# Patient Record
Sex: MALE | Race: BLACK OR AFRICAN AMERICAN | Employment: OTHER | URBAN - METROPOLITAN AREA
[De-identification: names, ages, dates, MRNs, and addresses within clinical notes are randomized per-mention and may not be internally consistent; named-entity substitution may affect disease eponyms.]

---

## 2011-03-01 LAB — COLONOSCOPY, EXTERNAL: NORMAL

## 2016-12-01 LAB — PSA, EXTERNAL: 1.9

## 2017-02-07 ENCOUNTER — OFFICE VISIT (OUTPATIENT)
Dept: SLEEP MEDICINE | Age: 62
End: 2017-02-07

## 2017-02-07 VITALS
OXYGEN SATURATION: 94 % | DIASTOLIC BLOOD PRESSURE: 99 MMHG | TEMPERATURE: 98 F | HEART RATE: 74 BPM | SYSTOLIC BLOOD PRESSURE: 160 MMHG | HEIGHT: 71 IN | WEIGHT: 315 LBS | BODY MASS INDEX: 44.1 KG/M2

## 2017-02-07 DIAGNOSIS — G47.33 OSA (OBSTRUCTIVE SLEEP APNEA): Primary | ICD-10-CM

## 2017-02-07 DIAGNOSIS — E66.01 OBESITY, MORBID, BMI 50 OR HIGHER (HCC): ICD-10-CM

## 2017-02-07 RX ORDER — ATENOLOL 50 MG/1
TABLET ORAL
Refills: 3 | COMMUNITY
Start: 2016-12-30 | End: 2017-12-22 | Stop reason: SDUPTHER

## 2017-02-07 RX ORDER — DOCUSATE SODIUM 100 MG/1
100 CAPSULE ORAL 2 TIMES DAILY
COMMUNITY
End: 2019-02-20

## 2017-02-07 NOTE — PROGRESS NOTES
217 [facility] [place]. [place], [address]  Tel.  [phone]  Fax. 100 [facility] 60  [place], [address]  Tel.  [phone]  Fax. [phone] [address] [facility] Lydia Barrera [address]  Tel.  [phone]  Fax. [phone]       Subjective:      Cassidy Simmons is a 59 y.o. male who I am asked to see in consultation for evaluation and management of sleep apnea. He was diagnosed with sleep apnea several years ago. He is using his device regularly. He complains of awakening in the middle of the night because of SOB associated with mask air leaking. Symptoms began several months ago, gradually worsening since that time. He usually can fall asleep in 5 minutes. Family or house members note tossing and turning. He denies completely or partially paralyzed while falling asleep or waking up. There are maximal  mask comfort problems. The following problems are noted with PAP:     Drowsiness yes Problems exhaling yes   Snoring no Forget to put on no   Mask Comfortable yes Can't fall asleep no   Dry Mouth yes Mask falls off yes   Air Leaking yes Frequent awakenings yes     Rosendo Kyle DANII Simmons does wake up frequently at night. He is bothered by waking up too early and left unable to get back to sleep. He actually sleeps about 5 hours at night and wakes up about 4 times during the night. He does not work shifts:  .   Rosendo Kyle indicates he does get too little sleep at night. His bedtime is 0100. He awakens at 0600. He does take naps. He takes   naps a week lasting  . He has the following observed behaviors:  ;  .  Other remarks:      [place] Sleepiness Score: 16   which reflect moderate daytime drowsiness. No Known Allergies      Current Outpatient Prescriptions:     atenolol (TENORMIN) 50 mg tablet, TK 1 T PO D, Disp: , Rfl: 3    psyllium (METAMUCIL) powd, Take  by mouth., Disp: , Rfl:     docusate sodium (COLACE) 100 mg capsule, Take 100 mg by mouth two (2) times a day. , Disp: , Rfl:     bumetanide (BUMEX) 1 mg tablet, , Disp: , Rfl:     cholecalciferol (VITAMIN D3) 1,000 unit tablet, Take 2,000 Units by mouth daily. , Disp: , Rfl:     omeprazole (PRILOSEC OTC) 20 mg tablet, Take 20 mg by mouth daily. , Disp: , Rfl:     olmesartan (BENICAR) 40 mg tablet, Take 40 mg by mouth daily. , Disp: , Rfl:     allopurinol (ZYLOPRIM) 300 mg tablet, Take 300 mg by mouth daily. , Disp: , Rfl:     LG-XT-LK-Fe-Min-Lycopen-Lutein (CENTRUM) 0.4-162-18 mg Tab, Take  by mouth.  , Disp: , Rfl:     bumetanide (BUMEX) 0.5 mg tablet, Take 0.5 mg by mouth daily. , Disp: , Rfl:     aspirin 81 mg chewable tablet, Take 81 mg by mouth daily. , Disp: , Rfl:     clobetasol-emollient (TEMOVATE-E) 0.05 % topical cream, , Disp: , Rfl:     clotrimazole-betamethasone (LOTRISONE) topical cream, , Disp: , Rfl:     BOOSTRIX TDAP 2.5-8-5 Lf-mcg-Lf/0.5mL susp susp, , Disp: , Rfl:     VARICELLA-ZOSTER VIRUS INFECTION PROPHYLAXIS 19,400 unit/0.65 mL susr injection, , Disp: , Rfl:     albuterol (PROAIR HFA) 90 mcg/actuation inhaler, Take 2 puffs by inhalation every four (4) hours as needed for Wheezing., Disp: 1 Inhaler, Rfl: 1    chlorpheniramine-HYDROcodone (TUSSIONEX PENNKINETIC ER) 8-10 mg/5 mL suspension, Take 5 mL by mouth every twelve (12) hours as needed for Cough. , Disp: 60 mL, Rfl: 0     He  has a past medical history of Community acquired pneumonia; Environmental allergies; Hypertension; and Sleep apnea. He  has a past surgical history that includes orthopaedic. He family history includes Diabetes in an other family member; Heart Disease in an other family member; Hypertension in an other family member. He  reports that he has never smoked. He does not have any smokeless tobacco history on file. He reports that he does not drink alcohol. Review of Systems:  Constitutional:  No significant weight loss or weight gain. Eyes:  No blurred vision.   CVS:  No significant chest pain  Pulm:  No significant shortness of breath  GI:  No significant nausea or vomiting  :  No significant nocturia  Musculoskeletal:  No significant joint pain at night  Skin:  No significant rashes  Neuro:  No significant dizziness   Psych:  No active mood issues    Sleep Review of Systems: notable for no difficulty falling asleep; frequent awakenings at night;  irregular dreaming noted; no nightmares ; no early morning headaches ; no memory problems; no concentration issues; no history of any automobile or occupational accidents due to daytime drowsiness. Objective:     Visit Vitals    BP (!) 160/99    Pulse 74    Temp 98 °F (36.7 °C)    Ht 5' 11\" (1.803 m)    Wt (!) 377 lb (171 kg)    SpO2 94%    BMI 52.58 kg/m2         General:   Not in acute distress   Eyes:  Anicteric sclerae, no obvious strabismus   Nose:  No obvious nasal septum deviation    Oropharynx:   Class 3 oropharyngeal outlet, thick tongue base, enlarged and boggy uvula, low-lying soft palate, narrow tonsilo-pharyngeal pilars   Tonsils:   tonsils are unappreciated   Neck:   Neck circ. in \"inches\": 18.5; midline trachea   Chest/Lungs:  Equal lung expansion, clear on auscultation    CVS:  Normal rate, regular rhythm; no JVD   Skin:  Warm to touch; no obvious rashes   Neuro:  No focal deficits ; no obvious tremor    Psych:  Normal affect,  normal countenance;          Assessment:       ICD-10-CM ICD-9-CM    1. TERESA (obstructive sleep apnea) G47.33 327.23 AMB SUPPLY ORDER   2. Obesity, morbid, BMI 50 or higher ([facility] E66.01 278.01        Plan:     * He was provided information on sleep apnea including coresponding risk factors and the importance of proper treatment. * He was likewise counseled on weight management and lateral sleeping posture (with the use of bed pillows or wedge) which may reduce sleep apnea events. Caution with hypnotics, alcohol, and sedating pain medications as these can worsen sleep apnea.   Adherence to PAP therapy sub-optimal.  We'll request Sleep Educator assessment to improve adherence to PAP therapy - see tech notes  Follow-up Disposition:  Return in about 1 month (around 3/7/2017) for CPAP Clinic. Counseling was provided regarding the importance of regular PAP use and on proper sleep hygiene and safe driving. I have reviewed/discussed the above normal BMI with the patient. I have recommended the following interventions: dietary management education, guidance, and counseling . The plan is as follows: I have counseled this patient on diet and exercise regimens. .    Thank you for allowing us to participate in your patient's medical care.     Manuel Glaser M.D.  (electronically signed)  Diplomate in Sleep Medicine, [facility]

## 2017-02-07 NOTE — MR AVS SNAPSHOT
Visit Information Date & Time Provider Department Dept. Phone Encounter #  
 2/7/2017 11:00 AM Mirian Palencia, Dionicio [facility] [id number] Follow-up Instructions Return in about 1 month (around 3/7/2017) for CPAP Clinic. Routing History Follow-up and Disposition History Upcoming Health Maintenance Date Due Hepatitis C Screening 1955 DTaP/Tdap/Td series (1 - Tdap) 12/29/1974 FOBT Q 1 YEAR AGE 50-75 12/29/2003 ZOSTER VACCINE AGE 60> 12/29/2013 INFLUENZA AGE 9 TO ADULT 8/1/2016 Allergies as of 2/7/2017  Review Complete On: 2/7/2017 By: Mirian Palencia MD  
 No Known Allergies Current Immunizations  Never Reviewed Name Date Influenza Vaccine 10/1/2012 Pneumococcal Vaccine (Unspecified Type) 10/1/2012 Not reviewed this visit You Were Diagnosed With   
  
 Codes Comments TERESA (obstructive sleep apnea)    -  Primary ICD-10-CM: G47.33 
ICD-9-CM: 327.23 Obesity, morbid, BMI 50 or higher (HCC)     ICD-10-CM: E66.01 
ICD-9-CM: 278.01 Vitals BP Pulse Temp Height(growth percentile) Weight(growth percentile) SpO2  
 (!) 160/99 74 98 °F (36.7 °C) 5' 11\" (1.803 m) (!) 377 lb (171 kg) 94% BMI Smoking Status 52.58 kg/m2 Never Smoker Vitals History BMI and BSA Data Body Mass Index Body Surface Area 52.58 kg/m 2 2.93 m 2 Preferred Pharmacy Pharmacy Name Phone [place] [phone], [address] AT [place] [address] [phone] Your Updated Medication List  
  
   
This list is accurate as of: 2/7/17 11:31 AM.  Always use your most recent med list.  
  
  
  
  
 albuterol 90 mcg/actuation inhaler Commonly known as:  PROAIR HFA Take 2 puffs by inhalation every four (4) hours as needed for Wheezing. allopurinol 300 mg tablet Commonly known as:  Arcadio Phan  
 Take 300 mg by mouth daily. aspirin 81 mg chewable tablet Take 81 mg by mouth daily. atenolol 50 mg tablet Commonly known as:  TENORMIN  
TK 1 T PO D  
  
 BENICAR 40 mg tablet Generic drug:  olmesartan Take 40 mg by mouth daily. BOOSTRIX TDAP 2.5-8-5 Lf-mcg-Lf/0.5mL Susp susp Generic drug:  Diphth, Pertus(Acell), Tetanus * bumetanide 0.5 mg tablet Commonly known as:  Ilan Valdo Take 0.5 mg by mouth daily. * bumetanide 1 mg tablet Commonly known as:  Ilan Valdo CENTRUM 0.4-162-18 mg Tab Generic drug:  AV-ZU-YX-Fe-Min-Lycopen-Lutein Take  by mouth. chlorpheniramine-HYDROcodone 10-8 mg/5 mL suspension Commonly known as:  Tg Grieves ER Take 5 mL by mouth every twelve (12) hours as needed for Cough. clobetasol-emollient 0.05 % topical cream  
Commonly known as:  TEMOVATE-E  
  
 clotrimazole-betamethasone topical cream  
Commonly known as:  LOTRISONE  
  
 COLACE 100 mg capsule Generic drug:  docusate sodium Take 100 mg by mouth two (2) times a day. PriLOSEC OTC 20 mg tablet Generic drug:  omeprazole Take 20 mg by mouth daily. psyllium Powd Commonly known as:  METAMUCIL Take  by mouth.  
  
 varicella-zoster vacine live 19,400 unit/0.65 mL Susr injection Generic drug:  varicella zoster vacine live VITAMIN D3 1,000 unit tablet Generic drug:  cholecalciferol Take 2,000 Units by mouth daily. * Notice: This list has 2 medication(s) that are the same as other medications prescribed for you. Read the directions carefully, and ask your doctor or other care provider to review them with you. We Performed the Following AMB SUPPLY ORDER [[id number] Custom] Comments:  
 Wireless modem enrollment with privileges to change therapy remotely - indefinite. PAP Mask -patient preference, tubing, filters as needed (all sleep supplies) Length of Need = 99 months Jorden Ashraf M.D.  (electronically signed) Diplomate in Sleep Medicine, ABI Follow-up Instructions Return in about 1 month (around 3/7/2017) for CPAP Clinic. Patient Instructions [address] [place]. [address] [facility], [address] Tel.  [phone] Fax. [address] [facility] [address] [place], [address] [facility] Tel.  [phone] Fax. [phone] [address] [facility] Lydia Barrera  Tel.  [phone] Fax. [phone] Learning About CPAP for Sleep Apnea What is CPAP? CPAP is a small machine that you use at home every night while you sleep. It increases air pressure in your throat to keep your airway open. When you have sleep apnea, this can help you sleep better so you feel much better. CPAP stands for \"continuous positive airway pressure. \" The CPAP machine will have one of the following: · A mask that covers your nose and mouth · Prongs that fit into your nose · A mask that covers your nose only, the most common type. This type is called NCPAP. The N stands for \"nasal.\" Why is it done? CPAP is usually the best treatment for obstructive sleep apnea. It is the first treatment choice and the most widely used. Your doctor may suggest CPAP if you have: · Moderate to severe sleep apnea. · Sleep apnea and coronary artery disease (CAD) or heart failure. How does it help? · CPAP can help you have more normal sleep, so you feel less sleepy and more alert during the daytime. · CPAP may help keep heart failure or other heart problems from getting worse. · NCPAP may help lower your blood pressure. · If you use CPAP, your bed partner may also sleep better because you are not snoring or restless. What are the side effects? Some people who use CPAP have: · A dry or stuffy nose and a sore throat. · Irritated skin on the face. · Sore eyes. · Bloating. If you have any of these problems, work with your doctor to fix them.  Here are some things you can try: · Be sure the mask or nasal prongs fit well. · See if your doctor can adjust the pressure of your CPAP. · If your nose is dry, try a humidifier. · If your nose is runny or stuffy, try decongestant medicine or a steroid nasal spray. If these things do not help, you might try a different type of machine. Some machines have air pressure that adjusts on its own. Others have air pressures that are different when you breathe in than when you breathe out. This may reduce discomfort caused by too much pressure in your nose. Where can you learn more? Go to Kno.be Enter J332 in the search box to learn more about \"Learning About CPAP for Sleep Apnea. \"  
© 9053-2703 Healthwise, Incorporated. Care instructions adapted under license by Navdeep Ayers (which disclaims liability or warranty for this information). This care instruction is for use with your licensed healthcare professional. If you have questions about a medical condition or this instruction, always ask your healthcare professional. [place] [address] any warranty or liability for your use of this information. Content Version: 4.7.95901; Last Revised: January 11, 2010 PROPER SLEEP HYGIENE What to avoid · Do not have drinks with caffeine, such as coffee or black tea, for 8 hours before bed. · Do not smoke or use other types of tobacco near bedtime. Nicotine is a stimulant and can keep you awake. · Avoid drinking alcohol late in the evening, because it can cause you to wake in the middle of the night. · Do not eat a big meal close to bedtime. If you are hungry, eat a light snack. · Do not drink a lot of water close to bedtime, because the need to urinate may wake you up during the night. · Do not read or watch TV in bed. Use the bed only for sleeping and sexual activity. What to try · Go to bed at the same time every night, and wake up at the same time every morning. Do not take naps during the day. · Keep your bedroom quiet, dark, and cool. · Get regular exercise, but not within 3 to 4 hours of your bedtime. [place] · Sleep on a comfortable pillow and mattress. · If watching the clock makes you anxious, turn it facing away from you so you cannot see the time. · If you worry when you lie down, start a worry book. Well before bedtime, write down your worries, and then set the book and your concerns aside. · Try meditation or other relaxation techniques before you go to bed. · If you cannot fall asleep, get up and go to another room until you feel sleepy. Do something relaxing. Repeat your bedtime routine before you go to bed again. · Make your house quiet and calm about an hour before bedtime. Turn down the lights, turn off the TV, log off the computer, and turn down the volume on music. This can help you relax after a busy day. Drowsy Driving: The [employer] cites drowsiness as a causing factor in more than 264,576 police reported crashes annually, resulting in 76,000 injuries and 1,500 deaths. Other surveys suggest 55% of people polled have driven while drowsy in the past year, 23% had fallen asleep but not crashed, 3% crashed, and 2% had and accident due to drowsy driving. Who is at risk? Young Drivers: One study of drowsy driving accidents states that 55% of the drivers were under 25 years. Of those, 75% were male. Shift Workers and Travelers: People who work overnight or travel across time zones frequently are at higher risk of experiencing Circadian Rhythm Disorders. They are trying to work and function when their body is programed to sleep. Sleep Deprived: Lack of sleep has a serious impact on your ability to pay attention or focus on a task. Consistently getting less than the average of 8 hours your body needs creates partial or cumulative sleep deprivation. Untreated Sleep Disorders: Sleep Apnea, Narcolepsy, R.L.S., and other sleep disorders (untreated) prevent a person from getting enough restful sleep. This leads to excessive daytime sleepiness and increases the risk for drowsy driving accidents by up to 7 times. Medications / Alcohol: Even over the counter medications can cause drowsiness. Medications that impair a drivers attention should have a warning label. Alcohol naturally makes you sleepy and on its own can cause accidents. Combined with excessive drowsiness its effects are amplified. Signs of Drowsy Driving: * You don't remember driving the last few miles * You may drift out of your quinton * You are unable to focus and your thoughts wander * You may yawn more often than normal 
 * You have difficulty keeping your eyes open / nodding off * Missing traffic signs, speeding, or tailgating Prevention-  
Good sleep hygiene, lifestyle and behavioral choices have the most impact on drowsy driving. There is no substitute for sleep and the average person requires 8 hours nightly. If you find yourself driving drowsy, stop and sleep. Consider the sleep hygiene tips provided during your visit as well. Medication Refill Policy: Refills for all medications require 1 week advance notice. Please have your pharmacy fax a refill request. We are unable to fax, or call in \"controled substance\" medications and you will need to pick these prescriptions up from our office. SeeControl Activation Thank you for requesting access to SeeControl. Please follow the instructions below to securely access and download your online medical record. SeeControl allows you to send messages to your doctor, view your test results, renew your prescriptions, schedule appointments, and more. How Do I Sign Up? 1. In your internet browser, go to [email or web address]. 2. Click on the First Time User? Click Here link in the Sign In box.  You will see the New Member Sign Up page. 3. Enter your [employer] Direct Access Code exactly as it appears below. You will not need to use this code after youve completed the sign-up process. If you do not sign up before the expiration date, you must request a new code. [employer] Direct Access Code: [email or web address] Expires: 2017 11:26 AM (This is the date your opvizorhart access code will ) 4. Enter the last four digits of your Social Security Number (xxxx) and Date of Birth (mm/dd/yyyy) as indicated and click Submit. You will be taken to the next sign-up page. 5. Create a [employer] Direct ID. This will be your [employer] Direct login ID and cannot be changed, so think of one that is secure and easy to remember. 6. Create a [employer] Direct password. You can change your password at any time. 7. Enter your Password Reset Question and Answer. This can be used at a later time if you forget your password. 8. Enter your e-mail address. You will receive e-mail notification when new information is available in [address] 9. Click Sign Up. You can now view and download portions of your medical record. 10. Click the Download Summary menu link to download a portable copy of your medical information. Additional Information If you have questions, please call [phone]. Remember, [employer] Direct is NOT to be used for urgent needs. For medical emergencies, dial 911. Starting a Weight Loss Plan: After Your Visit Your Care Instructions If you are thinking about losing weight, it can be hard to know where to start. Your doctor can help you set up a weight loss plan that best meets your needs. You may want to take a class on nutrition or exercise, or join a weight loss support group. If you have questions about how to make changes to your eating or exercise habits, ask your doctor about seeing a registered dietitian or an exercise specialist. 
It can be a big challenge to lose weight.  But you do not have to make huge changes at once. Make small changes, and stick with them. When those changes become habit, add a few more changes. If you do not think you are ready to make changes right now, try to pick a date in the future. Make an appointment to see your doctor to discuss whether the time is right for you to start a plan. Follow-up care is a key part of your treatment and safety. Be sure to make and go to all appointments, and call your doctor if you are having problems. It's also a good idea to know your test results and keep a list of the medicines you take. How can you care for yourself at home? · Set realistic goals. Many people expect to lose much more weight than is likely. A weight loss of 5% to 10% of your body weight may be enough to improve your health. · Get family and friends involved to provide support. Talk to them about why you are trying to lose weight, and ask them to help. They can help by participating in exercise and having meals with you, even if they may be eating something different. · Find what works best for you. If you do not have time or do not like to cook, a program that offers meal replacement bars or shakes may be better for you. Or if you like to prepare meals, finding a plan that includes daily menus and recipes may be best. 
· Ask your doctor about other health professionals who can help you achieve your weight loss goals. ¨ A dietitian can help you make healthy changes in your diet. ¨ An exercise specialist or  can help you develop a safe and effective exercise program. 
¨ A counselor or psychiatrist can help you cope with issues such as depression, anxiety, or family problems that can make it hard to focus on weight loss. · Consider joining a support group for people who are trying to lose weight. Your doctor can suggest groups in your area. Where can you learn more? Go to DealExplorer.be Enter O100 in the search box to learn more about \"Starting a Weight Loss Plan: After Your Visit. \"  
© 2897-6943 Healthwise, Incorporated. Care instructions adapted under license by [employer] [place] (which disclaims liability or warranty for this information). This care instruction is for use with your licensed healthcare professional. If you have questions about a medical condition or this instruction, always ask your healthcare professional. [place] [address] any warranty or liability for your use of this information. Content Version: 7.3.76507; Last Revised: September 1, 2009 Introducing [facility]! [employer] introduces L[employer]Works patient portal. Now you can access parts of your medical record, email your doctor's office, and request medication refills online. 1. In your internet browser, go to [email or web address] 2. Click on the First Time User? Click Here link in the Sign In box. You will see the New Member Sign Up page. 3. Enter your LootWorks Access Code exactly as it appears below. You will not need to use this code after youve completed the sign-up process. If you do not sign up before the expiration date, you must request a new code. · LootWorks Access Code: [email or web address] Expires: 5/8/2017 11:26 AM 
 
4. Enter the last four digits of your Social Security Number (xxxx) and Date of Birth (mm/dd/yyyy) as indicated and click Submit. You will be taken to the next sign-up page. 5. Create a LootWorks ID. This will be your LootWorks login ID and cannot be changed, so think of one that is secure and easy to remember. 6. Create a LootWorks password. You can change your password at any time. 7. Enter your Password Reset Question and Answer. This can be used at a later time if you forget your password. 8. Enter your e-mail address. You will receive e-mail notification when new information is available in [address] 9. Click Sign Up. You can now view and download portions of your medical record. 10. Click the Download Summary menu link to download a portable copy of your medical information. If you have questions, please visit the Frequently Asked Questions section of the [employer] website. Remember, [employer] is NOT to be used for urgent needs. For medical emergencies, dial 911. Now available from your iPhone and Android! Please provide this summary of care documentation to your next provider. Your primary care clinician is listed as Beth Beck II. If you have any questions after today's visit, please call [phone].

## 2017-02-07 NOTE — PATIENT INSTRUCTIONS
[address] [place]. [place], [address]  Tel.  [phone]  Fax. [address] [facility] 60  [place], [address] [facility]  Tel.  [phone]  Fax. [phone] [address]  Tel.  [phone]  Fax. [phone]     Learning About CPAP for Sleep Apnea  What is CPAP? CPAP is a small machine that you use at home every night while you sleep. It increases air pressure in your throat to keep your airway open. When you have sleep apnea, this can help you sleep better so you feel much better. CPAP stands for \"continuous positive airway pressure. \"  The CPAP machine will have one of the following:  · A mask that covers your nose and mouth  · Prongs that fit into your nose  · A mask that covers your nose only, the most common type. This type is called NCPAP. The N stands for \"nasal.\"  Why is it done? CPAP is usually the best treatment for obstructive sleep apnea. It is the first treatment choice and the most widely used. Your doctor may suggest CPAP if you have:  · Moderate to severe sleep apnea. · Sleep apnea and coronary artery disease (CAD) or heart failure. How does it help? · CPAP can help you have more normal sleep, so you feel less sleepy and more alert during the daytime. · CPAP may help keep heart failure or other heart problems from getting worse. · NCPAP may help lower your blood pressure. · If you use CPAP, your bed partner may also sleep better because you are not snoring or restless. What are the side effects? Some people who use CPAP have:  · A dry or stuffy nose and a sore throat. · Irritated skin on the face. · Sore eyes. · Bloating. If you have any of these problems, work with your doctor to fix them. Here are some things you can try:  · Be sure the mask or nasal prongs fit well. · See if your doctor can adjust the pressure of your CPAP. · If your nose is dry, try a humidifier.   · If your nose is runny or stuffy, try decongestant medicine or a steroid nasal spray. If these things do not help, you might try a different type of machine. Some machines have air pressure that adjusts on its own. Others have air pressures that are different when you breathe in than when you breathe out. This may reduce discomfort caused by too much pressure in your nose. Where can you learn more? Go to ShoutNow.be  Enter Perico Lang in the search box to learn more about \"Learning About CPAP for Sleep Apnea. \"   © 3775-3378 Healthwise, Incorporated. Care instructions adapted under license by [employer] (which disclaims liability or warranty for this information). This care instruction is for use with your licensed healthcare professional. If you have questions about a medical condition or this instruction, always ask your healthcare professional. [address] any warranty or liability for your use of this information. Content Version: 6.1.15432; Last Revised: January 11, 2010  PROPER SLEEP HYGIENE    What to avoid  · Do not have drinks with caffeine, such as coffee or black tea, for 8 hours before bed. · Do not smoke or use other types of tobacco near bedtime. Nicotine is a stimulant and can keep you awake. · Avoid drinking alcohol late in the evening, because it can cause you to wake in the middle of the night. · Do not eat a big meal close to bedtime. If you are hungry, eat a light snack. · Do not drink a lot of water close to bedtime, because the need to urinate may wake you up during the night. · Do not read or watch TV in bed. Use the bed only for sleeping and sexual activity. What to try  · Go to bed at the same time every night, and wake up at the same time every morning. Do not take naps during the day. · Keep your bedroom quiet, dark, and cool. · Get regular exercise, but not within 3 to 4 hours of your bedtime. .  · Sleep on a comfortable pillow and mattress.   · If watching the clock makes you anxious, turn it facing away from you so you cannot see the time. · If you worry when you lie down, start a worry book. Well before bedtime, write down your worries, and then set the book and your concerns aside. · Try meditation or other relaxation techniques before you go to bed. · If you cannot fall asleep, get up and go to another room until you feel sleepy. Do something relaxing. Repeat your bedtime routine before you go to bed again. · Make your house quiet and calm about an hour before bedtime. Turn down the lights, turn off the TV, log off the computer, and turn down the volume on music. This can help you relax after a busy day. Drowsy Driving: The [employer] cites drowsiness as a causing factor in more than 331,649 police reported crashes annually, resulting in 76,000 injuries and 1,500 deaths. Other surveys suggest 55% of people polled have driven while drowsy in the past year, 23% had fallen asleep but not crashed, 3% crashed, and 2% had and accident due to drowsy driving. Who is at risk? Young Drivers: One study of drowsy driving accidents states that 55% of the drivers were under 25 years. Of those, 75% were male. Shift Workers and Travelers: People who work overnight or travel across time zones frequently are at higher risk of experiencing Circadian Rhythm Disorders. They are trying to work and function when their body is programed to sleep. Sleep Deprived: Lack of sleep has a serious impact on your ability to pay attention or focus on a task. Consistently getting less than the average of 8 hours your body needs creates partial or cumulative sleep deprivation. Untreated Sleep Disorders: Sleep Apnea, Narcolepsy, R.L.S., and other sleep disorders (untreated) prevent a person from getting enough restful sleep. This leads to excessive daytime sleepiness and increases the risk for drowsy driving accidents by up to 7 times.   Medications / Alcohol: Even over the counter medications can cause drowsiness. Medications that impair a drivers attention should have a warning label. Alcohol naturally makes you sleepy and on its own can cause accidents. Combined with excessive drowsiness its effects are amplified. Signs of Drowsy Driving:   * You don't remember driving the last few miles   * You may drift out of your quinton   * You are unable to focus and your thoughts wander   * You may yawn more often than normal   * You have difficulty keeping your eyes open / nodding off   * Missing traffic signs, speeding, or tailgating  Prevention-   Good sleep hygiene, lifestyle and behavioral choices have the most impact on drowsy driving. There is no substitute for sleep and the average person requires 8 hours nightly. If you find yourself driving drowsy, stop and sleep. Consider the sleep hygiene tips provided during your visit as well. Medication Refill Policy: Refills for all medications require 1 week advance notice. Please have your pharmacy fax a refill request. We are unable to fax, or call in \"controled substance\" medications and you will need to pick these prescriptions up from our office. [facility] Activation    Thank you for requesting access to [facility]. Please follow the instructions below to securely access and download your online medical record. [facility] allows you to send messages to your doctor, view your test results, renew your prescriptions, schedule appointments, and more. How Do I Sign Up? 1. In your internet browser, go to [email or web address]. 2. Click on the First Time User? Click Here link in the Sign In box. You will see the New Member Sign Up page. 3. Enter your [facility] Access Code exactly as it appears below. You will not need to use this code after youve completed the sign-up process. If you do not sign up before the expiration date, you must request a new code.     [facility] Access Code: [email or web address]  Expires: 2017 11:26 AM (This is the date your [employer] access code will )    4. Enter the last four digits of your Social Security Number (xxxx) and Date of Birth (mm/dd/yyyy) as indicated and click Submit. You will be taken to the next sign-up page. 5. Create a [employer] ID. This will be your [employer] login ID and cannot be changed, so think of one that is secure and easy to remember. 6. Create a [employer] password. You can change your password at any time. 7. Enter your Password Reset Question and Answer. This can be used at a later time if you forget your password. 8. Enter your e-mail address. You will receive e-mail notification when new information is available in [address] 9. Click Sign Up. You can now view and download portions of your medical record. 10. Click the Download Summary menu link to download a portable copy of your medical information. Additional Information    If you have questions, please call [phone]. Remember, [employer] is NOT to be used for urgent needs. For medical emergencies, dial 911. Starting a Weight Loss Plan: After Your Visit  Your Care Instructions  If you are thinking about losing weight, it can be hard to know where to start. Your doctor can help you set up a weight loss plan that best meets your needs. You may want to take a class on nutrition or exercise, or join a weight loss support group. If you have questions about how to make changes to your eating or exercise habits, ask your doctor about seeing a registered dietitian or an exercise specialist.  It can be a big challenge to lose weight. But you do not have to make huge changes at once. Make small changes, and stick with them. When those changes become habit, add a few more changes. If you do not think you are ready to make changes right now, try to pick a date in the future.  Make an appointment to see your doctor to discuss whether the time is right for you to start a plan.  Follow-up care is a key part of your treatment and safety. Be sure to make and go to all appointments, and call your doctor if you are having problems. It's also a good idea to know your test results and keep a list of the medicines you take. How can you care for yourself at home? · Set realistic goals. Many people expect to lose much more weight than is likely. A weight loss of 5% to 10% of your body weight may be enough to improve your health. · Get family and friends involved to provide support. Talk to them about why you are trying to lose weight, and ask them to help. They can help by participating in exercise and having meals with you, even if they may be eating something different. · Find what works best for you. If you do not have time or do not like to cook, a program that offers meal replacement bars or shakes may be better for you. Or if you like to prepare meals, finding a plan that includes daily menus and recipes may be best.  · Ask your doctor about other health professionals who can help you achieve your weight loss goals. ¨ A dietitian can help you make healthy changes in your diet. ¨ An exercise specialist or  can help you develop a safe and effective exercise program.  ¨ A counselor or psychiatrist can help you cope with issues such as depression, anxiety, or family problems that can make it hard to focus on weight loss. · Consider joining a support group for people who are trying to lose weight. Your doctor can suggest groups in your area. Where can you learn more? Go to Diavibe.be  Enter U357 in the search box to learn more about \"Starting a Weight Loss Plan: After Your Visit. \"   © 5724-3706 Healthwise, Incorporated. Care instructions adapted under license by Elia Laura (which disclaims liability or warranty for this information).  This care instruction is for use with your licensed healthcare professional. If you have questions about a medical condition or this instruction, always ask your healthcare professional. [address] any warranty or liability for your use of this information.   Content Version: 5.3.89116; Last Revised: September 1, 2009

## 2017-08-01 ENCOUNTER — OFFICE VISIT (OUTPATIENT)
Dept: INTERNAL MEDICINE CLINIC | Age: 62
End: 2017-08-01

## 2017-08-01 VITALS
BODY MASS INDEX: 44.1 KG/M2 | TEMPERATURE: 98.5 F | WEIGHT: 315 LBS | HEART RATE: 74 BPM | RESPIRATION RATE: 16 BRPM | OXYGEN SATURATION: 97 % | DIASTOLIC BLOOD PRESSURE: 78 MMHG | HEIGHT: 71 IN | SYSTOLIC BLOOD PRESSURE: 122 MMHG

## 2017-08-01 DIAGNOSIS — J06.9 VIRAL URI WITH COUGH: Primary | ICD-10-CM

## 2017-08-01 RX ORDER — HYDROCODONE POLISTIREX AND CHLORPHENIRAMINE POLISTIREX 10; 8 MG/5ML; MG/5ML
5 SUSPENSION, EXTENDED RELEASE ORAL
Qty: 60 ML | Refills: 0 | Status: SHIPPED | OUTPATIENT
Start: 2017-08-01 | End: 2017-09-11

## 2017-08-01 RX ORDER — PROMETHAZINE HYDROCHLORIDE AND CODEINE PHOSPHATE 6.25; 1 MG/5ML; MG/5ML
5 SOLUTION ORAL
Qty: 240 ML | Refills: 0 | Status: SHIPPED | OUTPATIENT
Start: 2017-08-01 | End: 2017-08-01 | Stop reason: ALTCHOICE

## 2017-08-01 NOTE — PROGRESS NOTES
Mr. Martha Hastings comes to the office today complaining of rhinorrhea, dry cough, low grade temp and malaise. He didn't feel like he could work today. He didn't check his temperature. He says he knows he had one. Physical Examination:  GENERAL:  T: 98.5. BP: 122/78. P: 74 and regular. O2 sat on room air: 97%. WT: 381. HEENT:  Ears are clear. Nose - pale, swollen turbinates, clear drainage. Throat is normal.  NECK:  Without adenopathy or stridor. LUNGS:  Clear. Breath sounds diminished bilaterally due to poor inspiratory effort. HEART:  Regular rhythm, no murmurs or gallops. EXTREMITIES:  1+ edema. Impression:  1. Acute viral upper respiratory infection. Plan:  1. Use Tussionex, one teaspoon every 12 hours. 2. I have excused him from work until 08/03. 3. Follow up here as previously scheduled.

## 2017-08-01 NOTE — PROGRESS NOTES
Chief Complaint   Patient presents with    Cold Symptoms     congestion     Pt states has been having cold symptoms and congestion since 7/30/2017. Pt using fam cough drops. 1. Have you been to the ER, urgent care clinic since your last visit? Hospitalized since your last visit? No    2. Have you seen or consulted any other health care providers outside of the [facility] since your last visit? Include any pap smears or colon screening.  No

## 2017-08-01 NOTE — LETTER
NOTIFICATION RETURN TO WORK / SCHOOL 
 
8/1/2017 3:57 PM 
 
Mr. Wang Simmons 
[address] [facility] [address] [id number] To Whom It May Concern: 
 
Wang Simmons is currently under the care of Vandana Sparks. He will return to work/school on: 8/3/2017 If there are questions or concerns please have the patient contact our office. Sincerely, Gray Frye MD

## 2017-08-22 ENCOUNTER — OFFICE VISIT (OUTPATIENT)
Dept: INTERNAL MEDICINE CLINIC | Age: 62
End: 2017-08-22

## 2017-08-22 VITALS
SYSTOLIC BLOOD PRESSURE: 154 MMHG | BODY MASS INDEX: 53 KG/M2 | HEART RATE: 65 BPM | DIASTOLIC BLOOD PRESSURE: 91 MMHG | TEMPERATURE: 97.5 F | WEIGHT: 315 LBS

## 2017-08-22 DIAGNOSIS — R10.9 RIGHT FLANK PAIN: Primary | ICD-10-CM

## 2017-08-22 PROBLEM — M54.50 ACUTE RIGHT-SIDED LOW BACK PAIN WITHOUT SCIATICA: Status: ACTIVE | Noted: 2017-08-22

## 2017-08-22 LAB
BACTERIA UA POCT, BACTPOCT: NORMAL
BILIRUB UR QL STRIP: NEGATIVE
CASTS UA POCT: NORMAL
CLUE CELLS, CLUEPOCT: NEGATIVE
CRYSTALS UA POCT, CRYSPOCT: NEGATIVE
EPITHELIAL CELLS POCT, EPITHPOCT: NORMAL
GLUCOSE UR-MCNC: NEGATIVE MG/DL
KETONES P FAST UR STRIP-MCNC: NEGATIVE MG/DL
MUCUS UA POCT, MUCPOCT: NORMAL
PH UR STRIP: 5 [PH] (ref 5–7)
PROTEIN,URINE POC: NEGATIVE MG/DL
RBC UA POCT, RBCPOCT: NORMAL
SP GR UR STRIP: 1.01 (ref 1.01–1.02)
TRICH UA POCT, TRICHPOC: NEGATIVE
UA UROBILINOGEN AMB POC: NORMAL (ref 0.2–1)
URINALYSIS CLARITY POC: CLEAR
URINALYSIS COLOR POC: NORMAL
URINE BLOOD POC: NEGATIVE
URINE LEUKOCYTES POC: NORMAL
URINE NITRITES POC: NEGATIVE
WBC UA POCT, WBCPOCT: NORMAL
YEAST UA POCT, YEASTPOC: NEGATIVE

## 2017-08-22 RX ORDER — CELECOXIB 200 MG/1
200 CAPSULE ORAL 2 TIMES DAILY
Qty: 20 CAP | Refills: 2 | Status: SHIPPED | OUTPATIENT
Start: 2017-08-22 | End: 2017-08-22 | Stop reason: SDUPTHER

## 2017-08-22 RX ORDER — CYCLOBENZAPRINE HCL 5 MG
5 TABLET ORAL
Qty: 30 TAB | Refills: 1 | Status: SHIPPED | OUTPATIENT
Start: 2017-08-22 | End: 2017-09-11

## 2017-08-22 NOTE — PROGRESS NOTES
Mr. Nenita Frye comes to the office today complaining of right sided low back pain. He just returned from a week bus trip with a group to [place] and [place]. He said yesterday after work he could hardly get out of the car. There are no radicular symptoms. He's not had nausea, vomiting, fever or chills. Physical Examination:  GENERAL:  WT: 380 lbs. BP: initially 154/94, subsequently 130/84. T:  97.5. P: 65 and regular. LUNGS:  Clear. BACK:  There is no CVA tenderness to fist percussion. HEART:  Regular rhythm, no murmurs or gallops. ABDOMEN:  Obese, soft, non tender. MUSCULOSKELETAL:  Examination of lower back reveals tenderness over the right paraspinal lumbar musculature. He is moving quite gingerly. Studies:  UA is dipstick negative for blood. Impression:  1. Musculoskeletal right flank pain. Plan:  1. Flexeril 5 mg t.i.d.  2. Celebrex 200 mg b.i.d.  3. Ice and moist heat. 4. Excused from work until 08/24. 5. Follow up in December for full review or sooner prn.

## 2017-08-22 NOTE — LETTER
NOTIFICATION RETURN TO WORK / SCHOOL 
 
8/22/2017 4:07 PM 
 
Mr. Elba Simmons 
[address]  [address] [address] To Whom It May Concern: 
 
Elba Simmons is currently under the care of Jose Rees. He will return to work/school on: [id number] If there are questions or concerns please have the patient contact our office. Sincerely, Joy Mclain MD

## 2017-08-22 NOTE — PROGRESS NOTES
Reviewed record in preparation for visit and have obtained necessary documentation. Identified pt with two pt identifiers(name and ). Chief Complaint   Patient presents with    Back Pain        Coordination of Care Questionnaire:  :     1) Have you been to an emergency room, urgent care clinic since your last visit? No     Hospitalized since your last visit? No               2) Have you seen or consulted any other health care providers outside of [address] [place] [address] since your last visit?  No        Back pain since back to work after vacation   Right side most painful

## 2017-08-23 RX ORDER — CELECOXIB 200 MG/1
CAPSULE ORAL
Qty: 180 CAP | Refills: 2 | Status: SHIPPED | OUTPATIENT
Start: 2017-08-23 | End: 2017-09-11

## 2017-09-11 ENCOUNTER — OFFICE VISIT (OUTPATIENT)
Dept: INTERNAL MEDICINE CLINIC | Age: 62
End: 2017-09-11

## 2017-09-11 VITALS
HEIGHT: 71 IN | OXYGEN SATURATION: 93 % | DIASTOLIC BLOOD PRESSURE: 80 MMHG | BODY MASS INDEX: 44.1 KG/M2 | HEART RATE: 72 BPM | SYSTOLIC BLOOD PRESSURE: 130 MMHG | WEIGHT: 315 LBS | RESPIRATION RATE: 20 BRPM | TEMPERATURE: 97.9 F

## 2017-09-11 DIAGNOSIS — L29.9 PRURITUS: ICD-10-CM

## 2017-09-11 DIAGNOSIS — L30.9 ECZEMA, UNSPECIFIED TYPE: Primary | ICD-10-CM

## 2017-09-11 PROBLEM — E88.81 METABOLIC SYNDROME: Status: ACTIVE | Noted: 2017-09-11

## 2017-09-11 PROBLEM — R10.9 RIGHT FLANK PAIN: Status: RESOLVED | Noted: 2017-08-22 | Resolved: 2017-09-11

## 2017-09-11 PROBLEM — E78.5 DYSLIPIDEMIA: Status: ACTIVE | Noted: 2017-09-11

## 2017-09-11 PROBLEM — G47.33 OSA (OBSTRUCTIVE SLEEP APNEA): Status: ACTIVE | Noted: 2017-09-11

## 2017-09-11 RX ORDER — CETIRIZINE HCL 10 MG
10 TABLET ORAL 2 TIMES DAILY
COMMUNITY
Start: 2017-09-11 | End: 2019-02-20

## 2017-09-11 RX ORDER — TRIAMCINOLONE ACETONIDE 1 MG/G
CREAM TOPICAL 2 TIMES DAILY
Qty: 80 G | Refills: 0 | Status: SHIPPED | OUTPATIENT
Start: 2017-09-11 | End: 2019-02-20

## 2017-09-11 NOTE — PROGRESS NOTES
Chief Complaint   Patient presents with    Skin Problem     pt c/o red bump all over his upper body for 2 days.  it is itching bad used Benadryl spray and it didn't help

## 2017-09-11 NOTE — PROGRESS NOTES
Subjective:     Mr. Charlie Champion comes to the office today complaining of a rash and itching over the last three to four days time. He denies any unusual food ingestion. He denies any change in soaps, detergents, cologne, etc.  He's had no unusual ingestions. He's been using some Benadryl spray with improvement. He's had no fever or chills. He denies systemic symptoms otherwise. His rash is a vague erythematous rash, mostly over his elbows, left flank and shoulders. It mostly has an eczematoid appearance. He'll try a steroid cream, as well as high dose Zyrtec for the itching and see how he does. He'll call back if he doesn't improve.         Patient Active Problem List    Diagnosis Date Noted    HTN (hypertension) 05/23/2014     Priority: 1 - One    Dyslipidemia 09/11/2017     Priority: 2 - Two    TERESA (obstructive sleep apnea) 09/11/2017     Priority: 2 - Two    Metabolic syndrome 45/85/3834     Priority: 2 - Two    Esophageal reflux 05/23/2014     Priority: 2 - Two    Eczema 09/11/2017     Priority: 3 - Three    Pruritus 09/11/2017     Priority: 3 - Three    Essential hypertension, benign 02/16/2013     Priority: 3 - Three    Partial small bowel obstruction ([address] 05/22/2014     Priority: 4 - Four    Acute right-sided low back pain without sciatica 08/22/2017     Past Surgical History:   Procedure Laterality Date    HX ORTHOPAEDIC      right knee      Social History   Substance Use Topics    Smoking status: Never Smoker    Smokeless tobacco: Not on file    Alcohol use No     No Known Allergies    Review of Systems:  GEN: no weight loss, weight gain, fatigue or night sweats  CV: no PND, orthopnea, or palpitations  Resp: no dyspnea on exertion, no cough  Abd: no nausea, vomiting or diarrhea  Endocrine: no hair loss, excessive thirst or polyuria  Neurological ROS: no TIA or stroke symptoms      Objective:     Visit Vitals    /80 (BP 1 Location: Right arm, BP Patient Position: Sitting)    Pulse 72  Temp 97.9 °F (36.6 °C) (Oral)    Resp 20    Ht 5' 11\" (1.803 m)    Wt (!) 381 lb (172.8 kg)    SpO2 93%    BMI 53.14 kg/m2     General:   alert, cooperative and no distress   Eyes: conjunctivae/sclerae clear. PERRL   Mouth:  No oral lesions, no pharyngeal erythema, no exudates   Skin: Eczematoid rash over elbows, shoulder and left flank   Heart: S1 and S2 normal,no murmurs noted    Lungs: Clear to auscultation bilaterally, no increased work of breathing   Abdomen: Soft, nontender. Normal bowel sounds   Extremities: No edema or cyanosis                                Assessment/Plan:       ICD-10-CM ICD-9-CM    1. Eczema, unspecified type L30.9 692.9    2. Pruritus L29.9 698.9        Orders Placed This Encounter    triamcinolone acetonide (KENALOG) 0.1 % topical cream     Sig: Apply  to affected area two (2) times a day. use thin layer     Dispense:  80 g     Refill:  0    cetirizine (ZYRTEC) 10 mg tablet     Sig: Take 1 Tab by mouth two (2) times a day. Follow-up Disposition:  Return if symptoms worsen or fail to improve, for As scheduled.

## 2017-10-03 RX ORDER — CELECOXIB 200 MG/1
CAPSULE ORAL
Qty: 180 CAP | Refills: 0 | Status: SHIPPED | OUTPATIENT
Start: 2017-10-03 | End: 2019-02-20

## 2017-10-03 RX ORDER — CELECOXIB 200 MG/1
CAPSULE ORAL
Qty: 20 CAP | Refills: 0 | Status: SHIPPED | OUTPATIENT
Start: 2017-10-03 | End: 2017-10-03 | Stop reason: SDUPTHER

## 2017-10-31 RX ORDER — OLMESARTAN MEDOXOMIL 40 MG/1
TABLET ORAL
Qty: 30 TAB | Refills: 0 | Status: SHIPPED | OUTPATIENT
Start: 2017-10-31 | End: 2017-12-04 | Stop reason: SINTOL

## 2017-11-07 ENCOUNTER — OFFICE VISIT (OUTPATIENT)
Dept: INTERNAL MEDICINE CLINIC | Age: 62
End: 2017-11-07

## 2017-11-07 VITALS
HEIGHT: 71 IN | SYSTOLIC BLOOD PRESSURE: 131 MMHG | WEIGHT: 315 LBS | BODY MASS INDEX: 44.1 KG/M2 | TEMPERATURE: 98.6 F | HEART RATE: 73 BPM | RESPIRATION RATE: 18 BRPM | OXYGEN SATURATION: 95 % | DIASTOLIC BLOOD PRESSURE: 88 MMHG

## 2017-11-07 DIAGNOSIS — Z23 ENCOUNTER FOR IMMUNIZATION: ICD-10-CM

## 2017-11-07 DIAGNOSIS — J02.8 ACUTE PHARYNGITIS DUE TO OTHER SPECIFIED ORGANISMS: Primary | ICD-10-CM

## 2017-11-07 RX ORDER — AZITHROMYCIN 250 MG/1
TABLET, FILM COATED ORAL
Qty: 6 TAB | Refills: 0 | Status: SHIPPED | OUTPATIENT
Start: 2017-11-07 | End: 2017-11-12

## 2017-11-07 NOTE — PROGRESS NOTES
Chief Complaint   Patient presents with    Sinus Pain     States this started last week, sinus headaches    Ear Pain     States he has fluid in his ear, hear and feels the fluid    Sore Throat     states he feels the fluid draining down his throat with pressure     1. Have you been to the ER, urgent care clinic since your last visit? Hospitalized since your last visit? No    2. Have you seen or consulted any other health care providers outside of the [address] [place] [address] since your last visit? Include any pap smears or colon screening. No      Patient presents for routine immunizations. Patient denies any symptoms, reactions or allergies that would exclude them from being immunized today. After obtaining written consent, and per verbal orders of Dr. Steven Macedo, injection of influenza given. Risks and adverse reactions were discussed and the VIS was given to them. All questions were addressed. Patient was observed for 20 minutes post injection. There were no reactions observed at this time. Advised patient to call with any concerns or signs or symptoms of adverse reaction.     Abdi Ackerman LPN

## 2017-11-07 NOTE — PROGRESS NOTES
Mr. Sherrie Heath comes to the office today complaining of several days of sore throat, stopped up left ear and postnasal drip. He's not had fevers, chills or cough. He was taking Xyzal, but stopped it. Steroid nose sprays make him have epistaxis. Physical Examination:  GENERAL:  WT: 381. T: 98.6. BP: 131/88. P: 73 and regular. O2 sat on room air: 95%. HEENT:  Ears show structurally normal appearing TMs that move normally with valsalva. Nose - pale, swollen turbinates. No significant obstruction. Throat is erythematous. NECK:  Without adenopathy or stridor. LUNGS:  Clear. HEART:  Regular rhythm, no murmurs or gallops. Impression:  1. Acute pharyngitis. Plan:  1. Z-Yair as directed. 2. Resume Xyzal, one daily. 3. Follow up as previously scheduled for full review in November.

## 2017-11-22 PROBLEM — R07.9 CHEST PAIN SYNDROME: Status: ACTIVE | Noted: 2017-11-22

## 2017-11-22 PROBLEM — E88.89 STEATOSIS (HCC): Status: ACTIVE | Noted: 2017-11-22

## 2017-11-22 PROBLEM — J30.9 ALLERGIC RHINITIS: Status: ACTIVE | Noted: 2017-11-22

## 2017-11-22 PROBLEM — E61.1 IRON DEFICIENCY: Status: ACTIVE | Noted: 2017-11-22

## 2017-11-22 PROBLEM — M10.9 GOUT: Status: ACTIVE | Noted: 2017-11-22

## 2017-11-22 PROBLEM — R19.7 DIARRHEA: Status: ACTIVE | Noted: 2017-11-22

## 2017-11-22 PROBLEM — E66.9 OBESITY: Status: ACTIVE | Noted: 2017-11-22

## 2017-11-22 PROBLEM — H65.22 LEFT CHRONIC SEROUS OTITIS MEDIA: Status: ACTIVE | Noted: 2017-11-22

## 2017-11-22 PROBLEM — G47.00 INSOMNIA: Status: ACTIVE | Noted: 2017-11-22

## 2017-11-22 PROBLEM — R06.00 DYSPNEA: Status: ACTIVE | Noted: 2017-11-22

## 2017-11-27 ENCOUNTER — LAB ONLY (OUTPATIENT)
Dept: INTERNAL MEDICINE CLINIC | Age: 62
End: 2017-11-27

## 2017-11-27 DIAGNOSIS — E88.81 METABOLIC SYNDROME: ICD-10-CM

## 2017-11-27 DIAGNOSIS — E78.5 DYSLIPIDEMIA: ICD-10-CM

## 2017-11-27 DIAGNOSIS — Z20.5 EXPOSURE TO HEPATITIS C: Primary | ICD-10-CM

## 2017-11-27 DIAGNOSIS — Z00.00 ANNUAL PHYSICAL EXAM: ICD-10-CM

## 2017-11-27 DIAGNOSIS — M10.9 ACUTE GOUT, UNSPECIFIED CAUSE, UNSPECIFIED SITE: ICD-10-CM

## 2017-11-27 LAB
ALBUMIN SERPL-MCNC: 4 G/DL (ref 3.9–5.4)
ALKALINE PHOS POC: 108 U/L (ref 38–126)
ALT SERPL-CCNC: 48 U/L (ref 9–52)
AST SERPL-CCNC: 33 U/L (ref 14–36)
BACTERIA UA POCT, BACTPOCT: NORMAL
BILIRUB UR QL STRIP: NEGATIVE
BUN BLD-MCNC: 20 MG/DL (ref 9–20)
CALCIUM BLD-MCNC: 9.4 MG/DL (ref 8.4–10.2)
CASTS UA POCT: 0
CHLORIDE BLD-SCNC: 101 MMOL/L (ref 98–107)
CHOLEST SERPL-MCNC: 204 MG/DL (ref 0–200)
CK (CPK) POC: 334 U/L (ref 30–135)
CLUE CELLS, CLUEPOCT: NEGATIVE
CO2 POC: 31 MMOL/L (ref 22–32)
CREAT BLD-MCNC: 1 MG/DL (ref 0.8–1.5)
CRYSTALS UA POCT, CRYSPOCT: NEGATIVE
EGFR (POC): 79.7
EPITHELIAL CELLS POCT: NEGATIVE
GLUCOSE POC: 100 MG/DL (ref 75–110)
GLUCOSE UR-MCNC: NEGATIVE MG/DL
GRAN# POC: 4.3 K/UL (ref 2–7.8)
GRAN% POC: 59.3 % (ref 37–92)
HBA1C MFR BLD HPLC: 6.1 % (ref 4.5–5.7)
HCT VFR BLD CALC: 40.7 % (ref 37–51)
HDLC SERPL-MCNC: 35 MG/DL (ref 35–130)
HGB BLD-MCNC: 13.3 G/DL (ref 12–18)
IRON POC: 98 UG/DL (ref 49–181)
IRON SATURATION POC: 23 % (ref 15–55)
KETONES P FAST UR STRIP-MCNC: NEGATIVE MG/DL
LDL CHOLESTEROL POC: 98.4 MG/DL (ref 0–130)
LY# POC: 2.4 K/UL (ref 0.6–4.1)
LY% POC: 34 % (ref 10–58.5)
MCH RBC QN: 30.8 PG (ref 26–32)
MCHC RBC-ENTMCNC: 32.7 G/DL (ref 30–36)
MCV RBC: 94 FL (ref 80–97)
MID #, POC: 0.4 K/UL (ref 0–1.8)
MID% POC: 6.7 % (ref 0.1–24)
MUCUS UA POCT, MUCPOCT: NORMAL
PH UR STRIP: 6 [PH] (ref 5–7)
PLATELET # BLD: 189 K/UL (ref 140–440)
POTASSIUM SERPL-SCNC: 4.1 MMOL/L (ref 3.6–5)
PROT SERPL-MCNC: 7.5 G/DL (ref 6.3–8.2)
PROT UR QL STRIP: NEGATIVE
PSA SERPL-MCNC: 1.8 NG/ML (ref 0–4)
RBC # BLD: 4.32 M/UL (ref 4.2–6.3)
RBC UA POCT, RBCPOCT: NORMAL
SODIUM SERPL-SCNC: 142 MMOL/L (ref 137–145)
SP GR UR STRIP: 1.02 (ref 1.01–1.02)
TCHOL/HDL RATIO (POC): 5.8 (ref 0–4)
TIBC POC: 433 UG/DL (ref 260–462)
TOTAL BILIRUBIN POC: 0.6 MG/DL (ref 0.2–1.3)
TRICH UA POCT, TRICHPOC: NEGATIVE
TRIGL SERPL-MCNC: 353 MG/DL (ref 0–200)
TSH BLD-ACNC: 1.68 UIU/ML (ref 0.4–4.2)
UA UROBILINOGEN AMB POC: NORMAL (ref 0.2–1)
URIC ACID, POC: 5.4 MG/DL (ref 3.5–8.5)
URINALYSIS CLARITY POC: CLEAR
URINALYSIS COLOR POC: NORMAL
URINE BLOOD POC: NEGATIVE
URINE CULT COMMENT, POCT: NORMAL
URINE LEUKOCYTES POC: NEGATIVE
URINE NITRITES POC: NEGATIVE
VITAMIN D POC: 27.6 NG/ML (ref 30–96)
VLDLC SERPL CALC-MCNC: 70.6 MG/DL
WBC # BLD: 7.1 K/UL (ref 4.1–10.9)
WBC UA POCT, WBCPOCT: 0
YEAST UA POCT, YEASTPOC: NEGATIVE

## 2017-11-28 LAB — HCV AB S/CO SERPL IA: <0.1 S/CO RATIO (ref 0–0.9)

## 2017-12-04 ENCOUNTER — OFFICE VISIT (OUTPATIENT)
Dept: INTERNAL MEDICINE CLINIC | Age: 62
End: 2017-12-04

## 2017-12-04 VITALS
HEIGHT: 71 IN | TEMPERATURE: 98 F | OXYGEN SATURATION: 96 % | WEIGHT: 315 LBS | BODY MASS INDEX: 44.1 KG/M2 | HEART RATE: 80 BPM | SYSTOLIC BLOOD PRESSURE: 160 MMHG | DIASTOLIC BLOOD PRESSURE: 88 MMHG

## 2017-12-04 DIAGNOSIS — I10 ESSENTIAL HYPERTENSION: ICD-10-CM

## 2017-12-04 DIAGNOSIS — T46.4X5A COUGH DUE TO ANGIOTENSIN-CONVERTING ENZYME INHIBITOR: Primary | ICD-10-CM

## 2017-12-04 DIAGNOSIS — R05.8 COUGH DUE TO ANGIOTENSIN-CONVERTING ENZYME INHIBITOR: Primary | ICD-10-CM

## 2017-12-04 DIAGNOSIS — Z00.00 ANNUAL PHYSICAL EXAM: ICD-10-CM

## 2017-12-04 PROBLEM — E66.01 OBESITY, MORBID (HCC): Status: ACTIVE | Noted: 2017-12-04

## 2017-12-04 RX ORDER — AMLODIPINE BESYLATE 5 MG/1
5 TABLET ORAL DAILY
Qty: 30 TAB | Refills: 11 | Status: SHIPPED | OUTPATIENT
Start: 2017-12-04 | End: 2017-12-04 | Stop reason: SDUPTHER

## 2017-12-04 RX ORDER — AMLODIPINE BESYLATE 5 MG/1
TABLET ORAL
Qty: 90 TAB | Refills: 11 | Status: SHIPPED | OUTPATIENT
Start: 2017-12-04 | End: 2018-08-16 | Stop reason: ALTCHOICE

## 2017-12-04 NOTE — LETTER
12/4/2017 1:16 PM 
 
Mr. Farhat Raya Day 
[address]  [address] [address] Dear Mr. Simmons: 
 
I am writing this letter as a follow-up to your recent physical examination. I have enclosed copies of your lab work for your review. After going over this information, if you have any questions please give me a call. Your current active and past medical problems include: 1. Hypertension. 2. Hyperlipidemia. 3. Obesity. 4. Sleep apnea, for which you are supposed to be using CPAP nightly. 5. Swelling in your lower extremities, called lymphedema. 6. You had a cardiac catheterization in 2006 following an abnormal stress test.  The catheterization was normal. 
7. Gastroesophageal reflux disease. 8. Chronic cough secondary to Benicar. 9. Vitamin D deficiency. 10. You were hospitalized at [facility] in 2014 with a small bowel obstruction that resolved with conservative treatment. Your current medications are: 1. Allopurinol 300 mg daily. 2. Bumex 2 mg daily. 3. Amlodipine 5 mg daily. 4. Atenolol 50 mg daily. 5. Centrum Silver, one daily. 6. Metamucil daily. 7. Aspirin 81 mg daily. On physical examination, your height was 5'11\". Your weight was 390 pounds. BMI 54.6. Your blood pressure was 144/94. Temperature 98. Your pulse was 80 and regular. Your physical examination was generally normal.  You do have swelling in both legs and changes in the skin. It is essential that you use the moisturizing lotion at least once a day on those legs, or if the skin breaks down we will have a terrible time trying to get them to heal.  Your resting electrocardiogram was within normal limits. Pulmonary function testing was abnormal, likely due to your weight and nothing intrinsically wrong with your lungs. Hearing testing showed a symmetrical hearing loss across all frequencies tested, worse in the higher frequencies.  
 
Your laboratory studies revealed your hemoglobin and iron stores to be normal.  Your blood sugar was 100 and hemoglobin A1c, which tells me what your average blood sugar has been for the last six weeks, is slightly elevated at 6.1. If your A1c is less than 6.5, but greater than 5.7, we label you as prediabetes. You have a family history of diabetes. The treatment at this point is weight loss. PSA, which is a test for prostate cancer, was normal and stable at 1.8. You were slightly vitamin D deficient, but you have just started a new vitamin that should correct that. Thyroid function is normal.  Urinalysis is normal.  We checked you for the hepatitis C virus and that is negative. Uric acid is 5.4. As long as that is less than 6 your risk of gout is low. Total cholesterol was 204, triglycerides were 353. In order to lower your triglycerides you must restrict carbohydrates. You had a colonoscopy in 2016. That should be repeated in 2021. You are up to date on influenza, shingles, tetanus and whooping cough vaccines. I have changed Benicar to Amlodipine for your blood pressure and I think that will resolve your chronic cough. At this point in your life the major risk to your long term health is your weight. We have talked about diet and weight loss for many years without any significant results. The chances of you losing a substantial amount of weight at this point in your life is remote. Again, I ask you to consider having bariatric surgery. You would lose 100 pounds and a lot of your health problems would disappear. Please discuss this with your wife. I look forward to seeing you again in January to check your weight and blood pressure. Sincerely, Alejandro Jung MD

## 2017-12-04 NOTE — PROGRESS NOTES
Mr. Jaleel Franco comes to the office today for a comprehensive medical evaluation. Past Medical History:  1. Hypertension. 2. Hyperlipidemia. 3. Obesity. 4. Sleep apnea, on CPAP, but not using it regularly. 5. Gout. 6. Status post arthroscopic menisectomy right knee. 7. Lymphedema of lower extremities. 8. Chest pain syndrome, followed by abnormal stress Cardiolite, . Following the Cardiolite he had a cardiac catheterization that showed normal left ventricular function and no significant coronary artery disease. 9. GERD. 10. Hospitalized in  with community acquired pneumonia. 11. Status post colonoscopic polypectomy. 12. Metabolic syndrome. 13. Allergic rhinitis. 14. Vitamin D deficiency. 15. Pruritus ani.  16. Hospitalized at [address]  to 14 with small bowel obstruction and that resolved with conservative treatment. 17. Restrictive lung disease due to body habitus. Current Medications:  1. Allopurinol 300 mg daily. 2. Aspirin 81 mg daily. 3. Atenolol 50 mg daily. 4. Bumex 2 mg daily. 5. Zyrtec 10 mg daily. 6. Metamucil once daily. 7. Omeprazole 20 mg daily. Drug Allergies:  None. ACE inhibitors caused a chronic cough. 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 He is a lifelong nonsmoker and occasional social drinker. He is . Family History:  His mother  at age 72 from complications of CHF. She was also a type 2 diabetic. His father is alive at age 80. He is a type 2 diabetic. Mr. Jaleel Franco has seven siblings. He has a brother who  in his 92W from complications of diabetes and end stage renal disease. He also had a heart attack and bilateral amputations. He has an uncle with prostate cancer, another uncle with colon cancer. Review of Systems:  Mr. Simmons complains of a chronic cough. Cough is nonproductive. He does not have chest pain, chest pressure, chest tightness, PND or orthopnea.   He does have ankle edema. He has nocturia x 3-4. He has noticed that when he wears his CPAP mask he doesn't get up to urinate as frequently. Physical Examination:  GENERAL:  HT: 5'11\". WT: 390. BMI: 54.6. BP: 160/88 initially, I got 144/94. P: 80 and regular. O2 saturation on room air: 96%. HEENT:  Head normocephalic, atraumatic. Eyes - pupils midrange, equal directly and consensually. Extraocular movements are normal.  Ears, nose and throat are normal.  NECK:  Without JVD, adenopathy, thyromegaly or carotid bruits. LUNGS:  Clear. HEART:   Quiet precordium. Regular rate and rhythm. No audible murmurs or gallops. ABDOMEN:  Massively obese, soft, non tender. No detectable hepatosplenomegaly, masses or bruits. :  Genitalia, normal male, testes descended bilaterally without masses, tenderness or inguinal hernia. RECTAL:  Palpably normal prostate gland, stool brown and heme negative. EXTREMITIES:  1-2+ pedal, ankle and pretibial edema bilaterally. There are brawny changes in the skin on the anterior calves. Pulses are normal.  SKIN:  No suspicious lesions. NEUROLOGICAL:  Cranial nerves II-XII are intact. Strength, gait and mental status are normal for age. Studies:  Pulmonary function testing shows restriction as before. EKG is WNL. Hearing testing shows symmetrical hearing loss across all frequencies tested, worse in the higher frequencies. Laboratory:  HGB and iron stores are normal. Blood sugar is 100 and Hgb A1c is 6.1. Potassium, kidney function, calcium level and LFTs are normal.  Uric acid 5.4.  UA is normal.  Vitamin D 27.6. PSA 1.8. TSH 1.7. Total cholesterol 204, triglycerides 353, LDL 98, HDL 35. Impression:  1. Hypertension, poor control. 2. Obesity. 3. Metabolic syndrome. 4. Prediabetes. 5. Sleep apnea. 6. Lymphedema. 7. Hyperlipidemia. 8. Chronic cough due to Benicar. Plan:  1. Discontinue Benicar. Start Amlodipine 5 mg daily.   2. Continue other medications as outlined. 3. We talked about diet and salt restriction. 4. Flu vaccine, shingles, tetanus and whooping cough are up to date. 5. Next colonoscopy will be due in 2021.  6. Follow up here in six weeks for recheck.

## 2017-12-04 NOTE — PROGRESS NOTES
Reviewed record in preparation for visit and have obtained necessary documentation. Identified pt with two pt identifiers(name and ). Chief Complaint   Patient presents with    Complete Physical        Coordination of Care Questionnaire:  :     1) Have you been to an emergency room, urgent care clinic since your last visit? No    Hospitalized since your last visit? No    :          2) Have you seen or consulted any other health care providers outside of [address] [place] [address] since your last visit?  No

## 2017-12-04 NOTE — LETTER
NOTIFICATION RETURN TO WORK / SCHOOL 
 
12/4/2017 9:51 AM 
 
Mr. Wanda Simmons 
[address] [facility] [address] [address] To Whom It May Concern: 
 
Wanda Simmons is currently under the care of Vandana Sparks. He will return to work/school on: 12/05/2017 If there are questions or concerns please have the patient contact our office. Sincerely, Fariha Wagoner MD

## 2017-12-04 NOTE — Clinical Note
Mr. Teddy Thomas comes to the office today for a comprehensive medical evaluation. Past Medical History: 1. Hypertension. 2. Hyperlipidemia. 3. Obesity. 4. Sleep apnea, on CPAP, but not using it regularly. 5. Gout. 6. Status post arthroscopic menisectomy right knee. 7. Lymphedema of lower extremities. 8. Chest pain syndrome, followed by abnormal stress Cardiolite, . Following the Cardiolite he had a cardiac catheterization that showed normal left ventricular function and no significant coronary artery disease. 9. GERD. 10. Hospitalized in  with community acquired pneumonia. 11. Status post colonoscopic polypectomy. 12. Metabolic syndrome. 13. Allergic rhinitis. 14. Vitamin D deficiency. 15. Pruritus ani. 16. Hospitalized at [address]  to 14 with small bowel obstruction and that resolved with conservative treatment. 17. Restrictive lung disease due to body habitus. Current Medications: 1. Allopurinol 300 mg daily. 2. Aspirin 81 mg daily. 3. Atenolol 50 mg daily. 4. Bumex 2 mg daily. 5. Zyrtec 10 mg daily. 6. Metamucil once daily. 7. Omeprazole 20 mg daily. Drug Allergies:  None. ACE inhibitors caused a chronic cough. 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 He is a lifelong nonsmoker and occasional social drinker. He is . Family History:  His mother  at age 72 from complications of CHF. She was also a type 2 diabetic. His father is alive at age 80. He is a type 2 diabetic. Mr. Teddy Thomas has seven siblings. He has a brother who  in his 07T from complications of diabetes and end stage renal disease. He also had a heart attack and bilateral amputations. He has an uncle with prostate cancer, another uncle with colon cancer. Review of Systems:  Mr. Simmons complains of a chronic cough. Cough is nonproductive.   He does not have chest pain, chest pressure, chest tightness, PND or orthopnea. He does have ankle edema. He has nocturia x 3-4. He has noticed that when he wears his CPAP mask he doesn't get up to urinate as frequently. Physical Examination: 
GENERAL:  HT: 5'11\". WT: 390. BMI: 54.6. BP: 160/88 initially, I got 144/94. P: 80 and regular. O2 saturation on room air: 96%. HEENT:  Head normocephalic, atraumatic. Eyes - pupils midrange, equal directly and consensually. Extraocular movements are normal.  Ears, nose and throat are normal. 
NECK:  Without JVD, adenopathy, thyromegaly or carotid bruits. LUNGS:  Clear. HEART:   Quiet precordium. Regular rate and rhythm. No audible murmurs or gallops. ABDOMEN:  Massively obese, soft, non tender. No detectable hepatosplenomegaly, masses or bruits. :  Genitalia, normal male, testes descended bilaterally without masses, tenderness or inguinal hernia. RECTAL:  Palpably normal prostate gland, stool brown and heme negative. EXTREMITIES:  1-2+ pedal, ankle and pretibial edema bilaterally. There are brawny changes in the skin on the anterior calves. Pulses are normal. 
SKIN:  No suspicious lesions. NEUROLOGICAL:  Cranial nerves II-XII are intact. Strength, gait and mental status are normal for age. Studies:  Pulmonary function testing shows restriction as before. EKG is WNL. Hearing testing shows symmetrical hearing loss across all frequencies tested, worse in the higher frequencies. Laboratory:  HGB and iron stores are normal. Blood sugar is 100 and Hgb A1c is 6.1. Potassium, kidney function, calcium level and LFTs are normal.  Uric acid 5.4.  UA is normal.  Vitamin D 27.6. PSA 1.8. TSH 1.7. Total cholesterol 204, triglycerides 353, LDL 98, HDL 35. Impression: 1. Hypertension, poor control. 2. Obesity. 3. Metabolic syndrome. 4. Prediabetes. 5. Sleep apnea. 6. Lymphedema. 7. Hyperlipidemia. 8. Chronic cough due to Benicar. Plan: 1. Discontinue Benicar. Start Amlodipine 5 mg daily. 2. Continue other medications as outlined. 3. We talked about diet and salt restriction. 4. Flu vaccine, shingles, tetanus and whooping cough are up to date. 5. Next colonoscopy will be due in 2021. 
6. Follow up here in six weeks for recheck.

## 2017-12-22 RX ORDER — ATENOLOL 50 MG/1
TABLET ORAL
Qty: 90 TAB | Refills: 0 | Status: SHIPPED | OUTPATIENT
Start: 2017-12-22 | End: 2018-03-23 | Stop reason: SDUPTHER

## 2018-01-22 ENCOUNTER — OFFICE VISIT (OUTPATIENT)
Dept: INTERNAL MEDICINE CLINIC | Age: 63
End: 2018-01-22

## 2018-01-22 VITALS
DIASTOLIC BLOOD PRESSURE: 91 MMHG | SYSTOLIC BLOOD PRESSURE: 140 MMHG | HEART RATE: 80 BPM | WEIGHT: 315 LBS | BODY MASS INDEX: 44.1 KG/M2 | OXYGEN SATURATION: 90 % | HEIGHT: 71 IN | RESPIRATION RATE: 20 BRPM | TEMPERATURE: 98.3 F

## 2018-01-22 DIAGNOSIS — T46.4X5A COUGH DUE TO ANGIOTENSIN-CONVERTING ENZYME INHIBITOR: ICD-10-CM

## 2018-01-22 DIAGNOSIS — I10 ESSENTIAL HYPERTENSION, BENIGN: Primary | ICD-10-CM

## 2018-01-22 DIAGNOSIS — E11.9 DIABETES MELLITUS TYPE 2, DIET-CONTROLLED (HCC): ICD-10-CM

## 2018-01-22 DIAGNOSIS — R05.8 COUGH DUE TO ANGIOTENSIN-CONVERTING ENZYME INHIBITOR: ICD-10-CM

## 2018-01-22 DIAGNOSIS — E78.5 DYSLIPIDEMIA: ICD-10-CM

## 2018-01-22 LAB
ALBUMIN SERPL-MCNC: 3.9 G/DL (ref 3.9–5.4)
ALKALINE PHOS POC: 92 U/L (ref 38–126)
ALT SERPL-CCNC: 48 U/L (ref 9–52)
AST SERPL-CCNC: 35 U/L (ref 14–36)
BUN BLD-MCNC: 20 MG/DL (ref 9–20)
CALCIUM BLD-MCNC: 9.1 MG/DL (ref 8.4–10.2)
CHLORIDE BLD-SCNC: 100 MMOL/L (ref 98–107)
CHOLEST SERPL-MCNC: 172 MG/DL (ref 0–200)
CO2 POC: 31 MMOL/L (ref 22–32)
CREAT BLD-MCNC: 1 MG/DL (ref 0.8–1.5)
EGFR (POC): 79.2
GLUCOSE POC: 104 MG/DL (ref 75–110)
HBA1C MFR BLD HPLC: 5.6 % (ref 4.5–5.7)
HDLC SERPL-MCNC: 29 MG/DL (ref 35–130)
LDL CHOLESTEROL POC: 108.2 MG/DL (ref 0–130)
POTASSIUM SERPL-SCNC: 4 MMOL/L (ref 3.6–5)
PROT SERPL-MCNC: 7.2 G/DL (ref 6.3–8.2)
SODIUM SERPL-SCNC: 142 MMOL/L (ref 137–145)
TCHOL/HDL RATIO (POC): 5.9 (ref 0–4)
TOTAL BILIRUBIN POC: 0.8 MG/DL (ref 0.2–1.3)
TRIGL SERPL-MCNC: 174 MG/DL (ref 0–200)
VLDLC SERPL CALC-MCNC: 34.8 MG/DL

## 2018-01-22 NOTE — PROGRESS NOTES
Mr. Ramya Arriola comes to the office today in follow up for:  1. Hypertension. 2. Chronic cough due to ARB. 3. Prediabetes. 4. Obesity. 5. Hyperlipidemia. Medications:  As outlined in office visit of 12/04 except he's no longer on Benicar and is on Amlodipine 5 mg daily. He says his cough has improved since we stopped the Benicar. Physical Examination:  VITALS: WT: 362. BP: 140/91 initially, subsequently 130/84. T: 98.3.  P:  80 and regular. CHEST:  Lungs are clear. HEART:  Regular rhythm, no murmurs or gallops. EXTREMITIES:  Chronic brawny edema bilaterally. Plan:  1. Check CMP, glycohemoglobin, lipid panel. 2. We talked at length about calorie and carbohydrate and sodium restriction. He seems to understand. 3. Follow up for recheck in four months. I'll call with the results of the lab work.

## 2018-01-22 NOTE — PROGRESS NOTES
1. Have you been to the ER, urgent care clinic since your last visit? Hospitalized since your last visit? No    2. Have you seen or consulted any other health care providers outside of the [facility] since your last visit? Include any pap smears or colon screening.  No   Chief Complaint   Patient presents with    Follow-up     fasting

## 2018-01-22 NOTE — LETTER
NOTIFICATION RETURN TO WORK / SCHOOL 
 
1/22/2018 8:49 AM 
 
Mr. Ghazal Simmons 
[address]  [address] [address] To Whom It May Concern: 
 
Ghazal Simmons is currently under the care of Jose Rees. He will return to work/school on:01/22/2018 If there are questions or concerns please have the patient contact our office. Sincerely, Jim Osborn MD

## 2018-01-23 NOTE — PROGRESS NOTES
VT=952;A1C=5.6-both of these are nl & much improved compared to lab results in December.     KJ=823 down from 253    Cholesterol-172 down from 204-EXCELLENT-keep up the good work

## 2018-03-07 RX ORDER — ALLOPURINOL 300 MG/1
TABLET ORAL
Qty: 90 TAB | Refills: 0 | Status: SHIPPED | OUTPATIENT
Start: 2018-03-07 | End: 2018-06-03 | Stop reason: SDUPTHER

## 2018-03-15 DIAGNOSIS — G47.30 SLEEP APNEA IN ADULT: Primary | ICD-10-CM

## 2018-03-26 RX ORDER — ATENOLOL 50 MG/1
TABLET ORAL
Qty: 90 TAB | Refills: 0 | Status: SHIPPED | OUTPATIENT
Start: 2018-03-26 | End: 2018-06-24 | Stop reason: SDUPTHER

## 2018-05-22 ENCOUNTER — OFFICE VISIT (OUTPATIENT)
Dept: SLEEP MEDICINE | Age: 63
End: 2018-05-22

## 2018-05-22 VITALS
HEART RATE: 88 BPM | DIASTOLIC BLOOD PRESSURE: 66 MMHG | WEIGHT: 315 LBS | SYSTOLIC BLOOD PRESSURE: 129 MMHG | OXYGEN SATURATION: 91 % | BODY MASS INDEX: 44.1 KG/M2 | HEIGHT: 71 IN

## 2018-05-22 DIAGNOSIS — G47.33 OSA (OBSTRUCTIVE SLEEP APNEA): Primary | ICD-10-CM

## 2018-05-28 NOTE — PATIENT INSTRUCTIONS
Sleep Apnea: Care Instructions  Your Care Instructions    Sleep apnea means that you frequently stop breathing for 10 seconds or longer during sleep. It can be mild to severe, based on the number of times an hour that you stop breathing or have slowed breathing. Blocked or narrowed airways in your nose, mouth, or throat can cause sleep apnea. Your airway can become blocked when your throat muscles and tongue relax during sleep. You can treat sleep apnea at home by making lifestyle changes. You also can use a CPAP breathing machine that keeps tissues in the throat from blocking your airway. Or your doctor may suggest that you use a breathing device while you sleep. It helps keep your airway open. This could be a device that you put in your mouth. Other examples include strips or disks that you use on your nose. In some cases, surgery may be needed to remove enlarged tissues in the throat. Follow-up care is a key part of your treatment and safety. Be sure to make and go to all appointments, and call your doctor if you are having problems. It's also a good idea to know your test results and keep a list of the medicines you take. How can you care for yourself at home? · Lose weight, if needed. It may reduce the number of times you stop breathing or have slowed breathing. · Sleep on your side. It may stop mild apnea. If you tend to roll onto your back, sew a pocket in the back of your pajama top. Put a tennis ball into the pocket, and stitch the pocket shut. This will help keep you from sleeping on your back. · Avoid alcohol and medicines such as sleeping pills and sedatives before bed. · Do not smoke. Smoking can make sleep apnea worse. If you need help quitting, talk to your doctor about stop-smoking programs and medicines. These can increase your chances of quitting for good. · Prop up the head of your bed 4 to 6 inches by putting bricks under the legs of the bed.   · Treat breathing problems, such as a stuffy nose, caused by a cold or allergies. · Try a continuous positive airway pressure (CPAP) breathing machine if your doctor recommends it. The machine keeps your airway open when you sleep. · If CPAP does not work for you, ask your doctor if you can try other breathing machines. A bilevel positive airway pressure machine uses one type of air pressure for breathing in and another type for breathing out. Another device raises or lowers air pressure as needed while you breathe. · Talk to your doctor if:  ¨ Your nose feels dry or bleeds when you use one of these machines. You may need to increase moisture in the air. A humidifier may help. ¨ Your nose is runny or stuffy from using a breathing machine. Decongestants or a corticosteroid nasal spray may help. ¨ You are sleepy during the day and it gets in the way of the normal things you do. Do not drive when you are drowsy. When should you call for help? Watch closely for changes in your health, and be sure to contact your doctor if:  ? · You still have sleep apnea even though you have made lifestyle changes. ? · You are thinking of trying a device such as CPAP. ? · You are having problems using a CPAP or similar machine. Where can you learn more? Go to http://mike-rivas.info/. Enter I409 in the search box to learn more about \"Sleep Apnea: Care Instructions. \"  Current as of: May 12, 2017  Content Version: 11.4  © 3876-0458 [employer]. Care instructions adapted under license by [employer] (which disclaims liability or warranty for this information). If you have questions about a medical condition or this instruction, always ask your healthcare professional. [address] any warranty or liability for your use of this information.

## 2018-05-28 NOTE — PROGRESS NOTES
[address] [facility] [place]. [place], [address]  Tel.  [phone]  Fax. [address] [facility] [address]  [place], [address] [facility]  Tel.  [phone]  Fax. [phone] [address]   Tel.  [phone]  Fax. [phone]         Chief Complaint       Chief Complaint   Patient presents with    Sleep Problem     1 year PAP follow up, not sleeping well, last seen by Dr. Aletha Manzano in 2017         HPI        Shelton Jarvis is a right handed 57y.o. year old male seen for follow-up. He was accompanied by his wife who assisted with the history . He evaluated with a sleep study at [facility]. A copy of that evaluation is not noted in the chart. He was seen at [facility] in Feb 2017 by Dr. Kenton Brian. Compliance data not noted on chart. He is currently renting CPAP unit from [address] [place] [address]. He continues to experience nocturnal awakening, daytime fatigue, non-restorative sleep. Allergies   Allergen Reactions    Ace Inhibitors Cough       Current Outpatient Prescriptions   Medication Sig Dispense Refill    atenolol (TENORMIN) 50 mg tablet TAKE 1 TABLET BY MOUTH EVERY DAY 90 Tab 0    allopurinol (ZYLOPRIM) 300 mg tablet TAKE 1 TABLET BY MOUTH DAILY 90 Tab 0    amLODIPine (NORVASC) 5 mg tablet TAKE 1 TABLET BY MOUTH EVERY DAY 90 Tab 11    celecoxib (CELEBREX) 200 mg capsule TAKE 1 CAPSULE BY MOUTH TWICE DAILY 180 Cap 0    triamcinolone acetonide (KENALOG) 0.1 % topical cream Apply  to affected area two (2) times a day. use thin layer 80 g 0    cetirizine (ZYRTEC) 10 mg tablet Take 1 Tab by mouth two (2) times a day.  psyllium (METAMUCIL) powd Take  by mouth.  docusate sodium (COLACE) 100 mg capsule Take 100 mg by mouth two (2) times a day.  bumetanide (BUMEX) 1 mg tablet 2 mg.       clobetasol-emollient (TEMOVATE-E) 0.05 % topical cream       clotrimazole-betamethasone (LOTRISONE) topical cream       albuterol (PROAIR HFA) 90 mcg/actuation inhaler Take 2 puffs by inhalation every four (4) hours as needed for Wheezing. 1 Inhaler 1    cholecalciferol (VITAMIN D3) 1,000 unit tablet Take 2,000 Units by mouth daily.  omeprazole (PRILOSEC OTC) 20 mg tablet Take 20 mg by mouth daily.  ZS-KD-BC-Fe-Min-Lycopen-Lutein (CENTRUM) 0.4-162-18 mg Tab Take  by mouth.  aspirin 81 mg chewable tablet Take 81 mg by mouth daily. He  has a past medical history of Allergic rhinitis (11/22/2017); Chest pain syndrome (11/22/2017); Community acquired pneumonia; Diarrhea (11/22/2017); Dyspnea (11/22/2017); Environmental allergies; Gout (11/22/2017); Hypertension; Insomnia (11/22/2017); Iron deficiency (11/22/2017); Left chronic serous otitis media (11/22/2017); Obesity (11/22/2017); Sleep apnea; and Steatosis (11/22/2017). He  has a past surgical history that includes hx orthopaedic. He family history includes Diabetes in an other family member; Heart Disease in an other family member; Hypertension in an other family member. He  reports that he has never smoked. He has never used smokeless tobacco. He reports that he does not drink alcohol. Review of Systems:  Unchanged per patient      Objective:     Visit Vitals    /66    Pulse 88    Ht 5' 11\" (1.803 m)    Wt (!) 389 lb (176.4 kg)    SpO2 91%    BMI 54.25 kg/m2     Body mass index is 54.25 kg/(m^2). Assessment:       ICD-10-CM ICD-9-CM    1. TERESA (obstructive sleep apnea) G47.33 327.23 AMB SUPPLY ORDER   History of sleep apnea. His DME will be contacted for documentation of initial study and current compliance. HST if a sleep study is not documented. Plan:     Orders Placed This Encounter    AMB SUPPLY ORDER     CPAP mask and supplies       * Patient has a history  consistent with the diagnosis of sleep apnea. * Sleep testing was ordered for initial evaluation.     * He was provided information on sleep apnea including coresponding risk factors and the importance of proper treatment. * Treatment options if indicated were reviewed today. DME will be contacted regarding copy of original sleep study.       Dell Gonzalez MD, Vida Hannon  05/28/18

## 2018-06-04 ENCOUNTER — DOCUMENTATION ONLY (OUTPATIENT)
Dept: SLEEP MEDICINE | Age: 63
End: 2018-06-04

## 2018-06-04 RX ORDER — ALLOPURINOL 300 MG/1
TABLET ORAL
Qty: 90 TAB | Refills: 0 | Status: SHIPPED | OUTPATIENT
Start: 2018-06-04 | End: 2018-08-31 | Stop reason: SDUPTHER

## 2018-06-25 RX ORDER — ATENOLOL 50 MG/1
TABLET ORAL
Qty: 90 TAB | Refills: 0 | Status: SHIPPED | OUTPATIENT
Start: 2018-06-25 | End: 2018-09-20 | Stop reason: SDUPTHER

## 2018-07-17 ENCOUNTER — HOSPITAL ENCOUNTER (OUTPATIENT)
Dept: SLEEP MEDICINE | Age: 63
Discharge: HOME | End: 2018-07-17
Payer: COMMERCIAL

## 2018-07-17 ENCOUNTER — TELEPHONE (OUTPATIENT)
Dept: SLEEP MEDICINE | Age: 63
End: 2018-07-17

## 2018-07-17 DIAGNOSIS — G47.33 OBSTRUCTIVE SLEEP APNEA (ADULT) (PEDIATRIC): ICD-10-CM

## 2018-07-17 DIAGNOSIS — G47.33 OBSTRUCTIVE SLEEP APNEA (ADULT) (PEDIATRIC): Primary | ICD-10-CM

## 2018-07-17 PROCEDURE — 95811 POLYSOM 6/>YRS CPAP 4/> PARM: CPT | Performed by: SPECIALIST

## 2018-07-22 RX ORDER — BUMETANIDE 1 MG/1
TABLET ORAL
Qty: 60 TAB | Refills: 0 | Status: SHIPPED | OUTPATIENT
Start: 2018-07-22 | End: 2018-08-19 | Stop reason: SDUPTHER

## 2018-07-23 ENCOUNTER — OFFICE VISIT (OUTPATIENT)
Dept: INTERNAL MEDICINE CLINIC | Age: 63
End: 2018-07-23

## 2018-07-23 VITALS
SYSTOLIC BLOOD PRESSURE: 142 MMHG | DIASTOLIC BLOOD PRESSURE: 82 MMHG | OXYGEN SATURATION: 95 % | HEIGHT: 71 IN | HEART RATE: 74 BPM

## 2018-07-23 DIAGNOSIS — B35.1 ONYCHOMYCOSIS: Primary | ICD-10-CM

## 2018-07-23 NOTE — PROGRESS NOTES
Subjective:  Mr. Aston Pal comes to the office today because his left thumbnail dropped off over the weekend. It is painless. Physical Examination:  GENERAL:  BP: 142/82. P: 74 and regular. EXTREMITIES: He has a few onychomycotic toenails and fingernails. Plan:  I reassured him that this process was completely benign and treatment was likely to be ineffective and I didn't recommend it. Follow up as scheduled.

## 2018-07-24 ENCOUNTER — DOCUMENTATION ONLY (OUTPATIENT)
Dept: SLEEP MEDICINE | Age: 63
End: 2018-07-24

## 2018-07-24 ENCOUNTER — OFFICE VISIT (OUTPATIENT)
Dept: SLEEP MEDICINE | Age: 63
End: 2018-07-24

## 2018-07-24 VITALS
BODY MASS INDEX: 44.1 KG/M2 | HEART RATE: 74 BPM | WEIGHT: 315 LBS | DIASTOLIC BLOOD PRESSURE: 69 MMHG | SYSTOLIC BLOOD PRESSURE: 111 MMHG | OXYGEN SATURATION: 94 % | HEIGHT: 71 IN

## 2018-07-24 DIAGNOSIS — G47.33 OSA (OBSTRUCTIVE SLEEP APNEA): Primary | ICD-10-CM

## 2018-07-24 NOTE — PROGRESS NOTES
Pap order faxed stat to [facility] for Bi level pap machine on 07/24/2018.

## 2018-07-24 NOTE — PROGRESS NOTES
[facility] [place]. [place], [address]  Tel.  [phone]  Fax. 100 [facility] 60  [place], [address] [facility]  Tel.  [phone]  Fax. [phone] [address] [facility]  Tel.  [phone]  Fax. [phone]         Chief Complaint       Chief Complaint   Patient presents with    Sleep Problem     follow up         HPI        Neri Rodriguez is a  57y.o. year old male seen for follow-up. He is accompanied by his wife. He evaluated with a sleep study at [facility]. A copy of that evaluation is not noted in the chart. He was seen at [facility] in Feb 2017 by Dr. Ronny Donald. Compliance data not noted on chart. He is currently renting CPAP unit from [address] [place] [address].      He continues to experience nocturnal awakening, daytime fatigue, non-restorative sleep. He  was evaluated with a sleep study which demonstrated obstructive sleep apnea characterized by an AHI of 87.9  per hour associated with marked arterial desaturations to 52% . CPAP and BIPAP were employed . Desaturations persisted with CPAP. At BIPAP setting of 18/13  Cm baseline SaO2 greater than 90%. The study was reviewed in detail with the patient and his wife today. Allergies   Allergen Reactions    Ace Inhibitors Cough       Current Outpatient Prescriptions   Medication Sig Dispense Refill    bumetanide (BUMEX) 1 mg tablet TAKE 2 TABLETS BY MOUTH EVERY DAY 60 Tab 0    atenolol (TENORMIN) 50 mg tablet TAKE 1 TABLET BY MOUTH EVERY DAY 90 Tab 0    allopurinol (ZYLOPRIM) 300 mg tablet TAKE 1 TABLET BY MOUTH DAILY 90 Tab 0    amLODIPine (NORVASC) 5 mg tablet TAKE 1 TABLET BY MOUTH EVERY DAY 90 Tab 11    celecoxib (CELEBREX) 200 mg capsule TAKE 1 CAPSULE BY MOUTH TWICE DAILY 180 Cap 0    triamcinolone acetonide (KENALOG) 0.1 % topical cream Apply  to affected area two (2) times a day.  use thin layer 80 g 0    cetirizine (ZYRTEC) 10 mg tablet Take 1 Tab by mouth two (2) times a day.  psyllium (METAMUCIL) powd Take  by mouth.  docusate sodium (COLACE) 100 mg capsule Take 100 mg by mouth two (2) times a day.  clobetasol-emollient (TEMOVATE-E) 0.05 % topical cream       clotrimazole-betamethasone (LOTRISONE) topical cream       albuterol (PROAIR HFA) 90 mcg/actuation inhaler Take 2 puffs by inhalation every four (4) hours as needed for Wheezing. 1 Inhaler 1    omeprazole (PRILOSEC OTC) 20 mg tablet Take 20 mg by mouth daily.  SV-YR-TK-Fe-Min-Lycopen-Lutein (CENTRUM) 0.4-162-18 mg Tab Take  by mouth.  aspirin 81 mg chewable tablet Take 81 mg by mouth daily.  cholecalciferol (VITAMIN D3) 1,000 unit tablet Take 2,000 Units by mouth daily. He  has a past medical history of Allergic rhinitis (11/22/2017); Chest pain syndrome (11/22/2017); Community acquired pneumonia; Diarrhea (11/22/2017); Dyspnea (11/22/2017); Environmental allergies; Gout (11/22/2017); Hypertension; Insomnia (11/22/2017); Iron deficiency (11/22/2017); Left chronic serous otitis media (11/22/2017); Obesity (11/22/2017); Sleep apnea; and Steatosis (11/22/2017). He  has a past surgical history that includes hx orthopaedic. He family history includes Diabetes in an other family member; Heart Disease in an other family member; Hypertension in an other family member. He  reports that he has never smoked. He has never used smokeless tobacco. He reports that he does not drink alcohol. Review of Systems:  Unchanged per patient      Objective:     Visit Vitals    /69    Pulse 74    Ht 5' 11\" (1.803 m)    Wt (!) 388 lb (176 kg)    SpO2 94%    BMI 54.12 kg/m2     Body mass index is 54.12 kg/(m^2). Assessment:       ICD-10-CM ICD-9-CM    1. TERESA (obstructive sleep apnea) G47.33 327.23 AMB SUPPLY ORDER     Severe sleep disordered breathing associated with marked/sustained arterial desaturations.   Desaturations improve with BiPAP 18/13 cm. He will be seen in follow-up for first adherence appointment. He is to contact the office for specific problems. Plan:     Orders Placed This Encounter    AMB SUPPLY ORDER     Diagnosis: Obstructive Sleep Apnea ICD-10 Code (G47.33)     Positive Airway Pressure Therapy: Duration of need: 99 months. ResMed BiPAP Device: EPAP  13 cmH2O, IPAP: 18 cmH2O. CPAP mask -  Patient preference, headgear, tubing, and filter;  heated humidifier; wireless modem. Remote monitoring enrollment. Rafaela Mendoza MD, FAASM  Diplomate, American Board of Sleep Medicine       * Patient has a history and examination consistent with the diagnosis of sleep apnea. * He was provided information on sleep apnea including coresponding risk factors and the importance of proper treatment. * Treatment options if indicated were reviewed today. Potential benefit of weight reduction was discussed. Weight reduction techniques reviewed.       Rafaela Mendoza MD, FAASM  Electronically signed 07/24/18

## 2018-07-24 NOTE — MR AVS SNAPSHOT
[address] [address] [phone] Patient: Katharine Simmons MRN:  [id number] Visit Information Date & Time Provider Department Dept. Phone Encounter #  
 2018  2:30 PM Callie Banks MD [address] [facility] [phone] [id number] Follow-up Instructions Return in about 7 weeks (around 2018) for Compliance review. Follow-up and Disposition History Upcoming Health Maintenance Date Due  
 FOOT EXAM Q1 1963 MICROALBUMIN Q1 1963 EYE EXAM RETINAL OR DILATED Q1 1963 FOBT Q 1 YEAR AGE 50-75 2003 DTaP/Tdap/Td series (1 - Tdap) 2014 HEMOGLOBIN A1C Q6M 2018 Influenza Age 5 to Adult 2018 LIPID PANEL Q1 2019 Allergies as of 2018  Review Complete On: 2018 By: Callie Banks MD  
  
 Severity Noted Reaction Type Reactions Ace Inhibitors  2017    Cough Current Immunizations  Never Reviewed Name Date Influenza Vaccine 10/1/2012 Influenza Vaccine (Quad) PF 2017 Pneumococcal Vaccine (Unspecified Type) 10/1/2012 TB Skin Test (PPD) 2005 Td 2014 Zoster Vaccine, Live 2014 Not reviewed this visit You Were Diagnosed With   
  
 Codes Comments TERESA (obstructive sleep apnea)    -  Primary ICD-10-CM: G47.33 
ICD-9-CM: 327.23 Vitals BP Pulse Height(growth percentile) Weight(growth percentile) SpO2 BMI  
 111/69 74 5' 11\" (1.803 m) (!) 388 lb (176 kg) 94% 54.12 kg/m2 Smoking Status Never Smoker Vitals History BMI and BSA Data Body Mass Index Body Surface Area  
 54.12 kg/m 2 2.97 m 2 Preferred Pharmacy Pharmacy Name Phone [phone], [address] AT [place] [address] [phone] Your Updated Medication List  
  
   
This list is accurate as of 7/24/18  3:22 PM.  Always use your most recent med list.  
  
  
  
  
 albuterol 90 mcg/actuation inhaler Commonly known as:  PROAIR HFA Take 2 puffs by inhalation every four (4) hours as needed for Wheezing. allopurinol 300 mg tablet Commonly known as:  ZYLOPRIM  
TAKE 1 TABLET BY MOUTH DAILY  
  
 amLODIPine 5 mg tablet Commonly known as:  [place] TAKE 1 TABLET BY MOUTH EVERY DAY  
  
 aspirin 81 mg chewable tablet Take 81 mg by mouth daily. atenolol 50 mg tablet Commonly known as:  TENORMIN  
TAKE 1 TABLET BY MOUTH EVERY DAY  
  
 bumetanide 1 mg tablet Commonly known as:  [place] TAKE 2 TABLETS BY MOUTH EVERY DAY  
  
 celecoxib 200 mg capsule Commonly known as:  CELEBREX  
TAKE 1 CAPSULE BY MOUTH TWICE DAILY CENTRUM 0.4-162-18 mg Tab Generic drug:  CE-DP-PC-Fe-Min-Lycopen-Lutein Take  by mouth. cetirizine 10 mg tablet Commonly known as:  ZYRTEC Take 1 Tab by mouth two (2) times a day. clobetasol-emollient 0.05 % topical cream  
Commonly known as:  TEMOVATE-E  
  
 clotrimazole-betamethasone topical cream  
Commonly known as:  LOTRISONE  
  
 COLACE 100 mg capsule Generic drug:  docusate sodium Take 100 mg by mouth two (2) times a day. PriLOSEC OTC 20 mg tablet Generic drug:  omeprazole Take 20 mg by mouth daily. psyllium Powd Commonly known as:  METAMUCIL Take  by mouth.  
  
 triamcinolone acetonide 0.1 % topical cream  
Commonly known as:  KENALOG Apply  to affected area two (2) times a day. use thin layer VITAMIN D3 1,000 unit tablet Generic drug:  cholecalciferol Take 2,000 Units by mouth daily. We Performed the Following AMB SUPPLY ORDER [[id number] Custom] Comments:  
 Diagnosis: Obstructive Sleep Apnea ICD-10 Code (G47.33) 
  Positive Airway Pressure Therapy: Duration of need: 99 months. ResMed BiPAP Device: EPAP  13 cmH2O, IPAP: 18 cmH2O. CPAP mask -  Patient preference, headgear, tubing, and filter;  heated humidifier; wireless modem. Remote monitoring enrollment. Marianela Hardwick MD, Zhanna Rogers Diplomate, American Board of Sleep Medicine Follow-up Instructions Return in about 7 weeks (around 9/11/2018) for Compliance review. Patient Instructions Sleep Apnea: Care Instructions Your Care Instructions Sleep apnea means that you frequently stop breathing for 10 seconds or longer during sleep. It can be mild to severe, based on the number of times an hour that you stop breathing or have slowed breathing. Blocked or narrowed airways in your nose, mouth, or throat can cause sleep apnea. Your airway can become blocked when your throat muscles and tongue relax during sleep. You can treat sleep apnea at home by making lifestyle changes. You also can use a CPAP breathing machine that keeps tissues in the throat from blocking your airway. Or your doctor may suggest that you use a breathing device while you sleep. It helps keep your airway open. This could be a device that you put in your mouth. Other examples include strips or disks that you use on your nose. In some cases, surgery may be needed to remove enlarged tissues in the throat. Follow-up care is a key part of your treatment and safety. Be sure to make and go to all appointments, and call your doctor if you are having problems. It's also a good idea to know your test results and keep a list of the medicines you take. How can you care for yourself at home? · Lose weight, if needed. It may reduce the number of times you stop breathing or have slowed breathing. · Sleep on your side. It may stop mild apnea. If you tend to roll onto your back, sew a pocket in the back of your FanBread top. Put a tennis ball into the pocket, and stitch the pocket shut. This will help keep you from sleeping on your back. · Avoid alcohol and medicines such as sleeping pills and sedatives before bed. · Do not smoke. Smoking can make sleep apnea worse. If you need help quitting, talk to your doctor about stop-smoking programs and medicines. These can increase your chances of quitting for good. · Prop up the head of your bed 4 to 6 inches by putting bricks under the legs of the bed. · Treat breathing problems, such as a stuffy nose, caused by a cold or allergies. · Try a continuous positive airway pressure (CPAP) breathing machine if your doctor recommends it. The machine keeps your airway open when you sleep. · If CPAP does not work for you, ask your doctor if you can try other breathing machines. A bilevel positive airway pressure machine uses one type of air pressure for breathing in and another type for breathing out. Another device raises or lowers air pressure as needed while you breathe. · Talk to your doctor if: 
¨ Your nose feels dry or bleeds when you use one of these machines. You may need to increase moisture in the air. A humidifier may help. ¨ Your nose is runny or stuffy from using a breathing machine. Decongestants or a corticosteroid nasal spray may help. ¨ You are sleepy during the day and it gets in the way of the normal things you do. Do not drive when you are drowsy. When should you call for help? Watch closely for changes in your health, and be sure to contact your doctor if: 
  · You still have sleep apnea even though you have made lifestyle changes.  
  · You are thinking of trying a device such as CPAP.  
  · You are having problems using a CPAP or similar machine. Where can you learn more? Go to http://mike-rivas.info/. Enter Q978 in the search box to learn more about \"Sleep Apnea: Care Instructions. \" Current as of: December 6, 2017 Content Version: 11.7 © 9939-8726 [employer].  Care instructions adapted under license by [address] (which disclaims liability or warranty for this information). If you have questions about a medical condition or this instruction, always ask your healthcare professional. [address]  any warranty or liability for your use of this information. Patient Instructions History Introducing [facility]! [employer] introduces [employer] patient portal. Now you can access parts of your medical record, email your doctor's office, and request medication refills online. 1. In your internet browser, go to [email or web address] 2. Click on the First Time User? Click Here link in the Sign In box. You will see the New Member Sign Up page. 3. Enter your [employer] Access Code exactly as it appears below. You will not need to use this code after youve completed the sign-up process. If you do not sign up before the expiration date, you must request a new code. · [employer] Access Code: [email or web address] Expires: 10/7/2018  4:09 PM 
 
4. Enter the last four digits of your Social Security Number (xxxx) and Date of Birth (mm/dd/yyyy) as indicated and click Submit. You will be taken to the next sign-up page. 5. Create a [employer] ID. This will be your [employer] login ID and cannot be changed, so think of one that is secure and easy to remember. 6. Create a [employer] password. You can change your password at any time. 7. Enter your Password Reset Question and Answer. This can be used at a later time if you forget your password. 8. Enter your e-mail address. You will receive e-mail notification when new information is available in [address] 9. Click Sign Up. You can now view and download portions of your medical record. 10. Click the Download Summary menu link to download a portable copy of your medical information.  
 
If you have questions, please visit the Frequently Asked Questions section of the [address]. Remember, [employer] is NOT to be used for urgent needs. For medical emergencies, dial 911. Now available from your iPhone and Android! Please provide this summary of care documentation to your next provider. Your primary care clinician is listed as Fatuma Youssef II. If you have any questions after today's visit, please call [phone].

## 2018-07-24 NOTE — PATIENT INSTRUCTIONS
Sleep Apnea: Care Instructions  Your Care Instructions    Sleep apnea means that you frequently stop breathing for 10 seconds or longer during sleep. It can be mild to severe, based on the number of times an hour that you stop breathing or have slowed breathing. Blocked or narrowed airways in your nose, mouth, or throat can cause sleep apnea. Your airway can become blocked when your throat muscles and tongue relax during sleep. You can treat sleep apnea at home by making lifestyle changes. You also can use a CPAP breathing machine that keeps tissues in the throat from blocking your airway. Or your doctor may suggest that you use a breathing device while you sleep. It helps keep your airway open. This could be a device that you put in your mouth. Other examples include strips or disks that you use on your nose. In some cases, surgery may be needed to remove enlarged tissues in the throat. Follow-up care is a key part of your treatment and safety. Be sure to make and go to all appointments, and call your doctor if you are having problems. It's also a good idea to know your test results and keep a list of the medicines you take. How can you care for yourself at home? · Lose weight, if needed. It may reduce the number of times you stop breathing or have slowed breathing. · Sleep on your side. It may stop mild apnea. If you tend to roll onto your back, sew a pocket in the back of your pajama top. Put a tennis ball into the pocket, and stitch the pocket shut. This will help keep you from sleeping on your back. · Avoid alcohol and medicines such as sleeping pills and sedatives before bed. · Do not smoke. Smoking can make sleep apnea worse. If you need help quitting, talk to your doctor about stop-smoking programs and medicines. These can increase your chances of quitting for good. · Prop up the head of your bed 4 to 6 inches by putting bricks under the legs of the bed.   · Treat breathing problems, such as a stuffy nose, caused by a cold or allergies. · Try a continuous positive airway pressure (CPAP) breathing machine if your doctor recommends it. The machine keeps your airway open when you sleep. · If CPAP does not work for you, ask your doctor if you can try other breathing machines. A bilevel positive airway pressure machine uses one type of air pressure for breathing in and another type for breathing out. Another device raises or lowers air pressure as needed while you breathe. · Talk to your doctor if:  ¨ Your nose feels dry or bleeds when you use one of these machines. You may need to increase moisture in the air. A humidifier may help. ¨ Your nose is runny or stuffy from using a breathing machine. Decongestants or a corticosteroid nasal spray may help. ¨ You are sleepy during the day and it gets in the way of the normal things you do. Do not drive when you are drowsy. When should you call for help? Watch closely for changes in your health, and be sure to contact your doctor if:    · You still have sleep apnea even though you have made lifestyle changes.     · You are thinking of trying a device such as CPAP.     · You are having problems using a CPAP or similar machine. Where can you learn more? Go to http://mike-rivas.info/. Enter U482 in the search box to learn more about \"Sleep Apnea: Care Instructions. \"  Current as of: December 6, 2017  Content Version: 11.7  © 0341-9595 [employer]. Care instructions adapted under license by [employer] (which disclaims liability or warranty for this information). If you have questions about a medical condition or this instruction, always ask your healthcare professional. [place] [address] any warranty or liability for your use of this information.

## 2018-08-07 ENCOUNTER — OFFICE VISIT (OUTPATIENT)
Dept: INTERNAL MEDICINE CLINIC | Age: 63
End: 2018-08-07

## 2018-08-07 VITALS
DIASTOLIC BLOOD PRESSURE: 88 MMHG | HEART RATE: 72 BPM | OXYGEN SATURATION: 94 % | SYSTOLIC BLOOD PRESSURE: 141 MMHG | BODY MASS INDEX: 53.14 KG/M2 | WEIGHT: 315 LBS | TEMPERATURE: 98.5 F

## 2018-08-07 DIAGNOSIS — I10 ESSENTIAL HYPERTENSION, BENIGN: Primary | ICD-10-CM

## 2018-08-07 DIAGNOSIS — E11.9 DIABETES MELLITUS TYPE 2, DIET-CONTROLLED (HCC): ICD-10-CM

## 2018-08-07 DIAGNOSIS — E61.1 IRON DEFICIENCY: ICD-10-CM

## 2018-08-07 DIAGNOSIS — G47.33 OSA (OBSTRUCTIVE SLEEP APNEA): ICD-10-CM

## 2018-08-07 LAB
GRAN# POC: 4.2 K/UL (ref 2–7.8)
GRAN% POC: 59.3 % (ref 37–92)
HCT VFR BLD CALC: 42.2 % (ref 37–51)
HGB BLD-MCNC: 13.3 G/DL (ref 12–18)
LY# POC: 2.3 K/UL (ref 0.6–4.1)
LY% POC: 33.4 % (ref 10–58.5)
MCH RBC QN: 29.7 PG (ref 26–32)
MCHC RBC-ENTMCNC: 31.5 G/DL (ref 30–36)
MCV RBC: 94 FL (ref 80–97)
MID #, POC: 0.5 K/UL (ref 0–1.8)
MID% POC: 7.3 % (ref 0.1–24)
PLATELET # BLD: 215 K/UL (ref 140–440)
RBC # BLD: 4.48 M/UL (ref 4.2–6.3)
WBC # BLD: 7 K/UL (ref 4.1–10.9)

## 2018-08-07 NOTE — PROGRESS NOTES
Subjective:  Mr. Daniel Bee comes to the office today complaining of feeling lightheaded. He's recently had sleep studies done and he's now been placed on BIPAP. He had 88 apneas an hour. He has not had true vertigo and he has not had syncope or near syncope. Physical Examination:  GENERAL:  WT: 381. BP: 141/88 by the nurse, I get 108/80 in each arm with the patient sitting. T: 98.5. P: 72 and regular. O2 sat on room air: 94%. HEENT:  Unremarkable. NECK:  Without jugular venous distention or bruits. CHEST:  Lungs clear. CARDIAC:  Heart regular rhythm without murmurs or gallops. EXTREMITIES:  2+ brawny edema bilaterally. Impression:  1. I think his lightheadedness is probably related to relative hypotension. Plan:  1. Check CBC, CMP, iron profile and glycohemoglobin. 2. Discontinue Amlodipine. Continue other medications as outlined. 3. I've asked him to return to clinic next week to recheck the blood pressure. 4. I had another discussion with him about my recommendation for bariatric surgery.

## 2018-08-07 NOTE — LETTER
NOTIFICATION RETURN TO WORK / SCHOOL 
 
8/7/2018 4:09 PM 
 
Mr. Cassidy Simmons 
[address] [id number] To Whom It May Concern: 
 
Cassidy Simmons is currently under the care of Vandana Sparks. He will return to work/school on: 08/08/18 If there are questions or concerns please have the patient contact our office. Sincerely, Janay Patricio MD

## 2018-08-07 NOTE — PROGRESS NOTES
Reviewed record in preparation for visit and have obtained necessary documentation. Identified pt with two pt identifiers(name and ). Chief Complaint   Patient presents with    Dizziness     light headed        Coordination of Care Questionnaire:  :     1) Have you been to an emergency room, urgent care clinic since your last visit? No     Hospitalized since your last visit? No               2) Have you seen or consulted any other health care providers outside of [facility] since your last visit?  Sleep doctor

## 2018-08-08 LAB
ALBUMIN SERPL-MCNC: 4.3 G/DL (ref 3.6–4.8)
ALBUMIN/GLOB SERPL: 1.4 {RATIO} (ref 1.2–2.2)
ALP SERPL-CCNC: 99 IU/L (ref 39–117)
ALT SERPL-CCNC: 35 IU/L (ref 0–44)
AST SERPL-CCNC: 33 IU/L (ref 0–40)
BILIRUB SERPL-MCNC: 0.2 MG/DL (ref 0–1.2)
BUN SERPL-MCNC: 22 MG/DL (ref 8–27)
BUN/CREAT SERPL: 20 (ref 10–24)
CALCIUM SERPL-MCNC: 9.1 MG/DL (ref 8.6–10.2)
CHLORIDE SERPL-SCNC: 101 MMOL/L (ref 96–106)
CO2 SERPL-SCNC: 26 MMOL/L (ref 20–29)
CREAT SERPL-MCNC: 1.08 MG/DL (ref 0.76–1.27)
EST. AVERAGE GLUCOSE BLD GHB EST-MCNC: 117 MG/DL
GLOBULIN SER CALC-MCNC: 3.1 G/DL (ref 1.5–4.5)
GLUCOSE SERPL-MCNC: 98 MG/DL (ref 65–99)
HBA1C MFR BLD: 5.7 % (ref 4.8–5.6)
IRON SATN MFR SERPL: 15 % (ref 15–55)
IRON SERPL-MCNC: 47 UG/DL (ref 38–169)
POTASSIUM SERPL-SCNC: 4 MMOL/L (ref 3.5–5.2)
PROT SERPL-MCNC: 7.4 G/DL (ref 6–8.5)
SODIUM SERPL-SCNC: 143 MMOL/L (ref 134–144)
TIBC SERPL-MCNC: 321 UG/DL (ref 250–450)
UIBC SERPL-MCNC: 274 UG/DL (ref 111–343)

## 2018-08-16 ENCOUNTER — OFFICE VISIT (OUTPATIENT)
Dept: INTERNAL MEDICINE CLINIC | Age: 63
End: 2018-08-16

## 2018-08-16 VITALS
SYSTOLIC BLOOD PRESSURE: 150 MMHG | WEIGHT: 315 LBS | BODY MASS INDEX: 44.1 KG/M2 | HEIGHT: 71 IN | HEART RATE: 74 BPM | DIASTOLIC BLOOD PRESSURE: 82 MMHG | OXYGEN SATURATION: 94 %

## 2018-08-16 DIAGNOSIS — G47.33 OSA (OBSTRUCTIVE SLEEP APNEA): ICD-10-CM

## 2018-08-16 DIAGNOSIS — I10 ESSENTIAL HYPERTENSION, BENIGN: Primary | ICD-10-CM

## 2018-08-16 RX ORDER — MODAFINIL 200 MG/1
200 TABLET ORAL DAILY
Qty: 30 TAB | Refills: 11 | Status: SHIPPED | OUTPATIENT
Start: 2018-08-16 | End: 2018-10-22 | Stop reason: SDUPTHER

## 2018-08-16 NOTE — PROGRESS NOTES
Subjective:  Mr. Tapan Cintron comes to the office today in follow up for:  1. Hypertension. 2. Dizziness. He says the dizziness is better since he stopped Amlodipine, but he has a congestive cough, which is nonproductive. He's been taking Mucinex without relief. Current Medications:  1. Centrum Silver, one daily. 2. Bumex 2 mg q.a.m.  3. Allopurinol 300 mg daily. 4. Atenolol 50 mg daily. 5. Aspirin 81 mg daily. 6. Mucinex 600 mg b.i.d.  7. Prilosec 20 mg daily. He complains of fatigue and somnolence during the day despite wearing his CPAP mask. He has severe sleep apnea. Physical Examination:  GENERAL:  WT: 382. BP: 150/82, 130/86 on two determinations. P: 74 and regular. O2 sat on room air: 94%. HEENT:  Unremarkable. CHEST:  Lungs clear. CARDIAC:  Heart regular rhythm without murmurs or gallops. Plan:  1. Discontinue Mucinex. 2. Try Zyrtec 10 mg daily as I think this cough is likely allergic. 3. Stay off Amlodipine. 4. Try Provigil 200 mg daily for somnolence despite CPAP treatment. 5. Follow up to see me in two months.

## 2018-08-16 NOTE — PROGRESS NOTES
Reviewed record in preparation for visit and have obtained necessary documentation. Identified pt with two pt identifiers(name and ).     Chief Complaint   Patient presents with    Hypertension     follow up        Coordination of Care Questionnaire:  :     1) Have you been to an emergency room, urgent care clinic since your last visit? no    Hospitalized since your last visit? no    :          2) Have you seen or consulted any other health care providers outside of [facility] since your last visit? no

## 2018-08-20 RX ORDER — BUMETANIDE 1 MG/1
TABLET ORAL
Qty: 180 TAB | Refills: 0 | Status: SHIPPED | OUTPATIENT
Start: 2018-08-20 | End: 2018-11-18 | Stop reason: SDUPTHER

## 2018-08-20 RX ORDER — BUMETANIDE 1 MG/1
TABLET ORAL
Qty: 60 TAB | Refills: 0 | Status: SHIPPED | OUTPATIENT
Start: 2018-08-20 | End: 2018-08-20 | Stop reason: SDUPTHER

## 2018-08-31 RX ORDER — ALLOPURINOL 300 MG/1
TABLET ORAL
Qty: 90 TAB | Refills: 0 | Status: SHIPPED | OUTPATIENT
Start: 2018-08-31 | End: 2018-11-28 | Stop reason: SDUPTHER

## 2018-09-20 RX ORDER — ATENOLOL 50 MG/1
TABLET ORAL
Qty: 90 TAB | Refills: 0 | Status: SHIPPED | OUTPATIENT
Start: 2018-09-20 | End: 2018-12-20 | Stop reason: SDUPTHER

## 2018-10-22 ENCOUNTER — OFFICE VISIT (OUTPATIENT)
Dept: INTERNAL MEDICINE CLINIC | Age: 63
End: 2018-10-22

## 2018-10-22 VITALS
OXYGEN SATURATION: 95 % | HEART RATE: 66 BPM | WEIGHT: 315 LBS | SYSTOLIC BLOOD PRESSURE: 138 MMHG | BODY MASS INDEX: 44.1 KG/M2 | HEIGHT: 71 IN | DIASTOLIC BLOOD PRESSURE: 84 MMHG

## 2018-10-22 DIAGNOSIS — I10 ESSENTIAL HYPERTENSION, BENIGN: ICD-10-CM

## 2018-10-22 DIAGNOSIS — G47.33 OSA (OBSTRUCTIVE SLEEP APNEA): ICD-10-CM

## 2018-10-22 DIAGNOSIS — Z23 ENCOUNTER FOR IMMUNIZATION: Primary | ICD-10-CM

## 2018-10-22 RX ORDER — ECONAZOLE NITRATE 10 MG/G
CREAM TOPICAL
Refills: 0 | COMMUNITY
Start: 2018-10-11 | End: 2019-02-20

## 2018-10-22 RX ORDER — TERBINAFINE HYDROCHLORIDE 250 MG/1
TABLET ORAL
Refills: 1 | COMMUNITY
Start: 2018-10-12 | End: 2019-02-20

## 2018-10-22 RX ORDER — MODAFINIL 200 MG/1
200 TABLET ORAL DAILY
Qty: 30 TAB | Refills: 11 | Status: SHIPPED | OUTPATIENT
Start: 2018-10-22 | End: 2019-05-22

## 2018-10-22 NOTE — PATIENT INSTRUCTIONS
Vaccine Information Statement    Influenza (Flu) Vaccine (Inactivated or Recombinant): What you need to know    Many Vaccine Information Statements are available in Romanian and other languages. See www.immunize.org/vis  Hojas de Información Sobre Vacunas están disponibles en Español y en muchos otros idiomas. Visite www.immunize.org/vis    1. Why get vaccinated? Influenza (flu) is a contagious disease that spreads around the [place] every year, usually between October and May. Flu is caused by influenza viruses, and is spread mainly by coughing, sneezing, and close contact. Anyone can get flu. Flu strikes suddenly and can last several days. Symptoms vary by age, but can include:   fever/chills   sore throat   muscle aches   fatigue   cough   headache    runny or stuffy nose    Flu can also lead to pneumonia and blood infections, and cause diarrhea and seizures in children. If you have a medical condition, such as heart or lung disease, flu can make it worse. Flu is more dangerous for some people. Infants and young children, people 72years of age and older, pregnant women, and people with certain health conditions or a weakened immune system are at greatest risk. Each year thousands of people in the [facility] die from flu, and many more are hospitalized. Flu vaccine can:   keep you from getting flu,   make flu less severe if you do get it, and   keep you from spreading flu to your family and other people. 2. Inactivated and recombinant flu vaccines    A dose of flu vaccine is recommended every flu season. Children 6 months through 6years of age may need two doses during the same flu season. Everyone else needs only one dose each flu season.        Some inactivated flu vaccines contain a very small amount of a mercury-based preservative called thimerosal. Studies have not shown thimerosal in vaccines to be harmful, but flu vaccines that do not contain thimerosal are available. There is no live flu virus in flu shots. They cannot cause the flu. There are many flu viruses, and they are always changing. Each year a new flu vaccine is made to protect against three or four viruses that are likely to cause disease in the upcoming flu season. But even when the vaccine doesnt exactly match these viruses, it may still provide some protection    Flu vaccine cannot prevent:   flu that is caused by a virus not covered by the vaccine, or   illnesses that look like flu but are not. It takes about 2 weeks for protection to develop after vaccination, and protection lasts through the flu season. 3. Some people should not get this vaccine    Tell the person who is giving you the vaccine:     If you have any severe, life-threatening allergies. If you ever had a life-threatening allergic reaction after a dose of flu vaccine, or have a severe allergy to any part of this vaccine, you may be advised not to get vaccinated. Most, but not all, types of flu vaccine contain a small amount of egg protein.  If you ever had Guillain-Barré Syndrome (also called GBS). Some people with a history of GBS should not get this vaccine. This should be discussed with your doctor.  If you are not feeling well. It is usually okay to get flu vaccine when you have a mild illness, but you might be asked to come back when you feel better. 4. Risks of a vaccine reaction    With any medicine, including vaccines, there is a chance of reactions. These are usually mild and go away on their own, but serious reactions are also possible. Most people who get a flu shot do not have any problems with it.      Minor problems following a flu shot include:    soreness, redness, or swelling where the shot was given     hoarseness   sore, red or itchy eyes   cough   fever   aches   headache   itching   fatigue  If these problems occur, they usually begin soon after the shot and last 1 or 2 days. More serious problems following a flu shot can include the following:     There may be a small increased risk of Guillain-Barré Syndrome (GBS) after inactivated flu vaccine. This risk has been estimated at 1 or 2 additional cases per million people vaccinated. This is much lower than the risk of severe complications from flu, which can be prevented by flu vaccine.  Young children who get the flu shot along with pneumococcal vaccine (PCV13) and/or DTaP vaccine at the same time might be slightly more likely to have a seizure caused by fever. Ask your doctor for more information. Tell your doctor if a child who is getting flu vaccine has ever had a seizure. Problems that could happen after any injected vaccine:      People sometimes faint after a medical procedure, including vaccination. Sitting or lying down for about 15 minutes can help prevent fainting, and injuries caused by a fall. Tell your doctor if you feel dizzy, or have vision changes or ringing in the ears.  Some people get severe pain in the shoulder and have difficulty moving the arm where a shot was given. This happens very rarely.  Any medication can cause a severe allergic reaction. Such reactions from a vaccine are very rare, estimated at about 1 in a million doses, and would happen within a few minutes to a few hours after the vaccination. As with any medicine, there is a very remote chance of a vaccine causing a serious injury or death. The safety of vaccines is always being monitored. For more information, visit: www.cdc.gov/vaccinesafety/    5. What if there is a serious reaction? What should I look for?  Look for anything that concerns you, such as signs of a severe allergic reaction, very high fever, or unusual behavior.     Signs of a severe allergic reaction can include hives, swelling of the face and throat, difficulty breathing, a fast heartbeat, dizziness, and weakness - usually within a few minutes to a few hours after the vaccination. What should I do?  If you think it is a severe allergic reaction or other emergency that cant wait, call 9-1-1 and get the person to the nearest hospital. Otherwise, call your doctor.  Reactions should be reported to the Vaccine Adverse Event Reporting System (VAERS). Your doctor should file this report, or you can do it yourself through  the VAERS web site at www.vaers. [facility].gov, or by calling 4-218.420.4764. VAERS does not give medical advice. 6. The National Vaccine Injury Compensation Program    The [facility] Vaccine Injury Compensation Program (VICP) is a federal program that was created to compensate people who may have been injured by certain vaccines. Persons who believe they may have been injured by a vaccine can learn about the program and about filing a claim by calling 5-995.342.5759 or visiting the [employer] website at www.[facility].gov/vaccinecompensation. There is a time limit to file a claim for compensation. 7. How can I learn more?  Ask your healthcare provider. He or she can give you the vaccine package insert or suggest other sources of information.  Call your local or state health department.  Contact the Centers for Disease Control and Prevention (CDC):  - Call 4-108.135.4642 (1-800-CDC-INFO) or  - Visit CDCs website at www.cdc.gov/flu    Vaccine Information Statement   Inactivated Influenza Vaccine   8/7/2015  42 MISTY Smith [id number]    Department of Health and Human Services  Centers for Disease Control and Prevention    Office Use Only

## 2018-10-22 NOTE — PROGRESS NOTES
Subjective:  Mr. Michael Briceno comes to the office today in follow up for:  1. Hypertension. 2. Severe sleep apnea, on BiPAP. 3. Morbid obesity. Current Medications:  1. Allopurinol 300 mg daily. 2. Aspirin 81 mg daily. 3. Atenolol 50 mg daily. 4. Bumex 2 mg daily. 5. Zyrtec 10 mg prn.  6. Amlodipine 5 mg daily. 7. Metamucil once daily. 8. Omeprazole 20 mg daily. He has seen a dermatologist and he's on a pill, which I assume is Lamisil. He's not wearing his mask at night because of discomfort. He's not taking his Provigil for unknown reasons to he and I.    Physical Examination:  GENERAL:  WT: 385. BP: 138/84. P: 66 and regular. O2 sat on room air: 95%. CHEST:  Lungs clear. CARDIAC:  Heart regular rhythm without murmurs or gallops. EXTREMITIES:  2+ brawny edema bilaterally. Plan:  1. I've implored him to try the Provigil. 2. Flu vaccine given today. 3. He's seeing a sleep doctor. I also implored him to use the BiPAP. 4. Follow up in six months with Dr. Danna Danielle.

## 2018-10-22 NOTE — PROGRESS NOTES
Reviewed record in preparation for visit and have obtained necessary documentation. Identified pt with two pt identifiers(name and ). Chief Complaint   Patient presents with    Diabetes     follow up    Hypertension        Coordination of Care Questionnaire:  :     1) Have you been to an emergency room, urgent care clinic since your last visit? no    Hospitalized since your last visit? no              2) Have you seen or consulted any other health care providers outside of [address] [place] [address] since your last visit? Saw a dermatologist about 2 weeks ago for nail fungus.

## 2018-10-23 ENCOUNTER — OFFICE VISIT (OUTPATIENT)
Dept: SLEEP MEDICINE | Age: 63
End: 2018-10-23

## 2018-10-23 VITALS
WEIGHT: 315 LBS | HEART RATE: 74 BPM | HEIGHT: 71 IN | DIASTOLIC BLOOD PRESSURE: 70 MMHG | OXYGEN SATURATION: 94 % | SYSTOLIC BLOOD PRESSURE: 127 MMHG | BODY MASS INDEX: 44.1 KG/M2

## 2018-10-23 DIAGNOSIS — G47.33 OSA (OBSTRUCTIVE SLEEP APNEA): Primary | ICD-10-CM

## 2018-11-02 NOTE — PROGRESS NOTES
[address] [facility]. [place], [address]  Tel.  [phone]  Fax. 100 [facility] 60  [place], [address] [facility]  Tel.  [phone]  Fax. [phone] [address] [facility] Lydia Barrera   Tel.  [phone]  Fax. [phone]         Chief Complaint       Chief Complaint   Patient presents with    Sleep Problem     1st adherence visit         HPI        Olya Perez is a 57 y.o.  male seen for follow-up. He was evaluated with a sleep study at [facility]. A copy of that evaluation is not noted in the chart. He was seen at [facility] in Feb 2017 by Dr. Luz Peck. He  was evaluated with a sleep study which demonstrated obstructive sleep apnea characterized by an AHI of 87.9  per hour associated with marked arterial desaturations to 52% . CPAP and BIPAP were employed . Desaturations persisted with CPAP. At BIPAP setting of 18/13  cm baseline SaO2 greater than 90%. Compliance data downloaded and reviewed in detail with the patient today. During the past 82 days, BIPAP used during 50 days with the average daily use of 3.65 hours. CMS compliance criteria 18 %. AHI 3 per hour. Mask leak was elevated. Allergies   Allergen Reactions    Ace Inhibitors Cough       Current Outpatient Medications   Medication Sig Dispense Refill    econazole nitrate (SPECTAZOLE) 1 % topical cream USE ON RASH DAILY AFTER BATHING  0    terbinafine HCl (LAMISIL) 250 mg tablet TK 1 T PO QD  1    modafinil (PROVIGIL) 200 mg tablet Take 1 Tab by mouth daily.  Max Daily Amount: 200 mg. 30 Tab 11    atenolol (TENORMIN) 50 mg tablet TAKE 1 TABLET BY MOUTH EVERY DAY 90 Tab 0    allopurinol (ZYLOPRIM) 300 mg tablet TAKE 1 TABLET BY MOUTH DAILY 90 Tab 0    bumetanide (BUMEX) 1 mg tablet TAKE 2 TABLETS BY MOUTH EVERY  Tab 0    celecoxib (CELEBREX) 200 mg capsule TAKE 1 CAPSULE BY MOUTH TWICE DAILY 180 Cap 0    triamcinolone acetonide (KENALOG) 0.1 % topical cream Apply  to affected area two (2) times a day. use thin layer 80 g 0    cetirizine (ZYRTEC) 10 mg tablet Take 1 Tab by mouth two (2) times a day.  psyllium (METAMUCIL) powd Take  by mouth.  docusate sodium (COLACE) 100 mg capsule Take 100 mg by mouth two (2) times a day.  clobetasol-emollient (TEMOVATE-E) 0.05 % topical cream       clotrimazole-betamethasone (LOTRISONE) topical cream       albuterol (PROAIR HFA) 90 mcg/actuation inhaler Take 2 puffs by inhalation every four (4) hours as needed for Wheezing. 1 Inhaler 1    omeprazole (PRILOSEC OTC) 20 mg tablet Take 20 mg by mouth daily.  NV-RG-IE-Fe-Min-Lycopen-Lutein (CENTRUM) 0.4-162-18 mg Tab Take  by mouth.  aspirin 81 mg chewable tablet Take 81 mg by mouth daily.  cholecalciferol (VITAMIN D3) 1,000 unit tablet Take 2,000 Units by mouth daily. He  has a past medical history of Allergic rhinitis, Chest pain syndrome, Community acquired pneumonia, Diarrhea, Dyspnea, Environmental allergies, Gout, Hypertension, Insomnia, Iron deficiency, Left chronic serous otitis media, Obesity, Sleep apnea, and Steatosis. He  has a past surgical history that includes hx orthopaedic. He family history includes Diabetes in an other family member; Heart Disease in an other family member; Hypertension in an other family member. He  reports that  has never smoked. he has never used smokeless tobacco. He reports that he does not drink alcohol. Review of Systems:  Unchanged per patient      Objective:     Visit Vitals  /70   Pulse 74   Ht 5' 11\" (1.803 m)   Wt (!) 385 lb (174.6 kg)   SpO2 94%   BMI 53.70 kg/m²     Body mass index is 53.7 kg/m². Assessment:       ICD-10-CM ICD-9-CM    1. TERESA (obstructive sleep apnea) G47.33 327.23      Severe sleep disordered breathing responding to BiPAP 18/13 centimeters. Mask leak is elevated. Patient does need to be consistent with BiPAP usage. Compliance review will be obtained in 3 months. Plan:   No orders of the defined types were placed in this encounter. * Patient has a history and examination consistent with the diagnosis of sleep apnea. * He was provided information on sleep apnea including coresponding risk factors and the importance of proper treatment. * Treatment options if indicated were reviewed today. Potential benefit of weight reduction was discussed. Weight reduction techniques reviewed. Viri Boyd MD, [facility]  Electronically signed 11/02/18        This note was created using voice recognition software. Despite editing, there may be syntax errors. This note will not be viewable in [address]

## 2018-11-02 NOTE — PATIENT INSTRUCTIONS

## 2018-11-18 RX ORDER — BUMETANIDE 1 MG/1
TABLET ORAL
Qty: 180 TAB | Refills: 0 | Status: SHIPPED | OUTPATIENT
Start: 2018-11-18 | End: 2019-02-20 | Stop reason: SDUPTHER

## 2018-11-27 ENCOUNTER — OFFICE VISIT (OUTPATIENT)
Dept: SLEEP MEDICINE | Age: 63
End: 2018-11-27

## 2018-11-27 VITALS
OXYGEN SATURATION: 95 % | SYSTOLIC BLOOD PRESSURE: 137 MMHG | HEIGHT: 71 IN | HEART RATE: 83 BPM | WEIGHT: 315 LBS | DIASTOLIC BLOOD PRESSURE: 77 MMHG | BODY MASS INDEX: 44.1 KG/M2

## 2018-11-27 DIAGNOSIS — G47.33 OSA (OBSTRUCTIVE SLEEP APNEA): Primary | ICD-10-CM

## 2018-11-27 NOTE — PROGRESS NOTES
[address] [facility]. [place], [address]  Tel.  [phone]  Fax. 100 [facility] 60  [place], [address] [facility]  Tel.  [phone]  Fax. [phone] [address] [facility]  Tel.  [phone]  Fax. [phone]         Chief Complaint       Chief Complaint   Patient presents with    Sleep Problem     adherence visit- non compliant on last visit         HPI        Sierra Zhang is a  57 y.o.  male seen for follow-up. He was evaluated with a sleep study at [facility]. A copy of that evaluation is not noted in the chart. He was seen at [facility] in Feb 2017 by Dr. Joe Castillo.     He  was evaluated with a sleep study which demonstrated obstructive sleep apnea characterized by an AHI of 87.9  per hour associated with marked arterial desaturations to 52% . CPAP and BIPAP were employed . Desaturations persisted with CPAP. At BIPAP setting of 18/13  cm baseline SaO2 greater than 90%.  Compliance data download demonstrated CMS compliance criteria 18 %. AHI 3 per hour. Importance of consistent usage reviewed in detail.      Compliance data downloaded and reviewed in detail with the patient today. During the past 30 days, BIPAP used during 19 days with the average daily use of 3.95 hours. CMS compliance criteria 33%. AHI 3 per hour. Mask  leak elevated. Allergies   Allergen Reactions    Ace Inhibitors Cough       Current Outpatient Medications   Medication Sig Dispense Refill    bumetanide (BUMEX) 1 mg tablet TAKE 2 TABLETS BY MOUTH EVERY  Tab 0    econazole nitrate (SPECTAZOLE) 1 % topical cream USE ON RASH DAILY AFTER BATHING  0    terbinafine HCl (LAMISIL) 250 mg tablet TK 1 T PO QD  1    modafinil (PROVIGIL) 200 mg tablet Take 1 Tab by mouth daily.  Max Daily Amount: 200 mg. 30 Tab 11    atenolol (TENORMIN) 50 mg tablet TAKE 1 TABLET BY MOUTH EVERY DAY 90 Tab 0    allopurinol (ZYLOPRIM) 300 mg tablet TAKE 1 TABLET BY MOUTH DAILY 90 Tab 0    celecoxib (CELEBREX) 200 mg capsule TAKE 1 CAPSULE BY MOUTH TWICE DAILY 180 Cap 0    triamcinolone acetonide (KENALOG) 0.1 % topical cream Apply  to affected area two (2) times a day. use thin layer 80 g 0    cetirizine (ZYRTEC) 10 mg tablet Take 1 Tab by mouth two (2) times a day.  psyllium (METAMUCIL) powd Take  by mouth.  docusate sodium (COLACE) 100 mg capsule Take 100 mg by mouth two (2) times a day.  clobetasol-emollient (TEMOVATE-E) 0.05 % topical cream       clotrimazole-betamethasone (LOTRISONE) topical cream       albuterol (PROAIR HFA) 90 mcg/actuation inhaler Take 2 puffs by inhalation every four (4) hours as needed for Wheezing. 1 Inhaler 1    cholecalciferol (VITAMIN D3) 1,000 unit tablet Take 2,000 Units by mouth daily.  omeprazole (PRILOSEC OTC) 20 mg tablet Take 20 mg by mouth daily.  AW-FD-AL-Fe-Min-Lycopen-Lutein (CENTRUM) 0.4-162-18 mg Tab Take  by mouth.  aspirin 81 mg chewable tablet Take 81 mg by mouth daily. He  has a past medical history of Allergic rhinitis, Chest pain syndrome, Community acquired pneumonia, Diarrhea, Dyspnea, Environmental allergies, Gout, Hypertension, Insomnia, Iron deficiency, Left chronic serous otitis media, Obesity, Sleep apnea, and Steatosis. He  has a past surgical history that includes hx orthopaedic. He family history includes Diabetes in an other family member; Heart Disease in an other family member; Hypertension in an other family member. He  reports that  has never smoked. he has never used smokeless tobacco. He reports that he does not drink alcohol. Review of Systems:  Unchanged per patient      Objective:     Visit Vitals  /77 (BP 1 Location: Left arm, BP Patient Position: Sitting)   Pulse 83   Ht 5' 11\" (1.803 m)   Wt (!) 388 lb (176 kg)   SpO2 95%   BMI 54.12 kg/m²     Body mass index is 54.12 kg/m².        Assessment:       ICD-10-CM ICD-9-CM    1. TERESA (obstructive sleep apnea) G47.33 327.23      Severe sleep disordered breathing responding well to BiPAP 18/13 cm. The importance of using BiPAP nightly was again reviewed with him in detail. Remote compliance review will be obtained in 30 days. If compliant, he should be able to update his supplies at that time. Plan:   No orders of the defined types were placed in this encounter. * Patient has a history and examination consistent with the diagnosis of sleep apnea. * He was provided information on sleep apnea including coresponding risk factors and the importance of proper treatment. * Treatment options if indicated were reviewed today. Potential benefit of weight reduction was discussed. Weight reduction techniques reviewed. Saleem Langston MD, FAASM  Electronically signed 11/27/18        This note was created using voice recognition software. Despite editing, there may be syntax errors. This note will not be viewable in [address]

## 2018-11-27 NOTE — PATIENT INSTRUCTIONS

## 2018-11-28 RX ORDER — ALLOPURINOL 300 MG/1
TABLET ORAL
Qty: 90 TAB | Refills: 1 | Status: SHIPPED | OUTPATIENT
Start: 2018-11-28 | End: 2019-05-28 | Stop reason: SDUPTHER

## 2018-11-28 NOTE — TELEPHONE ENCOUNTER
Patient Last Seen:  10- without labs    Last labs done: 08-    Next appointment:  04-      Requested Prescriptions     Pending Prescriptions Disp Refills    allopurinol (ZYLOPRIM) 300 mg tablet 90 Tab 1     Sig: TAKE 1 TABLET BY MOUTH DAILY

## 2018-12-20 RX ORDER — ATENOLOL 50 MG/1
TABLET ORAL
Qty: 90 TAB | Refills: 3 | Status: SHIPPED | OUTPATIENT
Start: 2018-12-20 | End: 2019-12-16 | Stop reason: SDUPTHER

## 2018-12-20 NOTE — TELEPHONE ENCOUNTER
RX refill request from the patient/pharmacy. Patient last seen 10/22/2018 without labs, and next appt. scheduled for 4/25/2019.   Requested Prescriptions     Pending Prescriptions Disp Refills    atenolol (TENORMIN) 50 mg tablet 90 Tab 0

## 2018-12-20 NOTE — TELEPHONE ENCOUNTER
Requested Prescriptions     Pending Prescriptions Disp Refills    atenolol (TENORMIN) 50 mg tablet 90 Tab 0

## 2019-02-20 ENCOUNTER — OFFICE VISIT (OUTPATIENT)
Dept: INTERNAL MEDICINE CLINIC | Age: 64
End: 2019-02-20

## 2019-02-20 VITALS
HEART RATE: 73 BPM | DIASTOLIC BLOOD PRESSURE: 75 MMHG | SYSTOLIC BLOOD PRESSURE: 119 MMHG | BODY MASS INDEX: 44.1 KG/M2 | TEMPERATURE: 97.6 F | HEIGHT: 71 IN | OXYGEN SATURATION: 94 % | WEIGHT: 315 LBS

## 2019-02-20 DIAGNOSIS — L84 PRE-ULCERATIVE CORN OR CALLOUS: ICD-10-CM

## 2019-02-20 DIAGNOSIS — L97.519 ULCER OF TOE OF RIGHT FOOT, UNSPECIFIED ULCER STAGE (HCC): Primary | ICD-10-CM

## 2019-02-20 RX ORDER — BUMETANIDE 1 MG/1
TABLET ORAL
Qty: 180 TAB | Refills: 0 | Status: SHIPPED | OUTPATIENT
Start: 2019-02-20 | End: 2019-05-20 | Stop reason: SDUPTHER

## 2019-02-20 NOTE — PROGRESS NOTES
Subjective:     Mr. Walter Beckham comes to the office today complaining of irritation at the tip of his right great toe. He has previously had both of his great toenails ablated, though it was not fully ablated on the right side and he does have some minimal nail growing out of each side of the toenail bed. This is not really causing him a problem, but does have curvature of his toe upward and this seems to be rubbing his shoe wear and causing a callus and/or early ulceration there. I think he needs to see the podiatrist to deal with this, as well as any residual issues related to the nail. We will make that referral.  In the meantime he needs to protect it with bandaids and I showed him how to bandage it in the meantime.         Patient Active Problem List    Diagnosis Date Noted    HTN (hypertension) 05/23/2014     Priority: 1 - One    Dyslipidemia 09/11/2017     Priority: 2 - Two    TERESA (obstructive sleep apnea) 09/11/2017     Priority: 2 - Two    Metabolic syndrome 43/67/8233     Priority: 2 - Two    Esophageal reflux 05/23/2014     Priority: 2 - Two    Eczema 09/11/2017     Priority: 3 - Three    Pruritus 09/11/2017     Priority: 3 - Three    Essential hypertension, benign 02/16/2013     Priority: 3 - Three    Partial small bowel obstruction ([address] 05/22/2014     Priority: 4 - Four    Onychomycosis 07/23/2018    Diabetes mellitus type 2, diet-controlled ([address] 01/22/2018    Obesity, morbid ([address] 12/04/2017    Cough due to angiotensin-converting enzyme inhibitor 12/04/2017    Annual physical exam 12/04/2017    Diarrhea 11/22/2017    Obesity 11/22/2017    Chest pain syndrome 11/22/2017    Steatosis 11/22/2017    Allergic rhinitis 11/22/2017    Left chronic serous otitis media 11/22/2017    Gout 11/22/2017    Dyspnea 11/22/2017    Insomnia 11/22/2017    Iron deficiency 11/22/2017    Acute pharyngitis due to other specified organisms 11/07/2017    Acute right-sided low back pain without sciatica 08/22/2017     Past Surgical History:   Procedure Laterality Date    HX ORTHOPAEDIC      right knee      Social History     Tobacco Use    Smoking status: Never Smoker    Smokeless tobacco: Never Used   Substance Use Topics    Alcohol use: No     Allergies   Allergen Reactions    Ace Inhibitors Cough     Outpatient Medications Marked as Taking for the 2/20/19 encounter (Office Visit) with Arti Hart MD   Medication Sig Dispense Refill    atenolol (TENORMIN) 50 mg tablet TAKE 1 TABLET BY MOUTH EVERY DAY 90 Tab 3    allopurinol (ZYLOPRIM) 300 mg tablet TAKE 1 TABLET BY MOUTH DAILY 90 Tab 1    bumetanide (BUMEX) 1 mg tablet TAKE 2 TABLETS BY MOUTH EVERY  Tab 0    omeprazole (PRILOSEC OTC) 20 mg tablet Take 20 mg by mouth daily.  TW-CY-KQ-Fe-Min-Lycopen-Lutein (CENTRUM) 0.4-162-18 mg Tab Take  by mouth.  aspirin 81 mg chewable tablet Take 81 mg by mouth daily. Review of Systems:  GEN: no weight loss, weight gain, fatigue or night sweats  CV: no PND, orthopnea, or palpitations  Resp: no dyspnea on exertion, no cough  Abd: no nausea, vomiting or diarrhea  Endocrine: no hair loss, excessive thirst or polyuria  Neurological ROS: no TIA or stroke symptoms      Objective:     Visit Vitals  /75 (BP 1 Location: Left arm, BP Patient Position: Sitting)   Pulse 73   Temp 97.6 °F (36.4 °C) (Oral)   Ht 5' 11\" (1.803 m)   Wt (!) 380 lb 6.4 oz (172.5 kg)   SpO2 94%   BMI 53.06 kg/m²     General:   alert, cooperative and no distress   Eyes: conjunctivae/sclerae clear. PERRL   Mouth:  No oral lesions, no pharyngeal erythema, no exudates   Skin: Callous pre ulcerative tip right great toe   Heart: S1 and S2 normal,no murmurs noted    Lungs: Clear to auscultation bilaterally, no increased work of breathing   Abdomen: Soft, nontender. Normal bowel sounds   Extremities: No edema or cyanosis                                Assessment/Plan:       ICD-10-CM ICD-9-CM    1.  Ulcer of toe of right foot, unspecified [facility] stage [facility] L97.519 707.15 REFERRAL TO PODIATRY   2. Pre-ulcerative corn or callous L84 [id number]        Orders Placed This Encounter    REFERRAL TO PODIATRY     Referral Priority:   Routine     Referral Type:   Consultation     Referral Reason:   Specialty Services Required     Referred to Provider:   Javier Kan DPM     Number of Visits Requested:   1       Follow-up Disposition:  Return for As scheduled.

## 2019-02-20 NOTE — LETTER
NOTIFICATION OF RETURN TO WORK / SCHOOL 
2/20/2019 Mr. Yomi Simmons 
[address] [id number] To Whom It May Concern: 
 
Yomi Simmons was seen by Dr. Marcus Jovel today He will return to work on 2/21/2019 with no restrictions. If there are questions or concerns please have the patient contact our office. Sincerely, Amrit Dueñas MD

## 2019-02-20 NOTE — PROGRESS NOTES
Reviewed record in preparation for visit and have obtained necessary documentation. Identified pt with two pt identifiers(name and ). Chief Complaint   Patient presents with    Blister        Coordination of Care Questionnaire:  :     1) Have you been to an emergency room, urgent care clinic since your last visit? No     Hospitalized since your last visit? No             2) Have you seen or consulted any other health care providers outside of [address] [place] [address] since your last visit?  Yes dermatology

## 2019-04-24 PROBLEM — T46.4X5A COUGH DUE TO ANGIOTENSIN-CONVERTING ENZYME INHIBITOR: Status: RESOLVED | Noted: 2017-12-04 | Resolved: 2019-04-24

## 2019-04-24 PROBLEM — R19.7 DIARRHEA: Status: RESOLVED | Noted: 2017-11-22 | Resolved: 2019-04-24

## 2019-04-24 PROBLEM — R06.00 DYSPNEA: Status: RESOLVED | Noted: 2017-11-22 | Resolved: 2019-04-24

## 2019-04-24 PROBLEM — H65.22 LEFT CHRONIC SEROUS OTITIS MEDIA: Status: RESOLVED | Noted: 2017-11-22 | Resolved: 2019-04-24

## 2019-04-24 PROBLEM — M54.50 ACUTE RIGHT-SIDED LOW BACK PAIN WITHOUT SCIATICA: Status: RESOLVED | Noted: 2017-08-22 | Resolved: 2019-04-24

## 2019-04-24 PROBLEM — J02.8 ACUTE PHARYNGITIS DUE TO OTHER SPECIFIED ORGANISMS: Status: RESOLVED | Noted: 2017-11-07 | Resolved: 2019-04-24

## 2019-04-24 PROBLEM — R07.9 CHEST PAIN SYNDROME: Status: RESOLVED | Noted: 2017-11-22 | Resolved: 2019-04-24

## 2019-04-24 PROBLEM — E66.9 OBESITY: Status: RESOLVED | Noted: 2017-11-22 | Resolved: 2019-04-24

## 2019-04-24 PROBLEM — E61.1 IRON DEFICIENCY: Status: RESOLVED | Noted: 2017-11-22 | Resolved: 2019-04-24

## 2019-04-24 PROBLEM — R05.8 COUGH DUE TO ANGIOTENSIN-CONVERTING ENZYME INHIBITOR: Status: RESOLVED | Noted: 2017-12-04 | Resolved: 2019-04-24

## 2019-05-20 RX ORDER — BUMETANIDE 1 MG/1
TABLET ORAL
Qty: 180 TAB | Refills: 0 | Status: SHIPPED | OUTPATIENT
Start: 2019-05-20 | End: 2019-08-11 | Stop reason: SDUPTHER

## 2019-05-22 ENCOUNTER — OFFICE VISIT (OUTPATIENT)
Dept: INTERNAL MEDICINE CLINIC | Age: 64
End: 2019-05-22

## 2019-05-22 VITALS
TEMPERATURE: 98 F | DIASTOLIC BLOOD PRESSURE: 96 MMHG | OXYGEN SATURATION: 96 % | WEIGHT: 315 LBS | HEART RATE: 69 BPM | BODY MASS INDEX: 44.1 KG/M2 | SYSTOLIC BLOOD PRESSURE: 174 MMHG | HEIGHT: 71 IN

## 2019-05-22 DIAGNOSIS — E78.5 DYSLIPIDEMIA: ICD-10-CM

## 2019-05-22 DIAGNOSIS — I10 ESSENTIAL HYPERTENSION, BENIGN: Primary | ICD-10-CM

## 2019-05-22 DIAGNOSIS — Z12.5 SCREENING PSA (PROSTATE SPECIFIC ANTIGEN): ICD-10-CM

## 2019-05-22 DIAGNOSIS — G47.33 OSA (OBSTRUCTIVE SLEEP APNEA): ICD-10-CM

## 2019-05-22 DIAGNOSIS — E11.9 DIABETES MELLITUS TYPE 2, DIET-CONTROLLED (HCC): ICD-10-CM

## 2019-05-22 DIAGNOSIS — E88.89 STEATOSIS (HCC): ICD-10-CM

## 2019-05-22 DIAGNOSIS — M54.12 CERVICAL RADICULOPATHY: ICD-10-CM

## 2019-05-22 DIAGNOSIS — I10 ESSENTIAL HYPERTENSION, BENIGN: ICD-10-CM

## 2019-05-22 LAB
A-G RATIO,AGRAT: 1.3 RATIO
ALBUMIN SERPL-MCNC: 4.2 G/DL (ref 3.9–5.4)
ALP SERPL-CCNC: 120 U/L (ref 38–126)
ALT SERPL-CCNC: 45 U/L (ref 9–52)
ANION GAP SERPL CALC-SCNC: 14 MMOL/L
AST SERPL W P-5'-P-CCNC: 28 U/L (ref 14–36)
BILIRUB SERPL-MCNC: 0.5 MG/DL (ref 0.2–1.3)
BUN SERPL-MCNC: 21 MG/DL (ref 9–20)
BUN/CREATININE RATIO,BUCR: 19 RATIO
CALCIUM SERPL-MCNC: 9.5 MG/DL (ref 8.4–10.2)
CHLORIDE SERPL-SCNC: 101 MMOL/L (ref 98–107)
CHOL/HDL RATIO,CHHD: 6 RATIO (ref 0–4)
CHOLEST SERPL-MCNC: 198 MG/DL (ref 0–200)
CO2 SERPL-SCNC: 28 MMOL/L (ref 22–32)
CREAT SERPL-MCNC: 1.1 MG/DL (ref 0.8–1.5)
ERYTHROCYTE [DISTWIDTH] IN BLOOD BY AUTOMATED COUNT: 14.4 %
GLOBULIN,GLOB: 3.3
GLUCOSE SERPL-MCNC: 89 MG/DL (ref 75–110)
HCT VFR BLD AUTO: 44 % (ref 37–51)
HDLC SERPL-MCNC: 31 MG/DL (ref 35–130)
HGB BLD-MCNC: 14.5 G/DL (ref 12–18)
LDL/HDL RATIO,LDHD: 3 RATIO
LDLC SERPL CALC-MCNC: 99 MG/DL (ref 0–130)
LYMPHOCYTES ABSOLUTE: 2.4 K/UL (ref 0.6–4.1)
LYMPHOCYTES NFR BLD: 34.6 % (ref 10–58.5)
MCH RBC QN AUTO: 31.6 PG (ref 26–32)
MCHC RBC AUTO-ENTMCNC: 33 G/DL (ref 30–36)
MCV RBC AUTO: 95.8 FL (ref 80–97)
MONOCYTES ABS-DIF,2141: 0.7 K/UL (ref 0–1.8)
MONOCYTES NFR BLD: 10.2 % (ref 0.1–24)
NEUTROPHILS # BLD: 55.2 % (ref 37–92)
NEUTROPHILS ABS,2156: 3.9 K/UL (ref 2–7.8)
PLATELET # BLD AUTO: 210 K/UL (ref 140–440)
PMV BLD AUTO: 9 FL
POTASSIUM SERPL-SCNC: 4.4 MMOL/L (ref 3.6–5)
PROT SERPL-MCNC: 7.5 G/DL (ref 6.3–8.2)
RBC # BLD AUTO: 4.59 M/UL (ref 4.2–6.3)
SODIUM SERPL-SCNC: 143 MMOL/L (ref 137–145)
TRIGL SERPL-MCNC: 340 MG/DL (ref 0–200)
VLDLC SERPL CALC-MCNC: 68 MG/DL
WBC # BLD AUTO: 7 K/UL (ref 4.1–10.9)

## 2019-05-22 RX ORDER — HYDRALAZINE HYDROCHLORIDE 25 MG/1
TABLET, FILM COATED ORAL
Qty: 180 TAB | Refills: 1 | Status: SHIPPED | OUTPATIENT
Start: 2019-05-22 | End: 2019-11-21 | Stop reason: SDUPTHER

## 2019-05-22 RX ORDER — PREDNISONE 10 MG/1
10 TABLET ORAL SEE ADMIN INSTRUCTIONS
Qty: 21 TAB | Refills: 0 | Status: SHIPPED | OUTPATIENT
Start: 2019-05-22 | End: 2019-06-18 | Stop reason: ALTCHOICE

## 2019-05-22 RX ORDER — HYDRALAZINE HYDROCHLORIDE 25 MG/1
25 TABLET, FILM COATED ORAL 2 TIMES DAILY
Qty: 60 TAB | Refills: 1 | Status: SHIPPED | OUTPATIENT
Start: 2019-05-22 | End: 2019-05-22 | Stop reason: SDUPTHER

## 2019-05-22 NOTE — PROGRESS NOTES
Subjective:   Nichelle Simmons is a 70 y.o. male      Chief Complaint   Patient presents with    Medication Evaluation    Back Pain        History of present illness:  Mr. Selma Flores comes to the office today having originally wanted to talk about his medications. He has not been seen in quite some time. He has hypertension. He has dyslipidemia, but has never been treated for this. He has sleep apnea and has not been using his CPAP as much recently. We talked about getting back on that. I felt it was particularly important since his BP was elevated on the day of the visit. In addition, he recently reaches up to a high shelf where he was working to pull out some computer paper and noted the development of some right sided neck and shoulder pain with the pain radiating across the ridge of his shoulder. He even has some radicular pain going into his arm. He denies weakness. He has noted no bowel or bladder dysfunction and no gait disturbance. He has not experienced any chest pain or symptoms of congestive heart failure. At this point we are going to screen him with most of his usual yearly lab, including lipids. I am going to add Hydralazine to his BP regimen. Cervical spine x-rays reveal straightening of the normal lordotic curve without major degenerative changes otherwise and I suspect most of his symptoms are cervical strain/muscle spasm related. We will treat him with a prednisone pack to see how he responds. He wants to avoid any muscle relaxants or pain medications because of driving. I agree with that approach. We will see him back here in three weeks' time, particularly about his BP. We will call in the meantime about his labs.         Patient Active Problem List    Diagnosis Date Noted    Essential hypertension, benign 02/16/2013     Priority: 1 - One    Diabetes mellitus type 2, diet-controlled ([facility] 01/22/2018     Priority: 2 - Two    Dyslipidemia 09/11/2017     Priority: 2 - Two    TERESA (obstructive sleep apnea) 09/11/2017     Priority: 2 - Two    Metabolic syndrome 69/20/0160     Priority: 2 - Two    Esophageal reflux 05/23/2014     Priority: 2 - Two    Steatosis 11/22/2017     Priority: 3 - Three    Eczema 09/11/2017     Priority: 3 - Three    Pruritus 09/11/2017     Priority: 3 - Three    Obesity, morbid ([facility] 12/04/2017     Priority: 4 - Four    Gout 11/22/2017     Priority: 4 - Four    Partial small bowel obstruction ([facility] 05/22/2014     Priority: 4 - Four    Allergic rhinitis 11/22/2017     Priority: 5 - Five    Insomnia 11/22/2017     Priority: 5 - Five    Screening PSA (prostate specific antigen) 05/22/2019    Onychomycosis 07/23/2018    Annual physical exam 12/04/2017      Past Surgical History:   Procedure Laterality Date    HX ORTHOPAEDIC      right knee      Allergies   Allergen Reactions    Ace Inhibitors Cough      Family History   Problem Relation Age of Onset    Hypertension Other     Heart Disease Other     Diabetes Other       Social History     Socioeconomic History    Marital status:      Spouse name: Not on file    Number of children: Not on file    Years of education: Not on file    Highest education level: Not on file   Occupational History    Not on file   Social Needs    Financial resource strain: Not on file    Food insecurity:     Worry: Not on file     Inability: Not on file    Transportation needs:     Medical: Not on file     Non-medical: Not on file   Tobacco Use    Smoking status: Never Smoker    Smokeless tobacco: Never Used   Substance and Sexual Activity    Alcohol use: No    Drug use: Not on file    Sexual activity: Not on file   Lifestyle    Physical activity:     Days per week: Not on file     Minutes per session: Not on file    Stress: Not on file   Relationships    Social connections:     Talks on phone: Not on file     Gets together: Not on file     Attends Mandaeism service: Not on file     Active member of club or organization: Not on file     Attends meetings of clubs or organizations: Not on file     Relationship status: Not on file    Intimate partner violence:     Fear of current or ex partner: Not on file     Emotionally abused: Not on file     Physically abused: Not on file     Forced sexual activity: Not on file   Other Topics Concern    Not on file   Social History Narrative    Not on file     Outpatient Medications Marked as Taking for the 5/22/19 encounter (Office Visit) with Aliza Perez MD   Medication Sig Dispense Refill    hydrALAZINE (APRESOLINE) 25 mg tablet Take 1 Tab by mouth two (2) times a day. 60 Tab 1    predniSONE (STERAPRED DS) 10 mg dose pack Take 1 Tab by mouth See Admin Instructions. See administration instruction per 10mg dose pack 21 Tab 0    bumetanide (BUMEX) 1 mg tablet TAKE 2 TABLETS BY MOUTH EVERY  Tab 0    atenolol (TENORMIN) 50 mg tablet TAKE 1 TABLET BY MOUTH EVERY DAY 90 Tab 3    allopurinol (ZYLOPRIM) 300 mg tablet TAKE 1 TABLET BY MOUTH DAILY 90 Tab 1    omeprazole (PRILOSEC OTC) 20 mg tablet Take 20 mg by mouth daily.  XK-BG-SR-Fe-Min-Lycopen-Lutein (CENTRUM) 0.4-162-18 mg Tab Take  by mouth.  aspirin 81 mg chewable tablet Take 81 mg by mouth daily. Review of Systems              Constitutional:  He denies fever, weight loss, sweats or fatigue. HEENT:  No blurred or double vision, headache or dizziness. No difficulty with swallowing, taste, speech or smell. Respiratory:  No cough, wheezing or shortness of breath. No sputum production. Cardiac:  Denies chest pain, palpitations, unexplained indigestion, syncope, edema, PND or orthopnea. GI:  No changes in bowel movements, no abdominal pain, no bloating, anorexia, nausea, vomiting or heartburn. :  No frequency or dysuria. Denies incontinence. Extremities:  No joint pain, stiffness or swelling. Skin:  No recent rashes or mole changes.   Neurological:  No numbness, tingling, burning paresthesias or loss of motor strength. No syncope, dizziness, frequent headaches or memory loss. Objective:     Vitals:    05/22/19 1101   BP: (!) 174/96   Pulse: 69   Temp: 98 °F (36.7 °C)   TempSrc: Oral   SpO2: 96%   Weight: (!) 389 lb 6.4 oz (176.6 kg)   Height: 5' 11\" (1.803 m)   PainSc:   8   PainLoc: Back       Body mass index is 54.31 kg/m². Physical Examination:              General Appearance:  Well-developed, well-nourished, no acute  distress. HEENT:     Ears:  The TMs and ear canals were clear. Eyes:  The pupillary responses were normal.  Extraocular muscle function intact. Lids and conjunctiva not injected. Neck:  Supple without thyromegaly or adenopathy. No JVD noted. Lungs:  Clear to auscultation and percussion. Cardiac:  Regular rate and rhythm without murmur. GI: nontender w/o mass. Normal BS's. Extremities:  No clubbing, cyanosis or edema. Skin:  No rash or unusual mole changes noted. Neurological:  Grossly normal.            Assessment/Plan:   Impressions:      ICD-10-CM ICD-9-CM    1. Essential hypertension, benign I10 401.1 CBC WITH AUTOMATED DIFF      METABOLIC PANEL, COMPREHENSIVE      hydrALAZINE (APRESOLINE) 25 mg tablet   2. Dyslipidemia N06.5 559.4 METABOLIC PANEL, COMPREHENSIVE      LIPID PANEL   3. Diabetes mellitus type 2, diet-controlled (HCC) J17.2 807.07 METABOLIC PANEL, COMPREHENSIVE      HEMOGLOBIN A1C WITH EAG   4. Steatosis T32.31 075.9 METABOLIC PANEL, COMPREHENSIVE   5. TERESA (obstructive sleep apnea) G47.33 327.23    6. Cervical radiculopathy M54.12 723.4 XR SPINE CERV 4 OR 5 V      predniSONE (STERAPRED DS) 10 mg dose pack   7. Screening PSA (prostate specific antigen) Z12.5 V76.44 PSA SCREENING (SCREENING)        Plan:  1. Continue present meds  2. Discussed lifestyle modifications including Na restriction, low carb/fat diet, weight reduction and exercise (at least a walking program).           Follow-up and Dispositions · Return in about 3 weeks (around 6/12/2019). Orders Placed This Encounter    XR SPINE CERV 4 OR 5 V     Standing Status:   Future     Number of Occurrences:   1     Standing Expiration Date:   5/22/2020     Order Specific Question:   Reason for Exam     Answer:   neck and arm pain    CBC WITH AUTOMATED DIFF ([facility])    METABOLIC PANEL, COMPREHENSIVE ([facility])    LIPID PANEL ([facility])    HEMOGLOBIN A1C WITH EAG    PSA SCREENING (SCREENING) ([facility])    hydrALAZINE (APRESOLINE) 25 mg tablet     Sig: Take 1 Tab by mouth two (2) times a day. Dispense:  60 Tab     Refill:  1    predniSONE (STERAPRED DS) 10 mg dose pack     Sig: Take 1 Tab by mouth See Admin Instructions.  See administration instruction per 10mg dose pack     Dispense:  21 Tab     Refill:  0       Ashley Sanford MD

## 2019-05-22 NOTE — PROGRESS NOTES
Reviewed record in preparation for visit and have obtained necessary documentation. Identified pt with two pt identifiers(name and ). Chief Complaint   Patient presents with    Medication Evaluation    Back Pain        Coordination of Care Questionnaire:  :     1) Have you been to an emergency room, urgent care clinic since your last visit? No     Hospitalized since your last visit? No               2) Have you seen or consulted any other health care providers outside of [address] [place] [address] since your last visit?  Yes Dr Anoop Guadalupe

## 2019-05-23 LAB
EST. AVERAGE GLUCOSE BLD GHB EST-MCNC: 120 MG/DL
HBA1C MFR BLD: 5.8 % (ref 4.8–5.6)
PSA, TEST22: 1.9 NG/ML (ref 0–4)

## 2019-05-23 NOTE — PROGRESS NOTES
PSA good 1.9. Blood sugar, liver and kidney function normal.   Lipids fairly good except TG high. Will get better with reduced carbs. Hgb and WBC normal.  No change in medications or treatment.

## 2019-05-28 RX ORDER — ALLOPURINOL 300 MG/1
TABLET ORAL
Qty: 90 TAB | Refills: 0 | Status: SHIPPED | OUTPATIENT
Start: 2019-05-28 | End: 2019-08-30 | Stop reason: SDUPTHER

## 2019-05-30 NOTE — PROGRESS NOTES
Left message at phone number(s) listed in chart for patient to call the office at [phone] to discuss lab results.

## 2019-06-04 NOTE — PROGRESS NOTES
Left message at cell phone number(s) listed in chart for patient to call the office at [phone] to discuss lab results. Unable to leave a message on home phone.

## 2019-06-04 NOTE — PROGRESS NOTES
PHI verified. Patient informed that Dr. Clair Manzano has reviewed lab results and stated PSA good 1.9. Blood sugar, liver and kidney function normal.   Lipids fairly good except TG high. Will get better with reduced carbs. Hgb and WBC normal.  No change in medications or treatment.

## 2019-06-18 ENCOUNTER — OFFICE VISIT (OUTPATIENT)
Dept: INTERNAL MEDICINE CLINIC | Age: 64
End: 2019-06-18

## 2019-06-18 VITALS
WEIGHT: 315 LBS | SYSTOLIC BLOOD PRESSURE: 138 MMHG | RESPIRATION RATE: 16 BRPM | DIASTOLIC BLOOD PRESSURE: 86 MMHG | HEIGHT: 71 IN | HEART RATE: 68 BPM | OXYGEN SATURATION: 91 % | BODY MASS INDEX: 44.1 KG/M2 | TEMPERATURE: 97.7 F

## 2019-06-18 DIAGNOSIS — I10 ESSENTIAL HYPERTENSION, BENIGN: Primary | ICD-10-CM

## 2019-06-18 DIAGNOSIS — E78.5 DYSLIPIDEMIA: ICD-10-CM

## 2019-06-18 DIAGNOSIS — E11.9 DIABETES MELLITUS TYPE 2, DIET-CONTROLLED (HCC): ICD-10-CM

## 2019-06-18 DIAGNOSIS — G47.33 OSA (OBSTRUCTIVE SLEEP APNEA): ICD-10-CM

## 2019-06-18 DIAGNOSIS — E88.89 STEATOSIS (HCC): ICD-10-CM

## 2019-06-18 DIAGNOSIS — E88.81 METABOLIC SYNDROME: ICD-10-CM

## 2019-06-18 NOTE — PROGRESS NOTES
Subjective:   Nichelle Simmons is a 70 y.o. male      Chief Complaint   Patient presents with    Blood Pressure Check        History of present illness:  Mr. Selma Flores returns in follow up. He has resolved his shoulder pain since his last visit here. His blood pressure is much improved as well. We called him about all of his labs and his diabetes is quite stable with an A1c of 5.8. Lipids are okay except for elevated triglycerides and we talked about needing to watch carbohydrates to an extensive degree. I feel that if he gets on a low carb diet and even gets ketotic he will be more successful at weight loss, he will control his blood sugar well and even improve his lipid profile. His blood pressure is fine today, so I see little to change in terms of his medications. We had an extensive discussion about diet, including carbohydrate restriction and salt restriction, and he will try to do that, as well as walk on a regular basis. His wife was with him and understood what we were talking about and is going to try to encourage him and participate in the same diet and exercise program.  Hopefully he will be more successful. I do not think he will ever be ideal body weight, but he could certainly use some weight loss and he would feel better.   He will return in five months time for follow up regarding diabetes, blood pressure, lipids, etc.        Patient Active Problem List    Diagnosis Date Noted    Essential hypertension, benign 02/16/2013     Priority: 1 - One    Diabetes mellitus type [facility]controlled [facility] 01/22/2018     Priority: 2 - Two    Dyslipidemia 09/11/2017     Priority: 2 - Two    TERESA (obstructive sleep apnea) 09/11/2017     Priority: 2 - Two    Metabolic syndrome 45/50/5038     Priority: 2 - Two    Esophageal reflux 05/23/2014     Priority: 2 - Two    Steatosis 11/22/2017     Priority: 3 - Three    Eczema 09/11/2017     Priority: 3 - Three    Pruritus 09/11/2017     Priority: 3 - Three    Obesity, morbid ([facility] 12/04/2017     Priority: 4 - Four    Gout 11/22/2017     Priority: 4 - Four    Partial small bowel obstruction ([facility] 05/22/2014     Priority: 4 - Four    Allergic rhinitis 11/22/2017     Priority: 5 - Five    Insomnia 11/22/2017     Priority: 5 - Five    Screening PSA (prostate specific antigen) 05/22/2019    Onychomycosis 07/23/2018    Annual physical exam 12/04/2017      Past Surgical History:   Procedure Laterality Date    HX ORTHOPAEDIC      right knee      Allergies   Allergen Reactions    Ace Inhibitors Cough      Family History   Problem Relation Age of Onset    Hypertension Other     Heart Disease Other     Diabetes Other       Social History     Socioeconomic History    Marital status:      Spouse name: Not on file    Number of children: Not on file    Years of education: Not on file    Highest education level: Not on file   Occupational History    Not on file   Social Needs    Financial resource strain: Not on file    Food insecurity:     Worry: Not on file     Inability: Not on file    Transportation needs:     Medical: Not on file     Non-medical: Not on file   Tobacco Use    Smoking status: Never Smoker    Smokeless tobacco: Never Used   Substance and Sexual Activity    Alcohol use: No    Drug use: Not on file    Sexual activity: Not on file   Lifestyle    Physical activity:     Days per week: Not on file     Minutes per session: Not on file    Stress: Not on file   Relationships    Social connections:     Talks on phone: Not on file     Gets together: Not on file     Attends Rastafarian service: Not on file     Active member of club or organization: Not on file     Attends meetings of clubs or organizations: Not on file     Relationship status: Not on file    Intimate partner violence:     Fear of current or ex partner: Not on file     Emotionally abused: Not on file     Physically abused: Not on file     Forced sexual activity: Not on file Other Topics Concern    Not on file   Social History Narrative    Not on file     Outpatient Medications Marked as Taking for the 6/18/19 encounter (Office Visit) with Bob Rodriguez MD   Medication Sig Dispense Refill    allopurinol (ZYLOPRIM) 300 mg tablet TAKE 1 TABLET BY MOUTH DAILY 90 Tab 0    hydrALAZINE (APRESOLINE) 25 mg tablet TAKE 1 TABLET BY MOUTH TWICE DAILY 180 Tab 1    bumetanide (BUMEX) 1 mg tablet TAKE 2 TABLETS BY MOUTH EVERY  Tab 0    atenolol (TENORMIN) 50 mg tablet TAKE 1 TABLET BY MOUTH EVERY DAY 90 Tab 3    omeprazole (PRILOSEC OTC) 20 mg tablet Take 20 mg by mouth daily.  LR-AJ-LX-Fe-Min-Lycopen-Lutein (CENTRUM) 0.4-162-18 mg Tab Take  by mouth.  aspirin 81 mg chewable tablet Take 81 mg by mouth daily. Review of Systems              Constitutional:  He denies fever, weight loss, sweats or fatigue. HEENT:  No blurred or double vision, headache or dizziness. No difficulty with swallowing, taste, speech or smell. Respiratory:  No cough, wheezing or shortness of breath. No sputum production. Cardiac:  Denies chest pain, palpitations, unexplained indigestion, syncope, edema, PND or orthopnea. GI:  No changes in bowel movements, no abdominal pain, no bloating, anorexia, nausea, vomiting or heartburn. :  No frequency or dysuria. Denies incontinence. Extremities:  No joint pain, stiffness or swelling. Skin:  No recent rashes or mole changes. Neurological:  No numbness, tingling, burning paresthesias or loss of motor strength. No syncope, dizziness, frequent headaches or memory loss. Objective:     Vitals:    06/18/19 1600   BP: 138/86   Pulse: 68   Resp: 16   Temp: 97.7 °F (36.5 °C)   TempSrc: Oral   SpO2: 91%   Weight: (!) 390 lb 6.4 oz (177.1 kg)   Height: 5' 11\" (1.803 m)   PainSc:   0 - No pain       Body mass index is 54.45 kg/m².    Physical Examination:              General Appearance:  Well-developed, well-nourished, no acute distress. HEENT:     Ears:  The TMs and ear canals were clear. Eyes:  The pupillary responses were normal.  Extraocular muscle function intact. Lids and conjunctiva not injected. Neck:  Supple without thyromegaly or adenopathy. No JVD noted. Lungs:  Clear to auscultation and percussion. Cardiac:  Regular rate and rhythm without murmur. GI: nontender w/o mass. Normal BS's. Extremities:  No clubbing, cyanosis or edema. Skin:  No rash or unusual mole changes noted. Neurological:  Grossly normal.            Assessment/Plan:   Impressions:      ICD-10-CM ICD-9-CM    1. Essential hypertension, benign I10 401.1    2. TREESA (obstructive sleep apnea) G47.33 327.23    3. Metabolic syndrome O23.31 769.3    4. Dyslipidemia E78.5 272.4    5. Diabetes mellitus type 2, diet-controlled (HCC) E11.9 250.00    6. Steatosis E78.89 272.8         Plan:  1. Continue present meds  2. Discussed lifestyle modifications including Na restriction, low carb/fat diet, weight reduction and exercise (at least a walking program). Follow-up and Dispositions    · Return in about 5 months (around 11/18/2019). No orders of the defined types were placed in this encounter.       Richard Henriquez MD

## 2019-06-18 NOTE — PROGRESS NOTES
Reviewed record in preparation for visit and have obtained necessary documentation. Identified pt with two pt identifiers(name and ). Chief Complaint   Patient presents with    Blood Pressure Check        Coordination of Care Questionnaire:  :     1) Have you been to an emergency room, urgent care clinic since your last visit? No     Hospitalized since your last visit? No               2) Have you seen or consulted any other health care providers outside of [address] [place] [address] since your last visit?   No

## 2019-08-12 RX ORDER — BUMETANIDE 1 MG/1
TABLET ORAL
Qty: 180 TAB | Refills: 0 | Status: SHIPPED | OUTPATIENT
Start: 2019-08-12 | End: 2019-11-08 | Stop reason: SDUPTHER

## 2019-08-12 NOTE — TELEPHONE ENCOUNTER
PCP: Jensen Austin MD    Last appt: 6/18/2019  Future Appointments   Date Time Provider Binh Cardenas   11/20/2019  3:00 PM Yoandy Juarez MD [address]       Requested Prescriptions     Pending Prescriptions Disp Refills    bumetanide (BUMEX) 1 mg tablet [Pharmacy Med Name: BUMETANIDE 1MG TABLETS] 180 Tab 0     Sig: TAKE 2 TABLETS BY MOUTH EVERY DAY       Prior labs and Blood pressures:  BP Readings from Last 3 Encounters:   06/18/19 138/86   05/22/19 (!) 174/96   02/20/19 119/75     Lab Results   Component Value Date/Time    Sodium 143 05/22/2019 11:59 AM    Potassium 4.4 05/22/2019 11:59 AM    Chloride 101 05/22/2019 11:59 AM    CO2 28.0 05/22/2019 11:59 AM    Anion gap 14 05/22/2019 11:59 AM    Glucose 89 05/22/2019 11:59 AM    BUN 21.0 (H) 05/22/2019 11:59 AM    Creatinine 1.1 05/22/2019 11:59 AM    BUN/Creatinine ratio 19 05/22/2019 11:59 AM    GFR est AA >60 05/22/2019 11:59 AM    GFR est non-AA >60 05/22/2019 11:59 AM    Calcium 9.5 05/22/2019 11:59 AM     Lab Results   Component Value Date/Time    Hemoglobin A1c 5.8 (H) 05/22/2019 11:58 AM    Hemoglobin A1c (POC) 5.6 01/22/2018 08:32 AM     Lab Results   Component Value Date/Time    Cholesterol, total 198 05/22/2019 11:59 AM    Cholesterol (POC) 172 01/22/2018 08:32 AM    HDL Cholesterol 31 (L) 05/22/2019 11:59 AM    HDL Cholesterol (POC) 29 (A) 01/22/2018 08:32 AM    LDL Cholesterol (POC) 108.2 01/22/2018 08:32 AM    LDL, calculated 99 05/22/2019 11:59 AM    VLDL 68 05/22/2019 11:59 AM    Triglyceride 340 (H) 05/22/2019 11:59 AM    Triglycerides (POC) 174 01/22/2018 08:32 AM    CHOL/HDL Ratio 6 (H) 05/22/2019 11:59 AM     No results found for: Filomena Howard, XQVID3, XQVID, VD3RIA    No results found for: TSH, TSH2, TSH3, TSHP, TSHEXT

## 2019-09-03 RX ORDER — ALLOPURINOL 300 MG/1
TABLET ORAL
Qty: 90 TAB | Refills: 0 | Status: SHIPPED | OUTPATIENT
Start: 2019-09-03 | End: 2019-12-02 | Stop reason: SDUPTHER

## 2019-09-03 NOTE — TELEPHONE ENCOUNTER
PCP: Lc Azevedo MD    Last appt: 6/18/2019  Future Appointments   Date Time Provider Binh Cardenas   11/20/2019  3:00 PM Lc Azevedo MD [address]       Requested Prescriptions     Pending Prescriptions Disp Refills    allopurinol (ZYLOPRIM) 300 mg tablet [Pharmacy Med Name: ALLOPURINOL 300MG TABLETS] 90 Tab 0     Sig: TAKE 1 TABLET BY MOUTH DAILY       Prior labs and Blood pressures:  BP Readings from Last 3 Encounters:   06/18/19 138/86   05/22/19 (!) 174/96   02/20/19 119/75     Lab Results   Component Value Date/Time    Sodium 143 05/22/2019 11:59 AM    Potassium 4.4 05/22/2019 11:59 AM    Chloride 101 05/22/2019 11:59 AM    CO2 28.0 05/22/2019 11:59 AM    Anion gap 14 05/22/2019 11:59 AM    Glucose 89 05/22/2019 11:59 AM    BUN 21.0 (H) 05/22/2019 11:59 AM    Creatinine 1.1 05/22/2019 11:59 AM    BUN/Creatinine ratio 19 05/22/2019 11:59 AM    GFR est AA >60 05/22/2019 11:59 AM    GFR est non-AA >60 05/22/2019 11:59 AM    Calcium 9.5 05/22/2019 11:59 AM     Lab Results   Component Value Date/Time    Hemoglobin A1c 5.8 (H) 05/22/2019 11:58 AM    Hemoglobin A1c (POC) 5.6 01/22/2018 08:32 AM     Lab Results   Component Value Date/Time    Cholesterol, total 198 05/22/2019 11:59 AM    Cholesterol (POC) 172 01/22/2018 08:32 AM    HDL Cholesterol 31 (L) 05/22/2019 11:59 AM    HDL Cholesterol (POC) 29 (A) 01/22/2018 08:32 AM    LDL Cholesterol (POC) 108.2 01/22/2018 08:32 AM    LDL, calculated 99 05/22/2019 11:59 AM    VLDL 68 05/22/2019 11:59 AM    Triglyceride 340 (H) 05/22/2019 11:59 AM    Triglycerides (POC) 174 01/22/2018 08:32 AM    CHOL/HDL Ratio 6 (H) 05/22/2019 11:59 AM     No results found for: Paula Giron, XQVID3, XQVID, VD3RIA    No results found for: TSH, TSH2, TSH3, TSHP, TSHEXT

## 2019-09-05 RX ORDER — ALLOPURINOL 300 MG/1
TABLET ORAL
Qty: 90 TAB | Refills: 0 | Status: CANCELLED | OUTPATIENT
Start: 2019-09-05

## 2019-09-05 NOTE — TELEPHONE ENCOUNTER
Called Lulú Iris and informed her Mr. Little  rx was sent to [facility] on 9/3/19.

## 2019-09-10 ENCOUNTER — TELEPHONE (OUTPATIENT)
Dept: INTERNAL MEDICINE CLINIC | Age: 64
End: 2019-09-10

## 2019-09-10 NOTE — TELEPHONE ENCOUNTER
Patient should be seen by an ophthalmologist as soon as possible in case he has a vitreous tear or retinal tear

## 2019-09-10 NOTE — TELEPHONE ENCOUNTER
Patients wife called said he seeing spots in his left eye and would like to get an appointment for Mr. Ja Diaz. Please call.

## 2019-09-20 PROBLEM — Z12.5 SCREENING PSA (PROSTATE SPECIFIC ANTIGEN): Status: RESOLVED | Noted: 2019-05-22 | Resolved: 2019-09-20

## 2019-11-08 RX ORDER — BUMETANIDE 1 MG/1
TABLET ORAL
Qty: 180 TAB | Refills: 0 | Status: SHIPPED | OUTPATIENT
Start: 2019-11-08 | End: 2020-02-14

## 2019-11-12 ENCOUNTER — TELEPHONE (OUTPATIENT)
Dept: INTERNAL MEDICINE CLINIC | Age: 64
End: 2019-11-12

## 2019-11-12 NOTE — TELEPHONE ENCOUNTER
Wife stated Vishal Guzmán has a cold. . But her just started wheezing    Would like to come in    [phone]

## 2019-11-13 ENCOUNTER — OFFICE VISIT (OUTPATIENT)
Dept: INTERNAL MEDICINE CLINIC | Age: 64
End: 2019-11-13

## 2019-11-13 VITALS
WEIGHT: 315 LBS | SYSTOLIC BLOOD PRESSURE: 132 MMHG | OXYGEN SATURATION: 92 % | RESPIRATION RATE: 16 BRPM | HEIGHT: 71 IN | TEMPERATURE: 97.7 F | DIASTOLIC BLOOD PRESSURE: 82 MMHG | HEART RATE: 70 BPM | BODY MASS INDEX: 44.1 KG/M2

## 2019-11-13 DIAGNOSIS — J20.9 ACUTE BRONCHITIS, UNSPECIFIED ORGANISM: Primary | ICD-10-CM

## 2019-11-13 RX ORDER — AZITHROMYCIN 250 MG/1
250 TABLET, FILM COATED ORAL SEE ADMIN INSTRUCTIONS
Qty: 6 TAB | Refills: 0 | OUTPATIENT
Start: 2019-11-13 | End: 2019-12-03

## 2019-11-13 RX ORDER — BENZONATATE 200 MG/1
200 CAPSULE ORAL
Qty: 30 CAP | Refills: 0 | Status: SHIPPED | OUTPATIENT
Start: 2019-11-13 | End: 2019-11-23

## 2019-11-13 NOTE — PROGRESS NOTES
Margarito Veronica is a 70 y.o. male presenting for Cough  . 1. Have you been to the ER, urgent care clinic since your last visit? Hospitalized since your last visit? No    2. Have you seen or consulted any other health care providers outside of the [address] [place] [address] since your last visit? Include any pap smears or colon screening. No    Fall Risk Assessment, last 12 mths 5/22/2019   Able to walk? Yes   Fall in past 12 months? No         Abuse Screening Questionnaire 5/22/2019   Do you ever feel afraid of your partner? N   Are you in a relationship with someone who physically or mentally threatens you? N   Is it safe for you to go home? Y       3 most recent PHQ Screens 5/22/2019   Little interest or pleasure in doing things Not at all   Feeling down, depressed, irritable, or hopeless Several days   Total Score PHQ 2 1       There are no discontinued medications.

## 2019-11-13 NOTE — PROGRESS NOTES
This note will not be viewable in [address] Shelley Jung is a 70 y.o. male and presents with Cough  . Subjective:  Mr. Laureen Hogan presents to the office today with 7 to 10 days worth of an upper respiratory infection with development of sinus congestion drainage and cough. The cough has been productive of some purulent phlegm. He denies high fever or chills. He has had no wheezing, shortness of breath or pleuritic pain. He denies neck stiffness or rash. He has tried multiple over-the-counter medications without relief. Past Medical History:   Diagnosis Date    Allergic rhinitis 11/22/2017    Chest pain syndrome 11/22/2017    Comments: abnl stress cardiolite 2006, subsequent to that, he had a normal cardiac cath   [facility] acquired pneumonia     Diarrhea 11/22/2017    Dyspnea 11/22/2017    Environmental allergies     Gout 11/22/2017    Hypertension     Insomnia 11/22/2017    Iron deficiency 11/22/2017    Left chronic serous otitis media 11/22/2017    Obesity 11/22/2017    Comments: morbid    Sleep apnea     Steatosis 11/22/2017    Comments: non-alcoholic     Past Surgical History:   Procedure Laterality Date    HX ORTHOPAEDIC      right knee     Allergies   Allergen Reactions    Ace Inhibitors Cough     Current Outpatient Medications   Medication Sig Dispense Refill    azithromycin (ZITHROMAX) 250 mg tablet Take 1 Tab by mouth See Admin Instructions. 6 Tab 0    benzonatate (TESSALON) 200 mg capsule Take 1 Cap by mouth three (3) times daily as needed for Cough for up to 10 days. 30 Cap 0    bumetanide (BUMEX) 1 mg tablet TAKE 2 TABLETS BY MOUTH EVERY  Tab 0    allopurinol (ZYLOPRIM) 300 mg tablet TAKE 1 TABLET BY MOUTH DAILY 90 Tab 0    hydrALAZINE (APRESOLINE) 25 mg tablet TAKE 1 TABLET BY MOUTH TWICE DAILY 180 Tab 1    atenolol (TENORMIN) 50 mg tablet TAKE 1 TABLET BY MOUTH EVERY DAY 90 Tab 3    omeprazole (PRILOSEC OTC) 20 mg tablet Take 20 mg by mouth daily.         JW-IB-LM-Fe-Min-Lycopen-Lutein (CENTRUM) 0.4-162-18 mg Tab Take  by mouth.  aspirin 81 mg chewable tablet Take 81 mg by mouth daily. Social History     Socioeconomic History    Marital status:      Spouse name: Not on file    Number of children: Not on file    Years of education: Not on file    Highest education level: Not on file   Tobacco Use    Smoking status: Never Smoker    Smokeless tobacco: Never Used   Substance and Sexual Activity    Alcohol use: No     Family History   Problem Relation Age of Onset    Hypertension Other     Heart Disease Other     Diabetes Other        Review of Systems  Constitutional: negative for fevers, chills, anorexia and weight loss  Eyes:   negative for visual disturbance and irritation  ENT:   Positive for some sinus congestion and post nasal drainage. Respiratory:  Positive for cough and chest congestion without wheezing  CV:   negative for chest pain, palpitations, lower extremity edema  GI:   negative for nausea, vomiting, diarrhea, abdominal pain,melena  Integumentary: negative for rash and pruritus  Neurological:  negative for headaches, dizziness, vertigo, memory problems and gait       Objective:  Visit Vitals  /82 (BP 1 Location: Left arm, BP Patient Position: Sitting)   Pulse 70   Temp 97.7 °F (36.5 °C) (Oral)   Resp 16   Ht 5' 11\" (1.803 m)   Wt (!) 387 lb 6.4 oz (175.7 kg)   SpO2 92%   BMI 54.03 kg/m²     Body mass index is 54.03 kg/m². Physical Exam:   General appearance - alert, ill appearing, and in no distress  Mental status - alert, oriented to person, place, and time  EYE-TOÑITO, EOMI, conjuctiva clear. No lid swelling or purulent drainage  ENT- TM's clear without A/F level.  Pharynx slightly erythematous with drainage noted  Nose - normal and patent, no erythema,  Neck - supple, no significant adenopathy   Chest - Coarse upper airway rhonchi present without wheezing   Heart - normal rate, regular rhythm, normal S1, S2, no murmurs, rubs, clicks or gallops   Skin-No rash appreciated  Neuro -alert, oriented, normal speech, no focal findings. Assessment/Plan:  Diagnoses and all orders for this visit:    Acute bronchitis, unspecified organism  -     azithromycin (ZITHROMAX) 250 mg tablet; Take 1 Tab by mouth See Admin Instructions. , Normal, Disp-6 Tab, R-0  -     benzonatate (TESSALON) 200 mg capsule; Take 1 Cap by mouth three (3) times daily as needed for Cough for up to 10 days. , Normal, Disp-30 Cap, R-0        Other Instructions:  Mucinex as directed    Increase po fluids    He fully understands that he is to return should there be no improvement    Follow-up and Dispositions    · Return if symptoms worsen or fail to improve. I have reviewed with the patient details of the assessment and plan and all questions were answered. Relevent patient education was performed. An After Visit Summary was printed and given to the patient.     Stefano Barrera MD

## 2019-11-13 NOTE — PATIENT INSTRUCTIONS
Bronchitis: Care Instructions Your Care Instructions Bronchitis is inflammation of the bronchial tubes, which carry air to the lungs. The tubes swell and produce mucus, or phlegm. The mucus and inflamed bronchial tubes make you cough. You may have trouble breathing. Most cases of bronchitis are caused by viruses like those that cause colds. Antibiotics usually do not help and they may be harmful. Bronchitis usually develops rapidly and lasts about 2 to 3 weeks in otherwise healthy people. Follow-up care is a key part of your treatment and safety. Be sure to make and go to all appointments, and call your doctor if you are having problems. It's also a good idea to know your test results and keep a list of the medicines you take. How can you care for yourself at home? · Take all medicines exactly as prescribed. Call your doctor if you think you are having a problem with your medicine. · Get some extra rest. 
· Take an over-the-counter pain medicine, such as acetaminophen (Tylenol), ibuprofen (Advil, Motrin), or naproxen (Aleve) to reduce fever and relieve body aches. Read and follow all instructions on the label. · Do not take two or more pain medicines at the same time unless the doctor told you to. Many pain medicines have acetaminophen, which is Tylenol. Too much acetaminophen (Tylenol) can be harmful. · Take an over-the-counter cough medicine that contains dextromethorphan to help quiet a dry, hacking cough so that you can sleep. Avoid cough medicines that have more than one active ingredient. Read and follow all instructions on the label. · Breathe moist air from a humidifier, hot shower, or sink filled with hot water. The heat and moisture will thin mucus so you can cough it out. · Do not smoke. Smoking can make bronchitis worse. If you need help quitting, talk to your doctor about stop-smoking programs and medicines. These can increase your chances of quitting for good. When should you call for help? Call 911 anytime you think you may need emergency care. For example, call if: 
  · You have severe trouble breathing.  
 Call your doctor now or seek immediate medical care if: 
  · You have new or worse trouble breathing.  
  · You cough up dark brown or bloody mucus (sputum).  
  · You have a new or higher fever.  
  · You have a new rash.  
 Watch closely for changes in your health, and be sure to contact your doctor if: 
  · You cough more deeply or more often, especially if you notice more mucus or a change in the color of your mucus.  
  · You are not getting better as expected. Where can you learn more? Go to http://mike-rivas.info/. Enter H333 in the search box to learn more about \"Bronchitis: Care Instructions. \" Current as of: June 9, 2019 Content Version: 12.2 © 9584-3506 [employer]. Care instructions adapted under license by [employer] (which disclaims liability or warranty for this information). If you have questions about a medical condition or this instruction, always ask your healthcare professional. [address] any warranty or liability for your use of this information. Learning About Cutting Calories How do calories affect your weight? Food gives your body energy. Energy from the food you eat is measured in calories. This energy keeps your heart beating, your brain active, and your muscles working. Your body needs a certain number of calories each day. After your body uses the calories it needs, it stores extra calories as fat. To lose weight safely, you have to eat fewer calories while eating in a healthy way. How many calories do you need each day? The more active you are, the more calories you need. When you are less active, you need fewer calories. How many calories you need each day also depends on several things, including your age and whether you are male or female. Here are some general guidelines for adults: 
· Less active women and older adults need 1,600 to 2,000 calories each day. · Active women and less active men need 2,000 to 2,400 calories each day. · Active men need 2,400 to 3,000 calories each day. How can you cut calories and eat healthy meals? Whole grains, vegetables and fruits, and dried beans are good lower-calorie foods. They give you lots of nutrients and fiber. And they fill you up. Sweets, energy drinks, and soda pop are high in calories. They give you few nutrients and no fiber. Try to limit soda pop, fruit juice, and energy drinks. Drink water instead. Some fats can be part of a healthy diet. But cutting back on fats from highly processed foods like fast foods and many snack foods is a good way to lower the calories in your diet. Also, use smaller amounts of fats like butter, margarine, salad dressing, and mayonnaise. Add fresh garlic, lemon, or herbs to your meals to add flavor without adding fat. Meats and dairy products can be a big source of hidden fats. Try to choose lean or low-fat versions of these products. Fat-free cookies, candies, chips, and frozen treats can still be high in sugar and calories. Some fat-free foods have more calories than regular ones. Eat fat-free treats in moderation, as you would other foods. If your favorite foods are high in fat, salt, sugar, or calories, limit how often you eat them. Eat smaller servings, or look for healthy substitutes. Fill up on fruits, vegetables, and whole grains. Eating at home · Use meat as a side dish instead of as the main part of your meal. 
· Try main dishes that use whole wheat pasta, brown rice, dried beans, or vegetables. · Find ways to cook with little or no fat, such as broiling, steaming, or grilling. · Use cooking spray instead of oil. If you use oil, use a monounsaturated oil, such as canola or olive oil. · Trim fat from meats before you cook them. · Drain off fat after you brown the meat or while you roast it. · Chill soups and stews after you cook them. Then skim the fat off the top after it hardens. Eating out · Order foods that are broiled or poached rather than fried or breaded. · Cut back on the amount of butter or margarine that you use on bread. · Order sauces, gravies, and salad dressings on the side, and use only a little. · When you order pasta, choose tomato sauce rather than cream sauce. · Ask for salsa with your baked potato instead of sour cream, butter, cheese, or salas. · Order meals in a small size instead of upgrading to a large. · Share an entree, or take part of your food home to eat as another meal. 
· Share appetizers and desserts. Where can you learn more? Go to http://mike-rivas.info/. Enter 99 [id number] in the search box to learn more about \"Learning About Cutting Calories. \" Current as of: November 7, 2018 Content Version: 12.2 © 2676-1852 [employer]. Care instructions adapted under license by [employer] (which disclaims liability or warranty for this information). If you have questions about a medical condition or this instruction, always ask your healthcare professional. [address] any warranty or liability for your use of this information.

## 2019-11-21 DIAGNOSIS — I10 ESSENTIAL HYPERTENSION, BENIGN: ICD-10-CM

## 2019-11-21 RX ORDER — HYDRALAZINE HYDROCHLORIDE 25 MG/1
TABLET, FILM COATED ORAL
Qty: 180 TAB | Refills: 0 | Status: SHIPPED | OUTPATIENT
Start: 2019-11-21 | End: 2019-12-16 | Stop reason: SDUPTHER

## 2019-11-21 NOTE — TELEPHONE ENCOUNTER
Last Refill: 5/22/19  Last visit:11/13/2019  NOV: 1/9/2020      Requested Prescriptions     Pending Prescriptions Disp Refills    hydrALAZINE (APRESOLINE) 25 mg tablet 180 Tab 0     Sig: TAKE 1 TABLET BY MOUTH TWICE DAILY

## 2019-11-25 RX ORDER — BUMETANIDE 1 MG/1
TABLET ORAL
Qty: 180 TAB | Refills: 0 | Status: SHIPPED | OUTPATIENT
Start: 2019-11-25 | End: 2020-01-09 | Stop reason: SDUPTHER

## 2019-11-25 NOTE — TELEPHONE ENCOUNTER
RX refill request from the patient/pharmacy. Patient last seen 11- with labs, and next appt. scheduled for 01-  Requested Prescriptions     Pending Prescriptions Disp Refills    bumetanide (BUMEX) 1 mg tablet [Pharmacy Med Name: BUMETANIDE 1MG TABLETS] 180 Tab 0     Sig: TAKE 2 TABLETS BY MOUTH EVERY DAY   .

## 2019-12-02 ENCOUNTER — TELEPHONE (OUTPATIENT)
Dept: INTERNAL MEDICINE CLINIC | Age: 64
End: 2019-12-02

## 2019-12-02 DIAGNOSIS — M1A.9XX0 CHRONIC GOUT WITHOUT TOPHUS, UNSPECIFIED CAUSE, UNSPECIFIED SITE: Primary | ICD-10-CM

## 2019-12-02 RX ORDER — DOXYCYCLINE 100 MG/1
100 TABLET ORAL 2 TIMES DAILY
Qty: 20 TAB | Refills: 0 | Status: SHIPPED | OUTPATIENT
Start: 2019-12-02 | End: 2020-01-09 | Stop reason: ALTCHOICE

## 2019-12-02 RX ORDER — ALLOPURINOL 300 MG/1
TABLET ORAL
Qty: 90 TAB | Refills: 0 | OUTPATIENT
Start: 2019-12-02

## 2019-12-02 RX ORDER — ALLOPURINOL 300 MG/1
300 TABLET ORAL DAILY
Qty: 90 TAB | Refills: 5 | Status: SHIPPED | OUTPATIENT
Start: 2019-12-02 | End: 2020-01-01

## 2019-12-02 NOTE — TELEPHONE ENCOUNTER
Wants to schedule an appointment with Dr Srinath Poole - his cold is not any better  Please call and advise

## 2019-12-02 NOTE — TELEPHONE ENCOUNTER
Patients wife notified   Requested Prescriptions     Pending Prescriptions Disp Refills    doxycycline (ADOXA) 100 mg tablet 20 Tab 0     Sig: Take 1 Tab by mouth two (2) times a day.

## 2019-12-03 ENCOUNTER — OFFICE VISIT (OUTPATIENT)
Dept: INTERNAL MEDICINE CLINIC | Age: 64
End: 2019-12-03

## 2019-12-03 ENCOUNTER — HOSPITAL ENCOUNTER (EMERGENCY)
Age: 64
Discharge: HOME | End: 2019-12-03
Attending: EMERGENCY MEDICINE
Payer: MEDICARE

## 2019-12-03 VITALS
HEIGHT: 71 IN | WEIGHT: 315 LBS | HEART RATE: 80 BPM | RESPIRATION RATE: 18 BRPM | BODY MASS INDEX: 44.1 KG/M2 | DIASTOLIC BLOOD PRESSURE: 76 MMHG | SYSTOLIC BLOOD PRESSURE: 132 MMHG | OXYGEN SATURATION: 92 % | TEMPERATURE: 99 F

## 2019-12-03 VITALS
WEIGHT: 315 LBS | DIASTOLIC BLOOD PRESSURE: 57 MMHG | HEART RATE: 78 BPM | SYSTOLIC BLOOD PRESSURE: 112 MMHG | TEMPERATURE: 98.1 F | OXYGEN SATURATION: 94 % | HEIGHT: 71 IN | BODY MASS INDEX: 44.1 KG/M2 | RESPIRATION RATE: 22 BRPM

## 2019-12-03 DIAGNOSIS — R05.9 COUGH: Primary | ICD-10-CM

## 2019-12-03 DIAGNOSIS — J18.9 PNEUMONIA OF LEFT LOWER LOBE DUE TO INFECTIOUS ORGANISM: ICD-10-CM

## 2019-12-03 DIAGNOSIS — J18.9 LINGULAR PNEUMONIA: Primary | ICD-10-CM

## 2019-12-03 LAB
ALBUMIN SERPL-MCNC: 3.2 G/DL (ref 3.5–5)
ALBUMIN/GLOB SERPL: 0.8 {RATIO} (ref 1.1–2.2)
ALP SERPL-CCNC: 88 U/L (ref 45–117)
ALT SERPL-CCNC: 31 U/L (ref 12–78)
ANION GAP SERPL CALC-SCNC: 5 MMOL/L (ref 5–15)
AST SERPL-CCNC: 18 U/L (ref 15–37)
BASOPHILS # BLD: 0 K/UL (ref 0–0.1)
BASOPHILS NFR BLD: 0 % (ref 0–1)
BILIRUB SERPL-MCNC: 0.6 MG/DL (ref 0.2–1)
BUN SERPL-MCNC: 16 MG/DL (ref 6–20)
BUN/CREAT SERPL: 13 (ref 12–20)
CALCIUM SERPL-MCNC: 8.1 MG/DL (ref 8.5–10.1)
CHLORIDE SERPL-SCNC: 102 MMOL/L (ref 97–108)
CO2 SERPL-SCNC: 31 MMOL/L (ref 21–32)
CREAT SERPL-MCNC: 1.23 MG/DL (ref 0.7–1.3)
DIFFERENTIAL METHOD BLD: ABNORMAL
EOSINOPHIL # BLD: 0 K/UL (ref 0–0.4)
EOSINOPHIL NFR BLD: 0 % (ref 0–7)
ERYTHROCYTE [DISTWIDTH] IN BLOOD BY AUTOMATED COUNT: 16.5 % (ref 11.5–14.5)
GLOBULIN SER CALC-MCNC: 4.1 G/DL (ref 2–4)
GLUCOSE SERPL-MCNC: 100 MG/DL (ref 65–100)
HCT VFR BLD AUTO: 41.5 % (ref 36.6–50.3)
HGB BLD-MCNC: 13.3 G/DL (ref 12.1–17)
IMM GRANULOCYTES # BLD AUTO: 0 K/UL (ref 0–0.04)
IMM GRANULOCYTES NFR BLD AUTO: 0 % (ref 0–0.5)
LACTATE BLD-SCNC: 1.12 MMOL/L (ref 0.4–2)
LYMPHOCYTES # BLD: 2 K/UL (ref 0.8–3.5)
LYMPHOCYTES NFR BLD: 20 % (ref 12–49)
MCH RBC QN AUTO: 29.6 PG (ref 26–34)
MCHC RBC AUTO-ENTMCNC: 32 G/DL (ref 30–36.5)
MCV RBC AUTO: 92.2 FL (ref 80–99)
MONOCYTES # BLD: 1.5 K/UL (ref 0–1)
MONOCYTES NFR BLD: 16 % (ref 5–13)
NEUTS SEG # BLD: 6.2 K/UL (ref 1.8–8)
NEUTS SEG NFR BLD: 64 % (ref 32–75)
NRBC # BLD: 0 K/UL (ref 0–0.01)
NRBC BLD-RTO: 0 PER 100 WBC
PLATELET # BLD AUTO: 175 K/UL (ref 150–400)
PMV BLD AUTO: 11 FL (ref 8.9–12.9)
POTASSIUM SERPL-SCNC: 3.8 MMOL/L (ref 3.5–5.1)
PROT SERPL-MCNC: 7.3 G/DL (ref 6.4–8.2)
RBC # BLD AUTO: 4.5 M/UL (ref 4.1–5.7)
SODIUM SERPL-SCNC: 138 MMOL/L (ref 136–145)
WBC # BLD AUTO: 9.9 K/UL (ref 4.1–11.1)

## 2019-12-03 PROCEDURE — 36415 COLL VENOUS BLD VENIPUNCTURE: CPT

## 2019-12-03 PROCEDURE — 87040 BLOOD CULTURE FOR BACTERIA: CPT

## 2019-12-03 PROCEDURE — 85025 COMPLETE CBC W/AUTO DIFF WBC: CPT

## 2019-12-03 PROCEDURE — 80053 COMPREHEN METABOLIC PANEL: CPT

## 2019-12-03 PROCEDURE — 83605 ASSAY OF LACTIC ACID: CPT

## 2019-12-03 PROCEDURE — 99284 EMERGENCY DEPT VISIT MOD MDM: CPT

## 2019-12-03 RX ORDER — PREDNISONE 10 MG/1
TABLET ORAL
Qty: 21 TAB | Refills: 0 | Status: SHIPPED | OUTPATIENT
Start: 2019-12-03 | End: 2020-01-09 | Stop reason: ALTCHOICE

## 2019-12-03 NOTE — ED NOTES
Pt discharged at this time. VSS at time of discharge. PIV x1 removed with no complications. Discharge instructions reviewed and follow up care discussed at this time. Pt verbalized understanding and denies any questions at this time. Prescriptions x2 given. Pt dressed and ambulated out of ED at this time.  All care provided within pt best interest.

## 2019-12-03 NOTE — LETTER
[facility] EMERGENCY DEPT 
[address] [place] [address] 
[phone] Work/School Note Date: 12/3/2019 To Whom It May concern: 
 
Aman Simmons was seen and treated today in the emergency room by the following provider(s): 
Attending Provider: Mary Almanza MD.   
 
Aman Simmons may return to work on 12/9/2019.  
 
Sincerely, 
 
 
 
 
Tonya Costa MD

## 2019-12-03 NOTE — ED TRIAGE NOTES
Pt presents today from PCP for pneumonia. Pt states he had a cold on nov 13, and was treated with steriods. Pt states he has not recovered since then. Pt feels a rattling in his chest and per his PCP had \"low oxygen\". Pt is placed on monitor x3 at this time. Family at bedside.

## 2019-12-03 NOTE — PROGRESS NOTES
Subjective:  Mr. Carrol Mchugh is a pleasant 70year old gentleman, who comes in today with his wife in attendance. Mr. Simmons developed a URI, including a cough, about three weeks ago. He was seen by Dr. Luciano Ariza on 11/13/19 and treated with a Z-Yair and some Tessalon Perles. Despite the fact that he has taken his medication faithfully, he has continued to have a productive cough of yellowish to greenish sputum. In the past 24-48 hours he has had increasing congestion. He denies any shortness of breath, but has had wheezing. He has had chills and fever. Denies any nausea or vomiting. They did call Dr. Luciano Ariza yesterday and were given a prescription for Doxycycline 100 mg twice daily, however they have not yet started the prescription. This morning they were concerned because of his increasing congestion. Past Medical History:   Diagnosis Date    Allergic rhinitis 11/22/2017    Chest pain syndrome 11/22/2017    Comments: abnl stress cardiolite 2006, subsequent to that, he had a normal cardiac cath   [facility] acquired pneumonia     Diarrhea 11/22/2017    Dyspnea 11/22/2017    Environmental allergies     Gout 11/22/2017    Hypertension     Insomnia 11/22/2017    Iron deficiency 11/22/2017    Left chronic serous otitis media 11/22/2017    Obesity 11/22/2017    Comments: morbid    Sleep apnea     Steatosis 11/22/2017    Comments: non-alcoholic     Past Surgical History:   Procedure Laterality Date    HX ORTHOPAEDIC      right knee       Current Outpatient Medications on File Prior to Visit   Medication Sig Dispense Refill    allopurinol (ZYLOPRIM) 300 mg tablet Take 1 Tab by mouth daily for 30 days.  90 Tab 5    bumetanide (BUMEX) 1 mg tablet TAKE 2 TABLETS BY MOUTH EVERY  Tab 0    bumetanide (BUMEX) 1 mg tablet TAKE 2 TABLETS BY MOUTH EVERY  Tab 0    atenolol (TENORMIN) 50 mg tablet TAKE 1 TABLET BY MOUTH EVERY DAY 90 Tab 3    omeprazole (PRILOSEC OTC) 20 mg tablet Take 20 mg by mouth daily.  ZM-AJ-BQ-Fe-Min-Lycopen-Lutein (CENTRUM) 0.4-162-18 mg Tab Take  by mouth.  aspirin 81 mg chewable tablet Take 81 mg by mouth daily.  doxycycline (ADOXA) 100 mg tablet Take 1 Tab by mouth two (2) times a day. 20 Tab 0    hydrALAZINE (APRESOLINE) 25 mg tablet TAKE 1 TABLET BY MOUTH TWICE DAILY 180 Tab 0    azithromycin (ZITHROMAX) 250 mg tablet Take 1 Tab by mouth See Admin Instructions. 6 Tab 0     No current facility-administered medications on file prior to visit. Allergies   Allergen Reactions    Ace Inhibitors Cough   Physical Examination:  GENERAL:  Pleasant gentleman in no acute distress. He is alert and oriented. He answers my questions appropriately. VITALS:  Blood pressure:  132/76. Pulse:  80.  Respirations:  18.  Temperature:  99.  O2 sat: Initially 92, repeated by myself is 91. Weight:  386 pounds. HEENT:  Normocephalic, atraumatic. TMs normal.  Mouth mucosa pink. Tongue midline. Pharynx minimally injected without presence of exudate. No sinus tenderness. NECK:  Supple without adenopathy. CHEST:  Lungs - decreased breath sounds left lower lobe posteriorly with some expiratory wheezing. CV:  Heart regular rhythm. EXTREMITIES:  No edema or calf tenderness. Studies:  Two views of the chest revealed an infiltrate in his left mid lung. Impression:  1. Left lower lobe pneumonia. Plan:  1. Since he has failed outpatient treatment and sat is low, it was felt that he would be better served by being admitted in the hospital for IV antibiotic. I therefore am referring him to the emergency room for admission. I have contacted the emergency room to alert them that they were on their way.

## 2019-12-03 NOTE — PATIENT INSTRUCTIONS
Pneumonia: Care Instructions Your Care Instructions Pneumonia is an infection of the lungs. Most cases are caused by infections from bacteria or viruses. Pneumonia may be mild or very severe. If it is caused by bacteria, you will be treated with antibiotics. It may take a few weeks to a few months to recover fully from pneumonia, depending on how sick you were and whether your overall health is good. Follow-up care is a key part of your treatment and safety. Be sure to make and go to all appointments, and call your doctor if you are having problems. It's also a good idea to know your test results and keep a list of the medicines you take. How can you care for yourself at home? · Take your antibiotics exactly as directed. Do not stop taking the medicine just because you are feeling better. You need to take the full course of antibiotics. · Take your medicines exactly as prescribed. Call your doctor if you think you are having a problem with your medicine. · Get plenty of rest and sleep. You may feel weak and tired for a while, but your energy level will improve with time. · To prevent dehydration, drink plenty of fluids, enough so that your urine is light yellow or clear like water. Choose water and other caffeine-free clear liquids until you feel better. If you have kidney, heart, or liver disease and have to limit fluids, talk with your doctor before you increase the amount of fluids you drink. · Take care of your cough so you can rest. A cough that brings up mucus from your lungs is common with pneumonia. It is one way your body gets rid of the infection. But if coughing keeps you from resting or causes severe fatigue and chest-wall pain, talk to your doctor. He or she may suggest that you take a medicine to reduce the cough. · Use a vaporizer or humidifier to add moisture to your bedroom. Follow the directions for cleaning the machine. · Do not smoke or allow others to smoke around you. Smoke will make your cough last longer. If you need help quitting, talk to your doctor about stop-smoking programs and medicines. These can increase your chances of quitting for good. · Take an over-the-counter pain medicine, such as acetaminophen (Tylenol), ibuprofen (Advil, Motrin), or naproxen (Aleve). Read and follow all instructions on the label. · Do not take two or more pain medicines at the same time unless the doctor told you to. Many pain medicines have acetaminophen, which is Tylenol. Too much acetaminophen (Tylenol) can be harmful. · If you were given a spirometer to measure how well your lungs are working, use it as instructed. This can help your doctor tell how your recovery is going. · To prevent pneumonia in the future, talk to your doctor about getting a flu vaccine (once a year) and a pneumococcal vaccine (one time only for most people). When should you call for help? Call 911 anytime you think you may need emergency care. For example, call if: 
  · You have severe trouble breathing.  
 Call your doctor now or seek immediate medical care if: 
  · You cough up dark brown or bloody mucus (sputum).  
  · You have new or worse trouble breathing.  
  · You are dizzy or lightheaded, or you feel like you may faint.  
 Watch closely for changes in your health, and be sure to contact your doctor if: 
  · You have a new or higher fever.  
  · You are coughing more deeply or more often.  
  · You are not getting better after 2 days (48 hours).  
  · You do not get better as expected. Where can you learn more? Go to http://mike-rivas.info/. Enter [id number] in the search box to learn more about \"Pneumonia: Care Instructions. \" Current as of: June 9, 2019 Content Version: 12.2 © 0672-9026 [employer].  Care instructions adapted under license by [employer] (which disclaims liability or warranty for this information). If you have questions about a medical condition or this instruction, always ask your healthcare professional. [place] [address] any warranty or liability for your use of this information.

## 2019-12-03 NOTE — ED PROVIDER NOTES
EMERGENCY DEPARTMENT HISTORY AND PHYSICAL EXAM      Date: 12/3/2019  Patient Name: Aman Simmons    History of Presenting Illness     Chief Complaint   Patient presents with    Cough     sent by PCP for PNA on xray; c/o dry cough, malaise, fever x one week       History Provided By: Patient    HPI: Aman Simmons, 70 y.o. male  presents to the ED with cc of cough malaise and fever for 1 week. Patient states he began feeling bad actually on November 13. He went to his primary care doctor with upper respiratory type symptoms and was placed on a Z-Yair, steroids, and something for cough. He states he may have briefly felt better but never back to his baseline. He went to his doctor today for fever this morning. He is also had congestion in his chest but is not able to cough up any mucus. He has been fatigued and malaised. No chest pain. No nausea vomiting or diarrhea. Patient was prescribed doxycycline yesterday but he has not started this medicine yet. He had a chest x-ray today at his PCPs office which showed a lingular pneumonia. There are no other complaints, changes, or physical findings at this time. PCP: Chaz Posey NP    No current facility-administered medications on file prior to encounter. Current Outpatient Medications on File Prior to Encounter   Medication Sig Dispense Refill    allopurinol (ZYLOPRIM) 300 mg tablet Take 1 Tab by mouth daily for 30 days. 90 Tab 5    hydrALAZINE (APRESOLINE) 25 mg tablet TAKE 1 TABLET BY MOUTH TWICE DAILY 180 Tab 0    bumetanide (BUMEX) 1 mg tablet TAKE 2 TABLETS BY MOUTH EVERY  Tab 0    atenolol (TENORMIN) 50 mg tablet TAKE 1 TABLET BY MOUTH EVERY DAY 90 Tab 3    omeprazole (PRILOSEC OTC) 20 mg tablet Take 20 mg by mouth daily.  KE-WR-TB-Fe-Min-Lycopen-Lutein (CENTRUM) 0.4-162-18 mg Tab Take  by mouth.  aspirin 81 mg chewable tablet Take 81 mg by mouth daily.         doxycycline (ADOXA) 100 mg tablet Take 1 Tab by mouth two (2) times a day. 20 Tab 0    bumetanide (BUMEX) 1 mg tablet TAKE 2 TABLETS BY MOUTH EVERY  Tab 0    [DISCONTINUED] azithromycin (ZITHROMAX) 250 mg tablet Take 1 Tab by mouth See Admin Instructions. 6 Tab 0       Past History     Past Medical History:  Past Medical History:   Diagnosis Date    Allergic rhinitis 11/22/2017    Chest pain syndrome 11/22/2017    Comments: abnl stress cardiolite 2006, subsequent to that, he had a normal cardiac cath   [facility] acquired pneumonia     Diarrhea 11/22/2017    Dyspnea 11/22/2017    Environmental allergies     Gout 11/22/2017    Hypertension     Insomnia 11/22/2017    Iron deficiency 11/22/2017    Left chronic serous otitis media 11/22/2017    Obesity 11/22/2017    Comments: morbid    Sleep apnea     Steatosis 11/22/2017    Comments: non-alcoholic       Past Surgical History:  Past Surgical History:   Procedure Laterality Date    HX ORTHOPAEDIC      right knee       Family History:  Family History   Problem Relation Age of Onset    Hypertension Other     Heart Disease Other     Diabetes Other        Social History:  Social History     Tobacco Use    Smoking status: Never Smoker    Smokeless tobacco: Never Used   Substance Use Topics    Alcohol use: Yes     Comment: rarely    Drug use: Not on file       Allergies: Allergies   Allergen Reactions    Ace Inhibitors Cough         Review of Systems   Review of Systems   Constitutional: Positive for chills, fatigue and fever. HENT: Negative for congestion, ear pain, rhinorrhea, sore throat and trouble swallowing. Eyes: Negative for visual disturbance. Respiratory: Positive for cough. Negative for chest tightness and shortness of breath. Cardiovascular: Negative for chest pain and palpitations. Gastrointestinal: Negative for abdominal pain, blood in stool, constipation, diarrhea, nausea and vomiting.    Genitourinary: Negative for decreased urine volume, difficulty urinating, dysuria and frequency. Musculoskeletal: Negative for back pain and neck pain. Skin: Negative for color change and rash. Neurological: Negative for dizziness, weakness, light-headedness and headaches. Physical Exam   Physical Exam  Vitals signs and nursing note reviewed. Constitutional:       General: He is not in acute distress. Appearance: He is well-developed. He is obese. He is not ill-appearing. Eyes:      Conjunctiva/sclera: Conjunctivae normal.   Neck:      Musculoskeletal: Neck supple. Cardiovascular:      Rate and Rhythm: Normal rate and regular rhythm. Pulmonary:      Effort: Pulmonary effort is normal. No accessory muscle usage or respiratory distress. Breath sounds: Rhonchi present. Abdominal:      General: There is no distension. Palpations: Abdomen is soft. Tenderness: There is no tenderness. Lymphadenopathy:      Cervical: No cervical adenopathy. Skin:     General: Skin is warm and dry. Neurological:      Mental Status: He is alert and oriented to person, place, and time. Cranial Nerves: No cranial nerve deficit. Sensory: No sensory deficit. Diagnostic Study Results     Labs -     Recent Results (from the past 24 hour(s))   CBC WITH AUTOMATED DIFF    Collection Time: 12/03/19  1:18 PM   Result Value Ref Range    WBC 9.9 4.1 - 11.1 K/uL    RBC 4.50 4. 10 - 5.70 M/uL    HGB 13.3 12.1 - 17.0 g/dL    HCT 41.5 36.6 - 50.3 %    MCV 92.2 80.0 - 99.0 FL    MCH 29.6 26.0 - 34.0 PG    MCHC 32.0 30.0 - 36.5 g/dL    RDW 16.5 (H) 11.5 - 14.5 %    PLATELET 668 830 - 944 K/uL    MPV 11.0 8.9 - 12.9 FL    NRBC 0.0 0  WBC    ABSOLUTE NRBC 0.00 0.00 - 0.01 K/uL    NEUTROPHILS 64 32 - 75 %    LYMPHOCYTES 20 12 - 49 %    MONOCYTES 16 (H) 5 - 13 %    EOSINOPHILS 0 0 - 7 %    BASOPHILS 0 0 - 1 %    IMMATURE GRANULOCYTES 0 0.0 - 0.5 %    ABS. NEUTROPHILS 6.2 1.8 - 8.0 K/UL    ABS. LYMPHOCYTES 2.0 0.8 - 3.5 K/UL    ABS.  MONOCYTES 1.5 (H) 0.0 - 1.0 K/UL ABS. EOSINOPHILS 0.0 0.0 - 0.4 K/UL    ABS. BASOPHILS 0.0 0.0 - 0.1 K/UL    ABS. IMM. GRANS. 0.0 0.00 - 0.04 K/UL    DF AUTOMATED     POC LACTIC ACID    Collection Time: 12/03/19  1:43 PM   Result Value Ref Range    Lactic Acid (POC) 1.12 0.40 - 0.85 mmol/L   METABOLIC PANEL, COMPREHENSIVE    Collection Time: 12/03/19  1:58 PM   Result Value Ref Range    Sodium 138 136 - 145 mmol/L    Potassium 3.8 3.5 - 5.1 mmol/L    Chloride 102 97 - 108 mmol/L    CO2 31 21 - 32 mmol/L    Anion gap 5 5 - 15 mmol/L    Glucose 100 65 - 100 mg/dL    BUN 16 6 - 20 MG/DL    Creatinine 1.23 0.70 - 1.30 MG/DL    BUN/Creatinine ratio 13 12 - 20      GFR est AA >60 >60 ml/min/1.73m2    GFR est non-AA 59 (L) >60 ml/min/1.73m2    Calcium 8.1 (L) 8.5 - 10.1 MG/DL    Bilirubin, total 0.6 0.2 - 1.0 MG/DL    ALT (SGPT) 31 12 - 78 U/L    AST (SGOT) 18 15 - 37 U/L    Alk. phosphatase 88 45 - 117 U/L    Protein, total 7.3 6.4 - 8.2 g/dL    Albumin 3.2 (L) 3.5 - 5.0 g/dL    Globulin 4.1 (H) 2.0 - 4.0 g/dL    A-G Ratio 0.8 (L) 1.1 - 2.2         Radiologic Studies -   No orders to display     CT Results  (Last 48 hours)    None        CXR Results  (Last 48 hours)               12/03/19 1152  XR CHEST PA LAT Final result    Impression:  IMPRESSION: Lingular pneumonia. Narrative:  EXAM: XR CHEST PA LAT       INDICATION: cough       COMPARISON: 12/4/2017. FINDINGS: PA and lateral radiographs of the chest demonstrate an ill-defined   infiltrate in the left midlung, possibly in the lingula. The cardiac and   mediastinal contours and pulmonary vascularity are normal. The bones and soft   tissues are within normal limits. Medical Decision Making   I am the first provider for this patient. I reviewed the vital signs, available nursing notes, past medical history, past surgical history, family history and social history. Vital Signs-Reviewed the patient's vital signs.   Patient Vitals for the past 24 hrs:   Temp Pulse Resp BP SpO2   12/03/19 1434  76 26 100/65 96 %   12/03/19 1341     93 %   12/03/19 1257 98.1 °F (36.7 °C) 82 20 130/72 93 %         Records Reviewed: Nursing Notes, Old Medical Records and Previous Radiology Studies    Provider Notes (Medical Decision Making):   Patient referred from primary care for pneumonia. He is not febrile on arrival.  He does not meet any SIRS criteria for potential sepsis. His oxygen saturations are in the low 90s but they are acceptable not hypoxic. He has a normal white blood cell count. Remainder of labs are all within normal ranges. Would recommend that he continue the doxycycline that he was prescribed yesterday. We will add additional course of prednisone and guaifenesin. He does not meet any admission criteria and I do not think that it would be beneficial to admit him. Certainly the doxycycline is better than the azithromycin for this pneumonia and I think this will cleared up. ED Course:   Initial assessment performed. The patients presenting problems have been discussed, and they are in agreement with the care plan formulated and outlined with them. I have encouraged them to ask questions as they arise throughout their visit. Orders Placed This Encounter    SEPSIS ORDERS INITIATED IN TRIAGE (DO NOT DESELECT)    CULTURE, BLOOD, PAIRED    CBC WITH AUTOMATED DIFF    METABOLIC PANEL, COMPREHENSIVE    DIET NPO    WEIGH PATIENT    POC  LACTIC ACID    POC LACTIC ACID    SALINE LOCK IV ONE TIME STAT    DISCONTD: doxycycline (VIBRAMYCIN) 100 mg in 0.9% sodium chloride (MBP/ADV) 100 mL    predniSONE (STERAPRED DS) 10 mg dose pack    guaiFENesin (MUCINEX) 1,200 mg Ta12 ER tablet       Procedures      Critical Care Time:   0    Disposition:  Discharge  3:10 PM    The patient's emergency department evaluation is now complete. I have reviewed all labs, imaging, and pertinent information. I have discussed all results with the patient and/or family.   Based on our evaluation today I do believe that the patient is safe to be discharged home. The patient has been provided with at home instructions that are pertinent to their complaint today, although these may not be specific to the exact diagnosis. I have reviewed the patient's home medications and attempted to reconcile if not already done so by pharmacy or nursing staff. I have discussed all new prescriptions with the patient. The patient has been encouraged to follow-up with primary care doctor and/or specialist, and these have been discussed with the patient. The patient has been advised that they may return to the emergency department if they have any worsening symptoms and or new symptoms that are of concern to them. Verbal discharge instructions may have also been provided to the patient that may not be specifically contained in the written discharge instructions. The patient has been given opportunity to ask questions prior to discharge. PLAN:  1. Current Discharge Medication List      START taking these medications    Details   predniSONE (STERAPRED DS) 10 mg dose pack 6-day Dosepak use as directed  Qty: 21 Tab, Refills: 0      guaiFENesin (MUCINEX) 1,200 mg Ta12 ER tablet Take 1 Tab by mouth two (2) times a day. Qty: 10 Tab, Refills: 0         STOP taking these medications       azithromycin (ZITHROMAX) 250 mg tablet Comments:   Reason for Stoppin.   Follow-up Information     Follow up With Specialties Details Why Contact Andres Pennington NP Nurse Practitioner Schedule an appointment as soon as possible for a visit in 1 week  Felipe Luis A Woodszs[address]radha [facility] [address].  [phone]          Return to ED if worse     Diagnosis     Clinical Impression:   1. Lingular pneumonia            This note will not be viewable in [address]

## 2019-12-03 NOTE — PROGRESS NOTES
Stiven Simmons is a 70 y.o. male     Chief Complaint   Patient presents with    Cold Symptoms     fever chills and cought for the past three weeks       Visit Vitals  /76 (BP 1 Location: Left arm, BP Patient Position: Sitting)   Pulse 80   Temp 99 °F (37.2 °C) (Oral)   Resp 18   Ht 5' 11\" (1.803 m)   Wt (!) 386 lb 9.6 oz (175.4 kg)   SpO2 92%   BMI 53.92 kg/m²       Health Maintenance Due   Topic Date Due    FOOT EXAM Q1  12/29/1963    MICROALBUMIN Q1  12/29/1963    EYE EXAM RETINAL OR DILATED  12/29/1963    DTaP/Tdap/Td series (1 - Tdap) 12/29/1964    Shingrix Vaccine Age 50> (1 of 2) 12/29/2003    FOBT Q 1 YEAR AGE 50-75  12/29/2003    GLAUCOMA SCREENING Q2Y  12/29/2018    Pneumococcal 65+ years (1 of 1 - PPSV23) 12/29/2018    MEDICARE YEARLY EXAM  05/23/2019    Influenza Age 9 to Adult  08/01/2019    HEMOGLOBIN A1C Q6M  11/22/2019       1. Have you been to the ER, urgent care clinic since your last visit? Hospitalized since your last visit? No    2. Have you seen or consulted any other health care providers outside of the [address] [place] [address] since your last visit? Include any pap smears or colon screening.  No 18

## 2019-12-08 LAB
BACTERIA SPEC CULT: NORMAL
SERVICE CMNT-IMP: NORMAL

## 2019-12-10 ENCOUNTER — OFFICE VISIT (OUTPATIENT)
Dept: INTERNAL MEDICINE CLINIC | Age: 64
End: 2019-12-10

## 2019-12-10 VITALS
OXYGEN SATURATION: 95 % | BODY MASS INDEX: 44.1 KG/M2 | RESPIRATION RATE: 16 BRPM | WEIGHT: 315 LBS | DIASTOLIC BLOOD PRESSURE: 78 MMHG | HEIGHT: 71 IN | SYSTOLIC BLOOD PRESSURE: 122 MMHG | HEART RATE: 69 BPM | TEMPERATURE: 97.9 F

## 2019-12-10 DIAGNOSIS — J18.9 PNEUMONIA OF LEFT LOWER LOBE DUE TO INFECTIOUS ORGANISM: Primary | ICD-10-CM

## 2019-12-10 NOTE — PROGRESS NOTES
Subjective:  Mr. Nuzhat Brady is a pleasant 70year old gentleman who comes in with his wife in attendance. He is now one week status post left lower lobe lingular pneumonia. He is completing a course of Doxycycline 100 mg twice daily. He is feeling much better. He has continued to have a cough, but denies any shortness of breath or wheezing. He is tolerating his medication well. Denies chills or fever. Denies nausea or vomiting. Past Medical History:   Diagnosis Date    Allergic rhinitis 11/22/2017    Chest pain syndrome 11/22/2017    Comments: abnl stress cardiolite 2006, subsequent to that, he had a normal cardiac cath   [facility] acquired pneumonia     Diarrhea 11/22/2017    Dyspnea 11/22/2017    Environmental allergies     Gout 11/22/2017    Hypertension     Insomnia 11/22/2017    Iron deficiency 11/22/2017    Left chronic serous otitis media 11/22/2017    Obesity 11/22/2017    Comments: morbid    Sleep apnea     Steatosis 11/22/2017    Comments: non-alcoholic     Past Surgical History:   Procedure Laterality Date    HX ORTHOPAEDIC      right knee       Current Outpatient Medications on File Prior to Visit   Medication Sig Dispense Refill    predniSONE (STERAPRED DS) 10 mg dose pack 6-day Dosepak use as directed 21 Tab 0    guaiFENesin (MUCINEX) 1,200 mg Ta12 ER tablet Take 1 Tab by mouth two (2) times a day. 10 Tab 0    allopurinol (ZYLOPRIM) 300 mg tablet Take 1 Tab by mouth daily for 30 days. 90 Tab 5    doxycycline (ADOXA) 100 mg tablet Take 1 Tab by mouth two (2) times a day. 20 Tab 0    bumetanide (BUMEX) 1 mg tablet TAKE 2 TABLETS BY MOUTH EVERY  Tab 0    hydrALAZINE (APRESOLINE) 25 mg tablet TAKE 1 TABLET BY MOUTH TWICE DAILY 180 Tab 0    bumetanide (BUMEX) 1 mg tablet TAKE 2 TABLETS BY MOUTH EVERY  Tab 0    atenolol (TENORMIN) 50 mg tablet TAKE 1 TABLET BY MOUTH EVERY DAY 90 Tab 3    omeprazole (PRILOSEC OTC) 20 mg tablet Take 20 mg by mouth daily.         YL-OA-XV-Fe-Min-Lycopen-Lutein (CENTRUM) 0.4-162-18 mg Tab Take  by mouth.  aspirin 81 mg chewable tablet Take 81 mg by mouth daily. No current facility-administered medications on file prior to visit. Allergies   Allergen Reactions    Ace Inhibitors Cough     Physical Examination:  GENERAL:  Pleasant, markedly obese gentleman in no acute distress. He is alert and oriented. VITALS:  Blood pressure:  122/78. Pulse:  69.  Respirations:  16.  Temperature:  97.9. O2 sat:  95%. HEENT:  Normocephalic, atraumatic. NECK:  Supple without adenopathy. CHEST:  Lungs clear to auscultation, no rales or wheezes. CV:  Heart regular rhythm without murmur or gallop. EXTREMITIES:  No edema or calf tenderness. Distal pulses were present. Studies:  Two views of the chest revealed resolution of his pneumonia. Impression:  1. Left lower lobe pneumonia, resolved. Plan:  1. He was asked to finish the course of antibiotic. 2. He will certainly contact us should he have any concerns or recurrent problems.

## 2019-12-10 NOTE — PROGRESS NOTES
De Simmons is a 70 y.o. male     Chief Complaint   Patient presents with   [facility] Follow Up       Visit Vitals  /78 (BP 1 Location: Left arm, BP Patient Position: Sitting)   Pulse 69   Temp 97.9 °F (36.6 °C) (Oral)   Resp 16   Ht 5' 11\" (1.803 m)   Wt (!) 386 lb (175.1 kg)   SpO2 95%   BMI 53.84 kg/m²       Health Maintenance Due   Topic Date Due    FOOT EXAM Q1  12/29/1963    MICROALBUMIN Q1  12/29/1963    EYE EXAM RETINAL OR DILATED  12/29/1963    DTaP/Tdap/Td series (1 - Tdap) 12/29/1964    Shingrix Vaccine Age 50> (1 of 2) 12/29/2003    FOBT Q 1 YEAR AGE 50-75  12/29/2003    GLAUCOMA SCREENING Q2Y  12/29/2018    Pneumococcal 65+ years (1 of 1 - PPSV23) 12/29/2018    MEDICARE YEARLY EXAM  05/23/2019    Influenza Age 9 to Adult  08/01/2019    HEMOGLOBIN A1C Q6M  11/22/2019       1. Have you been to the ER, urgent care clinic since your last visit? Hospitalized since your last visit? No    2. Have you seen or consulted any other health care providers outside of the [address] [place] [address] since your last visit? Include any pap smears or colon screening.  No

## 2019-12-10 NOTE — PATIENT INSTRUCTIONS
Pneumonia: Care Instructions  Your Care Instructions    Pneumonia is an infection of the lungs. Most cases are caused by infections from bacteria or viruses. Pneumonia may be mild or very severe. If it is caused by bacteria, you will be treated with antibiotics. It may take a few weeks to a few months to recover fully from pneumonia, depending on how sick you were and whether your overall health is good. Follow-up care is a key part of your treatment and safety. Be sure to make and go to all appointments, and call your doctor if you are having problems. It's also a good idea to know your test results and keep a list of the medicines you take. How can you care for yourself at home? · Take your antibiotics exactly as directed. Do not stop taking the medicine just because you are feeling better. You need to take the full course of antibiotics. · Take your medicines exactly as prescribed. Call your doctor if you think you are having a problem with your medicine. · Get plenty of rest and sleep. You may feel weak and tired for a while, but your energy level will improve with time. · To prevent dehydration, drink plenty of fluids, enough so that your urine is light yellow or clear like water. Choose water and other caffeine-free clear liquids until you feel better. If you have kidney, heart, or liver disease and have to limit fluids, talk with your doctor before you increase the amount of fluids you drink. · Take care of your cough so you can rest. A cough that brings up mucus from your lungs is common with pneumonia. It is one way your body gets rid of the infection. But if coughing keeps you from resting or causes severe fatigue and chest-wall pain, talk to your doctor. He or she may suggest that you take a medicine to reduce the cough. · Use a vaporizer or humidifier to add moisture to your bedroom. Follow the directions for cleaning the machine. · Do not smoke or allow others to smoke around you.  Smoke will make your cough last longer. If you need help quitting, talk to your doctor about stop-smoking programs and medicines. These can increase your chances of quitting for good. · Take an over-the-counter pain medicine, such as acetaminophen (Tylenol), ibuprofen (Advil, Motrin), or naproxen (Aleve). Read and follow all instructions on the label. · Do not take two or more pain medicines at the same time unless the doctor told you to. Many pain medicines have acetaminophen, which is Tylenol. Too much acetaminophen (Tylenol) can be harmful. · If you were given a spirometer to measure how well your lungs are working, use it as instructed. This can help your doctor tell how your recovery is going. · To prevent pneumonia in the future, talk to your doctor about getting a flu vaccine (once a year) and a pneumococcal vaccine (one time only for most people). When should you call for help? Call 911 anytime you think you may need emergency care. For example, call if:    · You have severe trouble breathing.    Call your doctor now or seek immediate medical care if:    · You cough up dark brown or bloody mucus (sputum).     · You have new or worse trouble breathing.     · You are dizzy or lightheaded, or you feel like you may faint.    Watch closely for changes in your health, and be sure to contact your doctor if:    · You have a new or higher fever.     · You are coughing more deeply or more often.     · You are not getting better after 2 days (48 hours).     · You do not get better as expected. Where can you learn more? Go to http://mike-rivas.info/. Enter [id number] in the search box to learn more about \"Pneumonia: Care Instructions. \"  Current as of: June 9, 2019  Content Version: 12.2  © 3740-3193 [employer]. Care instructions adapted under license by [employer] (which disclaims liability or warranty for this information).  If you have questions about a medical condition or this instruction, always ask your healthcare professional. [place] [address] any warranty or liability for your use of this information.

## 2019-12-13 ENCOUNTER — TELEPHONE (OUTPATIENT)
Dept: INTERNAL MEDICINE CLINIC | Age: 64
End: 2019-12-13

## 2019-12-13 NOTE — TELEPHONE ENCOUNTER
Verified two patient identifiers, name and date of birth. Lemaye Hutchinsw is out of the office, xray follow up will be called by [facility] Monday.

## 2019-12-13 NOTE — TELEPHONE ENCOUNTER
Patients wife calling about husbands chest xray he had done on Tuesday. She was told that someone would call her back about the xray. Please advise.

## 2019-12-16 DIAGNOSIS — I10 ESSENTIAL HYPERTENSION, BENIGN: ICD-10-CM

## 2019-12-16 RX ORDER — ATENOLOL 50 MG/1
TABLET ORAL
Qty: 90 TAB | Refills: 3 | Status: SHIPPED | OUTPATIENT
Start: 2019-12-16 | End: 2020-06-11 | Stop reason: SDUPTHER

## 2019-12-16 RX ORDER — HYDRALAZINE HYDROCHLORIDE 25 MG/1
TABLET, FILM COATED ORAL
Qty: 180 TAB | Refills: 0 | Status: SHIPPED | OUTPATIENT
Start: 2019-12-16 | End: 2020-03-18 | Stop reason: SDUPTHER

## 2020-01-08 PROBLEM — B35.1 ONYCHOMYCOSIS: Status: RESOLVED | Noted: 2018-07-23 | Resolved: 2020-01-08

## 2020-01-08 PROBLEM — L29.9 PRURITUS: Status: RESOLVED | Noted: 2017-09-11 | Resolved: 2020-01-08

## 2020-01-08 PROBLEM — E88.89 STEATOSIS (HCC): Status: RESOLVED | Noted: 2017-11-22 | Resolved: 2020-01-08

## 2020-01-08 PROBLEM — L30.9 ECZEMA: Status: RESOLVED | Noted: 2017-09-11 | Resolved: 2020-01-08

## 2020-01-08 PROBLEM — J30.9 ALLERGIC RHINITIS: Status: RESOLVED | Noted: 2017-11-22 | Resolved: 2020-01-08

## 2020-01-09 ENCOUNTER — OFFICE VISIT (OUTPATIENT)
Dept: INTERNAL MEDICINE CLINIC | Age: 65
End: 2020-01-09

## 2020-01-09 VITALS
HEART RATE: 64 BPM | DIASTOLIC BLOOD PRESSURE: 82 MMHG | OXYGEN SATURATION: 95 % | HEIGHT: 71 IN | RESPIRATION RATE: 16 BRPM | TEMPERATURE: 97.9 F | WEIGHT: 315 LBS | BODY MASS INDEX: 44.1 KG/M2 | SYSTOLIC BLOOD PRESSURE: 126 MMHG

## 2020-01-09 DIAGNOSIS — E88.81 METABOLIC SYNDROME: ICD-10-CM

## 2020-01-09 DIAGNOSIS — M1A.9XX0 CHRONIC GOUT WITHOUT TOPHUS, UNSPECIFIED CAUSE, UNSPECIFIED SITE: ICD-10-CM

## 2020-01-09 DIAGNOSIS — I10 ESSENTIAL HYPERTENSION, BENIGN: ICD-10-CM

## 2020-01-09 DIAGNOSIS — E78.5 DYSLIPIDEMIA: ICD-10-CM

## 2020-01-09 DIAGNOSIS — E11.9 DIABETES MELLITUS TYPE 2, DIET-CONTROLLED (HCC): ICD-10-CM

## 2020-01-09 DIAGNOSIS — E66.01 OBESITY, MORBID (HCC): ICD-10-CM

## 2020-01-09 DIAGNOSIS — Z00.00 ANNUAL PHYSICAL EXAM: Primary | ICD-10-CM

## 2020-01-09 DIAGNOSIS — E55.9 VITAMIN D DEFICIENCY: ICD-10-CM

## 2020-01-09 DIAGNOSIS — Z12.5 SCREENING PSA (PROSTATE SPECIFIC ANTIGEN): ICD-10-CM

## 2020-01-09 DIAGNOSIS — Z23 ENCOUNTER FOR IMMUNIZATION: ICD-10-CM

## 2020-01-09 DIAGNOSIS — G47.33 OSA (OBSTRUCTIVE SLEEP APNEA): ICD-10-CM

## 2020-01-09 LAB
25(OH)D3 SERPL-MCNC: 28 NG/ML (ref 30–96)
A-G RATIO,AGRAT: 1.3 RATIO
ALBUMIN SERPL-MCNC: 4.1 G/DL (ref 3.9–5.4)
ALP SERPL-CCNC: 100 U/L (ref 38–126)
ALT SERPL-CCNC: 41 U/L (ref 0–50)
ANION GAP SERPL CALC-SCNC: 11 MMOL/L
AST SERPL W P-5'-P-CCNC: 34 U/L (ref 14–36)
BILIRUB SERPL-MCNC: 0.7 MG/DL (ref 0.2–1.3)
BILIRUB UR QL: NEGATIVE
BUN SERPL-MCNC: 23 MG/DL (ref 9–20)
BUN/CREATININE RATIO,BUCR: 21 RATIO
CALCIUM SERPL-MCNC: 9.6 MG/DL (ref 8.4–10.2)
CHLORIDE SERPL-SCNC: 98 MMOL/L (ref 98–107)
CHOL/HDL RATIO,CHHD: 6 RATIO (ref 0–4)
CHOLEST SERPL-MCNC: 197 MG/DL (ref 0–200)
CLARITY: CLEAR
CO2 SERPL-SCNC: 33 MMOL/L (ref 22–32)
COLOR UR: NORMAL
CREAT SERPL-MCNC: 1.1 MG/DL (ref 0.8–1.5)
GLOBULIN,GLOB: 3.2
GLUCOSE 24H UR-MRATE: NEGATIVE G/(24.H)
GLUCOSE SERPL-MCNC: 94 MG/DL (ref 75–110)
HDLC SERPL-MCNC: 34 MG/DL (ref 35–130)
HGB UR QL STRIP: NEGATIVE
KETONES UR QL STRIP.AUTO: NEGATIVE
LDL/HDL RATIO,LDHD: 3 RATIO
LDLC SERPL CALC-MCNC: 112 MG/DL (ref 0–130)
LEUKOCYTE ESTERASE: NEGATIVE
MICROALBUMIN, URINE: 20 MG/L (ref 0–20)
NITRITE UR QL STRIP.AUTO: NEGATIVE
PH UR STRIP: 6 [PH] (ref 5–7)
POTASSIUM SERPL-SCNC: 4.6 MMOL/L (ref 3.6–5)
PROT SERPL-MCNC: 7.3 G/DL (ref 6.3–8.2)
PROT UR STRIP-MCNC: NEGATIVE MG/DL
PSA, TEST22: 2.2 NG/ML (ref 0–4)
RBC #/AREA URNS HPF: 0 #/HPF
SODIUM SERPL-SCNC: 142 MMOL/L (ref 137–145)
SP GR UR REFRACTOMETRY: 1.01 (ref 1–1.03)
TRIGL SERPL-MCNC: 257 MG/DL (ref 0–200)
TSH SERPL DL<=0.05 MIU/L-ACNC: 1.14 UIU/ML (ref 0.34–5.6)
URATE SERPL-MCNC: 5.3 MG/DL (ref 3.5–8.5)
UROBILINOGEN UR QL STRIP.AUTO: NEGATIVE
VLDLC SERPL CALC-MCNC: 51 MG/DL
WBC URNS QL MICRO: 0 #/HPF

## 2020-01-09 RX ORDER — ALLOPURINOL 300 MG/1
TABLET ORAL DAILY
COMMUNITY
End: 2021-02-22 | Stop reason: SDUPTHER

## 2020-01-09 NOTE — PROGRESS NOTES
Harshad Rodriguez presents today at the clinic for    Chief Complaint   Patient presents with    Annual Wellness Visit    Diabetes     follow up    Hypertension     follow up    Cholesterol Problem     follow up        Wt Readings from Last 3 Encounters:   01/09/20 (!) 395 lb (179.2 kg)   12/10/19 (!) 386 lb (175.1 kg)   12/03/19 (!) 386 lb 0.4 oz (175.1 kg)     Temp Readings from Last 3 Encounters:   01/09/20 97.9 °F (36.6 °C) (Oral)   12/10/19 97.9 °F (36.6 °C) (Oral)   12/03/19 98.1 °F (36.7 °C)     BP Readings from Last 3 Encounters:   01/09/20 126/82   12/10/19 122/78   12/03/19 112/57     Pulse Readings from Last 3 Encounters:   01/09/20 64   12/10/19 69   12/03/19 78       Health Maintenance Due   Topic    FOOT EXAM Q1     MICROALBUMIN Q1     EYE EXAM RETINAL OR DILATED     DTaP/Tdap/Td series (1 - Tdap)    Shingrix Vaccine Age 50> (1 of 2)    FOBT Q 1 YEAR AGE 54-65     GLAUCOMA SCREENING Q2Y     Pneumococcal 65+ years (1 of 1 - PPSV23)    MEDICARE YEARLY EXAM     Influenza Age 5 to Adult     HEMOGLOBIN A1C Q6M          Learning Assessment:  :     Learning Assessment 2/7/2017   PRIMARY LEARNER Patient   BARRIERS PRIMARY LEARNER NONE   CO-LEARNER CAREGIVER No   PRIMARY LANGUAGE ENGLISH    NEED No   ANSWERED BY Patient   RELATIONSHIP SELF       Depression Screening:  :     3 most recent PHQ Screens 1/9/2020   Little interest or pleasure in doing things Not at all   Feeling down, depressed, irritable, or hopeless Not at all   Total Score PHQ 2 0       Fall Risk Assessment:  :     Fall Risk Assessment, last 12 mths 1/9/2020   Able to walk? Yes   Fall in past 12 months? No       Abuse Screening:  :     Abuse Screening Questionnaire 5/22/2019 11/7/2017   Do you ever feel afraid of your partner? N N   Are you in a relationship with someone who physically or mentally threatens you? N N   Is it safe for you to go home?  Y Y       Coordination of Care Questionnaire:  :     1. Have you been to the ER, urgent care clinic since your last visit? Hospitalized since your last visit? no    2. Have you seen or consulted any other health care providers outside of the [address] [place] [address] since your last visit? Include any pap smears or colon screening.  no

## 2020-01-09 NOTE — PROGRESS NOTES
This note will not be viewable in [address] Jaguar Hernandez is a 75 y.o. male and presents with Annual Wellness Visit; Diabetes (follow up); Hypertension (follow up); and Cholesterol Problem (follow up)      Subjective:  Patient comes in today for his annual Medicare wellness visit and follow-up of his prediabetes, hypertension, dyslipidemia. Patient is morbidly obese unfortunately is up 10 pounds since his last visit in December. He does admit to being noncompliant with his diet. He also has obstructive sleep apnea but has not followed up with his sleep specialist.  He is noncompliant with his CPAP mask. Patient has a history of prediabetes his last A1c was 5.8. He does admit over the holidays his diet has been full of carbs and soda. He denies numbness or tingling sensation, polydipsia or polyuria. He has dyslipidemia in particular hypertriglyceridemia but is not on any medication. Blood pressure is well controlled on current meds. He was on Benicar in the past but that was switched secondary to persistent cough. Currently remains on atenolol and hydralazine. He reports he is chronic lower back pain which inhibits him to work out.       Past Medical History:   Diagnosis Date    Allergic rhinitis 11/22/2017    Chest pain syndrome 11/22/2017    Comments: abnl stress cardiolite 2006, subsequent to that, he had a normal cardiac cath   [facility] acquired pneumonia     Diarrhea 11/22/2017    Dyspnea 11/22/2017    Environmental allergies     Gout 11/22/2017    Hypertension     Insomnia 11/22/2017    Iron deficiency 11/22/2017    Left chronic serous otitis media 11/22/2017    Obesity 11/22/2017    Comments: morbid    Sleep apnea     Steatosis 11/22/2017    Comments: non-alcoholic     Past Surgical History:   Procedure Laterality Date    HX ORTHOPAEDIC      right knee     Allergies   Allergen Reactions    Ace Inhibitors Cough     Current Outpatient Medications   Medication Sig Dispense Refill    allopurinol (ZYLOPRIM) 300 mg tablet Take  by mouth daily.  atenolol (TENORMIN) 50 mg tablet TAKE 1 TABLET BY MOUTH EVERY DAY 90 Tab 3    hydrALAZINE (APRESOLINE) 25 mg tablet TAKE 1 TABLET BY MOUTH TWICE DAILY 180 Tab 0    guaiFENesin (MUCINEX) 1,200 mg Ta12 ER tablet Take 1 Tab by mouth two (2) times a day. (Patient taking differently: Take 1,200 mg by mouth two (2) times daily as needed.) 10 Tab 0    bumetanide (BUMEX) 1 mg tablet TAKE 2 TABLETS BY MOUTH EVERY  Tab 0    omeprazole (PRILOSEC OTC) 20 mg tablet Take 20 mg by mouth daily.  VO-GK-PL-Fe-Min-Lycopen-Lutein (CENTRUM) 0.4-162-18 mg Tab Take  by mouth.  aspirin 81 mg chewable tablet Take 81 mg by mouth daily. Social History     Socioeconomic History    Marital status:      Spouse name: Not on file    Number of children: Not on file    Years of education: Not on file    Highest education level: Not on file   Tobacco Use    Smoking status: Never Smoker    Smokeless tobacco: Never Used   Substance and Sexual Activity    Alcohol use: Yes     Comment: rarely     Family History   Problem Relation Age of Onset    Hypertension Other     Heart Disease Other     Diabetes Other        Review of Systems  ROS is unremarkable except for ones highlighted in bold.    Constitutional: negative for fevers, chills, anorexia and weight loss  Eyes:   negative for visual disturbance and irritation  ENT:   negative for tinnitus,sore throat,nasal congestion,ear pain,hoarseness  Respiratory:  negative for cough, hemoptysis, dyspnea,wheezing  CV:   negative for chest pain, palpitations, lower extremity edema  GI:   negative for nausea, vomiting, diarrhea, abdominal pain,melena  Endo:               negative for polyuria,polydipsia,polyphagia,heat intolerance  Genitourinary: negative for frequency, dysuria and hematuria  Integumentary: negative for rash and pruritus  Hematologic:  negative for easy bruising and gum/nose bleeding  Musculoskel: negative for myalgias, arthralgias, back pain, muscle weakness, joint pain  Neurological:  negative for headaches, dizziness, vertigo, memory problems and gait   Behavl/Psych: negative for feelings of anxiety, depression, mood changes  ROS otherwise negative     Objective:  Visit Vitals  /82 (BP 1 Location: Left arm, BP Patient Position: Sitting)   Pulse 64   Temp 97.9 °F (36.6 °C) (Oral)   Resp 16   Ht 5' 11\" (1.803 m)   Wt (!) 395 lb (179.2 kg)   SpO2 95%   BMI 55.09 kg/m²     Physical Exam:   General appearance - alert, well appearing, and in no distress  Mental status - alert, oriented to person, place, and time  EYE-TOÑITO, EOMI, fundi normal, corneas normal, no foreign bodies  ENT-ENT exam normal, no neck nodes or sinus tenderness  Nose - normal and patent, no erythema, discharge or polyps  Mouth - mucous membranes moist, pharynx normal without lesions  Neck - supple, no significant adenopathy   Chest - clear to auscultation, no wheezes, rales or rhonchi, symmetric air entry   Heart - normal rate, regular rhythm, normal S1, S2, no murmurs, rubs, clicks or gallops   Abdomen - soft, nontender, nondistended, no masses or organomegaly  Lymph- no adenopathy palpable  Ext-peripheral pulses normal, no pedal edema, no clubbing or cyanosis  Skin-Warm and dry. no hyperpigmentation, vitiligo, or suspicious lesions  Neuro -alert, oriented, normal speech, no focal findings or movement disorder noted  Musculoskeletal- FROM, no bony abnormalities, no point tenderness    Left Foot: Inspection: normal.  Pulse DP: 2+ (normal). Filament test: normal sensation with micro filament. Vibratory sensation: normal.  Right Foot: Inspection: normal.  Pulse DP: 2+ (normal). Filament test: normal sensation with micro filament.   Vibratory sensation: normal.      Lab Review:  Results for orders placed or performed during the hospital encounter of 12/03/19   CULTURE, BLOOD, PAIRED   Result Value Ref Range Special Requests: NO SPECIAL REQUESTS      Culture result: NO GROWTH 5 DAYS     CBC WITH AUTOMATED DIFF   Result Value Ref Range    WBC 9.9 4.1 - 11.1 K/uL    RBC 4.50 4. 10 - 5.70 M/uL    HGB 13.3 12.1 - 17.0 g/dL    HCT 41.5 36.6 - 50.3 %    MCV 92.2 80.0 - 99.0 FL    MCH 29.6 26.0 - 34.0 PG    MCHC 32.0 30.0 - 36.5 g/dL    RDW 16.5 (H) 11.5 - 14.5 %    PLATELET 595 518 - 095 K/uL    MPV 11.0 8.9 - 12.9 FL    NRBC 0.0 0  WBC    ABSOLUTE NRBC 0.00 0.00 - 0.01 K/uL    NEUTROPHILS 64 32 - 75 %    LYMPHOCYTES 20 12 - 49 %    MONOCYTES 16 (H) 5 - 13 %    EOSINOPHILS 0 0 - 7 %    BASOPHILS 0 0 - 1 %    IMMATURE GRANULOCYTES 0 0.0 - 0.5 %    ABS. NEUTROPHILS 6.2 1.8 - 8.0 K/UL    ABS. LYMPHOCYTES 2.0 0.8 - 3.5 K/UL    ABS. MONOCYTES 1.5 (H) 0.0 - 1.0 K/UL    ABS. EOSINOPHILS 0.0 0.0 - 0.4 K/UL    ABS. BASOPHILS 0.0 0.0 - 0.1 K/UL    ABS. IMM. GRANS. 0.0 0.00 - 0.04 K/UL    DF AUTOMATED     METABOLIC PANEL, COMPREHENSIVE   Result Value Ref Range    Sodium 138 136 - 145 mmol/L    Potassium 3.8 3.5 - 5.1 mmol/L    Chloride 102 97 - 108 mmol/L    CO2 31 21 - 32 mmol/L    Anion gap 5 5 - 15 mmol/L    Glucose 100 65 - 100 mg/dL    BUN 16 6 - 20 MG/DL    Creatinine 1.23 0.70 - 1.30 MG/DL    BUN/Creatinine ratio 13 12 - 20      GFR est AA >60 >60 ml/min/1.73m2    GFR est non-AA 59 (L) >60 ml/min/1.73m2    Calcium 8.1 (L) 8.5 - 10.1 MG/DL    Bilirubin, total 0.6 0.2 - 1.0 MG/DL    ALT (SGPT) 31 12 - 78 U/L    AST (SGOT) 18 15 - 37 U/L    Alk. phosphatase 88 45 - 117 U/L    Protein, total 7.3 6.4 - 8.2 g/dL    Albumin 3.2 (L) 3.5 - 5.0 g/dL    Globulin 4.1 (H) 2.0 - 4.0 g/dL    A-G Ratio 0.8 (L) 1.1 - 2.2     POC LACTIC ACID   Result Value Ref Range    Lactic Acid (POC) 1.12 0.40 - 2.00 mmol/L        Documenation Review:  Hypertension. Hyperlipidemia. Obesity. Sleep apnea, on CPAP, but not using it regularly. Gout. Metabolic syndrome. Prediabetes    Chest pain syndrome, followed by abnormal stress Cardiolite, 2006. Following the Cardiolite he had a cardiac catheterization that showed normal left ventricular function and no significant coronary artery disease. Assessment/Plan:    Diagnoses and all orders for this visit:    1. Annual physical exam    2. Diabetes mellitus type 2, diet-controlled (HCC)  -     URINE, MICROALBUMIN, SEMIQUANTITATIVE  -     HEMOGLOBIN A1C W/O EAG  -      DIABETES FOOT EXAM    3. Dyslipidemia  -     LIPID PANEL    4. Essential hypertension, benign  -     CBC W/O DIFF  -     URINALYSIS W/MICROSCOPIC  -     TSH 3RD GENERATION  -     METABOLIC PANEL, COMPREHENSIVE    5. Chronic gout without tophus, unspecified cause, unspecified site    6. Metabolic syndrome  -     URIC ACID    7. Obesity, morbid ([address]    8. TERESA (obstructive sleep apnea)    9. Screening PSA (prostate specific antigen)  -     PSA SCREENING (SCREENING)    10. Vitamin D deficiency  -     VITAMIN D, 25 HYDROXY    11. Encounter for immunization  -     INFLUENZA VACCINE INACTIVATED (IIV), SUBUNIT, ADJUVANTED, IM  -     ADMIN INFLUENZA VIRUS VAC        Patient will get his flu shot today. He is due for his second booster dose of Pneumovax 23. He will first get his Prevnar 13 during the next visit. He is due for his Tdap and Shingrix vaccine. Next colonoscopy will be due in 2021   continue current meds. I will call with lab results and make further recommendations or adjustments if necessary. Discussed lifestyle modifications including Na restriction, low carb/fat diet, weight reduction and exercise (at least a walking program). ICD-10-CM ICD-9-CM    1. Annual physical exam Z00.00 V70.0    2. Diabetes mellitus type 2, diet-controlled (HCC) E11.9 250.00 URINE, MICROALBUMIN, SEMIQUANTITATIVE      HEMOGLOBIN A1C W/O EAG       DIABETES FOOT EXAM   3.  Dyslipidemia E78.5 272.4 LIPID PANEL   4. Essential hypertension, benign I10 401.1 CBC W/O DIFF      URINALYSIS W/MICROSCOPIC      TSH 3RD GENERATION      METABOLIC PANEL, COMPREHENSIVE   5. Chronic gout without tophus, unspecified cause, unspecified site M1A. 9XX0 274.02    6. Metabolic syndrome Q30.33 370.4 URIC ACID   7. Obesity, morbid ([address] E66.01 278.01    8. TERESA (obstructive sleep apnea) G47.33 327.23    9. Screening PSA (prostate specific antigen) Z12.5 V76.44 PSA SCREENING (SCREENING)   10. Vitamin D deficiency E55.9 268.9 VITAMIN D, 25 HYDROXY   11. Encounter for immunization Z23 V03.89 INFLUENZA VACCINE INACTIVATED (IIV), SUBUNIT, ADJUVANTED, IM      ADMIN INFLUENZA VIRUS VAC         Follow-up and Dispositions    · Return in about 3 months (around 4/9/2020) for follow up. I have reviewed with the patient details of the assessment and plan and all questions were answered. Relevent patient education was performed. Verbal and/or written instructions (see AVS) provided. The most recent lab findings were reviewed with the patient. Plan was discussed with patient who verbally expressed understanding. An After Visit Summary was printed and given to the patient.     Stephani Patel MD

## 2020-01-09 NOTE — PATIENT INSTRUCTIONS
Vaccine Information Statement    Influenza (Flu) Vaccine (Inactivated or Recombinant): What You Need to Know    Many Vaccine Information Statements are available in Serbian and other languages. See www.immunize.org/vis  Hojas de información sobre vacunas están disponibles en español y en muchos otros idiomas. Visite www.immunize.org/vis    1. Why get vaccinated? Influenza vaccine can prevent influenza (flu). Flu is a contagious disease that spreads around the [facility] every year, usually between October and May. Anyone can get the flu, but it is more dangerous for some people. Infants and young children, people 72years of age and older, pregnant women, and people with certain health conditions or a weakened immune system are at greatest risk of flu complications. Pneumonia, bronchitis, sinus infections and ear infections are examples of flu-related complications. If you have a medical condition, such as heart disease, cancer or diabetes, flu can make it worse. Flu can cause fever and chills, sore throat, muscle aches, fatigue, cough, headache, and runny or stuffy nose. Some people may have vomiting and diarrhea, though this is more common in children than adults. Each year thousands of people in the [facility] die from flu, and many more are hospitalized. Flu vaccine prevents millions of illnesses and flu-related visits to the doctor each year. 2. Influenza vaccines     CDC recommends everyone 10months of age and older get vaccinated every flu season. Children 6 months through 6years of age may need 2 doses during a single flu season. Everyone else needs only 1 dose each flu season. It takes about 2 weeks for protection to develop after vaccination. There are many flu viruses, and they are always changing. Each year a new flu vaccine is made to protect against three or four viruses that are likely to cause disease in the upcoming flu season.  Even when the vaccine doesnt exactly match these viruses, it may still provide some protection. Influenza vaccine does not cause flu. Influenza vaccine may be given at the same time as other vaccines. 3. Talk with your health care provider    Tell your vaccine provider if the person getting the vaccine:   Has had an allergic reaction after a previous dose of influenza vaccine, or has any severe, life-threatening allergies.  Has ever had Guillain-Barré Syndrome (also called GBS). In some cases, your health care provider may decide to postpone influenza vaccination to a future visit. People with minor illnesses, such as a cold, may be vaccinated. People who are moderately or severely ill should usually wait until they recover before getting influenza vaccine. Your health care provider can give you more information. 4. Risks of a reaction     Soreness, redness, and swelling where shot is given, fever, muscle aches, and headache can happen after influenza vaccine.  There may be a very small increased risk of Guillain-Barré Syndrome (GBS) after inactivated influenza vaccine (the flu shot). Genie Castellani children who get the flu shot along with pneumococcal vaccine (PCV13), and/or DTaP vaccine at the same time might be slightly more likely to have a seizure caused by fever. Tell your health care provider if a child who is getting flu vaccine has ever had a seizure. People sometimes faint after medical procedures, including vaccination. Tell your provider if you feel dizzy or have vision changes or ringing in the ears. As with any medicine, there is a very remote chance of a vaccine causing a severe allergic reaction, other serious injury, or death. 5. What if there is a serious problem? An allergic reaction could occur after the vaccinated person leaves the clinic.  If you see signs of a severe allergic reaction (hives, swelling of the face and throat, difficulty breathing, a fast heartbeat, dizziness, or weakness), call 9-1-1 and get the person to the nearest hospital.    For other signs that concern you, call your health care provider. Adverse reactions should be reported to the Vaccine Adverse Event Reporting System (VAERS). Your health care provider will usually file this report, or you can do it yourself. Visit the VAERS website at www.vaers. [facility].gov or call 7-671.461.8266. VAERS is only for reporting reactions, and VAERS staff do not give medical advice. 6. The National Vaccine Injury Compensation Program    The [facility] Vaccine Injury Compensation Program (VICP) is a federal program that was created to compensate people who may have been injured by certain vaccines. Visit the VICP website at www.[facility].gov/vaccinecompensation or call 9-773.255.4745 to learn about the program and about filing a claim. There is a time limit to file a claim for compensation. 7. How can I learn more?  Ask your health care provider.  Call your local or state health department.  Contact the Centers for Disease Control and Prevention (CDC):  - Call 9-480.432.3071 (4-459-XQU-INFO) or  - Visit CDCs influenza website at www.cdc.gov/flu    Vaccine Information Statement (Interim)  Inactivated Influenza Vaccine   8/15/2019  42 MISTY Mix 505ZD-48   Department of Health and Human Services  Centers for Disease Control and Prevention    Office Use Only         Starting a Weight Loss Plan: Care Instructions  Your Care Instructions    If you are thinking about losing weight, it can be hard to know where to start. Your doctor can help you set up a weight loss plan that best meets your needs. You may want to take a class on nutrition or exercise, or join a weight loss support group. If you have questions about how to make changes to your eating or exercise habits, ask your doctor about seeing a registered dietitian or an exercise specialist.  It can be a big challenge to lose weight. But you do not have to make huge changes at once.  Make small changes, and stick with them. When those changes become habit, add a few more changes. If you do not think you are ready to make changes right now, try to pick a date in the future. Make an appointment to see your doctor to discuss whether the time is right for you to start a plan. Follow-up care is a key part of your treatment and safety. Be sure to make and go to all appointments, and call your doctor if you are having problems. It's also a good idea to know your test results and keep a list of the medicines you take. How can you care for yourself at home? · Set realistic goals. Many people expect to lose much more weight than is likely. A weight loss of 5% to 10% of your body weight may be enough to improve your health. · Get family and friends involved to provide support. Talk to them about why you are trying to lose weight, and ask them to help. They can help by participating in exercise and having meals with you, even if they may be eating something different. · Find what works best for you. If you do not have time or do not like to cook, a program that offers meal replacement bars or shakes may be better for you. Or if you like to prepare meals, finding a plan that includes daily menus and recipes may be best.  · Ask your doctor about other health professionals who can help you achieve your weight loss goals. ? A dietitian can help you make healthy changes in your diet. ? An exercise specialist or  can help you develop a safe and effective exercise program.  ? A counselor or psychiatrist can help you cope with issues such as depression, anxiety, or family problems that can make it hard to focus on weight loss. · Consider joining a support group for people who are trying to lose weight. Your doctor can suggest groups in your area. Where can you learn more? Go to http://mike-rivas.info/.   Enter E565 in the search box to learn more about \"Starting a Weight Loss Plan: Care Instructions. \"  Current as of: March 28, 2019  Content Version: 12.2  © 7598-4051 [employer]. Care instructions adapted under license by [employer] (which disclaims liability or warranty for this information). If you have questions about a medical condition or this instruction, always ask your healthcare professional. [place] [address] any warranty or liability for your use of this information.

## 2020-01-10 LAB
ERYTHROCYTE [DISTWIDTH] IN BLOOD BY AUTOMATED COUNT: 15.2 % (ref 11.6–15.4)
HBA1C MFR BLD HPLC: 5.5 % (ref 4–5.7)
HCT VFR BLD AUTO: 40.5 % (ref 37.5–51)
HGB BLD-MCNC: 13.7 G/DL (ref 13–17.7)
MCH RBC QN AUTO: 30 PG (ref 26.6–33)
MCHC RBC AUTO-ENTMCNC: 33.8 G/DL (ref 31.5–35.7)
MCV RBC AUTO: 89 FL (ref 79–97)
PLATELET # BLD AUTO: 220 X10E3/UL (ref 150–450)
RBC # BLD AUTO: 4.56 X10E6/UL (ref 4.14–5.8)
WBC # BLD AUTO: 6.7 X10E3/UL (ref 3.4–10.8)

## 2020-02-14 RX ORDER — BUMETANIDE 1 MG/1
TABLET ORAL
Qty: 180 TAB | Refills: 0 | Status: SHIPPED | OUTPATIENT
Start: 2020-02-14 | End: 2020-05-13 | Stop reason: SDUPTHER

## 2020-02-14 NOTE — TELEPHONE ENCOUNTER
RX refill request from the patient/pharmacy. Patient last seen 01- with labs, and does not have a f/up appointment.   Requested Prescriptions     Pending Prescriptions Disp Refills    bumetanide (BUMEX) 1 mg tablet [Pharmacy Med Name: BUMETANIDE 1MG TABLETS] 180 Tab 0     Sig: TAKE 2 TABLETS BY MOUTH EVERY DAY

## 2020-03-18 DIAGNOSIS — I10 ESSENTIAL HYPERTENSION, BENIGN: ICD-10-CM

## 2020-03-18 RX ORDER — HYDRALAZINE HYDROCHLORIDE 25 MG/1
TABLET, FILM COATED ORAL
Qty: 180 TAB | Refills: 3 | Status: SHIPPED | OUTPATIENT
Start: 2020-03-18 | End: 2020-10-12 | Stop reason: SDUPTHER

## 2020-03-18 NOTE — TELEPHONE ENCOUNTER
PCP: Sujatha Bah MD    Last appt: 1/9/2020  No future appointments.     Requested Prescriptions     Pending Prescriptions Disp Refills    hydrALAZINE (APRESOLINE) 25 mg tablet 180 Tab 3     Sig: TAKE 1 TABLET BY MOUTH TWICE DAILY       Prior labs and Blood pressures:  BP Readings from Last 3 Encounters:   01/09/20 126/82   12/10/19 122/78   12/03/19 112/57     Lab Results   Component Value Date/Time    Sodium 142 01/09/2020 09:40 AM    Potassium 4.6 01/09/2020 09:40 AM    Chloride 98 01/09/2020 09:40 AM    CO2 33.0 (H) 01/09/2020 09:40 AM    Anion gap 11 01/09/2020 09:40 AM    Glucose 94 01/09/2020 09:40 AM    BUN 23.0 (H) 01/09/2020 09:40 AM    Creatinine 1.1 01/09/2020 09:40 AM    BUN/Creatinine ratio 21 01/09/2020 09:40 AM    GFR est AA >60 01/09/2020 09:40 AM    GFR est non-AA >60 01/09/2020 09:40 AM    Calcium 9.6 01/09/2020 09:40 AM     Lab Results   Component Value Date/Time    Hemoglobin A1c 5.5 01/09/2020 09:40 AM    Hemoglobin A1c (POC) 5.6 01/22/2018 08:32 AM     Lab Results   Component Value Date/Time    Cholesterol, total 197 01/09/2020 09:40 AM    Cholesterol (POC) 172 01/22/2018 08:32 AM    HDL Cholesterol 34 (L) 01/09/2020 09:40 AM    HDL Cholesterol (POC) 29 (A) 01/22/2018 08:32 AM    LDL Cholesterol (POC) 108.2 01/22/2018 08:32 AM    LDL, calculated 112 01/09/2020 09:40 AM    VLDL 51 01/09/2020 09:40 AM    Triglyceride 257 (H) 01/09/2020 09:40 AM    Triglycerides (POC) 174 01/22/2018 08:32 AM    CHOL/HDL Ratio 6 (H) 01/09/2020 09:40 AM     Lab Results   Component Value Date/Time    VITAMIN D, 25-HYDROXY 28 (L) 01/09/2020 09:40 AM       Lab Results   Component Value Date/Time    TSH, 3rd generation 1.14 01/09/2020 09:40 AM

## 2020-03-18 NOTE — TELEPHONE ENCOUNTER
Requested Prescriptions     Pending Prescriptions Disp Refills    hydrALAZINE (APRESOLINE) 25 mg tablet 180 Tab 0     Sig: TAKE 1 TABLET BY MOUTH TWICE DAILY

## 2020-05-13 RX ORDER — BUMETANIDE 1 MG/1
TABLET ORAL
Qty: 180 TAB | Refills: 0 | Status: SHIPPED | OUTPATIENT
Start: 2020-05-13 | End: 2020-08-11

## 2020-06-11 ENCOUNTER — OFFICE VISIT (OUTPATIENT)
Dept: INTERNAL MEDICINE CLINIC | Age: 65
End: 2020-06-11

## 2020-06-11 VITALS
OXYGEN SATURATION: 94 % | BODY MASS INDEX: 44.1 KG/M2 | WEIGHT: 315 LBS | HEIGHT: 71 IN | RESPIRATION RATE: 18 BRPM | HEART RATE: 72 BPM | DIASTOLIC BLOOD PRESSURE: 80 MMHG | TEMPERATURE: 98.1 F | SYSTOLIC BLOOD PRESSURE: 124 MMHG

## 2020-06-11 DIAGNOSIS — G47.33 OSA (OBSTRUCTIVE SLEEP APNEA): ICD-10-CM

## 2020-06-11 DIAGNOSIS — E66.2 OBESITY HYPOVENTILATION SYNDROME (HCC): ICD-10-CM

## 2020-06-11 DIAGNOSIS — M54.42 ACUTE LEFT-SIDED LOW BACK PAIN WITH LEFT-SIDED SCIATICA: ICD-10-CM

## 2020-06-11 DIAGNOSIS — E11.9 DIABETES MELLITUS TYPE 2, DIET-CONTROLLED (HCC): ICD-10-CM

## 2020-06-11 DIAGNOSIS — E88.81 METABOLIC SYNDROME: ICD-10-CM

## 2020-06-11 DIAGNOSIS — E78.1 HYPERTRIGLYCERIDEMIA: Primary | ICD-10-CM

## 2020-06-11 DIAGNOSIS — E78.5 DYSLIPIDEMIA: Primary | ICD-10-CM

## 2020-06-11 DIAGNOSIS — E66.01 OBESITY, MORBID (HCC): ICD-10-CM

## 2020-06-11 LAB
A-G RATIO,AGRAT: 1.2 RATIO
ALBUMIN SERPL-MCNC: 4.1 G/DL (ref 3.9–5.4)
ALP SERPL-CCNC: 113 U/L (ref 38–126)
ALT SERPL-CCNC: 26 U/L (ref 0–50)
ANION GAP SERPL CALC-SCNC: 9 MMOL/L
AST SERPL W P-5'-P-CCNC: 24 U/L (ref 14–36)
BILIRUB SERPL-MCNC: 0.4 MG/DL (ref 0.2–1.3)
BUN SERPL-MCNC: 21 MG/DL (ref 9–20)
BUN/CREATININE RATIO,BUCR: 18 RATIO
CALCIUM SERPL-MCNC: 9.7 MG/DL (ref 8.4–10.2)
CHLORIDE SERPL-SCNC: 102 MMOL/L (ref 98–107)
CHOL/HDL RATIO,CHHD: 6 RATIO (ref 0–4)
CHOLEST SERPL-MCNC: 183 MG/DL (ref 0–200)
CO2 SERPL-SCNC: 33 MMOL/L (ref 22–32)
CREAT SERPL-MCNC: 1.2 MG/DL (ref 0.8–1.5)
ERYTHROCYTE [DISTWIDTH] IN BLOOD BY AUTOMATED COUNT: 15.6 %
GLOBULIN,GLOB: 3.4
GLUCOSE SERPL-MCNC: 96 MG/DL (ref 75–110)
HCT VFR BLD AUTO: 42.5 % (ref 37–51)
HDLC SERPL-MCNC: 33 MG/DL (ref 35–130)
HGB BLD-MCNC: 13.3 G/DL (ref 12–18)
LDL/HDL RATIO,LDHD: 2 RATIO
LDLC SERPL CALC-MCNC: 80 MG/DL (ref 0–130)
MCH RBC QN AUTO: 29.8 PG (ref 26–32)
MCHC RBC AUTO-ENTMCNC: 31.3 G/DL (ref 30–36)
MCV RBC AUTO: 95.2 FL (ref 80–97)
PLATELET # BLD AUTO: 201 K/UL (ref 140–440)
POTASSIUM SERPL-SCNC: 4.6 MMOL/L (ref 3.6–5)
PROT SERPL-MCNC: 7.5 G/DL (ref 6.3–8.2)
RBC # BLD AUTO: 4.46 M/UL (ref 4.2–6.3)
SODIUM SERPL-SCNC: 144 MMOL/L (ref 137–145)
TRIGL SERPL-MCNC: 350 MG/DL (ref 0–200)
VLDLC SERPL CALC-MCNC: 70 MG/DL
WBC # BLD AUTO: 7 K/UL (ref 4.1–10.9)

## 2020-06-11 RX ORDER — FENOFIBRATE 145 MG/1
145 TABLET, COATED ORAL DAILY
Qty: 90 TAB | Refills: 0 | Status: SHIPPED | OUTPATIENT
Start: 2020-06-11 | End: 2020-09-07

## 2020-06-11 RX ORDER — GABAPENTIN 300 MG/1
300 CAPSULE ORAL
Qty: 14 CAP | Refills: 0 | Status: SHIPPED | OUTPATIENT
Start: 2020-06-11 | End: 2020-06-25

## 2020-06-11 RX ORDER — MELOXICAM 7.5 MG/1
7.5 TABLET ORAL DAILY
Qty: 10 TAB | Refills: 0 | Status: SHIPPED | OUTPATIENT
Start: 2020-06-11 | End: 2020-06-19

## 2020-06-11 RX ORDER — METHYLPREDNISOLONE 4 MG/1
TABLET ORAL
Qty: 1 DOSE PACK | Refills: 0 | Status: SHIPPED | OUTPATIENT
Start: 2020-06-11 | End: 2020-06-25

## 2020-06-11 RX ORDER — ATENOLOL 50 MG/1
TABLET ORAL
Qty: 90 TAB | Refills: 3 | Status: SHIPPED | OUTPATIENT
Start: 2020-06-11 | End: 2020-12-07 | Stop reason: SDUPTHER

## 2020-06-11 NOTE — PROGRESS NOTES
This note will not be viewable in [address] Chen Longo is a 75 y.o. male and presents with Hypertension (3 mo fu) and Back Pain      Subjective:  Ms. today comes in today for follow-up of her medical problems. He reports acute lower back pain especially on the left side radiating all the way to his left leg. Associated with numbness and tingling sensation in the left leg. He reports he is tried Tylenol and over-the-counter Advil without any relief. This has been affecting his sleep. He rates the pain as 5/10, continuous, radiating all the way down to his left. He denies bowel or bladder incontinence, no saddle anesthesia. No fever, chills. Morbid obesity. He is trying to lose his weight and following a low-carb diet. He is lost 6 pounds from his last visit. Because of his weight and back pain he is not been able to do a lot of physical activity. Not interested in gastric bypass and never followed up with obesity clinic. Never had any nutrition counseling. Recap- follow-up of his prediabetes, hypertension, dyslipidemia. Patient is morbidly obese unfortunately is up 10 pounds since his last visit in December. He does admit to being noncompliant with his diet. He also has obstructive sleep apnea but has not followed up with his sleep specialist.  He is noncompliant with his CPAP mask. Patient has a history of prediabetes his last A1c was 5.8. He does admit over the holidays his diet has been full of carbs and soda. He denies numbness or tingling sensation, polydipsia or polyuria. He has dyslipidemia in particular hypertriglyceridemia but is not on any medication. Blood pressure is well controlled on current meds. He was on Benicar in the past but that was switched secondary to persistent cough. Currently remains on atenolol and hydralazine. He reports he is chronic lower back pain which inhibits him to work out.       Past Medical History:   Diagnosis Date    Allergic rhinitis 11/22/2017    Chest pain syndrome 11/22/2017    Comments: abnl stress cardiolite 2006, subsequent to that, he had a normal cardiac cath   [facility] acquired pneumonia     Diarrhea 11/22/2017    Dyspnea 11/22/2017    Environmental allergies     Gout 11/22/2017    Hypertension     Insomnia 11/22/2017    Iron deficiency 11/22/2017    Left chronic serous otitis media 11/22/2017    Obesity 11/22/2017    Comments: morbid    Sleep apnea     Steatosis 11/22/2017    Comments: non-alcoholic     Past Surgical History:   Procedure Laterality Date    HX ORTHOPAEDIC      right knee     Allergies   Allergen Reactions    Ace Inhibitors Cough     Current Outpatient Medications   Medication Sig Dispense Refill    atenoloL (TENORMIN) 50 mg tablet TAKE 1 TABLET BY MOUTH EVERY DAY 90 Tab 3    meloxicam (MOBIC) 7.5 mg tablet Take 1 Tab by mouth daily for 10 days. 10 Tab 0    methylPREDNISolone (MEDROL DOSEPACK) 4 mg tablet As directed 1 Dose Pack 0    gabapentin (NEURONTIN) 300 mg capsule Take 1 Cap by mouth nightly as needed for Pain for up to 14 days. Max Daily Amount: 300 mg. Indications: neuropathic pain 14 Cap 0    bumetanide (BUMEX) 1 mg tablet TAKE 2 TABLETS BY MOUTH EVERY  Tab 0    hydrALAZINE (APRESOLINE) 25 mg tablet TAKE 1 TABLET BY MOUTH TWICE DAILY 180 Tab 3    allopurinol (ZYLOPRIM) 300 mg tablet Take  by mouth daily.  guaiFENesin (MUCINEX) 1,200 mg Ta12 ER tablet Take 1 Tab by mouth two (2) times a day. (Patient taking differently: Take 1,200 mg by mouth two (2) times daily as needed.) 10 Tab 0    omeprazole (PRILOSEC OTC) 20 mg tablet Take 20 mg by mouth daily.  EL-GM-YO-Fe-Min-Lycopen-Lutein (CENTRUM) 0.4-162-18 mg Tab Take  by mouth.  aspirin 81 mg chewable tablet Take 81 mg by mouth daily.          Social History     Socioeconomic History    Marital status:      Spouse name: Not on file    Number of children: Not on file    Years of education: Not on file    Highest education level: Not on file   Tobacco Use    Smoking status: Never Smoker    Smokeless tobacco: Never Used   Substance and Sexual Activity    Alcohol use: Yes     Comment: rarely     Family History   Problem Relation Age of Onset    Hypertension Other     Heart Disease Other     Diabetes Other        Review of Systems  ROS is unremarkable except for ones highlighted in bold.    Constitutional: negative for fevers, chills, anorexia and weight loss  Eyes:   negative for visual disturbance and irritation  ENT:   negative for tinnitus,sore throat,nasal congestion,ear pain,hoarseness  Respiratory:  negative for cough, hemoptysis, dyspnea,wheezing  CV:   negative for chest pain, palpitations, lower extremity edema  GI:   negative for nausea, vomiting, diarrhea, abdominal pain,melena  Endo:               negative for polyuria,polydipsia,polyphagia,heat intolerance  Genitourinary: negative for frequency, dysuria and hematuria  Integumentary: negative for rash and pruritus  Hematologic:  negative for easy bruising and gum/nose bleeding  Musculoskel: negative for myalgias, arthralgias, back pain, muscle weakness, joint pain  Neurological:  negative for headaches, dizziness, vertigo, memory problems and gait   Behavl/Psych: negative for feelings of anxiety, depression, mood changes  ROS otherwise negative     Objective:  Visit Vitals  /80 (BP 1 Location: Left arm, BP Patient Position: Sitting)   Pulse 72   Temp 98.1 °F (36.7 °C) (Oral)   Resp 18   Ht 5' 11\" (1.803 m)   Wt (!) 389 lb (176.4 kg)   SpO2 94%   BMI 54.25 kg/m²     Physical Exam:   General appearance - alert, well appearing, and in no distress  Mental status - alert, oriented to person, place, and time  EYE-TOÑITO, EOMI, fundi normal, corneas normal, no foreign bodies  ENT-ENT exam normal, no neck nodes or sinus tenderness  Nose - normal and patent, no erythema, discharge or polyps  Mouth - mucous membranes moist, pharynx normal without lesions  Neck - supple, no significant adenopathy   Chest - clear to auscultation, no wheezes, rales or rhonchi, symmetric air entry   Heart - normal rate, regular rhythm, normal S1, S2, no murmurs, rubs, clicks or gallops   Abdomen - soft, nontender, nondistended, no masses or organomegaly  Lymph- no adenopathy palpable  Ext-peripheral pulses normal, no pedal edema, no clubbing or cyanosis  Skin-Warm and dry.  no hyperpigmentation, vitiligo, or suspicious lesions  Neuro -alert, oriented, normal speech, no focal findings or movement disorder noted  Musculoskeletal- FROM, no bony abnormalities, left hip tenderness        Lab Review:  Results for orders placed or performed in visit on 01/09/20   CBC W/O DIFF   Result Value Ref Range    WBC 6.7 3.4 - 10.8 x10E3/uL    RBC 4.56 4.14 - 5.80 x10E6/uL    HGB 13.7 13.0 - 17.7 g/dL    HCT 40.5 37.5 - 51.0 %    MCV 89 79 - 97 fL    MCH 30.0 26.6 - 33.0 pg    MCHC 33.8 31.5 - 35.7 g/dL    RDW 15.2 11.6 - 15.4 %    PLATELET 608 817 - 940 x10E3/uL   PSA SCREENING (SCREENING)   Result Value Ref Range    PSA 2.2 0.0 - 4.0 ng/mL   URINALYSIS W/MICROSCOPIC   Result Value Ref Range    Color Pale Yellow Pale Yellow - Yellow    CLARITY clear Clear    Glucose urine, 24 hr Negative Negative    Ketone Negative Negative    Bilirubin Negative Negative    Specific gravity 1.015 1.000 - 1.030    pH (UA) 6 5 - 7    Blood Negative Negative    Protein Negative Negative    Urobilinogen Negative Negative    Nitrites Negative Negative    Leukocyte Esterase Negative Negative    WBC 0 0 #/HPF    RBC 0 0 #/HPF   URINE, MICROALBUMIN, SEMIQUANTITATIVE   Result Value Ref Range    Microalbumin, Urine 20 0 - 20 mg/L   TSH 3RD GENERATION   Result Value Ref Range    TSH, 3rd generation 1.14 0.34 - 5.60 uIU/mL   VITAMIN D, 25 HYDROXY   Result Value Ref Range    VITAMIN D, 25-HYDROXY 28 (L) 30 - 96 ng/mL   LIPID PANEL   Result Value Ref Range    Cholesterol, total 197 0 - 200 mg/dL    Triglyceride 257 (H) 0 - 200 mg/dL    HDL Cholesterol 34 (L) 35 - 130 mg/dL    VLDL 51 mg/dL    LDL, calculated 112 0 - 130 mg/dL    CHOL/HDL Ratio 6 (H) 0 - 4 Ratio    LDL/HDL Ratio 3 Ratio   METABOLIC PANEL, COMPREHENSIVE   Result Value Ref Range    Glucose 94 75 - 110 mg/dL    BUN 23.0 (H) 9.0 - 20.0 mg/dL    Creatinine 1.1 0.8 - 1.5 mg/dL    Sodium 142 137 - 145 mmol/L    Potassium 4.6 3.6 - 5.0 mmol/L    Chloride 98 98 - 107 mmol/L    CO2 33.0 (H) 22.0 - 32.0 mmol/L    Calcium 9.6 8.4 - 10.2 mg/dl    Protein, total 7.3 6.3 - 8.2 g/dL    Albumin 4.1 3.9 - 5.4 g/dL    ALT (SGPT) 41 0 - 50 U/L    AST (SGOT) 34.0 14.0 - 36.0 U/L    Alk. phosphatase 100 38 - 126 U/L    Bilirubin, total 0.7 0.2 - 1.3 mg/dL    BUN/Creatinine ratio 21 Ratio    GFR est AA >60 mL/min/1.73m2    GFR est non-AA >60 mL/min/1.73m2    Globulin 3.20     A-G Ratio 1.3 Ratio    Anion gap 11 mmol/L   HEMOGLOBIN A1C W/O EAG   Result Value Ref Range    Hemoglobin A1c 5.5 4.0 - 5.7 %   URIC ACID   Result Value Ref Range    Uric acid 5.3 3.5 - 8.5 mg/dL        Documenation Review:  Hypertension. Hyperlipidemia. Obesity. Sleep apnea, on CPAP, but not using it regularly. Gout. Metabolic syndrome. Prediabetes    Chest pain syndrome, followed by abnormal stress Cardiolite, 2006. Following the Cardiolite he had a cardiac catheterization that showed normal left ventricular function and no significant coronary artery disease. Assessment/Plan:    Diagnoses and all orders for this visit:    1. Dyslipidemia  -     LIPID PANEL    2. Obesity, morbid ([address]  -     REFERRAL TO DIETITIAN    3. Metabolic syndrome    4. Diabetes mellitus type 2, diet-controlled (HCC)  -     METABOLIC PANEL, COMPREHENSIVE  -     CBC W/O DIFF    5. Acute left-sided low back pain with left-sided sciatica  -     meloxicam (MOBIC) 7.5 mg tablet; Take 1 Tab by mouth daily for 10 days. -     methylPREDNISolone (MEDROL DOSEPACK) 4 mg tablet; As directed  -     gabapentin (NEURONTIN) 300 mg capsule;  Take 1 Cap by mouth nightly as needed for Pain for up to 14 days. Max Daily Amount: 300 mg. Indications: neuropathic pain    6. TERESA (obstructive sleep apnea)  -     SLEEP MEDICINE REFERRAL    7. Obesity hypoventilation syndrome (HCC)  -     SLEEP MEDICINE REFERRAL    Other orders  -     atenoloL (TENORMIN) 50 mg tablet; TAKE 1 TABLET BY MOUTH EVERY DAY        Referral for speech specialist seen. He is noncompliant with his CPAP. He needs an updated sleep study. Mobic and morning, gabapentin as needed at night and Medrol Dosepak given for sciatica symptoms. Follow-up in 2 weeks. If his symptoms do not improve we will refer him to physical therapy and orthopedic if required. Patient not interested in gastric bypass or weight loss obesity consultation at the moment. Referral for nutrition placed     - due for his second booster dose of Pneumovax 23. He will first get his Prevnar 13 during the next visit. He is due for his Tdap and Shingrix vaccine. Next colonoscopy will be due in 2021   continue current meds. I will call with lab results and make further recommendations or adjustments if necessary. Discussed lifestyle modifications including Na restriction, low carb/fat diet, weight reduction and exercise (at least a walking program). ICD-10-CM ICD-9-CM    1. Dyslipidemia E78.5 272.4 LIPID PANEL   2. Obesity, morbid ([facility] [address] E66.01 278.01 REFERRAL TO DIETITIAN   3. Metabolic syndrome T18.14 843.0    4. Diabetes mellitus type 2, diet-controlled (HCC) M54.4 317.68 METABOLIC PANEL, COMPREHENSIVE      CBC W/O DIFF   5. Acute left-sided low back pain with left-sided sciatica M54.42 724.2 meloxicam (MOBIC) 7.5 mg tablet     724.3 methylPREDNISolone (MEDROL DOSEPACK) 4 mg tablet      gabapentin (NEURONTIN) 300 mg capsule   6. TERESA (obstructive sleep apnea) G47.33 327.23 SLEEP MEDICINE REFERRAL   7.  Obesity hypoventilation syndrome (HCC) E66.2 278.03 SLEEP MEDICINE REFERRAL         Follow-up and Dispositions · Return in about 2 weeks (around 6/25/2020) for follow up. I have reviewed with the patient details of the assessment and plan and all questions were answered. Relevent patient education was performed. Verbal and/or written instructions (see AVS) provided. The most recent lab findings were reviewed with the patient. Plan was discussed with patient who verbally expressed understanding. An After Visit Summary was printed and given to the patient.     Kellee Hagan MD

## 2020-06-11 NOTE — PROGRESS NOTES
Please let patient know I have sent a prescription for fenofibrate since his triglycerides are still up and he is failed to do lifestyle modification. Rest labs look okay.

## 2020-06-11 NOTE — PATIENT INSTRUCTIONS
Starting a Weight Loss Plan: Care Instructions Your Care Instructions If you are thinking about losing weight, it can be hard to know where to start. Your doctor can help you set up a weight loss plan that best meets your needs. You may want to take a class on nutrition or exercise, or join a weight loss support group. If you have questions about how to make changes to your eating or exercise habits, ask your doctor about seeing a registered dietitian or an exercise specialist. 
It can be a big challenge to lose weight. But you do not have to make huge changes at once. Make small changes, and stick with them. When those changes become habit, add a few more changes. If you do not think you are ready to make changes right now, try to pick a date in the future. Make an appointment to see your doctor to discuss whether the time is right for you to start a plan. Follow-up care is a key part of your treatment and safety. Be sure to make and go to all appointments, and call your doctor if you are having problems. It's also a good idea to know your test results and keep a list of the medicines you take. How can you care for yourself at home? · Set realistic goals. Many people expect to lose much more weight than is likely. A weight loss of 5% to 10% of your body weight may be enough to improve your health. · Get family and friends involved to provide support. Talk to them about why you are trying to lose weight, and ask them to help. They can help by participating in exercise and having meals with you, even if they may be eating something different. · Find what works best for you. If you do not have time or do not like to cook, a program that offers meal replacement bars or shakes may be better for you. Or if you like to prepare meals, finding a plan that includes daily menus and recipes may be best. 
· Ask your doctor about other health professionals who can help you achieve your weight loss goals. ? A dietitian can help you make healthy changes in your diet. ? An exercise specialist or  can help you develop a safe and effective exercise program. 
? A counselor or psychiatrist can help you cope with issues such as depression, anxiety, or family problems that can make it hard to focus on weight loss. · Consider joining a support group for people who are trying to lose weight. Your doctor can suggest groups in your area. Where can you learn more? Go to http://mike-rivas.info/ Enter E519 in the search box to learn more about \"Starting a Weight Loss Plan: Care Instructions. \" Current as of: December 11, 2019               Content Version: 12.5 © 6333-1709 [employer]. Care instructions adapted under license by [employer] (which disclaims liability or warranty for this information). If you have questions about a medical condition or this instruction, always ask your healthcare professional. [address] any warranty or liability for your use of this information.

## 2020-06-11 NOTE — PROGRESS NOTES
Gaudencio Ann is a 75 y.o. male presenting for Hypertension (3 mo fu) and Back Pain  . 1. Have you been to the ER, urgent care clinic since your last visit? Hospitalized since your last visit? No    2. Have you seen or consulted any other health care providers outside of the [address] [place] [address] since your last visit? Include any pap smears or colon screening. No    Fall Risk Assessment, last 12 mths 1/9/2020   Able to walk? Yes   Fall in past 12 months? No         Abuse Screening Questionnaire 5/22/2019   Do you ever feel afraid of your partner? N   Are you in a relationship with someone who physically or mentally threatens you? N   Is it safe for you to go home? Y       3 most recent PHQ Screens 1/9/2020   Little interest or pleasure in doing things Not at all   Feeling down, depressed, irritable, or hopeless Not at all   Total Score PHQ 2 0       There are no discontinued medications.

## 2020-06-19 DIAGNOSIS — M54.42 ACUTE LEFT-SIDED LOW BACK PAIN WITH LEFT-SIDED SCIATICA: ICD-10-CM

## 2020-06-19 RX ORDER — MELOXICAM 7.5 MG/1
TABLET ORAL
Qty: 10 TAB | Refills: 0 | Status: SHIPPED | OUTPATIENT
Start: 2020-06-19 | End: 2020-06-25

## 2020-06-24 NOTE — PROGRESS NOTES
This note will not be viewable in [address] Shay Orozco is a 75 y.o. male and presents with Hypertension (2 week fu)      Subjective:  Ms. today comes in today for follow-up of low back pain, hypertriglyceridemia and constipation. Low back pain acute left lower back pain with sciatica symptoms. Patient was seen 2 weeks back back pain and was prescribed Mobic in the morning followed by gabapentin at the night and a Medrol Dosepak. He reports considerable improvement with the above-stated treatment however it aggravates more with movement. constipation he reports having constipation for more than 3 days. He is tried MiraLAX without any success. He wonders if there is any medication attributing to it. He does try to increase his fiber however that is not helping him much. Lipid panel was significant for elevated triglycerides and TriCor was initiated. He reports to be compliant with it. Recap-6/20-He reports acute lower back pain especially on the left side radiating all the way to his left leg. Associated with numbness and tingling sensation in the left leg. He reports he is tried Tylenol and over-the-counter Advil without any relief. This has been affecting his sleep. He rates the pain as 5/10, continuous, radiating all the way down to his left. He denies bowel or bladder incontinence, no saddle anesthesia. No fever, chills. Morbid obesity. He is trying to lose his weight and following a low-carb diet. He is lost 6 pounds from his last visit. Because of his weight and back pain he is not been able to do a lot of physical activity. Not interested in gastric bypass and never followed up with obesity clinic. Never had any nutrition counseling. Recap- follow-up of his prediabetes, hypertension, dyslipidemia. Patient is morbidly obese unfortunately is up 10 pounds since his last visit in December. He does admit to being noncompliant with his diet.   He also has obstructive sleep apnea but has not followed up with his sleep specialist.  He is noncompliant with his CPAP mask. Patient has a history of prediabetes his last A1c was 5.8. He does admit over the holidays his diet has been full of carbs and soda. He denies numbness or tingling sensation, polydipsia or polyuria. He has dyslipidemia in particular hypertriglyceridemia but is not on any medication. Blood pressure is well controlled on current meds. He was on Benicar in the past but that was switched secondary to persistent cough. Currently remains on atenolol and hydralazine. He reports he is chronic lower back pain which inhibits him to work out. Past Medical History:   Diagnosis Date    Allergic rhinitis 11/22/2017    Chest pain syndrome 11/22/2017    Comments: [facility] stress cardiolite 2006, subsequent to that, he had a normal cardiac cath   [facility] acquired pneumonia     Diarrhea 11/22/2017    Dyspnea 11/22/2017    Environmental allergies     Gout 11/22/2017    Hypertension     Insomnia 11/22/2017    Iron deficiency 11/22/2017    Left chronic serous otitis media 11/22/2017    Obesity 11/22/2017    Comments: morbid    Sleep apnea     Steatosis 11/22/2017    Comments: non-alcoholic     Past Surgical History:   Procedure Laterality Date    HX ORTHOPAEDIC      right knee     Allergies   Allergen Reactions    Ace Inhibitors Cough     Current Outpatient Medications   Medication Sig Dispense Refill    senna-docusate (PERICOLACE) 8.6-50 mg per tablet Take 1 Tab by mouth daily as needed for Constipation for up to 10 days. 10 Tab 0    atenoloL (TENORMIN) 50 mg tablet TAKE 1 TABLET BY MOUTH EVERY DAY 90 Tab 3    gabapentin (NEURONTIN) 300 mg capsule Take 1 Cap by mouth nightly as needed for Pain for up to 14 days. Max Daily Amount: 300 mg. Indications: neuropathic pain 14 Cap 0    fenofibrate nanocrystallized (TRICOR) 145 mg tablet Take 1 Tab by mouth daily for 90 days.  Indications: high amount of triglyceride in the blood 90 Tab 0    bumetanide (BUMEX) 1 mg tablet TAKE 2 TABLETS BY MOUTH EVERY  Tab 0    hydrALAZINE (APRESOLINE) 25 mg tablet TAKE 1 TABLET BY MOUTH TWICE DAILY 180 Tab 3    allopurinol (ZYLOPRIM) 300 mg tablet Take  by mouth daily.  guaiFENesin (MUCINEX) 1,200 mg Ta12 ER tablet Take 1 Tab by mouth two (2) times a day. (Patient taking differently: Take 1,200 mg by mouth two (2) times daily as needed.) 10 Tab 0    omeprazole (PRILOSEC OTC) 20 mg tablet Take 20 mg by mouth daily.  TT-CA-XE-Fe-Min-Lycopen-Lutein (CENTRUM) 0.4-162-18 mg Tab Take  by mouth.  aspirin 81 mg chewable tablet Take 81 mg by mouth daily. Social History     Socioeconomic History    Marital status:      Spouse name: Not on file    Number of children: Not on file    Years of education: Not on file    Highest education level: Not on file   Tobacco Use    Smoking status: Never Smoker    Smokeless tobacco: Never Used   Substance and Sexual Activity    Alcohol use: Yes     Comment: rarely    Drug use: Never     Family History   Problem Relation Age of Onset    Hypertension Other     Heart Disease Other     Diabetes Other     Diabetes Mother     Diabetes Father        Review of Systems  ROS is unremarkable except for ones highlighted in bold.    Constitutional: negative for fevers, chills, anorexia and weight loss  Eyes:   negative for visual disturbance and irritation  ENT:   negative for tinnitus,sore throat,nasal congestion,ear pain,hoarseness  Respiratory:  negative for cough, hemoptysis, dyspnea,wheezing  CV:   negative for chest pain, palpitations, lower extremity edema  GI:   negative for nausea, vomiting, diarrhea, abdominal pain,melena  Endo:               negative for polyuria,polydipsia,polyphagia,heat intolerance  Genitourinary: negative for frequency, dysuria and hematuria  Integumentary: negative for rash and pruritus  Hematologic:  negative for easy bruising and gum/nose bleeding  Musculoskel: negative for myalgias, arthralgias, back pain, muscle weakness, joint pain  Neurological:  negative for headaches, dizziness, vertigo, memory problems and gait   Behavl/Psych: negative for feelings of anxiety, depression, mood changes  ROS otherwise negative     Objective:  Visit Vitals  /80 (BP 1 Location: Left arm, BP Patient Position: Sitting)   Pulse 72   Temp 98.4 °F (36.9 °C)   Resp 16   Ht 5' 11\" (1.803 m)   Wt (!) 391 lb (177.4 kg)   SpO2 (!) 72%   BMI 54.53 kg/m²     Physical Exam:   General appearance - alert, well appearing, and in no distress  Mental status - alert, oriented to person, place, and time  EYE-TOÑITO, EOMI, fundi normal, corneas normal, no foreign bodies  ENT-ENT exam normal, no neck nodes or sinus tenderness  Nose - normal and patent, no erythema, discharge or polyps  Mouth - mucous membranes moist, pharynx normal without lesions  Neck - supple, no significant adenopathy   Chest - clear to auscultation, no wheezes, rales or rhonchi, symmetric air entry   Heart - normal rate, regular rhythm, normal S1, S2, no murmurs, rubs, clicks or gallops   Abdomen - soft, nontender, nondistended, no masses or organomegaly  Lymph- no adenopathy palpable  Ext-peripheral pulses normal, no pedal edema, no clubbing or cyanosis  Skin-Warm and dry.  no hyperpigmentation, vitiligo, or suspicious lesions  Neuro -alert, oriented, normal speech, no focal findings or movement disorder noted  Musculoskeletal- FROM, no bony abnormalities, left hip tenderness        Lab Review:  Results for orders placed or performed in visit on 77/42/67   METABOLIC PANEL, COMPREHENSIVE   Result Value Ref Range    Glucose 96 75 - 110 mg/dL    BUN 21.0 (H) 9.0 - 20.0 mg/dL    Creatinine 1.2 0.8 - 1.5 mg/dL    Sodium 144 137 - 145 mmol/L    Potassium 4.6 3.6 - 5.0 mmol/L    Chloride 102 98 - 107 mmol/L    CO2 33.0 (H) 22.0 - 32.0 mmol/L    Calcium 9.7 8.4 - 10.2 mg/dl    Protein, total 7.5 6.3 - 8.2 g/dL Albumin 4.1 3.9 - 5.4 g/dL    ALT (SGPT) 26 0 - 50 U/L    AST (SGOT) 24.0 14.0 - 36.0 U/L    Alk. phosphatase 113 38 - 126 U/L    Bilirubin, total 0.4 0.2 - 1.3 mg/dL    BUN/Creatinine ratio 18 Ratio    GFR est AA >60 mL/min/1.73m2    GFR est non-AA >60 mL/min/1.73m2    Globulin 3.40     A-G Ratio 1.2 Ratio    Anion gap 9 mmol/L   LIPID PANEL   Result Value Ref Range    Cholesterol, total 183 0 - 200 mg/dL    Triglyceride 350 (H) 0 - 200 mg/dL    HDL Cholesterol 33 (L) 35 - 130 mg/dL    VLDL 70 mg/dL    LDL, calculated 80 0 - 130 mg/dL    CHOL/HDL Ratio 6 (H) 0 - 4 Ratio    LDL/HDL Ratio 2 Ratio   CBC W/O DIFF   Result Value Ref Range    WBC 7.0 4.1 - 10.9 K/uL    RBC 4.46 4.20 - 6.30 M/uL    HGB 13.3 12.0 - 18.0 g/dL    HCT 42.5 37.0 - 51.0 %    MCH 29.8 26.0 - 32.0 pg    MCHC 31.3 30.0 - 36.0 g/dL    MCV 95.2 80.0 - 97.0 fL    RDW 15.6 %    PLATELET 001.9 529.9 - 440.0 K/uL        Documenation Review:  Hypertension. Hyperlipidemia. Obesity. Sleep apnea, on CPAP, but not using it regularly. Gout. Metabolic syndrome. Prediabetes  Chest pain syndrome, followed by abnormal stress Cardiolite, 2006. Following the Cardiolite he had a cardiac catheterization that showed normal left ventricular function and no significant coronary artery disease. Assessment/Plan:    Diagnoses and all orders for this visit:    1. Constipation, unspecified constipation type  -     senna-docusate (PERICOLACE) 8.6-50 mg per tablet; Take 1 Tab by mouth daily as needed for Constipation for up to 10 days. 2. Acute left-sided low back pain with left-sided sciatica  -     REFERRAL TO ORTHOPEDICS    3. Diabetes mellitus type 2, diet-controlled ([address]    4. Metabolic syndrome      Referral for orthopedic spine surgeon placed. Continue Mobic and gabapentin as needed. He is already finished his Medrol Dosepak. Might benefit from physical therapy exercises and weight loss which we discussed last visit as well.   Reports he is trying his best to lose weight however I have not seen a change in his scale. ICD-10-CM ICD-9-CM    1. Constipation, unspecified constipation type K59.00 564.00 senna-docusate (PERICOLACE) 8.6-50 mg per tablet   2. Acute left-sided low back pain with left-sided sciatica M54.42 724.2 REFERRAL TO ORTHOPEDICS     724.3    3. Diabetes mellitus type 2, diet-controlled (HCC) E11.9 250.00    4. Metabolic syndrome Y92.14 479.7          Follow-up and Dispositions    · Return in about 3 months (around 9/25/2020) for follow up. I have reviewed with the patient details of the assessment and plan and all questions were answered. Relevent patient education was performed. Verbal and/or written instructions (see AVS) provided. The most recent lab findings were reviewed with the patient. Plan was discussed with patient who verbally expressed understanding. An After Visit Summary was printed and given to the patient.     Macho Moore MD

## 2020-06-25 ENCOUNTER — OFFICE VISIT (OUTPATIENT)
Dept: INTERNAL MEDICINE CLINIC | Age: 65
End: 2020-06-25

## 2020-06-25 VITALS
WEIGHT: 315 LBS | RESPIRATION RATE: 16 BRPM | TEMPERATURE: 98.4 F | SYSTOLIC BLOOD PRESSURE: 122 MMHG | HEIGHT: 71 IN | OXYGEN SATURATION: 72 % | BODY MASS INDEX: 44.1 KG/M2 | DIASTOLIC BLOOD PRESSURE: 80 MMHG | HEART RATE: 72 BPM

## 2020-06-25 DIAGNOSIS — E11.9 DIABETES MELLITUS TYPE 2, DIET-CONTROLLED (HCC): ICD-10-CM

## 2020-06-25 DIAGNOSIS — E88.81 METABOLIC SYNDROME: ICD-10-CM

## 2020-06-25 DIAGNOSIS — M54.42 ACUTE LEFT-SIDED LOW BACK PAIN WITH LEFT-SIDED SCIATICA: ICD-10-CM

## 2020-06-25 DIAGNOSIS — K59.00 CONSTIPATION, UNSPECIFIED CONSTIPATION TYPE: Primary | ICD-10-CM

## 2020-06-25 RX ORDER — AMOXICILLIN 250 MG
1 CAPSULE ORAL
Qty: 10 TAB | Refills: 0 | Status: SHIPPED | OUTPATIENT
Start: 2020-06-25 | End: 2020-07-05

## 2020-06-25 NOTE — PROGRESS NOTES
Meagan Candelario is a 75 y.o. male presenting for Hypertension (2 week fu)  . 1. Have you been to the ER, urgent care clinic since your last visit? Hospitalized since your last visit? No    2. Have you seen or consulted any other health care providers outside of the [address] [place] [address] since your last visit? Include any pap smears or colon screening. No    Fall Risk Assessment, last 12 mths 1/9/2020   Able to walk? Yes   Fall in past 12 months? No         Abuse Screening Questionnaire 5/22/2019   Do you ever feel afraid of your partner? N   Are you in a relationship with someone who physically or mentally threatens you? N   Is it safe for you to go home? Y       3 most recent PHQ Screens 1/9/2020   Little interest or pleasure in doing things Not at all   Feeling down, depressed, irritable, or hopeless Not at all   Total Score PHQ 2 0       There are no discontinued medications.

## 2020-06-25 NOTE — PATIENT INSTRUCTIONS
Starting a Weight Loss Plan: Care Instructions Your Care Instructions If you are thinking about losing weight, it can be hard to know where to start. Your doctor can help you set up a weight loss plan that best meets your needs. You may want to take a class on nutrition or exercise, or join a weight loss support group. If you have questions about how to make changes to your eating or exercise habits, ask your doctor about seeing a registered dietitian or an exercise specialist. 
It can be a big challenge to lose weight. But you do not have to make huge changes at once. Make small changes, and stick with them. When those changes become habit, add a few more changes. If you do not think you are ready to make changes right now, try to pick a date in the future. Make an appointment to see your doctor to discuss whether the time is right for you to start a plan. Follow-up care is a key part of your treatment and safety. Be sure to make and go to all appointments, and call your doctor if you are having problems. It's also a good idea to know your test results and keep a list of the medicines you take. How can you care for yourself at home? · Set realistic goals. Many people expect to lose much more weight than is likely. A weight loss of 5% to 10% of your body weight may be enough to improve your health. · Get family and friends involved to provide support. Talk to them about why you are trying to lose weight, and ask them to help. They can help by participating in exercise and having meals with you, even if they may be eating something different. · Find what works best for you. If you do not have time or do not like to cook, a program that offers meal replacement bars or shakes may be better for you. Or if you like to prepare meals, finding a plan that includes daily menus and recipes may be best. 
· Ask your doctor about other health professionals who can help you achieve your weight loss goals. ? A dietitian can help you make healthy changes in your diet. ? An exercise specialist or  can help you develop a safe and effective exercise program. 
? A counselor or psychiatrist can help you cope with issues such as depression, anxiety, or family problems that can make it hard to focus on weight loss. · Consider joining a support group for people who are trying to lose weight. Your doctor can suggest groups in your area. Where can you learn more? Go to http://mike-rivas.info/ Enter H126 in the search box to learn more about \"Starting a Weight Loss Plan: Care Instructions. \" Current as of: December 11, 2019               Content Version: 12.5 © 2553-8702 [employer]. Care instructions adapted under license by [employer] (which disclaims liability or warranty for this information). If you have questions about a medical condition or this instruction, always ask your healthcare professional. [address] any warranty or liability for your use of this information.

## 2020-06-30 ENCOUNTER — DOCUMENTATION ONLY (OUTPATIENT)
Dept: SLEEP MEDICINE | Age: 65
End: 2020-06-30

## 2020-06-30 ENCOUNTER — VIRTUAL VISIT (OUTPATIENT)
Dept: SLEEP MEDICINE | Age: 65
End: 2020-06-30

## 2020-06-30 DIAGNOSIS — I10 ESSENTIAL HYPERTENSION, BENIGN: ICD-10-CM

## 2020-06-30 DIAGNOSIS — G47.33 OSA (OBSTRUCTIVE SLEEP APNEA): Primary | ICD-10-CM

## 2020-06-30 NOTE — PROGRESS NOTES
[address] [facility] [place]. [place], [address]   Tel.  [phone]   Fax. [address] [facility] [address]   [place], [address] [facility]   Tel.  [phone]   Fax. [phone] [address]    Tel.  [phone]   Fax. [phone]       Subjective:      Ida Simmons, who was evaluated through a patient-initiated, synchronous (real-time) audio only encounter, and/or her healthcare decision maker, is aware that it is a billable service, with coverage as determined by his insurance carrier. He provided verbal consent to proceed: Yes. He has not had a related appointment within my department in the past 7 days or scheduled within the next 24 hours. Total Time: minutes: 11-20 minutes    I was at home while conducting this encounter. Patient verified with demographics. Jody Bran is a 75 y.o. male, evaluated via audio-only technology on 6/30/2020 for positive airway pressure follow-up, last seen by Dr. Brenda Hamilton on 11/27/2018, prior notes reviewed in detail. In lab split sleep test 7/2018 showed AHI of 87.9/hr with a lowest SaO2 of 52%, duration of SaO2 < 88% 92.7 min. BiLevel device set up 8/1/2018. He reports problems using the device. The following concerns reviewed:    Drowsiness no Problems exhaling no   Snoring no Forget to put on no   Mask Comfortable No, pressure to high Can't fall asleep no   Dry Mouth no Mask falls off no   Air Leaking yes Frequent awakenings yes     He admits that his sleep has improved on PAP therapy using full face mask and heated tubing. Review of device download indicated:  BiLevel pressure: IPAP 18, EPAP 13 cmH2O;   95th Percentile Leak: 89.6 L/Min     % Used Days >= 4 hours: 9. Avg hours used:  3:14. Therapy Apnea Index averaged over PAP use: 2.2 /hr which reflects improved sleep breathing condition.     [place] Sleepiness Score: 13 and Modified F.O.S.Q. Score Total / 2: 17     Allergies   Allergen Reactions  Ace Inhibitors Cough       He has a current medication list which includes the following prescription(s): atenolol, fenofibrate nanocrystallized, bumetanide, hydralazine, allopurinol, omeprazole, fl-yk-uz-fe-min-lycopen-lutein, aspirin, senna-docusate, and guaifenesin. Bernardo Abarca He  has a past medical history of Allergic rhinitis (11/22/2017), Chest pain syndrome (11/22/2017), Community acquired pneumonia, Diarrhea (11/22/2017), Dyspnea (11/22/2017), Environmental allergies, Gout (11/22/2017), Hypertension, Insomnia (11/22/2017), Iron deficiency (11/22/2017), Left chronic serous otitis media (11/22/2017), Obesity (11/22/2017), Sleep apnea, and Steatosis (11/22/2017). Sleep Review of Systems: notable for Negative difficulty falling asleep; Positive awakenings at night; Negative early morning headaches; Negative memory problems; Negative concentration issues; Negative chest pain; Negative shortness of breath; Negative significant joint pain at night; Negative significant muscle pain at night; Negative rashes or itching; Negative heartburn; Negative significant mood issues; occasional afternoon naps per week    Objective:   Vitals reported by patient   Patient-Reported Vitals 6/30/2020   Patient-Reported Weight 390 lb   Patient-Reported Height 5' 11\"      Calculated BMI 54    Physical Exam not completed due to audio only visit. Assessment:       ICD-10-CM ICD-9-CM    1. TERESA (obstructive sleep apnea) G47.33 327.23 AMB SUPPLY ORDER   2. Essential hypertension, benign I10 401.1    3. Adult BMI 50.0-59.9 kg/sq m ([facility]) Z68.43 V85.43        AHI = 87.9(7/2018). On Bi - Level :  IPAP 18, EPAP 13 cmH2O. He is not compliant with PAP therapy although when used PAP shows benefit to the patient and remains necessary for control of his sleep apnea. There is continued clinical benefit from the hours of use demonstrated by AHI reduced from 87.9/hr to 2.2/hr.         Plan:     Follow-up and Dispositions    · Return in about 6 months (around 12/30/2020). * Change pressure setting to easy breath - changes made by provider in [facility] system and [place] [facility]    * Supplies ordered - full face mask and heated tubing    Orders Placed This Encounter    AMB SUPPLY ORDER     Diagnosis: (G47.33) TERESA (obstructive sleep apnea)  (primary encounter diagnosis)     Replacement Supplies for Positive Airway Pressure Therapy Device:   Duration of need: 99 months.  Full Face Mask 1 every 3 months.  Full Face Mask Cushion 1 per month.  Headgear 1 every 6 months.  Tubing with heating element 1 every 3 months.  Filter(s) Disposable 2 per month.  Filter(s) Non-Disposable 1 every 6 months. .   [facility] for Humidifier (Replace) 1 every 6 months. MARY JANE Marcelo; NPI: [id number]    Electronically signed. Date:- 06/30/20       * Counseling was provided regarding the importance of regular PAP use with emphasis on ensuring sufficient total sleep time, proper sleep hygiene, and safe driving. * Re-enforced proper and regular cleaning for the device. * He was asked to contact our office for any problems regarding PAP therapy. 2. Hypertension -  continue on his current regimen, he will continue to monitor his BP and follow up with his PMD for reevaluation/adjustment of medications if warranted. I have reviewed the relationship between hypertension as it relates to sleep-disordered breathing. 3. Recommended a dedicated weight loss program through appropriate diet and exercise regimen as significant weight reduction has been shown to reduce severity of obstructive sleep apnea. Patient's phone number [phone] (home)  was reviewed and confirmed for accuracy. He gives permission for messages regarding results and appointments to be left at that number.     Pursuant to the emergency declaration under the [facility] [place], [address] waiver authority and the [employer] Act, this telephone encounter was conducted, with patient's consent, to reduce the patient's risk of exposure to COVID-19 and provide continuity of care for an established patient. Services were provided through a telephone call to substitute for in-person clinic visit. LEIA ReneeBC, [address] [place] [address]  Electronically signed.  06/30/20

## 2020-06-30 NOTE — PATIENT INSTRUCTIONS
217 [facility]., [place]. 1668 [facility], [address] Tel.  [phone] Fax. 100 [facility] [address], [address] Tel.  [phone] Fax. [phone] [address] Lydia Barrera [address] Tel.  [phone] Fax. [phone] Learning About CPAP for Sleep Apnea What is CPAP? CPAP is a small machine that you use at home every night while you sleep. It increases air pressure in your throat to keep your airway open. When you have sleep apnea, this can help you sleep better so you feel much better. CPAP stands for \"continuous positive airway pressure. \" The CPAP machine will have one of the following: · A mask that covers your nose and mouth · Prongs that fit into your nose · A mask that covers your nose only, the most common type. This type is called NCPAP. The N stands for \"nasal.\" Why is it done? CPAP is usually the best treatment for obstructive sleep apnea. It is the first treatment choice and the most widely used. Your doctor may suggest CPAP if you have: · Moderate to severe sleep apnea. · Sleep apnea and coronary artery disease (CAD) or heart failure. How does it help? · CPAP can help you have more normal sleep, so you feel less sleepy and more alert during the daytime. · CPAP may help keep heart failure or other heart problems from getting worse. · NCPAP may help lower your blood pressure. · If you use CPAP, your bed partner may also sleep better because you are not snoring or restless. What are the side effects? Some people who use CPAP have: · A dry or stuffy nose and a sore throat. · Irritated skin on the face. · Sore eyes. · Bloating. If you have any of these problems, work with your doctor to fix them. Here are some things you can try: · Be sure the mask or nasal prongs fit well. · See if your doctor can adjust the pressure of your CPAP. · If your nose is dry, try a humidifier. · If your nose is runny or stuffy, try decongestant medicine or a steroid nasal spray. If these things do not help, you might try a different type of machine. Some machines have air pressure that adjusts on its own. Others have air pressures that are different when you breathe in than when you breathe out. This may reduce discomfort caused by too much pressure in your nose. Where can you learn more? Go to [employer] Enter O576 in the search box to learn more about \"Learning About CPAP for Sleep Apnea. \"  
© 7077-5218 Healthwise, [employer]orporated. Care instructions adapted under license by [employer] (which disclaims liability or warranty for this information). This care instruction is for use with your licensed healthcare professional. If you have questions about a medical condition or this instruction, always ask your healthcare professional. [address] any warranty or liability for your use of this information. Content Version: 0.6.77867; Last Revised: January 11, 2010 PROPER SLEEP HYGIENE What to avoid · Do not have drinks with caffeine, such as coffee or black tea, for 8 hours before bed. · Do not smoke or use other types of tobacco near bedtime. Nicotine is a stimulant and can keep you awake. · Avoid drinking alcohol late in the evening, because it can cause you to wake in the middle of the night. · Do not eat a big meal close to bedtime. If you are hungry, eat a light snack. · Do not drink a lot of water close to bedtime, because the need to urinate may wake you up during the night. · Do not read or watch TV in bed. Use the bed only for sleeping and sexual activity. What to try · Go to bed at the same time every night, and wake up at the same time every morning. Do not take naps during the day. · Keep your bedroom quiet, dark, and cool. · Get regular exercise, but not within 3 to 4 hours of your bedtime. Manny Victoria · Sleep on a comfortable pillow and mattress. · If watching the clock makes you anxious, turn it facing away from you so you cannot see the time. · If you worry when you lie down, start a worry book. Well before bedtime, write down your worries, and then set the book and your concerns aside. · Try meditation or other relaxation techniques before you go to bed. · If you cannot fall asleep, get up and go to another room until you feel sleepy. Do something relaxing. Repeat your bedtime routine before you go to bed again. · Make your house quiet and calm about an hour before bedtime. Turn down the lights, turn off the TV, log off the computer, and turn down the volume on music. This can help you relax after a busy day. Drowsy Driving: The [facility] [place] [facility] cites drowsiness as a causing factor in more than 086,555 police reported crashes annually, resulting in 76,000 injuries and 1,500 deaths. Other surveys suggest 55% of people polled have driven while drowsy in the past year, 23% had fallen asleep but not crashed, 3% crashed, and 2% had and accident due to drowsy driving. Who is at risk? Young Drivers: One study of drowsy driving accidents states that 55% of the drivers were under 25 years. Of those, 75% were male. Shift Workers and Travelers: People who work overnight or travel across time zones frequently are at higher risk of experiencing Circadian Rhythm Disorders. They are trying to work and function when their body is programed to sleep. Sleep Deprived: Lack of sleep has a serious impact on your ability to pay attention or focus on a task. Consistently getting less than the average of 8 hours your body needs creates partial or cumulative sleep deprivation.   
Untreated Sleep Disorders: Sleep Apnea, Narcolepsy, R.L.S., and other sleep disorders (untreated) prevent a person from getting enough restful sleep. This leads to excessive daytime sleepiness and increases the risk for drowsy driving accidents by up to 7 times. Medications / Alcohol: Even over the counter medications can cause drowsiness. Medications that impair a drivers attention should have a warning label. Alcohol naturally makes you sleepy and on its own can cause accidents. Combined with excessive drowsiness its effects are amplified. Signs of Drowsy Driving: * You don't remember driving the last few miles * You may drift out of your quinton * You are unable to focus and your thoughts wander * You may yawn more often than normal 
 * You have difficulty keeping your eyes open / nodding off * Missing traffic signs, speeding, or tailgating Prevention-  
Good sleep hygiene, lifestyle and behavioral choices have the most impact on drowsy driving. There is no substitute for sleep and the average person requires 8 hours nightly. If you find yourself driving drowsy, stop and sleep. Consider the sleep hygiene tips provided during your visit as well. Medication Refill Policy: Refills for all medications require 1 week advance notice. Please have your pharmacy fax a refill request. We are unable to fax, or call in \"controled substance\" medications and you will need to pick these prescriptions up from our office. [employer] Activation Thank you for requesting access to [employer]. Please follow the instructions below to securely access and download your online medical record. [employer] allows you to send messages to your doctor, view your test results, renew your prescriptions, schedule appointments, and more. How Do I Sign Up? 1. In your internet browser, go to [email or web address]. 2. Click on the First Time User? Click Here link in the Sign In box. You will see the New Member Sign Up page. 3. Enter your [employer] Access Code exactly as it appears below.  You will not need to use this code after youve completed the sign-up process. If you do not sign up before the expiration date, you must request a new code. Eventp[employer] Access Code: [email or web address] Expires: 2020  8:38 AM (This is the date your Eventpig access code will ) 4. Enter the last four digits of your Social Security Number (xxxx) and Date of Birth (mm/dd/yyyy) as indicated and click Submit. You will be taken to the next sign-up page. 5. Create a Eventpig ID. This will be your Eventpig login ID and cannot be changed, so think of one that is secure and easy to remember. 6. Create a Event[employer] password. You can change your password at any time. 7. Enter your Password Reset Question and Answer. This can be used at a later time if you forget your password. 8. Enter your e-mail address. You will receive e-mail notification when new information is available in [address]. 9. Click Sign Up. You can now view and download portions of your medical record. 10. Click the Download Summary menu link to download a portable copy of your medical information. Additional Information If you have questions, please call [phone]. Remember, Event[employer] is NOT to be used for urgent needs. For medical emergencies, dial 911.

## 2020-07-02 DIAGNOSIS — M54.42 ACUTE LEFT-SIDED LOW BACK PAIN WITH LEFT-SIDED SCIATICA: ICD-10-CM

## 2020-07-02 RX ORDER — MELOXICAM 7.5 MG/1
TABLET ORAL
Qty: 10 TAB | Refills: 0 | Status: SHIPPED | OUTPATIENT
Start: 2020-07-02 | End: 2020-07-10

## 2020-07-10 DIAGNOSIS — M54.42 ACUTE LEFT-SIDED LOW BACK PAIN WITH LEFT-SIDED SCIATICA: ICD-10-CM

## 2020-07-10 RX ORDER — MELOXICAM 7.5 MG/1
TABLET ORAL
Qty: 10 TAB | Refills: 0 | Status: SHIPPED | OUTPATIENT
Start: 2020-07-10 | End: 2020-07-19

## 2020-07-19 DIAGNOSIS — M54.42 ACUTE LEFT-SIDED LOW BACK PAIN WITH LEFT-SIDED SCIATICA: ICD-10-CM

## 2020-07-19 RX ORDER — MELOXICAM 7.5 MG/1
TABLET ORAL
Qty: 10 TAB | Refills: 0 | Status: SHIPPED | OUTPATIENT
Start: 2020-07-19 | End: 2020-07-27

## 2020-07-27 DIAGNOSIS — M54.42 ACUTE LEFT-SIDED LOW BACK PAIN WITH LEFT-SIDED SCIATICA: ICD-10-CM

## 2020-07-27 RX ORDER — MELOXICAM 7.5 MG/1
TABLET ORAL
Qty: 10 TAB | Refills: 0 | Status: SHIPPED | OUTPATIENT
Start: 2020-07-27 | End: 2020-08-03

## 2020-08-03 DIAGNOSIS — M54.42 ACUTE LEFT-SIDED LOW BACK PAIN WITH LEFT-SIDED SCIATICA: ICD-10-CM

## 2020-08-03 RX ORDER — MELOXICAM 7.5 MG/1
TABLET ORAL
Qty: 10 TAB | Refills: 0 | Status: SHIPPED | OUTPATIENT
Start: 2020-08-03 | End: 2020-10-06

## 2020-08-11 RX ORDER — BUMETANIDE 1 MG/1
TABLET ORAL
Qty: 180 TAB | Refills: 0 | Status: SHIPPED | OUTPATIENT
Start: 2020-08-11 | End: 2020-11-09

## 2020-09-07 DIAGNOSIS — E78.1 HYPERTRIGLYCERIDEMIA: ICD-10-CM

## 2020-09-07 RX ORDER — FENOFIBRATE 145 MG/1
TABLET, COATED ORAL
Qty: 90 TAB | Refills: 0 | Status: SHIPPED | OUTPATIENT
Start: 2020-09-07 | End: 2020-12-06

## 2020-10-05 PROBLEM — E78.1 HYPERTRIGLYCERIDEMIA: Status: ACTIVE | Noted: 2020-10-05

## 2020-10-06 ENCOUNTER — OFFICE VISIT (OUTPATIENT)
Dept: INTERNAL MEDICINE CLINIC | Age: 65
End: 2020-10-06
Payer: MEDICARE

## 2020-10-06 VITALS
OXYGEN SATURATION: 94 % | HEART RATE: 76 BPM | RESPIRATION RATE: 14 BRPM | TEMPERATURE: 97.1 F | WEIGHT: 315 LBS | DIASTOLIC BLOOD PRESSURE: 94 MMHG | HEIGHT: 71 IN | SYSTOLIC BLOOD PRESSURE: 154 MMHG | BODY MASS INDEX: 44.1 KG/M2

## 2020-10-06 DIAGNOSIS — G47.33 OSA (OBSTRUCTIVE SLEEP APNEA): Primary | ICD-10-CM

## 2020-10-06 DIAGNOSIS — E88.81 METABOLIC SYNDROME: ICD-10-CM

## 2020-10-06 DIAGNOSIS — E78.1 HYPERTRIGLYCERIDEMIA: ICD-10-CM

## 2020-10-06 DIAGNOSIS — I10 ESSENTIAL HYPERTENSION, BENIGN: ICD-10-CM

## 2020-10-06 DIAGNOSIS — Z23 NEEDS FLU SHOT: ICD-10-CM

## 2020-10-06 DIAGNOSIS — E11.9 DIABETES MELLITUS TYPE 2, DIET-CONTROLLED (HCC): ICD-10-CM

## 2020-10-06 LAB
A-G RATIO,AGRAT: 1.3 RATIO
ALBUMIN SERPL-MCNC: 4.4 G/DL (ref 3.9–5.4)
ALP SERPL-CCNC: 76 U/L (ref 38–126)
ALT SERPL-CCNC: 24 U/L (ref 0–50)
ANION GAP SERPL CALC-SCNC: 11 MMOL/L
AST SERPL W P-5'-P-CCNC: 26 U/L (ref 14–36)
BILIRUB SERPL-MCNC: 0.5 MG/DL (ref 0.2–1.3)
BUN SERPL-MCNC: 26 MG/DL (ref 9–20)
BUN/CREATININE RATIO,BUCR: 20 RATIO
CALCIUM SERPL-MCNC: 9.6 MG/DL (ref 8.4–10.2)
CHLORIDE SERPL-SCNC: 104 MMOL/L (ref 98–107)
CHOL/HDL RATIO,CHHD: 4 RATIO (ref 0–4)
CHOLEST SERPL-MCNC: 189 MG/DL (ref 0–200)
CO2 SERPL-SCNC: 28 MMOL/L (ref 22–32)
CREAT SERPL-MCNC: 1.3 MG/DL (ref 0.8–1.5)
ERYTHROCYTE [DISTWIDTH] IN BLOOD BY AUTOMATED COUNT: 15.1 %
GLOBULIN,GLOB: 3.3
GLUCOSE SERPL-MCNC: 90 MG/DL (ref 75–110)
HBA1C MFR BLD HPLC: 5.5 % (ref 4–5.7)
HCT VFR BLD AUTO: 42.3 % (ref 37–51)
HDLC SERPL-MCNC: 43 MG/DL (ref 35–130)
HGB BLD-MCNC: 13.5 G/DL (ref 12–18)
LDL/HDL RATIO,LDHD: 3 RATIO
LDLC SERPL CALC-MCNC: 109 MG/DL (ref 0–130)
MCH RBC QN AUTO: 30.2 PG (ref 26–32)
MCHC RBC AUTO-ENTMCNC: 31.9 G/DL (ref 30–36)
MCV RBC AUTO: 94.6 FL (ref 80–97)
PLATELET # BLD AUTO: 243 K/UL (ref 140–440)
POTASSIUM SERPL-SCNC: 4.3 MMOL/L (ref 3.6–5)
PROT SERPL-MCNC: 7.7 G/DL (ref 6.3–8.2)
RBC # BLD AUTO: 4.47 M/UL (ref 4.2–6.3)
SODIUM SERPL-SCNC: 143 MMOL/L (ref 137–145)
TRIGL SERPL-MCNC: 185 MG/DL (ref 0–200)
VLDLC SERPL CALC-MCNC: 37 MG/DL
WBC # BLD AUTO: 7 K/UL (ref 4.1–10.9)

## 2020-10-06 PROCEDURE — 85027 COMPLETE CBC AUTOMATED: CPT | Performed by: INTERNAL MEDICINE

## 2020-10-06 PROCEDURE — G8427 DOCREV CUR MEDS BY ELIG CLIN: HCPCS | Performed by: INTERNAL MEDICINE

## 2020-10-06 PROCEDURE — 99214 OFFICE O/P EST MOD 30 MIN: CPT | Performed by: INTERNAL MEDICINE

## 2020-10-06 PROCEDURE — 1101F PT FALLS ASSESS-DOCD LE1/YR: CPT | Performed by: INTERNAL MEDICINE

## 2020-10-06 PROCEDURE — G8755 DIAS BP > OR = 90: HCPCS | Performed by: INTERNAL MEDICINE

## 2020-10-06 PROCEDURE — G0008 ADMIN INFLUENZA VIRUS VAC: HCPCS

## 2020-10-06 PROCEDURE — G8536 NO DOC ELDER MAL SCRN: HCPCS | Performed by: INTERNAL MEDICINE

## 2020-10-06 PROCEDURE — 3044F HG A1C LEVEL LT 7.0%: CPT | Performed by: INTERNAL MEDICINE

## 2020-10-06 PROCEDURE — 2022F DILAT RTA XM EVC RTNOPTHY: CPT | Performed by: INTERNAL MEDICINE

## 2020-10-06 PROCEDURE — 83036 HEMOGLOBIN GLYCOSYLATED A1C: CPT | Performed by: INTERNAL MEDICINE

## 2020-10-06 PROCEDURE — 80053 COMPREHEN METABOLIC PANEL: CPT | Performed by: INTERNAL MEDICINE

## 2020-10-06 PROCEDURE — G8417 CALC BMI ABV UP PARAM F/U: HCPCS | Performed by: INTERNAL MEDICINE

## 2020-10-06 PROCEDURE — 80061 LIPID PANEL: CPT | Performed by: INTERNAL MEDICINE

## 2020-10-06 PROCEDURE — G8432 DEP SCR NOT DOC, RNG: HCPCS | Performed by: INTERNAL MEDICINE

## 2020-10-06 PROCEDURE — G8753 SYS BP > OR = 140: HCPCS | Performed by: INTERNAL MEDICINE

## 2020-10-06 PROCEDURE — 3017F COLORECTAL CA SCREEN DOC REV: CPT | Performed by: INTERNAL MEDICINE

## 2020-10-06 PROCEDURE — 90694 VACC AIIV4 NO PRSRV 0.5ML IM: CPT

## 2020-10-06 NOTE — PROGRESS NOTES
Mony Simmons is a 75 y.o. male presenting for Cholesterol Problem (follow up); Hypertension; and Immunization/Injection (flu shot)  . 1. Have you been to the ER, urgent care clinic since your last visit? Hospitalized since your last visit? No    2. Have you seen or consulted any other health care providers outside of the [address] [place] [address] since your last visit? Include any pap smears or colon screening. No    Fall Risk Assessment, last 12 mths 1/9/2020   Able to walk? Yes   Fall in past 12 months? No         Abuse Screening Questionnaire 5/22/2019   Do you ever feel afraid of your partner? N   Are you in a relationship with someone who physically or mentally threatens you? N   Is it safe for you to go home? Y       3 most recent PHQ Screens 1/9/2020   Little interest or pleasure in doing things Not at all   Feeling down, depressed, irritable, or hopeless Not at all   Total Score PHQ 2 0       There are no discontinued medications. After obtaining verbal consent and per orders of Dr. Madison Gilmore, injection of Fluad  given by Maryjane Bah LPN. Order and injection/medication verified by second nurse/ma review by Mere Anguiano. Patient tolerated procedure well.  No reactions noted

## 2020-10-06 NOTE — PROGRESS NOTES
Kwesi Simmons is a 75 y.o. male and presents with Cholesterol Problem (follow up); Hypertension; and Immunization/Injection (flu shot)      Subjective:  Ms. today comes in today for follow-up of chronic medical problems. Patient had an updated sleep study done an appointment with a sleep specialist.  He reports he has been using his CPAP. He does report compliance with his CPAP machine. Blood pressure, slightly elevated today. He denies headache or blurred vision. He is on atenolol and hydralazine. He reports he just took his blood pressure medication. Reports he was just anxious coming to the clinic today. He has tried to modify his diet and has lost 11 pounds since last office visit. Chronic low back pain for which patient followed up with Dr. Isamar Delgado. No surgical intervention required. He was referred to physical therapy which he has been doing religiously. He tried to take Flexeril as needed however that upset his stomach. He has not yet followed up with Dr. Melissa Whittaker.      Recap-6/20-Morbid obesity. He is trying to lose his weight and following a low-carb diet. He is lost 6 pounds from his last visit. Because of his weight and back pain he is not been able to do a lot of physical activity. Not interested in gastric bypass and never followed up with obesity clinic. Never had any nutrition counseling. Recap- follow-up of his prediabetes, hypertension, dyslipidemia. Patient is morbidly obese unfortunately is up 10 pounds since his last visit in December. He does admit to being noncompliant with his diet. He also has obstructive sleep apnea but has not followed up with his sleep specialist.  He is noncompliant with his CPAP mask. Patient has a history of prediabetes his last A1c was 5.8. He does admit over the holidays his diet has been full of carbs and soda. He denies numbness or tingling sensation, polydipsia or polyuria.   He has dyslipidemia in particular hypertriglyceridemia but is not on any medication. Blood pressure is well controlled on current meds. He was on Benicar in the past but that was switched secondary to persistent cough. Currently remains on atenolol and hydralazine. He reports he is chronic lower back pain which inhibits him to work out. Past Medical History:   Diagnosis Date    Allergic rhinitis 11/22/2017    Chest pain syndrome 11/22/2017    Comments: abnl stress cardiolite 2006, subsequent to that, he had a normal cardiac cath   [facility] acquired pneumonia     Diarrhea 11/22/2017    Dyspnea 11/22/2017    Environmental allergies     Gout 11/22/2017    Hypertension     Insomnia 11/22/2017    Iron deficiency 11/22/2017    Left chronic serous otitis media 11/22/2017    Obesity 11/22/2017    Comments: morbid    Sleep apnea     Steatosis 11/22/2017    Comments: non-alcoholic     Past Surgical History:   Procedure Laterality Date    HX ORTHOPAEDIC      right knee     Allergies   Allergen Reactions    Ace Inhibitors Cough     Current Outpatient Medications   Medication Sig Dispense Refill    fenofibrate nanocrystallized (TRICOR) 145 mg tablet TAKE 1 TABLET BY MOUTH EVERY DAY 90 Tab 0    bumetanide (BUMEX) 1 mg tablet TAKE 2 TABLETS BY MOUTH EVERY  Tab 0    atenoloL (TENORMIN) 50 mg tablet TAKE 1 TABLET BY MOUTH EVERY DAY 90 Tab 3    hydrALAZINE (APRESOLINE) 25 mg tablet TAKE 1 TABLET BY MOUTH TWICE DAILY 180 Tab 3    allopurinol (ZYLOPRIM) 300 mg tablet Take  by mouth daily.  omeprazole (PRILOSEC OTC) 20 mg tablet Take 20 mg by mouth daily.  XG-UT-JA-Fe-Min-Lycopen-Lutein (CENTRUM) 0.4-162-18 mg Tab Take  by mouth.  aspirin 81 mg chewable tablet Take 81 mg by mouth daily.          Social History     Socioeconomic History    Marital status:      Spouse name: Not on file    Number of children: Not on file    Years of education: Not on file    Highest education level: Not on file   Tobacco Use    Smoking status: Never Smoker    Smokeless tobacco: Never Used   Substance and Sexual Activity    Alcohol use: Yes     Comment: rarely    Drug use: Never     Family History   Problem Relation Age of Onset    Hypertension Other     Heart Disease Other     Diabetes Other     Diabetes Mother     Diabetes Father        Review of Systems  ROS is unremarkable except for ones highlighted in bold.    Constitutional: negative for fevers, chills, anorexia and weight loss  Eyes:   negative for visual disturbance and irritation  ENT:   negative for tinnitus,sore throat,nasal congestion,ear pain,hoarseness  Respiratory:  negative for cough, hemoptysis, dyspnea,wheezing  CV:   negative for chest pain, palpitations, lower extremity edema  GI:   negative for nausea, vomiting, diarrhea, abdominal pain,melena  Endo:               negative for polyuria,polydipsia,polyphagia,heat intolerance  Genitourinary: negative for frequency, dysuria and hematuria  Integumentary: negative for rash and pruritus  Hematologic:  negative for easy bruising and gum/nose bleeding  Musculoskel: negative for myalgias, arthralgias, back pain, muscle weakness, joint pain  Neurological:  negative for headaches, dizziness, vertigo, memory problems and gait   Behavl/Psych: negative for feelings of anxiety, depression, mood changes  ROS otherwise negative     Objective:  Visit Vitals  BP (!) 154/94 (BP 1 Location: Left arm, BP Patient Position: Sitting)   Pulse 76   Temp 97.1 °F (36.2 °C)   Resp 14   Ht 5' 11\" (1.803 m)   Wt (!) 380 lb 6.4 oz (172.5 kg)   SpO2 94%   BMI 53.06 kg/m²     Physical Exam:   General appearance - alert, well appearing, and in no distress  Mental status - alert, oriented to person, place, and time  EYE-TOÑITO, EOMI, fundi normal, corneas normal, no foreign bodies  ENT-ENT exam normal, no neck nodes or sinus tenderness  Nose - normal and patent, no erythema, discharge or polyps  Mouth - mucous membranes moist, pharynx normal without lesions  Neck - supple, no significant adenopathy   Chest - clear to auscultation, no wheezes, rales or rhonchi, symmetric air entry   Heart - normal rate, regular rhythm, normal S1, S2, no murmurs, rubs, clicks or gallops   Abdomen - soft, nontender, nondistended, no masses or organomegaly  Lymph- no adenopathy palpable  Ext-peripheral pulses normal, no pedal edema, no clubbing or cyanosis  Skin-Warm and dry. no hyperpigmentation, vitiligo, or suspicious lesions  Neuro -alert, oriented, normal speech, no focal findings or movement disorder noted  Musculoskeletal- FROM, no bony abnormalities, left hip tenderness        Lab Review:  Results for orders placed or performed in visit on 93/50/40   METABOLIC PANEL, COMPREHENSIVE   Result Value Ref Range    Glucose 96 75 - 110 mg/dL    BUN 21.0 (H) 9.0 - 20.0 mg/dL    Creatinine 1.2 0.8 - 1.5 mg/dL    Sodium 144 137 - 145 mmol/L    Potassium 4.6 3.6 - 5.0 mmol/L    Chloride 102 98 - 107 mmol/L    CO2 33.0 (H) 22.0 - 32.0 mmol/L    Calcium 9.7 8.4 - 10.2 mg/dl    Protein, total 7.5 6.3 - 8.2 g/dL    Albumin 4.1 3.9 - 5.4 g/dL    ALT (SGPT) 26 0 - 50 U/L    AST (SGOT) 24.0 14.0 - 36.0 U/L    Alk.  phosphatase 113 38 - 126 U/L    Bilirubin, total 0.4 0.2 - 1.3 mg/dL    BUN/Creatinine ratio 18 Ratio    GFR est AA >60 mL/min/1.73m2    GFR est non-AA >60 mL/min/1.73m2    Globulin 3.40     A-G Ratio 1.2 Ratio    Anion gap 9 mmol/L   LIPID PANEL   Result Value Ref Range    Cholesterol, total 183 0 - 200 mg/dL    Triglyceride 350 (H) 0 - 200 mg/dL    HDL Cholesterol 33 (L) 35 - 130 mg/dL    VLDL 70 mg/dL    LDL, calculated 80 0 - 130 mg/dL    CHOL/HDL Ratio 6 (H) 0 - 4 Ratio    LDL/HDL Ratio 2 Ratio   CBC W/O DIFF   Result Value Ref Range    WBC 7.0 4.1 - 10.9 K/uL    RBC 4.46 4.20 - 6.30 M/uL    HGB 13.3 12.0 - 18.0 g/dL    HCT 42.5 37.0 - 51.0 %    MCH 29.8 26.0 - 32.0 pg    MCHC 31.3 30.0 - 36.0 g/dL    MCV 95.2 80.0 - 97.0 fL    RDW 15.6 %    PLATELET 779.9 450.0 - 440.0 K/uL Documenation Review:  Hypertension. Hyperlipidemia. Obesity. Sleep apnea, on CPAP, but not using it regularly. Gout. Metabolic syndrome. Prediabetes  Chest pain syndrome, followed by abnormal stress Cardiolite, 2006. Following the Cardiolite he had a cardiac catheterization that showed normal left ventricular function and no significant coronary artery disease. Assessment/Plan:    Diagnoses and all orders for this visit:    1. TERESA (obstructive sleep apnea)    2. Diabetes mellitus type 2, diet-controlled (HCC)  -     HEMOGLOBIN A1C W/O EAG    3. Metabolic syndrome    4. Essential hypertension, benign  -     CBC W/O DIFF  -     METABOLIC PANEL, COMPREHENSIVE    5. Hypertriglyceridemia  -     LIPID PANEL    6. Needs flu shot  -     FLU (FLUAD QUAD INFLUENZA VACCINE,QUAD,ADJUVANTED)      Continue current meds. I will call with lab results and make further recommendations or adjustments if necessary. Discussed lifestyle modifications including Na restriction, low carb/fat diet, weight reduction and exercise (at least a walking program). ICD-10-CM ICD-9-CM    1. TERESA (obstructive sleep apnea)  G47.33 327.23    2. Diabetes mellitus type 2, diet-controlled (HCC)  E11.9 250.00 HEMOGLOBIN A1C W/O EAG   3. Metabolic syndrome  V81.11 277.7    4. Essential hypertension, benign  I10 401.1 CBC W/O DIFF      METABOLIC PANEL, COMPREHENSIVE   5. Hypertriglyceridemia  E78.1 272.1 LIPID PANEL   6. Needs flu shot  Z23 V04.81 FLU (FLUAD QUAD INFLUENZA VACCINE,QUAD,ADJUVANTED)             I have reviewed with the patient details of the assessment and plan and all questions were answered. Relevent patient education was performed. Verbal and/or written instructions (see AVS) provided. The most recent lab findings were reviewed with the patient. Plan was discussed with patient who verbally expressed understanding. An After Visit Summary was printed and given to the patient.     Deshaun Marquez MD

## 2020-10-06 NOTE — PATIENT INSTRUCTIONS
Vaccine Information Statement Influenza (Flu) Vaccine (Inactivated or Recombinant): What You Need to Know Many Vaccine Information Statements are available in Malay and other languages. See www.immunize.org/vis Hojas de información sobre vacunas están disponibles en español y en muchos otros idiomas. Visite www.immunize.org/vis 1. Why get vaccinated? Influenza vaccine can prevent influenza (flu). Flu is a contagious disease that spreads around the [facility] every year, usually between October and May. Anyone can get the flu, but it is more dangerous for some people. Infants and young children, people 72years of age and older, pregnant women, and people with certain health conditions or a weakened immune system are at greatest risk of flu complications. Pneumonia, bronchitis, sinus infections and ear infections are examples of flu-related complications. If you have a medical condition, such as heart disease, cancer or diabetes, flu can make it worse. Flu can cause fever and chills, sore throat, muscle aches, fatigue, cough, headache, and runny or stuffy nose. Some people may have vomiting and diarrhea, though this is more common in children than adults. Each year thousands of people in the [facility] die from flu, and many more are hospitalized. Flu vaccine prevents millions of illnesses and flu-related visits to the doctor each year. 2. Influenza vaccines CDC recommends everyone 10months of age and older get vaccinated every flu season. Children 6 months through 6years of age may need 2 doses during a single flu season. Everyone else needs only 1 dose each flu season. It takes about 2 weeks for protection to develop after vaccination. There are many flu viruses, and they are always changing. Each year a new flu vaccine is made to protect against three or four viruses that are likely to cause disease in the upcoming flu season.  Even when the vaccine doesnt exactly match these viruses, it may still provide some protection. Influenza vaccine does not cause flu. Influenza vaccine may be given at the same time as other vaccines. 3. Talk with your health care provider Tell your vaccine provider if the person getting the vaccine: 
 Has had an allergic reaction after a previous dose of influenza vaccine, or has any severe, life-threatening allergies.  Has ever had Guillain-Barré Syndrome (also called GBS). In some cases, your health care provider may decide to postpone influenza vaccination to a future visit. People with minor illnesses, such as a cold, may be vaccinated. People who are moderately or severely ill should usually wait until they recover before getting influenza vaccine. Your health care provider can give you more information. 4. Risks of a reaction  Soreness, redness, and swelling where shot is given, fever, muscle aches, and headache can happen after influenza vaccine.  There may be a very small increased risk of Guillain-Barré Syndrome (GBS) after inactivated influenza vaccine (the flu shot). [facility] children who get the flu shot along with pneumococcal vaccine (PCV13), and/or DTaP vaccine at the same time might be slightly more likely to have a seizure caused by fever. Tell your health care provider if a child who is getting flu vaccine has ever had a seizure. People sometimes faint after medical procedures, including vaccination. Tell your provider if you feel dizzy or have vision changes or ringing in the ears. As with any medicine, there is a very remote chance of a vaccine causing a severe allergic reaction, other serious injury, or death. 5. What if there is a serious problem? An allergic reaction could occur after the vaccinated person leaves the clinic.  If you see signs of a severe allergic reaction (hives, swelling of the face and throat, difficulty breathing, a fast heartbeat, dizziness, or weakness), call 9-1-1 and get the person to the nearest hospital. 
 
For other signs that concern you, call your health care provider. Adverse reactions should be reported to the Vaccine Adverse Event Reporting System (VAERS). Your health care provider will usually file this report, or you can do it yourself. Visit the VAERS website at www.vaers. hhs.gov or call 3-929.110.8346. VAERS is only for reporting reactions, and VAERS staff do not give medical advice. 6. The National Vaccine Injury Compensation Program 
 
The [facility] Vaccine Injury Compensation Program (VICP) is a federal program that was created to compensate people who may have been injured by certain vaccines. Visit the VICP website at www.hrsa.gov/vaccinecompensation or call 1-317.334.2774 to learn about the program and about filing a claim. There is a time limit to file a claim for compensation. 7. How can I learn more?  Ask your health care provider.  Call your local or state health department.  Contact the Centers for Disease Control and Prevention (CDC): 
- Call 9-401.928.9496 (4-628-FJO-INFO) or 
- Visit CDCs influenza website at www.cdc.gov/flu Vaccine Information Statement (Interim) Inactivated Influenza Vaccine 8/15/2019 
[address] 943VI-99 Department of Health and [employer] Centers for Disease Control and Prevention Office Use Only

## 2020-10-12 ENCOUNTER — OFFICE VISIT (OUTPATIENT)
Dept: INTERNAL MEDICINE CLINIC | Age: 65
End: 2020-10-12
Payer: MEDICARE

## 2020-10-12 VITALS
RESPIRATION RATE: 14 BRPM | DIASTOLIC BLOOD PRESSURE: 88 MMHG | TEMPERATURE: 98.1 F | SYSTOLIC BLOOD PRESSURE: 148 MMHG | WEIGHT: 315 LBS | HEIGHT: 71 IN | OXYGEN SATURATION: 91 % | HEART RATE: 71 BPM | BODY MASS INDEX: 44.1 KG/M2

## 2020-10-12 DIAGNOSIS — I73.9 PERIPHERAL VASCULAR DISEASE (HCC): ICD-10-CM

## 2020-10-12 DIAGNOSIS — I10 ESSENTIAL HYPERTENSION, BENIGN: ICD-10-CM

## 2020-10-12 DIAGNOSIS — I87.2 VENOUS STASIS DERMATITIS OF BOTH LOWER EXTREMITIES: ICD-10-CM

## 2020-10-12 DIAGNOSIS — E11.40 TYPE 2 DIABETES MELLITUS WITH DIABETIC NEUROPATHY, WITHOUT LONG-TERM CURRENT USE OF INSULIN (HCC): ICD-10-CM

## 2020-10-12 DIAGNOSIS — M79.2 NEUROPATHIC PAIN: Primary | ICD-10-CM

## 2020-10-12 PROCEDURE — 2022F DILAT RTA XM EVC RTNOPTHY: CPT | Performed by: INTERNAL MEDICINE

## 2020-10-12 PROCEDURE — 3044F HG A1C LEVEL LT 7.0%: CPT | Performed by: INTERNAL MEDICINE

## 2020-10-12 PROCEDURE — G8432 DEP SCR NOT DOC, RNG: HCPCS | Performed by: INTERNAL MEDICINE

## 2020-10-12 PROCEDURE — G8753 SYS BP > OR = 140: HCPCS | Performed by: INTERNAL MEDICINE

## 2020-10-12 PROCEDURE — G8417 CALC BMI ABV UP PARAM F/U: HCPCS | Performed by: INTERNAL MEDICINE

## 2020-10-12 PROCEDURE — 99214 OFFICE O/P EST MOD 30 MIN: CPT | Performed by: INTERNAL MEDICINE

## 2020-10-12 PROCEDURE — 1101F PT FALLS ASSESS-DOCD LE1/YR: CPT | Performed by: INTERNAL MEDICINE

## 2020-10-12 PROCEDURE — G8754 DIAS BP LESS 90: HCPCS | Performed by: INTERNAL MEDICINE

## 2020-10-12 PROCEDURE — 3017F COLORECTAL CA SCREEN DOC REV: CPT | Performed by: INTERNAL MEDICINE

## 2020-10-12 PROCEDURE — G8427 DOCREV CUR MEDS BY ELIG CLIN: HCPCS | Performed by: INTERNAL MEDICINE

## 2020-10-12 PROCEDURE — G8536 NO DOC ELDER MAL SCRN: HCPCS | Performed by: INTERNAL MEDICINE

## 2020-10-12 RX ORDER — HYDRALAZINE HYDROCHLORIDE 25 MG/1
50 TABLET, FILM COATED ORAL 2 TIMES DAILY
Qty: 180 TAB | Refills: 3
Start: 2020-10-12 | End: 2020-11-30

## 2020-10-12 RX ORDER — GABAPENTIN 300 MG/1
300 CAPSULE ORAL
Qty: 14 CAP | Refills: 0 | Status: SHIPPED | OUTPATIENT
Start: 2020-10-12 | End: 2020-10-27 | Stop reason: SDUPTHER

## 2020-10-12 NOTE — PROGRESS NOTES
Joellen Medley is a 75 y.o. male presenting for Leg Pain; Foot Swelling (bilateral); and Dizziness  . 1. Have you been to the ER, urgent care clinic since your last visit? Hospitalized since your last visit? No    2. Have you seen or consulted any other health care providers outside of the [address] [place] [address] since your last visit? Include any pap smears or colon screening. No    Fall Risk Assessment, last 12 mths 1/9/2020   Able to walk? Yes   Fall in past 12 months? No         Abuse Screening Questionnaire 5/22/2019   Do you ever feel afraid of your partner? N   Are you in a relationship with someone who physically or mentally threatens you? N   Is it safe for you to go home? Y       3 most recent PHQ Screens 1/9/2020   Little interest or pleasure in doing things Not at all   Feeling down, depressed, irritable, or hopeless Not at all   Total Score PHQ 2 0       There are no discontinued medications.

## 2020-10-12 NOTE — PATIENT INSTRUCTIONS
High Blood Pressure: Care Instructions Overview It's normal for blood pressure to go up and down throughout the day. But if it stays up, you have high blood pressure. Another name for high blood pressure is hypertension. Despite what a lot of people think, high blood pressure usually doesn't cause headaches or make you feel dizzy or lightheaded. It usually has no symptoms. But it does increase your risk of stroke, heart attack, and other problems. You and your doctor will talk about your risks of these problems based on your blood pressure. Your doctor will give you a goal for your blood pressure. Your goal will be based on your health and your age. Lifestyle changes, such as eating healthy and being active, are always important to help lower blood pressure. You might also take medicine to reach your blood pressure goal. 
Follow-up care is a key part of your treatment and safety. Be sure to make and go to all appointments, and call your doctor if you are having problems. It's also a good idea to know your test results and keep a list of the medicines you take. How can you care for yourself at home? Medical treatment · If you stop taking your medicine, your blood pressure will go back up. You may take one or more types of medicine to lower your blood pressure. Be safe with medicines. Take your medicine exactly as prescribed. Call your doctor if you think you are having a problem with your medicine. · Talk to your doctor before you start taking aspirin every day. Aspirin can help certain people lower their risk of a heart attack or stroke. But taking aspirin isn't right for everyone, because it can cause serious bleeding. · See your doctor regularly. You may need to see the doctor more often at first or until your blood pressure comes down. · If you are taking blood pressure medicine, talk to your doctor before you take decongestants or anti-inflammatory medicine, such as ibuprofen. Some of these medicines can raise blood pressure. · Learn how to check your blood pressure at home. Lifestyle changes · Stay at a healthy weight. This is especially important if you put on weight around the waist. Losing even 10 pounds can help you lower your blood pressure. · If your doctor recommends it, get more exercise. Walking is a good choice. Bit by bit, increase the amount you walk every day. Try for at least 30 minutes on most days of the week. You also may want to swim, bike, or do other activities. · Avoid or limit alcohol. Talk to your doctor about whether you can drink any alcohol. · Try to limit how much sodium you eat to less than 2,300 milligrams (mg) a day. Your doctor may ask you to try to eat less than 1,500 mg a day. · Eat plenty of fruits (such as bananas and oranges), vegetables, legumes, whole grains, and low-fat dairy products. · Lower the amount of saturated fat in your diet. Saturated fat is found in animal products such as milk, cheese, and meat. Limiting these foods may help you lose weight and also lower your risk for heart disease. · Do not smoke. Smoking increases your risk for heart attack and stroke. If you need help quitting, talk to your doctor about stop-smoking programs and medicines. These can increase your chances of quitting for good. When should you call for help? Call  911 anytime you think you may need emergency care. This may mean having symptoms that suggest that your blood pressure is causing a serious heart or blood vessel problem. Your blood pressure may be over 180/120. For example, call 911 if: 
  · You have symptoms of a heart attack. These may include: 
? Chest pain or pressure, or a strange feeling in the chest. 
? Sweating. ? Shortness of breath. ? Nausea or vomiting. ? Pain, pressure, or a strange feeling in the back, neck, jaw, or upper belly or in one or both shoulders or arms. ? Lightheadedness or sudden weakness. ? A fast or irregular heartbeat.  
  · You have symptoms of a stroke. These may include: 
? Sudden numbness, tingling, weakness, or loss of movement in your face, arm, or leg, especially on only one side of your body. ? Sudden vision changes. ? Sudden trouble speaking. ? Sudden confusion or trouble understanding simple statements. ? Sudden problems with walking or balance. ? A sudden, severe headache that is different from past headaches.  
  · You have severe back or belly pain. Do not wait until your blood pressure comes down on its own. Get help right away. Call your doctor now or seek immediate care if: 
  · Your blood pressure is much higher than normal (such as 180/120 or higher), but you don't have symptoms.  
  · You think high blood pressure is causing symptoms, such as: 
? Severe headache. 
? Blurry vision. Watch closely for changes in your health, and be sure to contact your doctor if: 
  · Your blood pressure measures higher than your doctor recommends at least 2 times. That means the top number is higher or the bottom number is higher, or both.  
  · You think you may be having side effects from your blood pressure medicine. Where can you learn more? Go to http://www.gray.com/ Enter S230 in the search box to learn more about \"High Blood Pressure: Care Instructions. \" Current as of: December 16, 2019               Content Version: 12.6 © 7750-4859 [employer]. Care instructions adapted under license by [employer] (which disclaims liability or warranty for this information). If you have questions about a medical condition or this instruction, always ask your healthcare professional. [place] [address] any warranty or liability for your use of this information.

## 2020-10-12 NOTE — PROGRESS NOTES
Cammie Garcia is a 75 y.o. male and presents with Leg Pain; Foot Swelling (bilateral); and Dizziness      Subjective:  Ms. today comes in today for acute care visit. Patient reports he has been having lightheadedness and elevated blood pressures in the past few weeks. Denies headaches. His blood pressure today in the office was not at goal.  He is already on hydralazine 25 mg p.o. twice daily, atenolol 50 mg p.o. daily. He has lost more than 15 pounds in the past 6 months. reports of neuropathic pain bilateral lower extremity. It is associated with leg pain and swelling. Does have chronic insufficiency and bilateral venous status dermatitis. Does report taking Bumex 2 mg p.o. daily. He does have a history of peripheral vascular disease? Not on statin? Takes a daily aspirin. Reports numbness and tingling sensation in bilateral foot. Does have a history of prediabetes. Diet controlled. reports of a boil-left thigh medial aspect. Recap-Patient had an updated sleep study done an appointment with a sleep specialist.  He reports he has been using his CPAP. He does report compliance with his CPAP machine. Chronic low back pain for which patient followed up with Dr. Bennie Abel. No surgical intervention required. He was referred to physical therapy which he has been doing religiously. He tried to take Flexeril as needed however that upset his stomach. He has not yet followed up with Dr. Cherylene Shank.    Recap-6/20-Morbid obesity. He is trying to lose his weight and following a low-carb diet. He is lost 6 pounds from his last visit. Because of his weight and back pain he is not been able to do a lot of physical activity. Not interested in gastric bypass and never followed up with obesity clinic. Never had any nutrition counseling. Recap- follow-up of his prediabetes, hypertension, dyslipidemia. Patient is morbidly obese unfortunately is up 10 pounds since his last visit in December.   He does admit to being noncompliant with his diet. He also has obstructive sleep apnea but has not followed up with his sleep specialist.  He is noncompliant with his CPAP mask. Patient has a history of prediabetes his last A1c was 5.8. He does admit over the holidays his diet has been full of carbs and soda. He denies numbness or tingling sensation, polydipsia or polyuria. He has dyslipidemia in particular hypertriglyceridemia but is not on any medication. Blood pressure is well controlled on current meds. He was on Benicar in the past but that was switched secondary to persistent cough. Currently remains on atenolol and hydralazine. He reports he is chronic lower back pain which inhibits him to work out. Past Medical History:   Diagnosis Date    Allergic rhinitis 11/22/2017    Chest pain syndrome 11/22/2017    Comments: abnl stress cardiolite 2006, subsequent to that, he had a normal cardiac cath   [facility] acquired pneumonia     Diarrhea 11/22/2017    Dyspnea 11/22/2017    Environmental allergies     Gout 11/22/2017    Hypertension     Insomnia 11/22/2017    Iron deficiency 11/22/2017    Left chronic serous otitis media 11/22/2017    Obesity 11/22/2017    Comments: morbid    Sleep apnea     Steatosis 11/22/2017    Comments: non-alcoholic     Past Surgical History:   Procedure Laterality Date    HX ORTHOPAEDIC      right knee     Allergies   Allergen Reactions    Ace Inhibitors Cough     Current Outpatient Medications   Medication Sig Dispense Refill    hydrALAZINE (APRESOLINE) 25 mg tablet Take 2 Tabs by mouth two (2) times a day. TAKE 1 TABLET BY MOUTH TWICE DAILY  Indications: high blood pressure 180 Tab 3    gabapentin (NEURONTIN) 300 mg capsule Take 1 Cap by mouth nightly as needed for Pain for up to 14 days.  Max Daily Amount: 300 mg. 14 Cap 0    fenofibrate nanocrystallized (TRICOR) 145 mg tablet TAKE 1 TABLET BY MOUTH EVERY DAY 90 Tab 0    bumetanide (BUMEX) 1 mg tablet TAKE 2 TABLETS BY MOUTH EVERY  Tab 0    atenoloL (TENORMIN) 50 mg tablet TAKE 1 TABLET BY MOUTH EVERY DAY 90 Tab 3    allopurinol (ZYLOPRIM) 300 mg tablet Take  by mouth daily.  omeprazole (PRILOSEC OTC) 20 mg tablet Take 20 mg by mouth daily.  BK-MJ-CH-Fe-Min-Lycopen-Lutein (CENTRUM) 0.4-162-18 mg Tab Take  by mouth.  aspirin 81 mg chewable tablet Take 81 mg by mouth daily. Social History     Socioeconomic History    Marital status:      Spouse name: Not on file    Number of children: Not on file    Years of education: Not on file    Highest education level: Not on file   Tobacco Use    Smoking status: Never Smoker    Smokeless tobacco: Never Used   Substance and Sexual Activity    Alcohol use: Yes     Comment: rarely    Drug use: Never     Family History   Problem Relation Age of Onset    Hypertension Other     Heart Disease Other     Diabetes Other     Diabetes Mother     Diabetes Father        Review of Systems  ROS is unremarkable except for ones highlighted in bold.    Constitutional: negative for fevers, chills, anorexia and weight loss  Eyes:   negative for visual disturbance and irritation  ENT:   negative for tinnitus,sore throat,nasal congestion,ear pain,hoarseness  Respiratory:  negative for cough, hemoptysis, dyspnea,wheezing  CV:   negative for chest pain, palpitations, lower extremity edema  GI:   negative for nausea, vomiting, diarrhea, abdominal pain,melena  Endo:               negative for polyuria,polydipsia,polyphagia,heat intolerance  Genitourinary: negative for frequency, dysuria and hematuria  Integumentary: negative for rash and pruritus  Hematologic:  negative for easy bruising and gum/nose bleeding, boil on thigh  Musculoskel: negative for myalgias, arthralgias, back pain, muscle weakness, joint pain, bilateral lower extremity pain, numbness and tingling  Neurological:  negative for headaches, dizziness, vertigo, memory problems and gait   Behavl/Psych: negative for feelings of anxiety, depression, mood changes  ROS otherwise negative     Objective:  Visit Vitals  BP (!) 148/88 (BP 1 Location: Left arm, BP Patient Position: Sitting)   Pulse 71   Temp 98.1 °F (36.7 °C)   Resp 14   Ht 5' 11\" (1.803 m)   Wt (!) 374 lb (169.6 kg)   SpO2 91%   BMI 52.16 kg/m²     Physical Exam:   General appearance - alert, well appearing, and in no distress  Mental status - alert, oriented to person, place, and time  EYE-TOÑITO, EOMI, fundi normal, corneas normal, no foreign bodies  ENT-ENT exam normal, no neck nodes or sinus tenderness  Nose - normal and patent, no erythema, discharge or polyps  Mouth - mucous membranes moist, pharynx normal without lesions  Neck - supple, no significant adenopathy   Chest - clear to auscultation, no wheezes, rales or rhonchi, symmetric air entry   Heart - normal rate, regular rhythm, normal S1, S2, no murmurs, rubs, clicks or gallops   Abdomen - soft, nontender, nondistended, no masses or organomegaly  Lymph- no adenopathy palpable  Ext-peripheral pulses normal, no pedal edema, no clubbing or cyanosis 1+ pitting edema, chronic dermatitis  Skin-Warm and dry.  no hyperpigmentation, vitiligo, or suspicious lesions  Neuro -alert, oriented, normal speech, no focal findings or movement disorder noted  Musculoskeletal- FROM, no bony abnormalities, left hip tenderness        Lab Review:  Results for orders placed or performed in visit on 10/06/20   CBC W/O DIFF   Result Value Ref Range    WBC 7.0 4.1 - 10.9 K/uL    RBC 4.47 4.20 - 6.30 M/uL    HGB 13.5 12.0 - 18.0 g/dL    HCT 42.3 37.0 - 51.0 %    MCH 30.2 26.0 - 32.0 pg    MCHC 31.9 30.0 - 36.0 g/dL    MCV 94.6 80.0 - 97.0 fL    RDW 15.1 %    PLATELET 156.1 541.7 - 875.3 K/uL   METABOLIC PANEL, COMPREHENSIVE   Result Value Ref Range    Glucose 90 75 - 110 mg/dL    BUN 26.0 (H) 9.0 - 20.0 mg/dL    Creatinine 1.3 0.8 - 1.5 mg/dL    Sodium 143 137 - 145 mmol/L    Potassium 4.3 3.6 - 5.0 mmol/L    Chloride 104 98 - 107 mmol/L    CO2 28.0 22.0 - 32.0 mmol/L    Calcium 9.6 8.4 - 10.2 mg/dl    Protein, total 7.7 6.3 - 8.2 g/dL    Albumin 4.4 3.9 - 5.4 g/dL    ALT (SGPT) 24 0 - 50 U/L    AST (SGOT) 26.0 14.0 - 36.0 U/L    Alk. phosphatase 76 38 - 126 U/L    Bilirubin, total 0.5 0.2 - 1.3 mg/dL    BUN/Creatinine ratio 20 Ratio    GFR est AA >60 mL/min/1.73m2    GFR est non-AA 55 <60 mL/min/1.73m2    Globulin 3.30     A-G Ratio 1.3 Ratio    Anion gap 11 mmol/L   LIPID PANEL   Result Value Ref Range    Cholesterol, total 189 0 - 200 mg/dL    Triglyceride 185 0 - 200 mg/dL    HDL Cholesterol 43 35 - 130 mg/dL    VLDL 37 mg/dL    LDL, calculated 109 0 - 130 mg/dL    CHOL/HDL Ratio 4 0 - 4 Ratio    LDL/HDL Ratio 3 Ratio   HEMOGLOBIN A1C W/O EAG   Result Value Ref Range    Hemoglobin A1c 5.5 4.0 - 5.7 %        Documenation Review:  Hypertension. Hyperlipidemia. Obesity. Sleep apnea, on CPAP, but not using it regularly. Gout. Metabolic syndrome. Prediabetes  Chest pain syndrome, followed by abnormal stress Cardiolite, 2006. Following the Cardiolite he had a cardiac catheterization that showed normal left ventricular function and no significant coronary artery disease. Assessment/Plan:    Diagnoses and all orders for this visit:    1. Neuropathic pain  -     gabapentin (NEURONTIN) 300 mg capsule; Take 1 Cap by mouth nightly as needed for Pain for up to 14 days. Max Daily Amount: 300 mg.  -     REFERRAL TO PODIATRY    2. Essential hypertension, benign  -     hydrALAZINE (APRESOLINE) 25 mg tablet; Take 2 Tabs by mouth two (2) times a day. TAKE 1 TABLET BY MOUTH TWICE DAILY  Indications: high blood pressure    3. Peripheral vascular disease ([address]    4. Type 2 diabetes mellitus with diabetic neuropathy, without long-term current use of insulin ([address]    5. Venous stasis dermatitis of both lower extremities      Referral for podiatrist placed. Increase hydralazine to 50 mg p.o. twice daily. Continue atenolol at 50 mg p.o. daily.   I have asked him to take his blood pressure readings over the next 2 weeks and get back to me. Gabapentin 300 mg p.o. as needed for neuropathic pain. Recommended to apply Neosporin ointment to the boil. Continue current meds. I will call with lab results and make further recommendations or adjustments if necessary. Discussed lifestyle modifications including Na restriction, low carb/fat diet, weight reduction and exercise (at least a walking program). ICD-10-CM ICD-9-CM    1. Neuropathic pain  M79.2 729.2 gabapentin (NEURONTIN) 300 mg capsule      REFERRAL TO PODIATRY   2. Essential hypertension, benign  I10 401.1 hydrALAZINE (APRESOLINE) 25 mg tablet   3. Peripheral vascular disease (HCC)  I73.9 443.9    4. Type 2 diabetes mellitus with diabetic neuropathy, without long-term current use of insulin (HCC)  E11.40 250.60      357.2    5. Venous stasis dermatitis of both lower extremities  I87.2 454.1              I have reviewed with the patient details of the assessment and plan and all questions were answered. Relevent patient education was performed. Verbal and/or written instructions (see AVS) provided. The most recent lab findings were reviewed with the patient. Plan was discussed with patient who verbally expressed understanding. An After Visit Summary was printed and given to the patient.     Rupali Fuentes MD

## 2020-10-27 ENCOUNTER — OFFICE VISIT (OUTPATIENT)
Dept: INTERNAL MEDICINE CLINIC | Age: 65
End: 2020-10-27
Payer: MEDICARE

## 2020-10-27 VITALS
DIASTOLIC BLOOD PRESSURE: 80 MMHG | RESPIRATION RATE: 14 BRPM | WEIGHT: 315 LBS | HEART RATE: 66 BPM | HEIGHT: 71 IN | BODY MASS INDEX: 44.1 KG/M2 | OXYGEN SATURATION: 95 % | TEMPERATURE: 98.4 F | SYSTOLIC BLOOD PRESSURE: 124 MMHG

## 2020-10-27 DIAGNOSIS — E88.81 METABOLIC SYNDROME: ICD-10-CM

## 2020-10-27 DIAGNOSIS — M79.2 NEUROPATHIC PAIN: ICD-10-CM

## 2020-10-27 DIAGNOSIS — G47.33 OSA (OBSTRUCTIVE SLEEP APNEA): ICD-10-CM

## 2020-10-27 DIAGNOSIS — I10 ESSENTIAL HYPERTENSION, BENIGN: Primary | ICD-10-CM

## 2020-10-27 PROCEDURE — 99214 OFFICE O/P EST MOD 30 MIN: CPT | Performed by: INTERNAL MEDICINE

## 2020-10-27 PROCEDURE — G8417 CALC BMI ABV UP PARAM F/U: HCPCS | Performed by: INTERNAL MEDICINE

## 2020-10-27 PROCEDURE — 3017F COLORECTAL CA SCREEN DOC REV: CPT | Performed by: INTERNAL MEDICINE

## 2020-10-27 PROCEDURE — G8427 DOCREV CUR MEDS BY ELIG CLIN: HCPCS | Performed by: INTERNAL MEDICINE

## 2020-10-27 PROCEDURE — G8752 SYS BP LESS 140: HCPCS | Performed by: INTERNAL MEDICINE

## 2020-10-27 PROCEDURE — 1101F PT FALLS ASSESS-DOCD LE1/YR: CPT | Performed by: INTERNAL MEDICINE

## 2020-10-27 PROCEDURE — G8754 DIAS BP LESS 90: HCPCS | Performed by: INTERNAL MEDICINE

## 2020-10-27 PROCEDURE — G8432 DEP SCR NOT DOC, RNG: HCPCS | Performed by: INTERNAL MEDICINE

## 2020-10-27 PROCEDURE — G8536 NO DOC ELDER MAL SCRN: HCPCS | Performed by: INTERNAL MEDICINE

## 2020-10-27 RX ORDER — GABAPENTIN 300 MG/1
300 CAPSULE ORAL
Qty: 30 CAP | Refills: 2 | Status: SHIPPED | OUTPATIENT
Start: 2020-10-27 | End: 2020-12-18 | Stop reason: SDUPTHER

## 2020-10-27 NOTE — PROGRESS NOTES
Dorothy Rock is a 75 y.o. male presenting for Hypertension (2 week fu)  . 1. Have you been to the ER, urgent care clinic since your last visit? Hospitalized since your last visit? No    2. Have you seen or consulted any other health care providers outside of the [address] [place] [address] since your last visit? Include any pap smears or colon screening. No    Fall Risk Assessment, last 12 mths 1/9/2020   Able to walk? Yes   Fall in past 12 months? No         Abuse Screening Questionnaire 5/22/2019   Do you ever feel afraid of your partner? N   Are you in a relationship with someone who physically or mentally threatens you? N   Is it safe for you to go home? Y       3 most recent PHQ Screens 1/9/2020   Little interest or pleasure in doing things Not at all   Feeling down, depressed, irritable, or hopeless Not at all   Total Score PHQ 2 0       There are no discontinued medications.

## 2020-10-27 NOTE — PATIENT INSTRUCTIONS
Starting a Weight Loss Plan: Care Instructions Your Care Instructions If you are thinking about losing weight, it can be hard to know where to start. Your doctor can help you set up a weight loss plan that best meets your needs. You may want to take a class on nutrition or exercise, or join a weight loss support group. If you have questions about how to make changes to your eating or exercise habits, ask your doctor about seeing a registered dietitian or an exercise specialist. 
It can be a big challenge to lose weight. But you do not have to make huge changes at once. Make small changes, and stick with them. When those changes become habit, add a few more changes. If you do not think you are ready to make changes right now, try to pick a date in the future. Make an appointment to see your doctor to discuss whether the time is right for you to start a plan. Follow-up care is a key part of your treatment and safety. Be sure to make and go to all appointments, and call your doctor if you are having problems. It's also a good idea to know your test results and keep a list of the medicines you take. How can you care for yourself at home? · Set realistic goals. Many people expect to lose much more weight than is likely. A weight loss of 5% to 10% of your body weight may be enough to improve your health. · Get family and friends involved to provide support. Talk to them about why you are trying to lose weight, and ask them to help. They can help by participating in exercise and having meals with you, even if they may be eating something different. · Find what works best for you. If you do not have time or do not like to cook, a program that offers meal replacement bars or shakes may be better for you. Or if you like to prepare meals, finding a plan that includes daily menus and recipes may be best. 
· Ask your doctor about other health professionals who can help you achieve your weight loss goals. ? A dietitian can help you make healthy changes in your diet. ? An exercise specialist or  can help you develop a safe and effective exercise program. 
? A counselor or psychiatrist can help you cope with issues such as depression, anxiety, or family problems that can make it hard to focus on weight loss. · Consider joining a support group for people who are trying to lose weight. Your doctor can suggest groups in your area. Where can you learn more? Go to http://mike-rivas.info/ Enter O093 in the search box to learn more about \"Starting a Weight Loss Plan: Care Instructions. \" Current as of: December 11, 2019               Content Version: 12.6 © 9564-4055 [employer]. Care instructions adapted under license by [employer] (which disclaims liability or warranty for this information). If you have questions about a medical condition or this instruction, always ask your healthcare professional. [address] any warranty or liability for your use of this information.

## 2020-10-27 NOTE — PROGRESS NOTES
Lauren Conner is a 75 y.o. male and presents with Hypertension (2 week fu)      Subjective:  Ms. today comes in today for for follow-up of his blood pressure. Last office visit I recommended him to increase his hydralazine to 50 mg p.o. twice daily and continue taking atenolol. His home blood pressure readings are much better now. His BP in office was 124/80. Denies headaches or blurred vision. Continues to work on his lifestyle modification and has lost over 20 pounds in the past 6 months. Encouragement provided. Chronic bilateral lower extremity painneuro pathic. On gabapentin as needed. Does have chronic venous insufficiency and bilateral venous status dermatitis. On Bumex 2 mg p.o. daily. He does have a history of peripheral vascular disease? Not on statin? Takes a daily aspirin. Recap-Patient had an updated sleep study done an appointment with a sleep specialist.  He reports he has been using his CPAP. He does report compliance with his CPAP machine. Chronic low back pain for which patient followed up with Dr. Robyn Odonnell. No surgical intervention required. He was referred to physical therapy which he has been doing religiously. He tried to take Flexeril as needed however that upset his stomach.   He has not yet followed up with Dr. Nick Willson.      Past Medical History:   Diagnosis Date    Allergic rhinitis 11/22/2017    Chest pain syndrome 11/22/2017    Comments: abnl stress cardiolite 2006, subsequent to that, he had a normal cardiac cath   [facility] acquired pneumonia     Diarrhea 11/22/2017    Dyspnea 11/22/2017    Environmental allergies     Gout 11/22/2017    Hypertension     Insomnia 11/22/2017    Iron deficiency 11/22/2017    Left chronic serous otitis media 11/22/2017    Obesity 11/22/2017    Comments: morbid    Sleep apnea     Steatosis 11/22/2017    Comments: non-alcoholic     Past Surgical History:   Procedure Laterality Date    HX ORTHOPAEDIC      right knee Allergies   Allergen Reactions    Ace Inhibitors Cough     Current Outpatient Medications   Medication Sig Dispense Refill    gabapentin (NEURONTIN) 300 mg capsule Take 1 Cap by mouth nightly as needed for Pain for up to 14 days. Max Daily Amount: 300 mg. 30 Cap 2    hydrALAZINE (APRESOLINE) 25 mg tablet Take 2 Tabs by mouth two (2) times a day. TAKE 1 TABLET BY MOUTH TWICE DAILY  Indications: high blood pressure 180 Tab 3    fenofibrate nanocrystallized (TRICOR) 145 mg tablet TAKE 1 TABLET BY MOUTH EVERY DAY 90 Tab 0    bumetanide (BUMEX) 1 mg tablet TAKE 2 TABLETS BY MOUTH EVERY  Tab 0    atenoloL (TENORMIN) 50 mg tablet TAKE 1 TABLET BY MOUTH EVERY DAY 90 Tab 3    allopurinol (ZYLOPRIM) 300 mg tablet Take  by mouth daily.  omeprazole (PRILOSEC OTC) 20 mg tablet Take 20 mg by mouth daily.  TY-PR-SR-Fe-Min-Lycopen-Lutein (CENTRUM) 0.4-162-18 mg Tab Take  by mouth.  aspirin 81 mg chewable tablet Take 81 mg by mouth daily. Social History     Socioeconomic History    Marital status:      Spouse name: Not on file    Number of children: Not on file    Years of education: Not on file    Highest education level: Not on file   Tobacco Use    Smoking status: Never Smoker    Smokeless tobacco: Never Used   Substance and Sexual Activity    Alcohol use: Yes     Comment: rarely    Drug use: Never     Family History   Problem Relation Age of Onset    Hypertension Other     Heart Disease Other     Diabetes Other     Diabetes Mother     Diabetes Father        Review of Systems  ROS is unremarkable except for ones highlighted in bold.    Constitutional: negative for fevers, chills, anorexia and weight loss  Eyes:   negative for visual disturbance and irritation  ENT:   negative for tinnitus,sore throat,nasal congestion,ear pain,hoarseness  Respiratory:  negative for cough, hemoptysis, dyspnea,wheezing  CV:   negative for chest pain, palpitations, lower extremity edema  GI:   negative for nausea, vomiting, diarrhea, abdominal pain,melena  Endo:               negative for polyuria,polydipsia,polyphagia,heat intolerance  Genitourinary: negative for frequency, dysuria and hematuria  Integumentary: negative for rash and pruritus  Hematologic:  negative for easy bruising and gum/nose bleeding, boil on thigh  Musculoskel: negative for myalgias, arthralgias, back pain, muscle weakness, joint pain, bilateral lower extremity pain, numbness and tingling  Neurological:  negative for headaches, dizziness, vertigo, memory problems and gait   Behavl/Psych: negative for feelings of anxiety, depression, mood changes  ROS otherwise negative     Objective:  Visit Vitals  /80 (BP 1 Location: Left arm, BP Patient Position: Sitting)   Pulse 66   Temp 98.4 °F (36.9 °C)   Resp 14   Ht 5' 11\" (1.803 m)   Wt (!) 372 lb (168.7 kg)   SpO2 95%   BMI 51.88 kg/m²     Physical Exam:   General appearance - alert, well appearing, and in no distress  Mental status - alert, oriented to person, place, and time  EYE-TOÑITO, EOMI, fundi normal, corneas normal, no foreign bodies  ENT-ENT exam normal, no neck nodes or sinus tenderness  Nose - normal and patent, no erythema, discharge or polyps  Mouth - mucous membranes moist, pharynx normal without lesions  Neck - supple, no significant adenopathy   Chest - clear to auscultation, no wheezes, rales or rhonchi, symmetric air entry   Heart - normal rate, regular rhythm, normal S1, S2, no murmurs, rubs, clicks or gallops   Abdomen - soft, nontender, nondistended, no masses or organomegaly  Lymph- no adenopathy palpable  Ext-peripheral pulses normal, no pedal edema, no clubbing or cyanosis 1+ pitting edema, chronic dermatitis  Skin-Warm and dry.  no hyperpigmentation, vitiligo, or suspicious lesions  Neuro -alert, oriented, normal speech, no focal findings or movement disorder noted  Musculoskeletal- FROM, no bony abnormalities, left hip tenderness        Lab Review:  Results for orders placed or performed in visit on 10/06/20   CBC W/O DIFF   Result Value Ref Range    WBC 7.0 4.1 - 10.9 K/uL    RBC 4.47 4.20 - 6.30 M/uL    HGB 13.5 12.0 - 18.0 g/dL    HCT 42.3 37.0 - 51.0 %    MCH 30.2 26.0 - 32.0 pg    MCHC 31.9 30.0 - 36.0 g/dL    MCV 94.6 80.0 - 97.0 fL    RDW 15.1 %    PLATELET 077.6 405.4 - 724.5 K/uL   METABOLIC PANEL, COMPREHENSIVE   Result Value Ref Range    Glucose 90 75 - 110 mg/dL    BUN 26.0 (H) 9.0 - 20.0 mg/dL    Creatinine 1.3 0.8 - 1.5 mg/dL    Sodium 143 137 - 145 mmol/L    Potassium 4.3 3.6 - 5.0 mmol/L    Chloride 104 98 - 107 mmol/L    CO2 28.0 22.0 - 32.0 mmol/L    Calcium 9.6 8.4 - 10.2 mg/dl    Protein, total 7.7 6.3 - 8.2 g/dL    Albumin 4.4 3.9 - 5.4 g/dL    ALT (SGPT) 24 0 - 50 U/L    AST (SGOT) 26.0 14.0 - 36.0 U/L    Alk. phosphatase 76 38 - 126 U/L    Bilirubin, total 0.5 0.2 - 1.3 mg/dL    BUN/Creatinine ratio 20 Ratio    GFR est AA >60 mL/min/1.73m2    GFR est non-AA 55 <60 mL/min/1.73m2    Globulin 3.30     A-G Ratio 1.3 Ratio    Anion gap 11 mmol/L   LIPID PANEL   Result Value Ref Range    Cholesterol, total 189 0 - 200 mg/dL    Triglyceride 185 0 - 200 mg/dL    HDL Cholesterol 43 35 - 130 mg/dL    VLDL 37 mg/dL    LDL, calculated 109 0 - 130 mg/dL    CHOL/HDL Ratio 4 0 - 4 Ratio    LDL/HDL Ratio 3 Ratio   HEMOGLOBIN A1C W/O EAG   Result Value Ref Range    Hemoglobin A1c 5.5 4.0 - 5.7 %        Documenation Review:  Hypertension. Hyperlipidemia. Obesity. Sleep apnea, on CPAP, but not using it regularly. Gout. Metabolic syndrome. Prediabetes  Chest pain syndrome, followed by abnormal stress Cardiolite, 2006. Following the Cardiolite he had a cardiac catheterization that showed normal left ventricular function and no significant coronary artery disease. Assessment/Plan:    Diagnoses and all orders for this visit:    1. Essential hypertension, benign    2. Neuropathic pain  -     gabapentin (NEURONTIN) 300 mg capsule;  Take 1 Cap by mouth nightly as needed for Pain for up to 14 days. Max Daily Amount: 300 mg.    3. Metabolic syndrome    4. TERESA (obstructive sleep apnea)       Continue hydralazine to 50 mg p.o. twice daily. Continue atenolol at 50 mg p.o. daily. Gabapentin 300 mg p.o. as needed for neuropathic pain. Continue current meds. I will call with lab results and make further recommendations or adjustments if necessary. Discussed lifestyle modifications including Na restriction, low carb/fat diet, weight reduction and exercise (at least a walking program). ICD-10-CM ICD-9-CM    1. Essential hypertension, benign  I10 401.1    2. Neuropathic pain  M79.2 729.2 gabapentin (NEURONTIN) 300 mg capsule   3. Metabolic syndrome  N02.39 277.7    4. TERESA (obstructive sleep apnea)  G47.33 327.23              I have reviewed with the patient details of the assessment and plan and all questions were answered. Relevent patient education was performed. Verbal and/or written instructions (see AVS) provided. The most recent lab findings were reviewed with the patient. Plan was discussed with patient who verbally expressed understanding. An After Visit Summary was printed and given to the patient.     Marquise Whitt MD

## 2020-11-09 RX ORDER — BUMETANIDE 1 MG/1
TABLET ORAL
Qty: 180 TAB | Refills: 0 | Status: SHIPPED | OUTPATIENT
Start: 2020-11-09 | End: 2021-02-04

## 2020-11-30 DIAGNOSIS — I10 ESSENTIAL HYPERTENSION, BENIGN: ICD-10-CM

## 2020-11-30 RX ORDER — HYDRALAZINE HYDROCHLORIDE 50 MG/1
50 TABLET, FILM COATED ORAL 2 TIMES DAILY
Qty: 180 TAB | Refills: 0 | Status: SHIPPED | OUTPATIENT
Start: 2020-11-30 | End: 2021-02-28

## 2020-12-06 DIAGNOSIS — E78.1 HYPERTRIGLYCERIDEMIA: ICD-10-CM

## 2020-12-06 RX ORDER — FENOFIBRATE 145 MG/1
TABLET, COATED ORAL
Qty: 90 TAB | Refills: 0 | Status: SHIPPED | OUTPATIENT
Start: 2020-12-06 | End: 2021-03-11

## 2020-12-07 RX ORDER — ATENOLOL 50 MG/1
TABLET ORAL
Qty: 90 TAB | Refills: 3 | Status: SHIPPED | OUTPATIENT
Start: 2020-12-07 | End: 2021-12-06 | Stop reason: SDUPTHER

## 2020-12-18 ENCOUNTER — TELEPHONE (OUTPATIENT)
Dept: INTERNAL MEDICINE CLINIC | Age: 65
End: 2020-12-18

## 2020-12-18 DIAGNOSIS — M79.2 NEUROPATHIC PAIN: ICD-10-CM

## 2020-12-18 RX ORDER — GABAPENTIN 300 MG/1
300 CAPSULE ORAL
Qty: 30 CAP | Refills: 2 | Status: SHIPPED | OUTPATIENT
Start: 2020-12-18 | End: 2021-01-06

## 2020-12-18 NOTE — TELEPHONE ENCOUNTER
Patients wife called stating the patient is experiencing tightness and burning in his feet. All our providers are booked for today. I suggested they go to the ER or an urgent care. The wife just wanted a note made to Dr. Krystle Baca about this issue.   CB: [phone]

## 2020-12-18 NOTE — TELEPHONE ENCOUNTER
MD Giuliana Andrade 1 hour ago (12:16 PM)     I have sent in a prescription for gabapentin. Tarah Welsh will also need before for similar symptoms.  I think he ran out of it. Tarah Welsh may try gabapentin and see if that helps with his symptoms.  If not, he may go to the urgent care. ,

## 2021-01-06 ENCOUNTER — TELEPHONE (OUTPATIENT)
Dept: INTERNAL MEDICINE CLINIC | Age: 66
End: 2021-01-06

## 2021-01-06 ENCOUNTER — OFFICE VISIT (OUTPATIENT)
Dept: INTERNAL MEDICINE CLINIC | Age: 66
End: 2021-01-06
Payer: MEDICARE

## 2021-01-06 VITALS
SYSTOLIC BLOOD PRESSURE: 128 MMHG | BODY MASS INDEX: 51.88 KG/M2 | TEMPERATURE: 98.2 F | HEIGHT: 71 IN | HEART RATE: 75 BPM | DIASTOLIC BLOOD PRESSURE: 82 MMHG | OXYGEN SATURATION: 97 % | RESPIRATION RATE: 14 BRPM

## 2021-01-06 DIAGNOSIS — I73.9 PERIPHERAL VASCULAR DISEASE (HCC): ICD-10-CM

## 2021-01-06 DIAGNOSIS — M79.2 NEUROPATHIC PAIN: ICD-10-CM

## 2021-01-06 DIAGNOSIS — E66.01 OBESITY, MORBID (HCC): ICD-10-CM

## 2021-01-06 DIAGNOSIS — M54.42 ACUTE LEFT-SIDED LOW BACK PAIN WITH LEFT-SIDED SCIATICA: Primary | ICD-10-CM

## 2021-01-06 PROCEDURE — G8536 NO DOC ELDER MAL SCRN: HCPCS | Performed by: INTERNAL MEDICINE

## 2021-01-06 PROCEDURE — 96372 THER/PROPH/DIAG INJ SC/IM: CPT | Performed by: INTERNAL MEDICINE

## 2021-01-06 PROCEDURE — 1101F PT FALLS ASSESS-DOCD LE1/YR: CPT | Performed by: INTERNAL MEDICINE

## 2021-01-06 PROCEDURE — 99214 OFFICE O/P EST MOD 30 MIN: CPT | Performed by: INTERNAL MEDICINE

## 2021-01-06 PROCEDURE — G8427 DOCREV CUR MEDS BY ELIG CLIN: HCPCS | Performed by: INTERNAL MEDICINE

## 2021-01-06 PROCEDURE — G8752 SYS BP LESS 140: HCPCS | Performed by: INTERNAL MEDICINE

## 2021-01-06 PROCEDURE — G8417 CALC BMI ABV UP PARAM F/U: HCPCS | Performed by: INTERNAL MEDICINE

## 2021-01-06 PROCEDURE — G8510 SCR DEP NEG, NO PLAN REQD: HCPCS | Performed by: INTERNAL MEDICINE

## 2021-01-06 PROCEDURE — 3017F COLORECTAL CA SCREEN DOC REV: CPT | Performed by: INTERNAL MEDICINE

## 2021-01-06 PROCEDURE — G8754 DIAS BP LESS 90: HCPCS | Performed by: INTERNAL MEDICINE

## 2021-01-06 RX ORDER — KETOROLAC TROMETHAMINE 15 MG/ML
15 INJECTION, SOLUTION INTRAMUSCULAR; INTRAVENOUS ONCE
Qty: 1 VIAL | Refills: 0
Start: 2021-01-06 | End: 2021-01-06

## 2021-01-06 RX ORDER — DICLOFENAC SODIUM 10 MG/G
GEL TOPICAL 4 TIMES DAILY
Qty: 1 EACH | Refills: 1 | Status: SHIPPED | OUTPATIENT
Start: 2021-01-06 | End: 2021-07-29

## 2021-01-06 RX ORDER — KETOROLAC TROMETHAMINE 15 MG/ML
30 INJECTION, SOLUTION INTRAMUSCULAR; INTRAVENOUS ONCE
Qty: 1 VIAL | Refills: 0
Start: 2021-01-06 | End: 2021-01-06

## 2021-01-06 RX ORDER — METHYLPREDNISOLONE 4 MG/1
TABLET ORAL
Qty: 1 DOSE PACK | Refills: 0 | Status: SHIPPED | OUTPATIENT
Start: 2021-01-06 | End: 2021-01-14 | Stop reason: ALTCHOICE

## 2021-01-06 RX ORDER — MELOXICAM 15 MG/1
15 TABLET ORAL DAILY
Qty: 10 TAB | Refills: 0 | Status: SHIPPED | OUTPATIENT
Start: 2021-01-06 | End: 2021-01-13

## 2021-01-06 RX ORDER — GABAPENTIN 300 MG/1
300 CAPSULE ORAL
Qty: 30 CAP | Refills: 0 | Status: SHIPPED | OUTPATIENT
Start: 2021-01-06 | End: 2021-01-16

## 2021-01-06 NOTE — PATIENT INSTRUCTIONS
Increase gabapentin to 3 times as needed for pain. Medrol Dosepak for sciatica-like symptoms. Take meloxicam 15 mg p.o. daily with food for the next 7-10 days. Apply diclofenac gel to affected area up to 4 times in a day.

## 2021-01-06 NOTE — TELEPHONE ENCOUNTER
Patients wife called stating the patient is having extreme back pain and she would like to speak with someone.   CB: [phone]

## 2021-01-06 NOTE — PROGRESS NOTES
Coby Beebe is a 77 y.o. male presenting for Back Pain (low left ? sciatica)  . 1. Have you been to the ER, urgent care clinic since your last visit? Hospitalized since your last visit? No    2. Have you seen or consulted any other health care providers outside of the [address] [place] [address] since your last visit? Include any pap smears or colon screening. No    Fall Risk Assessment, last 12 mths 1/6/2021   Able to walk? Yes   Fall in past 12 months? 0   Do you feel unsteady? 0   Are you worried about falling 0         Abuse Screening Questionnaire 5/22/2019   Do you ever feel afraid of your partner? N   Are you in a relationship with someone who physically or mentally threatens you? N   Is it safe for you to go home? Y       3 most recent PHQ Screens 1/6/2021   Little interest or pleasure in doing things Not at all   Feeling down, depressed, irritable, or hopeless Not at all   Total Score PHQ 2 0       There are no discontinued medications.

## 2021-01-06 NOTE — PROGRESS NOTES
Kervin Simmons is a 77 y.o. male and presents with Back Pain (low left ? sciatica)      Subjective:  Ms. today comes in today for for acute back pain. Patient complains of acute on chronic back pain, more on the left side as compared to the right. He reports he is doing fine until last night when he suddenly started having pain on the left side back, radiating all the way down his left leg. Associated with numbness and tingling sensation. Pain is 10 on 10, sharp pain radiating all the way down to the left leg. He is having problem in weightbearing on a wheelchair today. Also is having problems sleeping. He has had issues in the past and responded well to Medrol Dosepak, gabapentin and Mobic. He was also referred to Dr. Sofya Alvarado and went for physical therapy. Denies any injury or trauma to the site. Hypertensionblood pressure well controlled on atenolol and hydralazine. Recap-  Chronic bilateral lower extremity painneuro pathic. On gabapentin as needed. Does have chronic venous insufficiency and bilateral venous status dermatitis. On Bumex 2 mg p.o. daily. He does have a history of peripheral vascular disease? Not on statin? Takes a daily aspirin. Recap-Patient had an updated sleep study done an appointment with a sleep specialist.  He reports he has been using his CPAP. He does report compliance with his CPAP machine. Chronic low back pain for which patient followed up with Dr. Deisy Aldridge. No surgical intervention required. He was referred to physical therapy which he has been doing religiously. He tried to take Flexeril as needed however that upset his stomach.   He has not yet followed up with Dr. Sofya Alvarado.      Past Medical History:   Diagnosis Date    Allergic rhinitis 11/22/2017    Chest pain syndrome 11/22/2017    Comments: abnl stress cardiolite 2006, subsequent to that, he had a normal cardiac cath   [facility] acquired pneumonia     Diarrhea 11/22/2017    Dyspnea 11/22/2017    Environmental allergies     Gout 11/22/2017    Hypertension     Insomnia 11/22/2017    Iron deficiency 11/22/2017    Left chronic serous otitis media 11/22/2017    Obesity 11/22/2017    Comments: morbid    Sleep apnea     Steatosis 11/22/2017    Comments: non-alcoholic     Past Surgical History:   Procedure Laterality Date    HX ORTHOPAEDIC      right knee     Allergies   Allergen Reactions    Ace Inhibitors Cough     Current Outpatient Medications   Medication Sig Dispense Refill    meloxicam (MOBIC) 15 mg tablet Take 1 Tab by mouth daily for 10 days. 10 Tab 0    gabapentin (NEURONTIN) 300 mg capsule Take 1 Cap by mouth three (3) times daily as needed for Pain for up to 10 days. Max Daily Amount: 900 mg. Indications: neuropathic pain 30 Cap 0    methylPREDNISolone (MEDROL DOSEPACK) 4 mg tablet As directed 1 Dose Pack 0    diclofenac (VOLTAREN) 1 % gel Apply  to affected area four (4) times daily. 1 Each 1    ketorolac (TORADOL) 15 mg/mL soln injection 2 mL by IntraVENous route once for 1 dose. 1 Vial 0    atenoloL (TENORMIN) 50 mg tablet TAKE 1 TABLET BY MOUTH EVERY DAY 90 Tab 3    fenofibrate nanocrystallized (TRICOR) 145 mg tablet TAKE 1 TABLET BY MOUTH EVERY DAY 90 Tab 0    hydrALAZINE (APRESOLINE) 50 mg tablet Take 1 Tab by mouth two (2) times a day for 90 days. TAKE 1 TABLET BY MOUTH TWICE DAILY  Indications: high blood pressure 180 Tab 0    bumetanide (BUMEX) 1 mg tablet TAKE 2 TABLETS BY MOUTH EVERY  Tab 0    allopurinol (ZYLOPRIM) 300 mg tablet Take  by mouth daily.  omeprazole (PRILOSEC OTC) 20 mg tablet Take 20 mg by mouth daily.  ED-EA-UG-Fe-Min-Lycopen-Lutein (CENTRUM) 0.4-162-18 mg Tab Take  by mouth.  aspirin 81 mg chewable tablet Take 81 mg by mouth daily.          Social History     Socioeconomic History    Marital status:      Spouse name: Not on file    Number of children: Not on file    Years of education: Not on file    Highest education level: Not on file   Tobacco Use    Smoking status: Never Smoker    Smokeless tobacco: Never Used   Substance and Sexual Activity    Alcohol use: Yes     Comment: rarely    Drug use: Never    Sexual activity: Yes     Partners: Female     Family History   Problem Relation Age of Onset    Hypertension Other     Heart Disease Other     Diabetes Other     Diabetes Mother     Diabetes Father        Review of Systems  ROS is unremarkable except for ones highlighted in bold.    Constitutional: negative for fevers, chills, anorexia and weight loss  Eyes:   negative for visual disturbance and irritation  ENT:   negative for tinnitus,sore throat,nasal congestion,ear pain,hoarseness  Respiratory:  negative for cough, hemoptysis, dyspnea,wheezing  CV:   negative for chest pain, palpitations, lower extremity edema  GI:   negative for nausea, vomiting, diarrhea, abdominal pain,melena  Endo:               negative for polyuria,polydipsia,polyphagia,heat intolerance  Genitourinary: negative for frequency, dysuria and hematuria  Integumentary: negative for rash and pruritus  Hematologic:  negative for easy bruising and gum/nose bleeding, boil on thigh  Musculoskel: negative for myalgias, arthralgias, back pain, muscle weakness, joint pain, bilateral lower extremity pain, numbness and tingling  Neurological:  negative for headaches, dizziness, vertigo, memory problems and gait   Behavl/Psych: negative for feelings of anxiety, depression, mood changes  ROS otherwise negative     Objective:  Visit Vitals  /82 (BP 1 Location: Left arm, BP Patient Position: Sitting)   Pulse 75   Temp 98.2 °F (36.8 °C)   Resp 14   Ht 5' 11\" (1.803 m)   SpO2 97%   BMI 51.88 kg/m²     Physical Exam:   General appearance - alert, well appearing, and in no distress  Mental status - alert, oriented to person, place, and time  EYE-TOÑITO, EOMI, fundi normal, corneas normal, no foreign bodies  ENT-ENT exam normal, no neck nodes or sinus tenderness  Nose - normal and patent, no erythema, discharge or polyps  Mouth - mucous membranes moist, pharynx normal without lesions  Neck - supple, no significant adenopathy   Chest - clear to auscultation, no wheezes, rales or rhonchi, symmetric air entry   Heart - normal rate, regular rhythm, normal S1, S2, no murmurs, rubs, clicks or gallops   Abdomen - soft, nontender, nondistended, no masses or organomegaly  Lymph- no adenopathy palpable  Ext-peripheral pulses normal, no pedal edema, no clubbing or cyanosis 1+ pitting edema, chronic dermatitis  Skin-Warm and dry. no hyperpigmentation, vitiligo, or suspicious lesions  Neuro -alert, oriented, normal speech, no focal findings or movement disorder noted  Musculoskeletal- FROM, no bony abnormalities, left hip tenderness, numbness in left leg        Lab Review:  Results for orders placed or performed in visit on 10/06/20   CBC W/O DIFF   Result Value Ref Range    WBC 7.0 4.1 - 10.9 K/uL    RBC 4.47 4.20 - 6.30 M/uL    HGB 13.5 12.0 - 18.0 g/dL    HCT 42.3 37.0 - 51.0 %    MCH 30.2 26.0 - 32.0 pg    MCHC 31.9 30.0 - 36.0 g/dL    MCV 94.6 80.0 - 97.0 fL    RDW 15.1 %    PLATELET 906.6 399.1 - 904.6 K/uL   METABOLIC PANEL, COMPREHENSIVE   Result Value Ref Range    Glucose 90 75 - 110 mg/dL    BUN 26.0 (H) 9.0 - 20.0 mg/dL    Creatinine 1.3 0.8 - 1.5 mg/dL    Sodium 143 137 - 145 mmol/L    Potassium 4.3 3.6 - 5.0 mmol/L    Chloride 104 98 - 107 mmol/L    CO2 28.0 22.0 - 32.0 mmol/L    Calcium 9.6 8.4 - 10.2 mg/dl    Protein, total 7.7 6.3 - 8.2 g/dL    Albumin 4.4 3.9 - 5.4 g/dL    ALT (SGPT) 24 0 - 50 U/L    AST (SGOT) 26.0 14.0 - 36.0 U/L    Alk.  phosphatase 76 38 - 126 U/L    Bilirubin, total 0.5 0.2 - 1.3 mg/dL    BUN/Creatinine ratio 20 Ratio    GFR est AA >60 mL/min/1.73m2    GFR est non-AA 55 <60 mL/min/1.73m2    Globulin 3.30     A-G Ratio 1.3 Ratio    Anion gap 11 mmol/L   LIPID PANEL   Result Value Ref Range    Cholesterol, total 189 0 - 200 mg/dL Triglyceride 185 0 - 200 mg/dL    HDL Cholesterol 43 35 - 130 mg/dL    VLDL 37 mg/dL    LDL, calculated 109 0 - 130 mg/dL    CHOL/HDL Ratio 4 0 - 4 Ratio    LDL/HDL Ratio 3 Ratio   HEMOGLOBIN A1C W/O EAG   Result Value Ref Range    Hemoglobin A1c 5.5 4.0 - 5.7 %        Documenation Review:  Hypertension. Hyperlipidemia. Obesity. Sleep apnea, on CPAP, but not using it regularly. Gout. Metabolic syndrome. Prediabetes  Chest pain syndrome, followed by abnormal stress Cardiolite, 2006. Following the Cardiolite he had a cardiac catheterization that showed normal left ventricular function and no significant coronary artery disease. Assessment/Plan:    Diagnoses and all orders for this visit:    1. Acute left-sided low back pain with left-sided sciatica  -     meloxicam (MOBIC) 15 mg tablet; Take 1 Tab by mouth daily for 10 days.  -     gabapentin (NEURONTIN) 300 mg capsule; Take 1 Cap by mouth three (3) times daily as needed for Pain for up to 10 days. Max Daily Amount: 900 mg. Indications: neuropathic pain  -     methylPREDNISolone (MEDROL DOSEPACK) 4 mg tablet; As directed  -     diclofenac (VOLTAREN) 1 % gel; Apply  to affected area four (4) times daily.  -     KETOROLAC TROMETHAMINE INJ  -     FL THER/PROPH/DIAG INJECTION, SUBCUT/IM  -     ketorolac (TORADOL) 15 mg/mL soln injection; 2 mL by IntraVENous route once for 1 dose. 2. Neuropathic pain  -     gabapentin (NEURONTIN) 300 mg capsule; Take 1 Cap by mouth three (3) times daily as needed for Pain for up to 10 days. Max Daily Amount: 900 mg. Indications: neuropathic pain    3. Obesity, morbid ([address]    4. Peripheral vascular disease (HCC)       Gave ketorolac 1 dose while in office. Increase gabapentin to 3 times as needed for pain. Medrol Dosepak for sciatica-like symptoms. Take meloxicam 15 mg p.o. daily with food for the next 7-10 days. Apply diclofenac gel to affected area up to 4 times in a day. Follow-up with Dr. Ella Jarrell current meds.   I will call with lab results and make further recommendations or adjustments if necessary. Discussed lifestyle modifications including Na restriction, low carb/fat diet, weight reduction and exercise (at least a walking program). ICD-10-CM ICD-9-CM    1. Acute left-sided low back pain with left-sided sciatica  M54.42 724.2 meloxicam (MOBIC) 15 mg tablet     724.3 gabapentin (NEURONTIN) 300 mg capsule      methylPREDNISolone (MEDROL DOSEPACK) 4 mg tablet      diclofenac (VOLTAREN) 1 % gel      KETOROLAC TROMETHAMINE INJ      OR THER/PROPH/DIAG INJECTION, SUBCUT/IM      ketorolac (TORADOL) 15 mg/mL soln injection      DISCONTINUED: ketorolac (TORADOL) 15 mg/mL soln injection   2. Neuropathic pain  M79.2 729.2 gabapentin (NEURONTIN) 300 mg capsule   3. Obesity, morbid ([facility] [address]  E66.01 278.01    4. Peripheral vascular disease (HCC)  I73.9 443.9          Follow-up and Dispositions    · Return in about 3 months (around 4/6/2021) for follow up. I have reviewed with the patient details of the assessment and plan and all questions were answered. Relevent patient education was performed. Verbal and/or written instructions (see AVS) provided. The most recent lab findings were reviewed with the patient. Plan was discussed with patient who verbally expressed understanding. An After Visit Summary was printed and given to the patient.     Saji Bran MD

## 2021-01-13 DIAGNOSIS — M54.42 ACUTE LEFT-SIDED LOW BACK PAIN WITH LEFT-SIDED SCIATICA: ICD-10-CM

## 2021-01-13 RX ORDER — MELOXICAM 15 MG/1
TABLET ORAL
Qty: 10 TAB | Refills: 0 | Status: SHIPPED | OUTPATIENT
Start: 2021-01-13 | End: 2021-01-22 | Stop reason: SDUPTHER

## 2021-01-14 ENCOUNTER — OFFICE VISIT (OUTPATIENT)
Dept: INTERNAL MEDICINE CLINIC | Age: 66
End: 2021-01-14
Payer: MEDICARE

## 2021-01-14 VITALS
HEIGHT: 71 IN | HEART RATE: 72 BPM | RESPIRATION RATE: 16 BRPM | BODY MASS INDEX: 44.1 KG/M2 | DIASTOLIC BLOOD PRESSURE: 70 MMHG | WEIGHT: 315 LBS | OXYGEN SATURATION: 95 % | SYSTOLIC BLOOD PRESSURE: 124 MMHG | TEMPERATURE: 97.7 F

## 2021-01-14 DIAGNOSIS — E66.01 OBESITY, MORBID (HCC): ICD-10-CM

## 2021-01-14 DIAGNOSIS — I73.9 CLAUDICATION (HCC): ICD-10-CM

## 2021-01-14 DIAGNOSIS — R35.0 BENIGN PROSTATIC HYPERPLASIA WITH URINARY FREQUENCY: ICD-10-CM

## 2021-01-14 DIAGNOSIS — M54.42 ACUTE LEFT-SIDED LOW BACK PAIN WITH LEFT-SIDED SCIATICA: ICD-10-CM

## 2021-01-14 DIAGNOSIS — E11.40 TYPE 2 DIABETES MELLITUS WITH DIABETIC NEUROPATHY, WITHOUT LONG-TERM CURRENT USE OF INSULIN (HCC): ICD-10-CM

## 2021-01-14 DIAGNOSIS — Z12.11 ENCOUNTER FOR COLORECTAL CANCER SCREENING: ICD-10-CM

## 2021-01-14 DIAGNOSIS — Z23 ENCOUNTER FOR IMMUNIZATION: ICD-10-CM

## 2021-01-14 DIAGNOSIS — M79.2 NEUROPATHIC PAIN: ICD-10-CM

## 2021-01-14 DIAGNOSIS — I10 ESSENTIAL HYPERTENSION, BENIGN: ICD-10-CM

## 2021-01-14 DIAGNOSIS — Z12.12 ENCOUNTER FOR COLORECTAL CANCER SCREENING: ICD-10-CM

## 2021-01-14 DIAGNOSIS — E55.9 VITAMIN D DEFICIENCY: ICD-10-CM

## 2021-01-14 DIAGNOSIS — R53.82 CHRONIC FATIGUE: ICD-10-CM

## 2021-01-14 DIAGNOSIS — N40.1 BENIGN PROSTATIC HYPERPLASIA WITH URINARY FREQUENCY: ICD-10-CM

## 2021-01-14 DIAGNOSIS — E88.81 METABOLIC SYNDROME: ICD-10-CM

## 2021-01-14 DIAGNOSIS — Z12.5 ENCOUNTER FOR PROSTATE CANCER SCREENING: ICD-10-CM

## 2021-01-14 DIAGNOSIS — G47.33 OSA (OBSTRUCTIVE SLEEP APNEA): ICD-10-CM

## 2021-01-14 DIAGNOSIS — Z00.00 ENCOUNTER FOR ANNUAL PHYSICAL EXAM: Primary | ICD-10-CM

## 2021-01-14 DIAGNOSIS — E78.1 HYPERTRIGLYCERIDEMIA: ICD-10-CM

## 2021-01-14 DIAGNOSIS — I73.9 PERIPHERAL VASCULAR DISEASE (HCC): ICD-10-CM

## 2021-01-14 PROCEDURE — 3017F COLORECTAL CA SCREEN DOC REV: CPT | Performed by: INTERNAL MEDICINE

## 2021-01-14 PROCEDURE — 3044F HG A1C LEVEL LT 7.0%: CPT | Performed by: INTERNAL MEDICINE

## 2021-01-14 PROCEDURE — 84443 ASSAY THYROID STIM HORMONE: CPT | Performed by: INTERNAL MEDICINE

## 2021-01-14 PROCEDURE — G8510 SCR DEP NEG, NO PLAN REQD: HCPCS | Performed by: INTERNAL MEDICINE

## 2021-01-14 PROCEDURE — 85027 COMPLETE CBC AUTOMATED: CPT | Performed by: INTERNAL MEDICINE

## 2021-01-14 PROCEDURE — 80053 COMPREHEN METABOLIC PANEL: CPT | Performed by: INTERNAL MEDICINE

## 2021-01-14 PROCEDURE — G8754 DIAS BP LESS 90: HCPCS | Performed by: INTERNAL MEDICINE

## 2021-01-14 PROCEDURE — G8752 SYS BP LESS 140: HCPCS | Performed by: INTERNAL MEDICINE

## 2021-01-14 PROCEDURE — 80061 LIPID PANEL: CPT | Performed by: INTERNAL MEDICINE

## 2021-01-14 PROCEDURE — 1101F PT FALLS ASSESS-DOCD LE1/YR: CPT | Performed by: INTERNAL MEDICINE

## 2021-01-14 PROCEDURE — G8427 DOCREV CUR MEDS BY ELIG CLIN: HCPCS | Performed by: INTERNAL MEDICINE

## 2021-01-14 PROCEDURE — G0009 ADMIN PNEUMOCOCCAL VACCINE: HCPCS | Performed by: INTERNAL MEDICINE

## 2021-01-14 PROCEDURE — 99214 OFFICE O/P EST MOD 30 MIN: CPT | Performed by: INTERNAL MEDICINE

## 2021-01-14 PROCEDURE — G8417 CALC BMI ABV UP PARAM F/U: HCPCS | Performed by: INTERNAL MEDICINE

## 2021-01-14 PROCEDURE — G8536 NO DOC ELDER MAL SCRN: HCPCS | Performed by: INTERNAL MEDICINE

## 2021-01-14 PROCEDURE — 81001 URINALYSIS AUTO W/SCOPE: CPT | Performed by: INTERNAL MEDICINE

## 2021-01-14 PROCEDURE — G0439 PPPS, SUBSEQ VISIT: HCPCS | Performed by: INTERNAL MEDICINE

## 2021-01-14 PROCEDURE — 82044 UR ALBUMIN SEMIQUANTITATIVE: CPT | Performed by: INTERNAL MEDICINE

## 2021-01-14 PROCEDURE — 84153 ASSAY OF PSA TOTAL: CPT | Performed by: INTERNAL MEDICINE

## 2021-01-14 PROCEDURE — 2022F DILAT RTA XM EVC RTNOPTHY: CPT | Performed by: INTERNAL MEDICINE

## 2021-01-14 PROCEDURE — 83036 HEMOGLOBIN GLYCOSYLATED A1C: CPT | Performed by: INTERNAL MEDICINE

## 2021-01-14 PROCEDURE — 82306 VITAMIN D 25 HYDROXY: CPT | Performed by: INTERNAL MEDICINE

## 2021-01-14 PROCEDURE — 90670 PCV13 VACCINE IM: CPT | Performed by: INTERNAL MEDICINE

## 2021-01-14 NOTE — PATIENT INSTRUCTIONS
Learning About Type 2 Diabetes What is type 2 diabetes? Insulin is a hormone that helps your body use sugar from your food as energy. Type 2 diabetes happens when your body can't use insulin the right way. Over time, the pancreas can't make enough insulin. If you don't have enough insulin, too much sugar stays in your blood. If you are overweight, get little or no exercise, or have type 2 diabetes in your family, you are more likely to have problems with the way insulin works in your body.  Americans, Hispanics, Native Americans,  Americans, and Pacific Islanders have a higher risk for type 2 diabetes. Type 2 diabetes can be prevented or delayed with a healthy lifestyle, which includes staying at a healthy weight, making smart food choices, and getting regular exercise. What can you expect with type 2 diabetes? [place] keep hearing about how important it is to keep your blood sugar within a target range. That's because over time, high blood sugar can lead to serious problems. It can: 
· Harm your eyes, nerves, and kidneys. · Damage your blood vessels, leading to heart disease and stroke. · Reduce blood flow and cause nerve damage to parts of your body, especially your feet. This can cause slow healing and pain when you walk. · Make your immune system weak and less able to fight infections. When people hear the word \"diabetes,\" they often think of problems like these. But daily care and treatment can help prevent or delay these problems. The goal is to keep your blood sugar in a target range. That's the best way to reduce your chance of having more problems from diabetes. What are the symptoms? Some people who have type 2 diabetes may not have any symptoms early on. Many people with the disease don't even know they have it at first. But with time, diabetes starts to cause symptoms. You experience most symptoms of type 2 diabetes when your blood sugar is either too high or too low. The most common symptoms of high blood sugar include: · Thirst. 
· Frequent urination. · Weight loss. · Blurry vision. The symptoms of low blood sugar include: · Sweating. · Shakiness. · Weakness. · Hunger. · Confusion. How can you prevent type 2 diabetes? The best way to prevent or delay type 2 diabetes is to adopt healthy habits, which include: 
· Staying at a healthy weight. · Exercising regularly. · Eating healthy foods. How is type 2 diabetes treated? If you have type 2 diabetes, here are the most important things you can do. · Take your diabetes medicines. · Check your blood sugar as often as your doctor recommends. Also, get a hemoglobin A1c test at least every 6 months. · Try to eat a variety of foods and to spread carbohydrate throughout the day. Carbohydrate raises blood sugar higher and more quickly than any other nutrient does. Carbohydrate is found in sugar, breads and cereals, fruit, starchy vegetables such as potatoes and corn, and milk and yogurt. · Get at least 30 minutes of exercise on most days of the week. Walking is a good choice. You also may want to do other activities, such as running, swimming, cycling, or playing tennis or team sports. If your doctor says it's okay, do muscle-strengthening exercises at least 2 times a week. · See your doctor for checkups and tests on a regular schedule. · If you have high blood pressure or high cholesterol, take the medicines as prescribed by your doctor. · Do not smoke. Smoking can make health problems worse. This includes problems you might have with type 2 diabetes. If you need help quitting, talk to your doctor about stop-smoking programs and medicines. These can increase your chances of quitting for good. Follow-up care is a key part of your treatment and safety. Be sure to make and go to all appointments, and call your doctor if you are having problems. It's also a good idea to know your test results and keep a list of the medicines you take. Where can you learn more? Go to http://www.gray.com/ Enter R238 in the search box to learn more about \"Learning About Type 2 Diabetes. \" Current as of: December 20, 2019               Content Version: 12.6 © 9349-1252 [employer]. Care instructions adapted under license by [employer] (which disclaims liability or warranty for this information). If you have questions about a medical condition or this instruction, always ask your healthcare professional. [address] any warranty or liability for your use of this information. The best way to stay healthy is to live a healthy lifestyle. A healthy lifestyle includes regular exercise, eating a well-balanced diet, keeping a healthy weight and not smoking. Regular physical exams and screening tests are another important way to take care of yourself. Preventive exams provided by health care providers can find health problems early when treatment works best and can keep you from getting certain diseases or illnesses. Preventive services include exams, lab tests, screenings, shots, monitoring and information to help you take care of your own health. All people over 65 should have a pneumonia shot. Pneumonia shots are usually only needed once in a lifetime unless your doctor decides differently. In addition to your physical exam, some screening tests are recommended: 
 
All people over 65 should have a yearly flu shot. People over 65 are at medium to high risk for Hepatitis B. Three shots are needed for complete protection. Bone mass measurement (dexa scan) is recommended every two years. Diabetes Mellitus screening is recommended every year. Glaucoma is an eye disease caused by high pressure in the eye. An eye exam is recommended every year. Cardiovascular screening tests that check your cholesterol and other blood fat (lipid) levels are recommended every five years. Colorectal Cancer screening tests help to find pre-cancerous polyps (growths in the colon) so they can be removed before they turn into cancer. Tests ordered for screening depend on your personal and family history risk factors. Prostate Cancer Screening (annually up to age 76) Screening for breast cancer is recommended yearly with a Mammogram. 
 
Screening for cervical and vaginal cancer is recommended with a pelvic and Pap test every two years. However if you have had an abnormal pap in the past  three years or at high risk for cervical or vaginal cancer Medicare will cover a pap test and a pelvic exam every year. Here is a list of your current Health Maintenance items with a due date: 
Health Maintenance Due Topic Date Due [facility] Eye Exam  12/29/1963  DTaP/Tdap/Td  (1 - Tdap) 12/29/1974  Shingles Vaccine (1 of 2) 12/29/2003  Glaucoma Screening   12/29/2018  Pneumococcal Vaccine (1 of 1 - PPSV23) 12/29/2018  Albumin Urine Test  01/09/2021

## 2021-01-14 NOTE — PROGRESS NOTES
Chief Complaint   Patient presents with    Annual Wellness Visit       Depression Risk Factor Screening:     3 most recent PHQ Screens 1/14/2021   Little interest or pleasure in doing things Not at all   Feeling down, depressed, irritable, or hopeless Not at all   Total Score PHQ 2 0       Functional Ability and Level of Safety:     Activities of Daily Living  ADL Assessment 5/22/2019   Feeding yourself No Help Needed   Getting from bed to chair No Help Needed   Getting dressed No Help Needed   Bathing or showering No Help Needed   Walk across the room (includes cane/walker) No Help Needed   Using the telphone No Help Needed   Taking your medications No Help Needed   Preparing meals No Help Needed   Managing money (expenses/bills) No Help Needed   Moderately strenuous housework (laundry) No Help Needed   Shopping for personal items (toiletries/medicines) No Help Needed   Shopping for groceries No Help Needed   Driving No Help Needed   Climbing a flight of stairs No Help Needed   Getting to places beyond walking distances No Help Needed       Fall Risk  Fall Risk Assessment, last 12 mths 1/14/2021   Able to walk? Yes   Fall in past 12 months? 0   Do you feel unsteady? 0   Are you worried about falling 0       Abuse Screen  Abuse Screening Questionnaire 1/14/2021   Do you ever feel afraid of your partner? N   Are you in a relationship with someone who physically or mentally threatens you? N   Is it safe for you to go home?  Y         Patient Care Team   Patient Care Team:  Elvis Bundy MD as PCP - General (Hospitalist)  Elvis Bundy MD as PCP - [facility] Empaneled Provider  Lloyd Sawyer MD as Physician (Sleep Medicine)

## 2021-01-14 NOTE — PROGRESS NOTES
Megan Thomas is a 77 y.o. male and presents with Annual Wellness Visit      Subjective:  Patient comes in today for his annual Medicare wellness visit and follow-up of his prediabetes, hypertension, dyslipidemia. Patient has acute on chronic back pain. He was seen last week for flareup. Was prescribed Medrol Dosepak, gabapentin (dose increased) and was given a Toradol shot while he was here. He reports he is 50% better. Chronic low back pain for which patient followed up with Dr. Antonio Gregorio. No surgical intervention required. He was referred to physical therapy which he has been doing religiously. He tried to take Flexeril as needed however that upset his stomach. He has not yet followed up with Dr. Christine Melton.    Hypertensionblood pressure well controlled on atenolol and hydralazine. Does have chronic venous insufficiency and bilateral venous status dermatitis. On Bumex 2 mg p.o. daily. He does have a history of peripheral vascular disease? Not on statin? Takes a daily aspirin. Patient reports he is not sure if he had an KENNY done. He does not follow with a vascular surgeon. Sleep apnea on CPAP.     Past Medical History:   Diagnosis Date    Allergic rhinitis 11/22/2017    Chest pain syndrome 11/22/2017    Comments: [facility] stress cardiolite 2006, subsequent to that, he had a normal cardiac cath   [facility] acquired pneumonia     Diarrhea 11/22/2017    Dyspnea 11/22/2017    Environmental allergies     Gout 11/22/2017    Hypertension     Insomnia 11/22/2017    Iron deficiency 11/22/2017    Left chronic serous otitis media 11/22/2017    Obesity 11/22/2017    Comments: morbid    Sleep apnea     Steatosis 11/22/2017    Comments: non-alcoholic     Past Surgical History:   Procedure Laterality Date    HX ORTHOPAEDIC      right knee     Allergies   Allergen Reactions    Ace Inhibitors Cough     Current Outpatient Medications   Medication Sig Dispense Refill    meloxicam (MOBIC) 15 mg tablet TAKE 1 TABLET BY MOUTH DAILY FOR 10 DAYS 10 Tab 0    diclofenac (VOLTAREN) 1 % gel Apply  to affected area four (4) times daily. 1 Each 1    atenoloL (TENORMIN) 50 mg tablet TAKE 1 TABLET BY MOUTH EVERY DAY 90 Tab 3    fenofibrate nanocrystallized (TRICOR) 145 mg tablet TAKE 1 TABLET BY MOUTH EVERY DAY 90 Tab 0    hydrALAZINE (APRESOLINE) 50 mg tablet Take 1 Tab by mouth two (2) times a day for 90 days. TAKE 1 TABLET BY MOUTH TWICE DAILY  Indications: high blood pressure 180 Tab 0    bumetanide (BUMEX) 1 mg tablet TAKE 2 TABLETS BY MOUTH EVERY  Tab 0    allopurinol (ZYLOPRIM) 300 mg tablet Take  by mouth daily.  omeprazole (PRILOSEC OTC) 20 mg tablet Take 20 mg by mouth daily.  SE-FJ-RB-Fe-Min-Lycopen-Lutein (CENTRUM) 0.4-162-18 mg Tab Take  by mouth.  aspirin 81 mg chewable tablet Take 81 mg by mouth daily. Social History     Socioeconomic History    Marital status:      Spouse name: Not on file    Number of children: Not on file    Years of education: Not on file    Highest education level: Not on file   Tobacco Use    Smoking status: Never Smoker    Smokeless tobacco: Never Used   Substance and Sexual Activity    Alcohol use: Yes     Comment: rarely    Drug use: Never    Sexual activity: Yes     Partners: Female     Family History   Problem Relation Age of Onset    Hypertension Other     Heart Disease Other     Diabetes Other     Diabetes Mother     Diabetes Father        Review of Systems  ROS is unremarkable except for ones highlighted in bold.    Constitutional: negative for fevers, chills, anorexia and weight loss  Eyes:   negative for visual disturbance and irritation  ENT:   negative for tinnitus,sore throat,nasal congestion,ear pain,hoarseness  Respiratory:  negative for cough, hemoptysis, dyspnea,wheezing  CV:   negative for chest pain, palpitations, lower extremity edema  GI:   negative for nausea, vomiting, diarrhea, abdominal pain,melena  Endo:         negative for polyuria,polydipsia,polyphagia,heat intolerance  Genitourinary: negative for frequency, dysuria and hematuria  Integumentary: negative for rash and pruritus  Hematologic:  negative for easy bruising and gum/nose bleeding  Musculoskel: negative for myalgias, arthralgias, back pain, muscle weakness, joint pain  Neurological:  negative for headaches, dizziness, vertigo, memory problems and gait   Behavl/Psych: negative for feelings of anxiety, depression, mood changes  ROS otherwise negative     Objective:  Visit Vitals  /70 (BP 1 Location: Left arm, BP Patient Position: Sitting)   Pulse 72   Temp 97.7 °F (36.5 °C)   Resp 16   Ht 5' 11\" (1.803 m)   Wt (!) 369 lb (167.4 kg)   SpO2 95%   BMI 51.47 kg/m²     Physical Exam:   General appearance - alert, well appearing, and in no distress  Mental status - alert, oriented to person, place, and time  EYE-TOÑITO, EOMI, fundi normal, corneas normal, no foreign bodies  ENT-ENT exam normal, no neck nodes or sinus tenderness  Nose - normal and patent, no erythema, discharge or polyps  Mouth - mucous membranes moist, pharynx normal without lesions  Neck - supple, no significant adenopathy   Chest - clear to auscultation, no wheezes, rales or rhonchi, symmetric air entry   Heart - normal rate, regular rhythm, normal S1, S2, no murmurs, rubs, clicks or gallops   Abdomen - soft, nontender, nondistended, no masses or organomegaly  Lymph- no adenopathy palpable  Ext-peripheral pulses normal, no pedal edema, no clubbing or cyanosis  Skin-Warm and dry. no hyperpigmentation, vitiligo, or suspicious lesions  Neuro -alert, oriented, normal speech, no focal findings or movement disorder noted  Musculoskeletal- FROM, no bony abnormalities, no point tenderness    Left Foot: Inspection: normal.  Pulse DP: 2+ (normal).  Filament test: normal sensation with micro filament.  Vibratory sensation: normal.  Right Foot: Inspection: normal.  Pulse DP: 2+ (normal).  Filament  test: normal sensation with micro filament. Vibratory sensation: normal.      Lab Review:  Results for orders placed or performed in visit on 01/14/21   CBC W/O DIFF   Result Value Ref Range    WBC 7.9 4.1 - 10.9 K/uL    RBC 4.68 4.20 - 6.30 M/uL    HGB 13.9 12.0 - 18.0 g/dL    HCT 44.6 37.0 - 51.0 %    MCH 29.7 26.0 - 32.0 pg    MCHC 31.2 30.0 - 36.0 g/dL    MCV 95.4 80.0 - 97.0 fL    RDW 15.2 %    PLATELET 633.9 815.4 - 440.0 K/uL   LIPID PANEL   Result Value Ref Range    Cholesterol, total 177 0 - 200 mg/dL    Triglyceride 154 0 - 200 mg/dL    HDL Cholesterol 41 35 - 130 mg/dL    VLDL 31 mg/dL    LDL, calculated 105 0 - 130 mg/dL    CHOL/HDL Ratio 4 0 - 4 Ratio    LDL/HDL Ratio 3 Ratio   METABOLIC PANEL, COMPREHENSIVE   Result Value Ref Range    Glucose 93 75 - 110 mg/dL    BUN 32.0 (H) 9.0 - 20.0 mg/dL    Creatinine 1.3 0.8 - 1.5 mg/dL    Sodium 142 137 - 145 mmol/L    Potassium 4.2 3.6 - 5.0 mmol/L    Chloride 100 98 - 107 mmol/L    CO2 29.0 22.0 - 32.0 mmol/L    Calcium 9.7 8.4 - 10.2 mg/dl    Protein, total 7.2 6.3 - 8.2 g/dL    Albumin 4.3 3.9 - 5.4 g/dL    ALT (SGPT) 20 0 - 50 U/L    AST (SGOT) 24.0 14.0 - 36.0 U/L    Alk.  phosphatase 72 38 - 126 U/L    Bilirubin, total 0.7 0.2 - 1.3 mg/dL    BUN/Creatinine ratio 25 Ratio    GFR est AA >60 mL/min/1.73m2    GFR est non-AA 55 <60 mL/min/1.73m2    Globulin 2.90     A-G Ratio 1.5 Ratio    Anion gap 13 mmol/L   VITAMIN D, 25 HYDROXY   Result Value Ref Range    VITAMIN D, 25-HYDROXY 31 30 - 96 ng/mL   TSH 3RD GENERATION   Result Value Ref Range    TSH, 3rd generation 1.22 0.34 - 5.60 uIU/mL   HEMOGLOBIN A1C W/O EAG   Result Value Ref Range    Hemoglobin A1c 5.5 4.0 - 5.7 %   PSA, DIAGNOSTIC (PROSTATE SPECIFIC AG)   Result Value Ref Range    PSA 2.9 0.0 - 4.0 ng/mL   URINALYSIS W/MICROSCOPIC   Result Value Ref Range    Color terra (A) Pale Yellow - Yellow    CLARITY clear Clear    Glucose urine, 24 hr Negative Negative    Ketone Negative Negative    Bilirubin Negative Negative    Specific gravity 1.020 1.000 - 1.030    pH (UA) 6 5 - 7    Blood Negative Negative    Protein 1+ (A) Negative    Urobilinogen 1+ (A) Negative    Nitrites Negative Negative    Leukocyte Esterase Negative Negative    WBC 0 0 #/HPF    RBC 0 0 #/HPF    Bacteria Occasional (A) None Seen   URINE, MICROALBUMIN, SEMIQUANTITATIVE   Result Value Ref Range    Microalbumin, Urine 20 0 - 20 mg/L        Documenation Review:  Hypertension. Hyperlipidemia. Obesity. Sleep apnea, on CPAP, but not using it regularly. Gout. Metabolic syndrome. Prediabetes  Chest pain syndrome, followed by abnormal stress Cardiolite, 2006. Following the Cardiolite he had a cardiac catheterization that showed normal left ventricular function and no significant coronary artery disease. Assessment/Plan:    Diagnoses and all orders for this visit:    1. Encounter for annual physical exam    2. Encounter for colorectal cancer screening    3. Encounter for prostate cancer screening  -     PSA, DIAGNOSTIC (PROSTATE SPECIFIC AG)    4. Essential hypertension, benign  -     CBC W/O DIFF  -     METABOLIC PANEL, COMPREHENSIVE  -     URINALYSIS W/MICROSCOPIC    5. Hypertriglyceridemia  -     LIPID PANEL    6. Type 2 diabetes mellitus with diabetic neuropathy, without long-term current use of insulin (HCC)  -     HEMOGLOBIN A1C W/O EAG  -      DIABETES FOOT EXAM  -     URINE, MICROALBUMIN, SEMIQUANTITATIVE    7. Metabolic syndrome    8. TERESA (obstructive sleep apnea)    9. Neuropathic pain    10. Peripheral vascular disease ([address]    11. Obesity, morbid ([address]    12. Vitamin D deficiency  -     VITAMIN D, 25 HYDROXY    13. Chronic fatigue  -     TSH 3RD GENERATION    14. Benign prostatic hyperplasia with urinary frequency   -     PSA, DIAGNOSTIC (PROSTATE SPECIFIC AG)    15. Acute left-sided low back pain with left-sided sciatica  -     REFERRAL TO PHYSICAL THERAPY    16. Claudication ([address]  -     ANKLE BRACHIAL INDEX; Future    17. Encounter for immunization  -     PNEUMOCOCCAL CONJ VACCINE 13 VALENT IM  -     ADMIN PNEUMOCOCCAL VACCINE  -     ADMIN INFLUENZA VIRUS VAC    Other orders  -     URINE, MICROALBUMIN, SEMIQUANTITATIVE        Got flu shot for this year. He is up-to-date on Tdap, Pneumovax 23. He is due for Prevnar 13 today. Would like to wait for Shingrix vaccine. Overdue for eye appointment  colonoscopy will be due in 2021   continue current meds. I will call with lab results and make further recommendations or adjustments if necessary. Discussed lifestyle modifications including Na restriction, low carb/fat diet, weight reduction and exercise (at least a walking program). ICD-10-CM ICD-9-CM    1. Encounter for annual physical exam  Z00.00 V70.0    2. Encounter for colorectal cancer screening  Z12.11 V76.51     Z12.12 V76.41    3. Encounter for prostate cancer screening  Z12.5 V76.44 PSA, DIAGNOSTIC (PROSTATE SPECIFIC AG)   4. Essential hypertension, benign  I10 401.1 CBC W/O DIFF      METABOLIC PANEL, COMPREHENSIVE      URINALYSIS W/MICROSCOPIC   5. Hypertriglyceridemia  E78.1 272.1 LIPID PANEL   6. Type 2 diabetes mellitus with diabetic neuropathy, without long-term current use of insulin (HCC)  E11.40 250.60 HEMOGLOBIN A1C W/O EAG     357.2 HM DIABETES FOOT EXAM      URINE, MICROALBUMIN, SEMIQUANTITATIVE   7. Metabolic syndrome  X18.46 277.7    8. TERESA (obstructive sleep apnea)  G47.33 327.23    9. Neuropathic pain  M79.2 729.2    10. Peripheral vascular disease (HCC)  I73.9 443.9    11. Obesity, morbid ([address]  E66.01 278.01    12. Vitamin D deficiency  E55.9 268.9 VITAMIN D, 25 HYDROXY   13. Chronic fatigue  R53.82 780.79 TSH 3RD GENERATION   14. Benign prostatic hyperplasia with urinary frequency   N40.1 600.01 PSA, DIAGNOSTIC (PROSTATE SPECIFIC AG)    R35.0 788.41    15.  Acute left-sided low back pain with left-sided sciatica  M54.42 724.2 REFERRAL TO PHYSICAL THERAPY     724.3    16. Claudication (HCC)  I73.9 443.9 ANKLE BRACHIAL INDEX   17. Encounter for immunization  Z23 V03.89 PNEUMOCOCCAL CONJ VACCINE 13 VALENT IM      ADMIN PNEUMOCOCCAL VACCINE      ADMIN INFLUENZA VIRUS VAC         Follow-up and Dispositions    · Return in about 3 months (around 4/14/2021) for follow up. I have reviewed with the patient details of the assessment and plan and all questions were answered. Relevent patient education was performed. Verbal and/or written instructions (see AVS) provided. The most recent lab findings were reviewed with the patient. Plan was discussed with patient who verbally expressed understanding. An After Visit Summary was printed and given to the patient.     Boy Roman MD

## 2021-01-15 LAB
25(OH)D3 SERPL-MCNC: 31 NG/ML (ref 30–96)
A-G RATIO,AGRAT: 1.5 RATIO
ALBUMIN SERPL-MCNC: 4.3 G/DL (ref 3.9–5.4)
ALP SERPL-CCNC: 72 U/L (ref 38–126)
ALT SERPL-CCNC: 20 U/L (ref 0–50)
ANION GAP SERPL CALC-SCNC: 13 MMOL/L
AST SERPL W P-5'-P-CCNC: 24 U/L (ref 14–36)
BACTERIA,BACTU: ABNORMAL
BILIRUB SERPL-MCNC: 0.7 MG/DL (ref 0.2–1.3)
BILIRUB UR QL: NEGATIVE
BUN SERPL-MCNC: 32 MG/DL (ref 9–20)
BUN/CREATININE RATIO,BUCR: 25 RATIO
CALCIUM SERPL-MCNC: 9.7 MG/DL (ref 8.4–10.2)
CHLORIDE SERPL-SCNC: 100 MMOL/L (ref 98–107)
CHOL/HDL RATIO,CHHD: 4 RATIO (ref 0–4)
CHOLEST SERPL-MCNC: 177 MG/DL (ref 0–200)
CLARITY: CLEAR
CO2 SERPL-SCNC: 29 MMOL/L (ref 22–32)
COLOR UR: ABNORMAL
CREAT SERPL-MCNC: 1.3 MG/DL (ref 0.8–1.5)
ERYTHROCYTE [DISTWIDTH] IN BLOOD BY AUTOMATED COUNT: 15.2 %
GLOBULIN,GLOB: 2.9
GLUCOSE 24H UR-MRATE: NEGATIVE G/(24.H)
GLUCOSE SERPL-MCNC: 93 MG/DL (ref 75–110)
HBA1C MFR BLD HPLC: 5.5 % (ref 4–5.7)
HCT VFR BLD AUTO: 44.6 % (ref 37–51)
HDLC SERPL-MCNC: 41 MG/DL (ref 35–130)
HGB BLD-MCNC: 13.9 G/DL (ref 12–18)
HGB UR QL STRIP: NEGATIVE
KETONES UR QL STRIP.AUTO: NEGATIVE
LDL/HDL RATIO,LDHD: 3 RATIO
LDLC SERPL CALC-MCNC: 105 MG/DL (ref 0–130)
LEUKOCYTE ESTERASE: NEGATIVE
MCH RBC QN AUTO: 29.7 PG (ref 26–32)
MCHC RBC AUTO-ENTMCNC: 31.2 G/DL (ref 30–36)
MCV RBC AUTO: 95.4 FL (ref 80–97)
MICROALBUMIN, URINE: 20 MG/L (ref 0–20)
NITRITE UR QL STRIP.AUTO: NEGATIVE
PH UR STRIP: 6 [PH] (ref 5–7)
PLATELET # BLD AUTO: 264 K/UL (ref 140–440)
POTASSIUM SERPL-SCNC: 4.2 MMOL/L (ref 3.6–5)
PROT SERPL-MCNC: 7.2 G/DL (ref 6.3–8.2)
PROT UR STRIP-MCNC: ABNORMAL MG/DL
PSA, TEST22: 2.9 NG/ML (ref 0–4)
RBC # BLD AUTO: 4.68 M/UL (ref 4.2–6.3)
RBC #/AREA URNS HPF: 0 #/HPF
SODIUM SERPL-SCNC: 142 MMOL/L (ref 137–145)
SP GR UR REFRACTOMETRY: 1.02 (ref 1–1.03)
TRIGL SERPL-MCNC: 154 MG/DL (ref 0–200)
TSH SERPL DL<=0.05 MIU/L-ACNC: 1.22 UIU/ML (ref 0.34–5.6)
UROBILINOGEN UR QL STRIP.AUTO: ABNORMAL
VLDLC SERPL CALC-MCNC: 31 MG/DL
WBC # BLD AUTO: 7.9 K/UL (ref 4.1–10.9)
WBC URNS QL MICRO: 0 #/HPF

## 2021-01-21 ENCOUNTER — HOSPITAL ENCOUNTER (OUTPATIENT)
Dept: VASCULAR SURGERY | Age: 66
Discharge: HOME | End: 2021-01-21
Attending: INTERNAL MEDICINE
Payer: MEDICARE

## 2021-01-21 DIAGNOSIS — I73.9 CLAUDICATION (HCC): ICD-10-CM

## 2021-01-21 PROCEDURE — 93922 UPR/L XTREMITY ART 2 LEVELS: CPT

## 2021-01-22 DIAGNOSIS — M54.42 ACUTE LEFT-SIDED LOW BACK PAIN WITH LEFT-SIDED SCIATICA: ICD-10-CM

## 2021-01-22 LAB
LEFT ABI: 1.23
LEFT ANTERIOR TIBIAL: 146 MMHG
LEFT ARM BP: 122 MMHG
LEFT POSTERIOR TIBIAL: 164 MMHG
LEFT TBI: 0.62
LEFT TOE PRESSURE: 83 MMHG
RIGHT ABI: 1.2
RIGHT ANTERIOR TIBIAL: 145 MMHG
RIGHT ARM BP: 133 MMHG
RIGHT POSTERIOR TIBIAL: 160 MMHG
RIGHT TBI: 0.72
RIGHT TOE PRESSURE: 96 MMHG

## 2021-01-22 RX ORDER — MELOXICAM 15 MG/1
TABLET ORAL
Qty: 10 TAB | Refills: 0 | Status: SHIPPED | OUTPATIENT
Start: 2021-01-22 | End: 2021-02-02

## 2021-01-26 ENCOUNTER — HOSPITAL ENCOUNTER (OUTPATIENT)
Dept: PHYSICAL THERAPY | Age: 66
Discharge: HOME | End: 2021-01-26
Payer: MEDICARE

## 2021-01-26 PROCEDURE — 97110 THERAPEUTIC EXERCISES: CPT

## 2021-01-26 PROCEDURE — 97162 PT EVAL MOD COMPLEX 30 MIN: CPT

## 2021-01-26 NOTE — PROGRESS NOTES
[facility]  [address]  [place]  Phone: [phone] Fax: [phone]     Plan of Care/Statement of Necessity for Physical Therapy Services  2-15    Patient name: Karina Simmons  : 1955  Provider#: [id number]  Referral source: Diamond Currie MD      Medical/Treatment Diagnosis: Lumbago with sciatica, left side [M54.42]     Prior Hospitalization: see medical history     Comorbidities: Arthritis, hypertension  Prior Level of Function: Independent  Medications: Verified on Patient Summary List  Start of Care: 21      Onset Date: Chronic (acute episode mid-2021)   The Plan of Care and following information is based on the information from the initial evaluation. Assessment/ key information:     The patient is a 77year old male who presents to physical therapy services due to acute episode of low back and L leg pain. He demonstrates decreased multiplanar lumbar AROM, decreased multiplanar core/LE strength, decreased muscle flexibility/joint mobility, and decreased functional endurance limiting daily tasks and exercise. He would benefit from continued skilled physical therapy services to increase functional endurance/independence and improve quality of life. Evaluation Complexity History MEDIUM  Complexity : 1-2 comorbidities / personal factors will impact the outcome/ POC ; Examination HIGH Complexity : 4+ Standardized tests and measures addressing body structure, function, activity limitation and / or participation in recreation  ;Presentation MEDIUM Complexity : Evolving with changing characteristics  ; Clinical Decision Making MEDIUM Complexity : FOTO score of 26-74  Overall Complexity Rating: MEDIUM    Problem List: pain affecting function, decrease ROM, decrease strength, impaired gait/ balance, decrease ADL/ functional abilitiies, decrease activity tolerance, decrease flexibility/ joint mobility and decrease transfer abilities   Treatment Plan may include any combination of the following: Therapeutic exercise, Therapeutic activities, Neuromuscular re-education, Physical agent/modality, Gait/balance training, Manual therapy, Patient education, Self Care training, Functional mobility training, Home safety training and Stair training  Patient / Family readiness to learn indicated by: asking questions, trying to perform skills and interest  Persons(s) to be included in education: patient (P)  Barriers to Learning/Limitations: None  Patient Goal (s): Eliminate the pain!   Patient Self Reported Health Status: good  Rehabilitation Potential: good    Short Term Goals: To be accomplished in 3-6 treatments: The patient will demonstrate full, pain-free multiplanar lumbar AROM to demonstrate increased independence with functional tasks. The patient will demonstrate SL balance >= 10 seconds bilaterally toward improved gait and stair mechanics. Long Term Goals: To be accomplished in 8-12 treatments: The patient will demonstrate gross bilateral multiplanar LE strength increased >= 1/2 MMT grade toward improved functional endurance with standing and walking. The patient will score >= 73 on FOTO to demonstrate significantly improved quality of life. Frequency / Duration: Patient to be seen 2 times per week for 8-12 treatments. Patient/ Caregiver education and instruction: self care, activity modification and exercises    [x]  Plan of care has been reviewed with PTA    Certification Period: 01/26/21-04/26/21  Tito Ng PT, DPT 1/26/2021     ________________________________________________________________________    I certify that the above Therapy Services are being furnished while the patient is under my care. I agree with the treatment plan and certify that this therapy is necessary.     [de-identified] Signature:____________________  Date:____________Time: _________

## 2021-01-26 NOTE — PROGRESS NOTES
PT INITIAL EVALUATION NOTE - [facility] 2-15    Patient Name: Jacey Simmons \"Rainer\"  Date:2021  : 1955  [x]  Patient  Verified  Payor: Dinesh Phillips / Plan: [place] / Product Type: Medicare /    In time: 1150  Out time:1255  Total Treatment Time (min): 65  Total Timed Codes (min): 15  1:1 Treatment Time ( only): 15   Visit #: 1     Treatment Area: Lumbago with sciatica, left side [M54.42]    SUBJECTIVE  Pain Level (0-10 scale): 5 currently in low back L > R, patient reports as \"super sharp but medication has dulled this recently\"  Any medication changes, allergies to medications, adverse drug reactions, diagnosis change, or new procedure performed?: [] No    [x] Yes (see summary sheet for update)  Subjective: The patient reports longstanding history of chronic low back pain, however notes exacerbation of symptoms when he attempted to rise from bed approximately two weeks ago, and found he could not walk due to excruciating low back/L-sided leg pain. He followed up with referring physician, who performed lumbar injection and prescribed gabapentin/meloxicam, which patient feels has alleviated symptoms somewhat. Currently, he notes \"constant\" low back pain and \"strained, tired\" feeling along the outsides of both legs to his feet. He also notes occasional pain into the bottoms of both feet. He notes frustration due to many practitioners telling him he needs to lose weight, as he exercises, fatigues, and needs to take rest breaks to continue. The patient denies falls, (-) bowel/bladder symptoms, however he does feel he is accumulating fluid/fat mass on the insides of both thighs. Current functional limitations include: bending/lifting, walking > 5-10 minutes, standing > 5-10 minutes, tying his shoes, and sleeping . Goals: \"eliminate the pain! \"    OBJECTIVE/EXAMINATION    Posture: Bilateral LE ER R > L, R shoulder lower than L, increased lumbar lordosis, forward head posture, decreased base of support, R scapular winging, increased cervical lordosis  Other Observations: Patient requiring increased time for bed mobility (rolling, supine <-> sit transfers), noting increased R-sided low back pain with pushing up to sitting from supine/sidelying positions  Gait and Functional Mobility: Patient ambulates with decreased nathaniel, bilateral Trendelenberg, decreased bilateral step length, decreased bilateral foot clearance  Squat: increased R weight shifting with increased squat depth, decreased base of support, bilateral LE ER, increased lumbar flexion, quadriceps dominance  Palpation: Tender to palpation L > R QL; no tenderness to palpation along bilateral posterolateral hips/thoracolumbar paraspinals        Lumbar AROM:          R  L    Flexion    Fingertips to distal tibia    Extension   50% (low back p!)    Side Bending   Fingertips 2cm from knee joint line     Fingertips 4cm from knee joint line (p!)    Rotation   75%  50% (p!)      Hip PROM: WNL for flexion/ER/IR bilaterally; noted decreased bilateral hip extension PROM    LOWER QUARTER   MUSCLE STRENGTH  KEY       R  L  0 - No Contraction  L1, L2 Psoas  4+  4  1 - Trace   L3 Quads  4+  4+  2 - Poor   L4 Tib Ant  5  5  3 - Fair      4 - Good     5 - Normal   S2 Hams  4  4    Flexibility: Hamstring 90-90 Test (+) bilaterally; Carlos Test (+) bilaterally (iliopsoas)  Mobility Assessment: Hypomobile to thoracic PA joint mobilizations (T4-10)      MMT:               HIP Ext: R = 4/L = 4              HIP Abd: R = 4+/L = 4  Neurological: Reflexes / Sensations: Dermatomes WNL bilateral LEs; reflexes 0 patellar/Achilles bilaterally  Special Tests:    Trendelenberg: (+) bilaterally    FABERS: (-) bilaterally   Repeated Flexion/Extension: (-) change  Slump: (-) bilaterally              SLR: MSK response bilaterally  Single limb balance: R < 1 second; L = 3 seconds. Noted bilateral UE abduction to maintain postural control during assessment.   30 Second Sit to Stand = 13x (no UEs)    15 min Therapeutic Exercise:  [x] See flow sheet :   Rationale: increase ROM, increase strength, improve coordination, improve balance and increase proprioception to improve the patients ability to stand, walk, and exercise          With   [x] TE   [] TA   [] Neuro   [] SC   [] other: Patient Education: [x] Review HEP    [] Progressed/Changed HEP based on:   [] positioning   [] body mechanics   [] transfers   [] heat/ice application    [] other:      Other Objective/Functional Measures: FOTO = 57    Pain Level (0-10 scale) post treatment: Patient noting increased muscle soreness, decrease in low back pain post-exercise/evaluation today    ASSESSMENT/Changes in Function:     [x]  See Plan of [address] Mccabe, PT, DPT 1/26/2021

## 2021-01-29 ENCOUNTER — TELEPHONE (OUTPATIENT)
Dept: INTERNAL MEDICINE CLINIC | Age: 66
End: 2021-01-29

## 2021-01-29 NOTE — TELEPHONE ENCOUNTER
Patients wife called wanting to know if his physical labs were back yet and they have questions about the vaccine.    CB: [phone]

## 2021-02-01 ENCOUNTER — HOSPITAL ENCOUNTER (OUTPATIENT)
Dept: PHYSICAL THERAPY | Age: 66
Discharge: HOME | End: 2021-02-01
Payer: MEDICARE

## 2021-02-01 PROCEDURE — 97140 MANUAL THERAPY 1/> REGIONS: CPT

## 2021-02-01 PROCEDURE — 97110 THERAPEUTIC EXERCISES: CPT

## 2021-02-01 NOTE — PROGRESS NOTES
PT DAILY TREATMENT NOTE - [facility] 2-15    Patient Name: Jim Conn Day  Date:2021  : 1955  [x]  Patient  Verified  Payor: Rodolfo Cintron / Plan: [place] / Product Type: Medicare /    In time: 3551  Out time: 1100  Total Treatment Time (min): 44  Total Timed Codes (min): 44  1:1 Treatment Time (MC only): 44   Visit #: 2    Treatment Area: Lumbago with sciatica, left side [M54.42]    SUBJECTIVE  Pain Level (0-10 scale): 0  Any medication changes, allergies to medications, adverse drug reactions, diagnosis change, or new procedure performed?: [x] No    [] Yes (see summary sheet for update)  Subjective functional status/changes:   [] No changes reported    The patient reports he feels as though the muscles and nerves in his legs are \"jumping\" this morning; he did take his medication prior to arrival and notes his back pain has been better. He continues to report a \"strain\" in both his legs. He has not worked on [employer] since last visit due to a death in the family.     OBJECTIVE    29 min Therapeutic Exercise:  [x] See flow sheet : Includes time spent discussing and practicing bed mobility techniques to achieve supine -> seated transfer   Rationale: increase ROM, increase strength, improve coordination and increase proprioception to improve the patients ability to stand, walk, and exercise    15 min Manual Therapy: Bilateral hip PROM, all planes with overpressure to tolerance; bilateral QL release/MWM (hip hike, 10x each LE); prone mid-thoracic PA joint mobilizations (Grade III, T4-T10)   Rationale: decrease pain, increase ROM, increase tissue extensibility, decrease trigger points and increase postural awareness to improve the patients ability to stand, walk, and exercise          With   [x] TE   [] TA   [] Neuro   [] SC   [] other: Patient Education: [x] Review HEP    [] Progressed/Changed HEP based on:   [] positioning   [] body mechanics   [] transfers   [] heat/ice application    [] other: Other Objective/Functional Measures: Patient very point tender to palpation at L > R QL      Pain Level (0-10 scale) post treatment: Patient noting no pain, significant R-sided low back \"soreness\" after session today, attributed to exercise fatigue    ASSESSMENT/Changes in Function:   The patient tolerated therapy visit well at this date. He continues to demonstrate focal tenderness replicating familiar symptoms over bilateral QL, L > R, improved with manual intervention today. Patient requires frequent cues to maintain form and transversus abdominis/gluteal setting with therapeutic exercise, particularly in standing positions as he tends to compensate with trunk lean. Patient demonstrating improved ability to isolate gluteal musculature with breathing during supine gluteal setting today. Patient very fatigued at end of session today. Continue to progress as tolerated. Patient will continue to benefit from skilled PT services to modify and progress therapeutic interventions, address functional mobility deficits, address ROM deficits, address strength deficits, analyze and address soft tissue restrictions, analyze and cue movement patterns, analyze and modify body mechanics/ergonomics, assess and modify postural abnormalities and instruct in home and community integration to attain remaining goals. []  See Plan of Care  []  See progress note/recertification  []  See Discharge Summary         Progress towards goals / Updated goals:    Short Term Goals: To be accomplished in 3-6 treatments: The patient will demonstrate full, pain-free multiplanar lumbar AROM to demonstrate increased independence with functional tasks. - Progressing              The patient will demonstrate SL balance >= 10 seconds bilaterally toward improved gait and stair mechanics. - Progressing  Long Term Goals: To be accomplished in 8-12 treatments:               The patient will demonstrate gross bilateral multiplanar LE strength increased >= 1/2 MMT grade toward improved functional endurance with standing and walking. - Progressing              The patient will score >= 73 on FOTO to demonstrate significantly improved quality of life. - Progressing  Frequency / Duration: Patient to be seen 2 times per week for 8-12 treatments.     PLAN  [x]  Upgrade activities as tolerated     [x]  Continue plan of care  [x]  Update interventions per flow sheet       []  Discharge due to:_  []  Other:_      Sear Hauser, PT, DPT 2/1/2021

## 2021-02-02 DIAGNOSIS — M54.42 ACUTE LEFT-SIDED LOW BACK PAIN WITH LEFT-SIDED SCIATICA: ICD-10-CM

## 2021-02-02 RX ORDER — MELOXICAM 15 MG/1
TABLET ORAL
Qty: 10 TAB | Refills: 0 | Status: SHIPPED | OUTPATIENT
Start: 2021-02-02 | End: 2021-02-10

## 2021-02-03 ENCOUNTER — HOSPITAL ENCOUNTER (OUTPATIENT)
Dept: PHYSICAL THERAPY | Age: 66
Discharge: HOME | End: 2021-02-03
Payer: MEDICARE

## 2021-02-03 PROCEDURE — 97140 MANUAL THERAPY 1/> REGIONS: CPT

## 2021-02-03 PROCEDURE — 97110 THERAPEUTIC EXERCISES: CPT

## 2021-02-03 NOTE — PROGRESS NOTES
PT DAILY TREATMENT NOTE - [facility] 2-15    Patient Name: Drew Simmons  Date:2/3/2021  : 1955  [x]  Patient  Verified  Payor: [employer] / Plan: [place] / Product Type: Medicare /    In time: 4940  Out time: 1110  Total Treatment Time (min): 49  Total Timed Codes (min): 49  1:1 Treatment Time (MC only): 52   Visit #: 3    Treatment Area: Lumbago with sciatica, left side [M54.42]    SUBJECTIVE  Pain Level (0-10 scale): 4 in R-sided low back  Any medication changes, allergies to medications, adverse drug reactions, diagnosis change, or new procedure performed?: [x] No    [] Yes (see summary sheet for update)  Subjective functional status/changes:   [] No changes reported    The patient reports he was able to get up and move around with minimal thigh discomfort this morning; he did have some discomfort in his calves when first starting to move. He notes increased R-sided low back pain at arrival. He felt good while leaving last visit. He has not performed HEP since last time in clinic.     OBJECTIVE    34 min Therapeutic Exercise:  [x] See flow sheet : Includes time spent discussing and practicing bed mobility techniques to achieve supine -> seated transfer   Rationale: increase ROM, increase strength, improve coordination and increase proprioception to improve the patients ability to stand, walk, and exercise    15 min Manual Therapy: Bilateral hip PROM, all planes with overpressure to tolerance; bilateral QL release/MWM (hip hike, 10x each LE); prone mid-thoracic PA joint mobilizations (Grade III, T4-T10)   Rationale: decrease pain, increase ROM, increase tissue extensibility, decrease trigger points and increase postural awareness to improve the patients ability to stand, walk, and exercise          With   [x] TE   [] TA   [] Neuro   [] SC   [] other: Patient Education: [x] Review HEP    [] Progressed/Changed HEP based on:   [] positioning   [] body mechanics   [] transfers   [] heat/ice application    [] other:      Other Objective/Functional Measures: Patient very point tender to palpation at L > R QL. Noted improvement in speed and ease of bed mobility and transfers. Patient noting significant improvement in upright posture and R-sided low back pain with ambulation post-manual intervention. Pain Level (0-10 scale) post treatment: Patient noting no pain, significant R-sided low back \"soreness\" after session today, attributed to exercise fatigue    ASSESSMENT/Changes in Function:   The patient tolerated therapy visit well at this date. He requires increased time to complete bed mobility and transfers throughout session. He continues to demonstrate focal tenderness replicating familiar symptoms over bilateral QL, L > R, improved with manual intervention today. Patient requiring cues to maintain hip positioning with introduction of clamshells to improve gait mechanics today. Patient requires frequent cues to maintain form and transversus abdominis/gluteal setting with therapeutic exercise, particularly in standing positions as he tends to compensate with trunk lean toward stance limb. Patient demonstrating improved ability to isolate gluteal musculature with breathing during supine gluteal setting today. Patient very fatigued at end of session today, particularly noting LE fatigue with standing exercise. Continue to progress as tolerated. Patient will continue to benefit from skilled PT services to modify and progress therapeutic interventions, address functional mobility deficits, address ROM deficits, address strength deficits, analyze and address soft tissue restrictions, analyze and cue movement patterns, analyze and modify body mechanics/ergonomics, assess and modify postural abnormalities and instruct in home and community integration to attain remaining goals.      []  See Plan of Care  []  See progress note/recertification  []  See Discharge Summary         Progress towards goals / Updated goals:    Short Term Goals: To be accomplished in 3-6 treatments: The patient will demonstrate full, pain-free multiplanar lumbar AROM to demonstrate increased independence with functional tasks. - Progressing              The patient will demonstrate SL balance >= 10 seconds bilaterally toward improved gait and stair mechanics. - Progressing  Long Term Goals: To be accomplished in 8-12 treatments: The patient will demonstrate gross bilateral multiplanar LE strength increased >= 1/2 MMT grade toward improved functional endurance with standing and walking. - Progressing              The patient will score >= 73 on FOTO to demonstrate significantly improved quality of life. - Progressing  Frequency / Duration: Patient to be seen 2 times per week for 8-12 treatments.     PLAN  [x]  Upgrade activities as tolerated     [x]  Continue plan of care  [x]  Update interventions per flow sheet       []  Discharge due to:_  []  Other:_      Sera Hauser, PT, DPT 2/3/2021

## 2021-02-04 RX ORDER — BUMETANIDE 1 MG/1
TABLET ORAL
Qty: 180 TAB | Refills: 0 | Status: SHIPPED | OUTPATIENT
Start: 2021-02-04 | End: 2021-05-03

## 2021-02-05 ENCOUNTER — TELEPHONE (OUTPATIENT)
Dept: INTERNAL MEDICINE CLINIC | Age: 66
End: 2021-02-05

## 2021-02-05 NOTE — TELEPHONE ENCOUNTER
Spoke with patient and advised him to try Miralax through the weekend (one capful daily) if no results contact our office next week.

## 2021-02-05 NOTE — TELEPHONE ENCOUNTER
Patient called stating he is having constipation. Patient would like a call back.   CB: [phone]

## 2021-02-08 ENCOUNTER — HOSPITAL ENCOUNTER (EMERGENCY)
Age: 66
Discharge: HOME | End: 2021-02-08
Attending: EMERGENCY MEDICINE
Payer: MEDICARE

## 2021-02-08 ENCOUNTER — APPOINTMENT (OUTPATIENT)
Dept: GENERAL RADIOLOGY | Age: 66
End: 2021-02-08
Attending: PHYSICIAN ASSISTANT
Payer: MEDICARE

## 2021-02-08 ENCOUNTER — HOSPITAL ENCOUNTER (OUTPATIENT)
Dept: PHYSICAL THERAPY | Age: 66
End: 2021-02-08
Payer: MEDICARE

## 2021-02-08 VITALS
TEMPERATURE: 97.2 F | DIASTOLIC BLOOD PRESSURE: 97 MMHG | OXYGEN SATURATION: 97 % | SYSTOLIC BLOOD PRESSURE: 185 MMHG | HEART RATE: 93 BPM | RESPIRATION RATE: 16 BRPM

## 2021-02-08 DIAGNOSIS — K59.00 CONSTIPATION, UNSPECIFIED CONSTIPATION TYPE: Primary | ICD-10-CM

## 2021-02-08 PROCEDURE — 99282 EMERGENCY DEPT VISIT SF MDM: CPT

## 2021-02-08 PROCEDURE — 74018 RADEX ABDOMEN 1 VIEW: CPT

## 2021-02-08 PROCEDURE — 74011250637 HC RX REV CODE- 250/637: Performed by: PHYSICIAN ASSISTANT

## 2021-02-08 RX ADMIN — SODIUM PHOSPHATE, DIBASIC AND SODIUM PHOSPHATE, MONOBASIC 1 ENEMA: 7; 19 ENEMA RECTAL at 11:16

## 2021-02-08 NOTE — DISCHARGE INSTRUCTIONS
Miralax - Take 2 caps in a beverage twice daily. Do this for 3 days. Then, reduce to 1 cap in a beverage daily. Do this for 3 days. Then, reduce to 1 cap once daily. You may continue this until you have very soft regular stools and then use only as needed.

## 2021-02-08 NOTE — ED PROVIDER NOTES
59-year-old male presents to ED due to constipation worsening over the past week. States he has had some bowel movements but even very small and hard. Notes today generalized abdominal pressure and discomfort and feels like he has to go the bathroom but has to strain very hard to have a very small result. Has tried eating oatmeal, pears, prune juice, and 1 dose of MiraLAX. Notes that this is all been worsened over the last week as his son passed away about 7 days ago. Denies any chronic problems with bowel movements and states typically he has some most days and they are easy to pass. Denies any bright red blood or melena. Denies any nausea or vomiting or fever.            Past Medical History:   Diagnosis Date    Allergic rhinitis 11/22/2017    Chest pain syndrome 11/22/2017    Comments: abnl stress cardiolite 2006, subsequent to that, he had a normal cardiac cath   [facility] acquired pneumonia     Diarrhea 11/22/2017    Dyspnea 11/22/2017    Environmental allergies     Gout 11/22/2017    Hypertension     Insomnia 11/22/2017    Iron deficiency 11/22/2017    Left chronic serous otitis media 11/22/2017    Obesity 11/22/2017    Comments: morbid    Sleep apnea     Steatosis 11/22/2017    Comments: non-alcoholic       Past Surgical History:   Procedure Laterality Date    HX ORTHOPAEDIC      right knee         Family History:   Problem Relation Age of Onset    Hypertension Other     Heart Disease Other     Diabetes Other     Diabetes Mother     Diabetes Father        Social History     Socioeconomic History    Marital status:      Spouse name: Not on file    Number of children: Not on file    Years of education: Not on file    Highest education level: Not on file   Occupational History    Not on file   Social Needs    Financial resource strain: Not on file    Food insecurity     Worry: Not on file     Inability: Not on file    Transportation needs     Medical: Not on file Non-medical: Not on file   Tobacco Use    Smoking status: Never Smoker    Smokeless tobacco: Never Used   Substance and Sexual Activity    Alcohol use: Yes     Comment: rarely    Drug use: Never    Sexual activity: Yes     Partners: Female   Lifestyle    Physical activity     Days per week: Not on file     Minutes per session: Not on file    Stress: Not on file   Relationships    Social connections     Talks on phone: Not on file     Gets together: Not on file     Attends Gnosticist service: Not on file     Active member of club or organization: Not on file     Attends meetings of clubs or organizations: Not on file     Relationship status: Not on file    Intimate partner violence     Fear of current or ex partner: Not on file     Emotionally abused: Not on file     Physically abused: Not on file     Forced sexual activity: Not on file   Other Topics Concern    Not on file   Social History Narrative    Not on file         ALLERGIES: Ace inhibitors    Review of Systems   Constitutional: Negative for chills and fever. HENT: Negative for congestion and sore throat. Respiratory: Negative for cough and shortness of breath. Cardiovascular: Negative for chest pain. Gastrointestinal: Positive for abdominal distention and constipation. Negative for blood in stool, nausea and vomiting. Genitourinary: Negative for dysuria. Musculoskeletal: Negative for myalgias. Skin: Negative for rash. Neurological: Negative for dizziness and weakness. Vitals:    02/08/21 0910   BP: (!) 185/97   Pulse: 93   Resp: 16   Temp: 97.2 °F (36.2 °C)   SpO2: 97%            Physical Exam  Vitals signs and nursing note reviewed. Constitutional:       General: He is not in acute distress. Appearance: He is not ill-appearing. HENT:      Head: Normocephalic. Nose: No rhinorrhea. Mouth/Throat:      Mouth: Mucous membranes are moist.      Pharynx: Oropharynx is clear. No posterior oropharyngeal erythema. Eyes:      Pupils: Pupils are equal, round, and reactive to light. Neck:      Musculoskeletal: Normal range of motion. Cardiovascular:      Rate and Rhythm: Normal rate and regular rhythm. Heart sounds: Normal heart sounds. Pulmonary:      Effort: Pulmonary effort is normal.      Breath sounds: Normal breath sounds. No wheezing. Abdominal:      General: Abdomen is protuberant. Bowel sounds are normal. There is no distension. Palpations: Abdomen is soft. Tenderness: There is no abdominal tenderness. Skin:     General: Skin is warm. Capillary Refill: Capillary refill takes less than 2 seconds. Findings: No erythema or rash. Neurological:      Mental Status: He is alert. Psychiatric:         Mood and Affect: Mood normal.         Behavior: Behavior normal.        Medications   sodium phosphate (FLEET'S) enema 1 Enema (1 Enema Rectal Given 2/8/21 1116)     Labs Reviewed - No data to display  XR ABD (KUB)   Final Result   No acute process. Moderate fecal stasis. MDM  Number of Diagnoses or Management Options  Constipation, unspecified constipation type  Diagnosis management comments: Differential diagnosis includes constipation, bowel obstruction    Patient states he is uncomfortable but has no significant abdominal tenderness. X-ray reveals moderate fecal stasis without obstructive pattern. Fleets enema given in ED with good results. Patient states he is feeling much better. Reviewed maintenance bowel regimen with MiraLAX taper plan. Discussed appropriate follow-up with primary care provider as well as GI as he has this scheduled for routine colonoscopy in the near future.   Return precautions reviewed       Amount and/or Complexity of Data Reviewed  Tests in the radiology section of CPT®: reviewed           Procedures        Eagle Haney PA-C  2/8/2021

## 2021-02-10 ENCOUNTER — HOSPITAL ENCOUNTER (OUTPATIENT)
Dept: PHYSICAL THERAPY | Age: 66
Discharge: HOME | End: 2021-02-10
Payer: MEDICARE

## 2021-02-10 DIAGNOSIS — M54.42 ACUTE LEFT-SIDED LOW BACK PAIN WITH LEFT-SIDED SCIATICA: ICD-10-CM

## 2021-02-10 RX ORDER — MELOXICAM 15 MG/1
TABLET ORAL
Qty: 10 TAB | Refills: 0 | Status: SHIPPED | OUTPATIENT
Start: 2021-02-10 | End: 2021-02-17

## 2021-02-15 ENCOUNTER — APPOINTMENT (OUTPATIENT)
Dept: PHYSICAL THERAPY | Age: 66
End: 2021-02-15
Payer: MEDICARE

## 2021-02-17 ENCOUNTER — HOSPITAL ENCOUNTER (OUTPATIENT)
Dept: PHYSICAL THERAPY | Age: 66
Discharge: HOME | End: 2021-02-17
Payer: MEDICARE

## 2021-02-17 DIAGNOSIS — M54.42 ACUTE LEFT-SIDED LOW BACK PAIN WITH LEFT-SIDED SCIATICA: ICD-10-CM

## 2021-02-17 PROCEDURE — 97110 THERAPEUTIC EXERCISES: CPT

## 2021-02-17 PROCEDURE — 97140 MANUAL THERAPY 1/> REGIONS: CPT

## 2021-02-17 RX ORDER — MELOXICAM 15 MG/1
TABLET ORAL
Qty: 10 TAB | Refills: 0 | Status: SHIPPED | OUTPATIENT
Start: 2021-02-17 | End: 2021-03-01

## 2021-02-17 NOTE — PROGRESS NOTES
PT DAILY TREATMENT NOTE - [facility] 2-15    Patient Name: Eligio Arboleda Day  Date:2021  : 1955  [x]  Patient  Verified  Payor: Latoya Ashley / Plan: [place] PART [place] / Product Type: Medicare /    In time: 8484  Out time: 1103  Total Treatment Time (min): 44  Total Timed Codes (min): 41 (restroom break from 10:38-10:41am)  1:1 Treatment Time ( only): 41   Visit #: 4    Treatment Area: Lumbago with sciatica, left side [M54.42]    SUBJECTIVE  Pain Level (0-10 scale): Notes no pain, mild \"stiffness\" in L hip/thigh  Any medication changes, allergies to medications, adverse drug reactions, diagnosis change, or new procedure performed?: [x] No    [] Yes (see summary sheet for update)  Subjective functional status/changes:   [] No changes reported    The patient reports he is feeling pretty good overall; does note some \"stiffness\" in L hip/upper leg this morning which limits him in getting up from bed and standing for periods of time. He notes minimal discomfort in low back/R hip/leg. He attempted to do several bridges at home this morning, which were difficult and painful. Otherwise, he has not done much related to HEP since last visit. OBJECTIVE    29 min Therapeutic Exercise:  [x] See flow sheet :   Rationale: increase ROM, increase strength, improve coordination and increase proprioception to improve the patients ability to stand, walk, and exercise    12 min Manual Therapy: L hip PROM, all planes with overpressure to tolerance; L QL release/MWM (hip hike, 10x);  L posterolateral hip STM/TPR (gluteus yrn/medius/minimus/piriformis); L gluteus medius pin + stretch MWM (clamshell, 10x)   Rationale: decrease pain, increase ROM, increase tissue extensibility, decrease trigger points and increase postural awareness to improve the patients ability to stand, walk, and exercise          With   [x] TE   [] TA   [] Neuro   [] SC   [] other: Patient Education: [x] Review HEP    [] Progressed/Changed HEP based on:   [] positioning   [] body mechanics   [] transfers   [] heat/ice application    [] other:      Other Objective/Functional Measures:  Noted improvement in speed and ease of bed mobility and transfers throughout visit today. Noted significant improvement in upright posture during gait post-session today. Pain Level (0-10 scale) post treatment: Patient noting no pain, continued L hip \"stiffness\" post session today    ASSESSMENT/Changes in Function:   The patient tolerated therapy visit well at this date. He requires increased time to complete bed mobility and transfers throughout session. He continues to demonstrate focal tenderness replicating familiar symptoms over L QL and posterolateral hip, improved post-manual intervention today. Patient requiring continued cues to maintain hip stacking with clamshells to improve gait mechanics. Emphasis on progressing standing exercise to improve endurance with standing and walking at this time. Patient requires frequent cues to maintain form and transversus abdominis/gluteal setting with therapeutic exercise, particularly in standing positions as he tends to compensate with trunk lean toward stance limb. Patient demonstrating decreased eccentric control to plinth during introduction of sit <-> stand transfers today, particularly with fatigue. Patient very fatigued at end of session today, particularly noting LE fatigue with standing exercise. Continue to progress as tolerated. Patient will continue to benefit from skilled PT services to modify and progress therapeutic interventions, address functional mobility deficits, address ROM deficits, address strength deficits, analyze and address soft tissue restrictions, analyze and cue movement patterns, analyze and modify body mechanics/ergonomics, assess and modify postural abnormalities and instruct in home and community integration to attain remaining goals.      [x]  See Plan of Care  []  See progress note/recertification  []  See Discharge Summary         Progress towards goals / Updated goals:    Short Term Goals: To be accomplished in 3-6 treatments: The patient will demonstrate full, pain-free multiplanar lumbar AROM to demonstrate increased independence with functional tasks. - Progressing              The patient will demonstrate SL balance >= 10 seconds bilaterally toward improved gait and stair mechanics. - Progressing  Long Term Goals: To be accomplished in 8-12 treatments: The patient will demonstrate gross bilateral multiplanar LE strength increased >= 1/2 MMT grade toward improved functional endurance with standing and walking. - Progressing              The patient will score >= 73 on FOTO to demonstrate significantly improved quality of life. - Progressing  Frequency / Duration: Patient to be seen 2 times per week for 8-12 treatments.     PLAN  [x]  Upgrade activities as tolerated     [x]  Continue plan of care  [x]  Update interventions per flow sheet       []  Discharge due to:_  []  Other:_      Corey Weiss PT, DPT 2/17/2021

## 2021-02-22 ENCOUNTER — HOSPITAL ENCOUNTER (OUTPATIENT)
Dept: PHYSICAL THERAPY | Age: 66
Discharge: HOME | End: 2021-02-22
Payer: MEDICARE

## 2021-02-22 PROCEDURE — 97140 MANUAL THERAPY 1/> REGIONS: CPT

## 2021-02-22 PROCEDURE — 97110 THERAPEUTIC EXERCISES: CPT

## 2021-02-22 RX ORDER — ALLOPURINOL 300 MG/1
300 TABLET ORAL DAILY
Qty: 30 TAB | Refills: 2 | Status: SHIPPED | OUTPATIENT
Start: 2021-02-22 | End: 2021-05-24

## 2021-02-22 NOTE — TELEPHONE ENCOUNTER
Last Refill: ?  Last Visit: 1/14/2021   Next Visit: 4/22/2021    Requested Prescriptions     Pending Prescriptions Disp Refills   • allopurinoL (ZYLOPRIM) 300 mg tablet 30 Tab 2     Sig: Take 1 Tab by mouth daily.

## 2021-02-22 NOTE — PROGRESS NOTES
PT DAILY TREATMENT NOTE - [facility] -15    Patient Name: Eligio Arboleda Day  Date:2021  : 1955  [x]  Patient  Verified  [place]or: [place] / Plan: [place] MED[place] & B / Product Type: Medicare /    In time: 4  Out time: 0802  Total Treatment Time (min): 54  Total Timed Codes (min): 42 (patient required restroom breaks from 7:14-7:18 am, 7:29-7:32am, 7:40-7:43am, 7:55-7:57am which he attributed to fluid pill ingestion)  1:1 Treatment Time ( [address] only): 42   Visit #: 5    Treatment Area: Lumbago with sciatica, left side [M54.42]    SUBJECTIVE  Pain Level (0-10 scale): 4 in L hip  Any medication changes, allergies to medications, adverse drug reactions, diagnosis change, or new procedure performed?: [x] No    [] Yes (see summary sheet for update)  Subjective functional status/changes:   [] No changes reported    The patient reports he has been walking around the house for exercise; he has not been working on HEP since last visit. His L hip pain has been better, however he continues to report intermittent L outer lower leg pain. He felt good, but tired, after last visit. OBJECTIVE    32 min Therapeutic Exercise:  [x] See flow sheet :   Rationale: increase ROM, increase strength, improve coordination and increase proprioception to improve the patients ability to stand, walk, and exercise    10 min Manual Therapy: L hip PROM, all planes with overpressure to tolerance; L QL release/MWM (hip hike, 10x);  L posterolateral hip STM/TPR (gluteus yrn/medius/minimus/piriformis); L gluteus medius pin + stretch MWM (clamshell, 10x); sidelying L hip extension (PA) joint mobilization (Grade III)   Rationale: decrease pain, increase ROM, increase tissue extensibility, decrease trigger points and increase postural awareness to improve the patients ability to stand, walk, and exercise          With   [x] TE   [] TA   [] Neuro   [] SC   [] other: Patient Education: [x] Review HEP    [] Progressed/Changed HEP based on: [] positioning   [] body mechanics   [] transfers   [] heat/ice application    [] other:      Other Objective/Functional Measures:  Noted improvement in speed and ease of bed mobility and transfers throughout visit today. Noted significant improvement in upright posture, mild antalgia on L due to increased L buttock \"soreness\" post-session. Pain Level (0-10 scale) post treatment: Patient noting increased L buttock/hip \"soreness\" attributed to exercise post-session today    ASSESSMENT/Changes in Function:   The patient tolerated therapy visit moderately well at this date. He continues to demonstrate and report overall centralizing symptoms to L buttock, with occasional continued L lateral lower leg pain during his days. He requires increased time to complete bed mobility and transfers throughout, and required several restroom breaks limiting pace of session. He continues to demonstrate focal tenderness replicating familiar symptoms over L QL and posterolateral hip (piriformis), improved mildly post-manual intervention today. Patient requiring continued cues to maintain hip stacking with clamshells to improve gluteus medius recruitment and isolation. Patient requires frequent cues to maintain form and transversus abdominis/gluteal setting with therapeutic exercise. Patient noting increased familiar L buttock symptoms with figure four stretch, however transitioned into \"stretching\" per self-report with positional modifications. Patient unable to participate in standing exercise today as he self-attenuated session due to frequent restroom breaks and significant L hip soreness after table exercises. Continue to progress as tolerated.   Patient will continue to benefit from skilled PT services to modify and progress therapeutic interventions, address functional mobility deficits, address ROM deficits, address strength deficits, analyze and address soft tissue restrictions, analyze and cue movement patterns, analyze and modify body mechanics/ergonomics, assess and modify postural abnormalities and instruct in home and community integration to attain remaining goals. [x]  See Plan of Care  []  See progress note/recertification  []  See Discharge Summary         Progress towards goals / Updated goals:    Short Term Goals: To be accomplished in 3-6 treatments: The patient will demonstrate full, pain-free multiplanar lumbar AROM to demonstrate increased independence with functional tasks. - Progressing              The patient will demonstrate SL balance >= 10 seconds bilaterally toward improved gait and stair mechanics. - Progressing  Long Term Goals: To be accomplished in 8-12 treatments: The patient will demonstrate gross bilateral multiplanar LE strength increased >= 1/2 MMT grade toward improved functional endurance with standing and walking. - Progressing              The patient will score >= 73 on FOTO to demonstrate significantly improved quality of life. - Progressing  Frequency / Duration: Patient to be seen 2 times per week for 8-12 treatments.     PLAN  [x]  Upgrade activities as tolerated     [x]  Continue plan of care  [x]  Update interventions per flow sheet       []  Discharge due to:_  []  Other:_      Destin Brown, PT, DPT 2/22/2021

## 2021-02-24 ENCOUNTER — HOSPITAL ENCOUNTER (OUTPATIENT)
Dept: PHYSICAL THERAPY | Age: 66
Discharge: HOME | End: 2021-02-24
Payer: MEDICARE

## 2021-02-24 PROCEDURE — 97535 SELF CARE MNGMENT TRAINING: CPT

## 2021-02-24 PROCEDURE — 97110 THERAPEUTIC EXERCISES: CPT

## 2021-02-24 PROCEDURE — 97140 MANUAL THERAPY 1/> REGIONS: CPT

## 2021-02-24 NOTE — PROGRESS NOTES
PT DAILY TREATMENT NOTE - [facility] -15    Patient Name: Cas De La Cruz Day  Date:2021  : 1955  [x]  Patient  Verified  Payor: Lesley Hastings / Plan: [place] MEDICARE PART A & B / Product Type: Medicare /    In time: 3732  Out time: 836  Total Treatment Time (min): 46  Total Timed Codes (min): 38 (patient required restroom break from 8:17-8:20 am which he attributed to fluid pill ingestion)  1:1 Treatment Time ( [address] only): 38   Visit #: 6    Treatment Area: Lumbago with sciatica, left side [M54.42]    SUBJECTIVE  Pain Level (0-10 scale): 5 in L hip/outer lower leg  Any medication changes, allergies to medications, adverse drug reactions, diagnosis change, or new procedure performed?: [x] No    [] Yes (see summary sheet for update)  Subjective functional status/changes:   [] No changes reported    The patient reports he has been feeling more pain in L hip and leg since last visit, particularly when he first gets out of bed in the morning. He has not been working on home exercises much, however did do a few clamshells to warm himself up prior to getting up from bed. He notes overall improvement in symptoms since initiating plan of care.     OBJECTIVE    Other Observations: Patient requiring increased time for bed mobility (rolling, supine <-> sit transfers), noting increased L-sided low back pain with rolling R > L  Gait and Functional Mobility: Patient ambulates with decreased nathaniel, bilateral Trendelenberg, increased lateral trunk sway, decreased bilateral step length R > L, decreased bilateral foot clearance  Palpation: Tender to palpation L > R QL; no tenderness to palpation along bilateral posterolateral hips/thoracolumbar paraspinals                                                     Lumbar AROM:                                                                                               R                      L                        Flexion                                     Fingertips to mid-tibia (increased L-sided low back pain)                        Extension                                50%      *Patient unable to complete remainder of lumbar AROM due to increased L-sided hip/low back pain at this date                                            Hip PROM: WNL for flexion/ER/IR on L; noted decreased L hip extension PROM     LOWER QUARTER                            MUSCLE STRENGTH  KEY                                                                             R                      L  0 - No Contraction                   L1, L2 Psoas               4+                    4+  1 - Trace                                  L3 Quads                    5                      5  2 - Poor                                   L4 Tib Ant                    5                      5  3 - Fair                                       4 - Good                                    5 - Normal                               S2 Hams                     4                      4+     Flexibility: Hamstring 90-90 Test (+) bilaterally; Carlos Test (+) bilaterally (iliopsoas)  Mobility Assessment: Hypomobile to thoracic PA joint mobilizations (T4-10)                                  MMT:               HIP Ext: R = 4+/L = 4+              HIP Abd: R = 5/L = 4 (p!)  Special Tests:               Trendelenberg: (+) bilaterally                                      FABERS: (-) on L              SLR: MSK response on L  Single limb balance: R < 1 second; L < 1 second. Noted bilateral UE abduction to maintain postural control during assessment.     OBJECTIVE    Modality rationale: decrease edema, decrease inflammation, decrease pain and increase tissue extensibility to improve the patients ability to stand, walk, and exercise   Min Type Additional Details       [] Estim: []Att   []Unatt    []TENS instruct                  []IFC  []Premod   []NMES                     []Other:  []w/US   []w/ice   []w/heat  Position:  Location:       []  Traction: [] Cervical []Lumbar                       [] Prone          []Supine                       []Intermittent   []Continuous Lbs:  [] before manual  [] after manual  []w/heat    []  Ultrasound: []Continuous   [] Pulsed                       at: []1MHz   []3MHz Location:  W/cm2:    [] Paraffin         Location:   []w/heat   5 []  Ice     [x]  Heat  []  Ice massage Position: Supine  Location: L-sided low back/buttock    []  Laser  []  Other: Position:  Location:      []  Vasopneumatic Device Pressure:       [] lo [] med [] hi   Temperature:      [x] Skin assessment post-treatment:  [x]intact []redness- no adverse reaction    []redness  adverse reaction:     13 min Therapeutic Exercise:  [x] See flow sheet : Includes time spent on re-assessment tests and measures   Rationale: increase ROM, increase strength, improve coordination and increase proprioception to improve the patients ability to stand, walk, and exercise    10 min Manual Therapy: L hip PROM, all planes with overpressure to tolerance; L QL release/MWM (hip hike, 10x); L posterolateral hip STM/TPR (gluteus yrn/medius/minimus/piriformis); L gluteus medius pin + stretch MWM (clamshell, 10x); sidelying L hip extension (PA) joint mobilization (Grade III)   Rationale: decrease pain, increase ROM, increase tissue extensibility, decrease trigger points and increase postural awareness to improve the patients ability to stand, walk, and exercise    15 min Self Care/Home Management:  []  See flow sheet : Discussed/educated patient on improving pain management strategies in home environment, such as performing 2-3 HEP exercises prior to rising in the morning, use of heat pad to manage symptoms, and contacting PCP to discuss medication management as needed. Additionally discussed plan of care with recommendation to continue 1-2x/week for 4 weeks due to recent outside events limiting PT plan of care to this point.  Patient verbalizing and demonstrating understanding of provided education with 100% accuracy using teach back method. Rationale: increase ROM, increase strength, improve coordination and increase proprioception  to improve the patients ability to self-manage condition in home environment          With   [x] TE   [] TA   [] Neuro   [x] SC   [] other: Patient Education: [x] Review HEP    [] Progressed/Changed HEP based on:   [] positioning   [] body mechanics   [] transfers   [] heat/ice application    [] other:      Other Objective/Functional Measures:  FOTO = 43    Patient noting no change in L buttock/LE pain post-manual intervention today     Pain Level (0-10 scale) post treatment: Patient noting increased L buttock/hip \"soreness\" attributed to exercise post-session today, and with noted difficulty initiating gait upon first rising from mat table due to L hip \"stiffness\" and pain    ASSESSMENT/Changes in Function:   The patient is a 77year old male who has participated in 6 skilled physical therapy visits due to low back with L-sided LE pain. Plan of care has to this point been limited largely by patient's ED visit to address GI distress, limited tolerance to exercise related to bowel and bladder urgency, and limited HEP compliance. He demonstrates decreased lumbar forward flexion with noted increase in L-sided low back/buttock pain, and demonstrates continued postural control and muscular endurance deficits. The patient does demonstrate improved bilateral posterolateral hip strength since initial evaluation. He scored 43 on FOTO today, demonstrating decreased subjective report of function today. However, subjectively he reports he feels better now versus upon initiating plan of care. Spent extensive time discussing home strategies for pain management and importance of regular HEP compliance, and recommending patient participate prior to rising from bed in morning to improve activity tolerance.  Due to difficulties maintaining HEP compliance and related to recent ED visit, as well as patient's ability to participate in physical therapy visits, plan to continue PT plan of care 1-2x/week for 4-8 visits toward improved pain management and functional tolerance. Patient will continue to benefit from skilled PT services to modify and progress therapeutic interventions, address functional mobility deficits, address ROM deficits, address strength deficits, analyze and address soft tissue restrictions, analyze and cue movement patterns, analyze and modify body mechanics/ergonomics, assess and modify postural abnormalities and instruct in home and community integration to attain remaining goals. []  See Plan of Care  [x]  See progress note/recertification  []  See Discharge Summary         Progress towards goals / Updated goals:    Short Term Goals: To be accomplished in 3-6 treatments: The patient will demonstrate full, pain-free multiplanar lumbar AROM to demonstrate increased independence with functional tasks. - Continue goal              The patient will demonstrate SL balance >= 10 seconds bilaterally toward improved gait and stair mechanics. - Continue goal  Long Term Goals: To be accomplished in 8-12 treatments: The patient will demonstrate gross bilateral multiplanar LE strength increased >= 1/2 MMT grade toward improved functional endurance with standing and walking. - Progressing              The patient will score >= 73 on FOTO to demonstrate significantly improved quality of life. - Continue goal  Frequency / Duration: Patient to be seen 1-2 times per week for 4-8 treatments.     PLAN  [x]  Upgrade activities as tolerated     [x]  Continue plan of care  [x]  Update interventions per flow sheet       []  Discharge due to:_  []  Other:_      Sonja Cintron, PT, DPT 2/24/2021

## 2021-02-24 NOTE — PROGRESS NOTES
[address] Physical Therapy  [address] [facility] ([facility]), [address] [place]  Phone: [phone] Fax: [phone]    Progress Note    Name: Bora Simmons   : 1955   MD: Sonido Haq MD       Treatment Diagnosis: Lumbago with sciatica, left side [M54.42]  Start of Care: 21    Visits from Start of Care: 6  Missed Visits: 3    Summary of Care: Therapy has included therapeutic exercise, self-care interventions, and manual techniques to improve thoracic/lumbar/hip mobility, core/hip/LE ROM and strength, postural control, and functional endurance/independence due to functional deficits related to low back/L LE pain. Assessment / Recommendations: The patient is a 77year old male who has participated in 6 skilled physical therapy visits due to low back with L-sided LE pain. Plan of care has to this point been limited largely by patient's ED visit to address GI distress, limited tolerance to exercise related to bowel and bladder urgency, and limited HEP compliance. He demonstrates decreased lumbar forward flexion with noted increase in L-sided low back/buttock pain, and demonstrates continued postural control and muscular endurance deficits. The patient does demonstrate improved bilateral posterolateral hip strength since initial evaluation. He scored 43 on FOTO today, demonstrating decreased subjective report of function today. However, subjectively he reports he feels better now versus upon initiating plan of care. Spent extensive time discussing home strategies for pain management and importance of regular HEP compliance, and recommending patient participate prior to rising from bed in morning to improve activity tolerance.  Due to difficulties maintaining HEP compliance and related to recent ED visit, as well as patient's ability to participate in physical therapy visits, plan to continue PT plan of care 1-2x/week for 4-8 visits toward improved pain management and functional tolerance. Short Term Goals: To be accomplished in 3-6 treatments:              EQH patient will demonstrate full, pain-free multiplanar lumbar AROM to demonstrate increased independence with functional tasks. - Continue goal              The patient will demonstrate SL balance >= 10 seconds bilaterally toward improved gait and stair mechanics. - Continue goal  Long Term Goals: To be accomplished in 8-12 treatments:              The patient will demonstrate gross bilateral multiplanar LE strength increased >= 1/2 MMT grade toward improved functional endurance with standing and walking. - Progressing              The patient will score >= 73 on FOTO to demonstrate significantly improved quality of life. - Continue goal  Frequency / Duration: Patient to be seen 1-2 times per week for 4-8 treatments.     Quincy Mattson, PT, DPT 2/24/2021

## 2021-02-28 DIAGNOSIS — I10 ESSENTIAL HYPERTENSION, BENIGN: ICD-10-CM

## 2021-02-28 RX ORDER — HYDRALAZINE HYDROCHLORIDE 50 MG/1
TABLET, FILM COATED ORAL
Qty: 180 TAB | Refills: 0 | Status: SHIPPED | OUTPATIENT
Start: 2021-02-28 | End: 2021-05-24 | Stop reason: SDUPTHER

## 2021-03-01 ENCOUNTER — HOSPITAL ENCOUNTER (OUTPATIENT)
Dept: PHYSICAL THERAPY | Age: 66
Discharge: HOME | End: 2021-03-01
Payer: MEDICARE

## 2021-03-01 DIAGNOSIS — M54.42 ACUTE LEFT-SIDED LOW BACK PAIN WITH LEFT-SIDED SCIATICA: ICD-10-CM

## 2021-03-01 PROCEDURE — 97110 THERAPEUTIC EXERCISES: CPT

## 2021-03-01 RX ORDER — MELOXICAM 15 MG/1
TABLET ORAL
Qty: 10 TAB | Refills: 0 | Status: SHIPPED | OUTPATIENT
Start: 2021-03-01 | End: 2021-03-04

## 2021-03-01 NOTE — PROGRESS NOTES
PT DAILY TREATMENT NOTE - [facility] 2-15    Patient Name: Pritesh Cardenas Day  Date:3/1/2021  : 1955  [x]  Patient  Verified  Payor: [employer] / Plan: [place] MEDICARE PART A & B / Product Type: Medicare /    In time: 208  Out time: 745  Total Treatment Time (min): 43  Total Timed Codes (min): 43  1:1 Treatment Time ( only): 43   Visit #: 7    Treatment Area: Lumbago with sciatica, left side [M54.42]    SUBJECTIVE  Pain Level (0-10 scale): 3-4 in L-sided low back/hip  Any medication changes, allergies to medications, adverse drug reactions, diagnosis change, or new procedure performed?: [x] No    [] Yes (see summary sheet for update)  Subjective functional status/changes:   [] No changes reported    The patient reports he has been feeling somewhat better; he was doing some exercises in his bed yesterday and noted increased R-sided low back pain, however wonders whether this may be attributed to receiving COVID-19 vaccine Saturday. He has been walking around more than usual as well. OBJECTIVE    43 min Therapeutic Exercise:  [x] See flow sheet :   Rationale: increase ROM, increase strength, improve coordination and increase proprioception to improve the patients ability to stand, walk, and exercise          With   [x] TE   [] TA   [] Neuro   [] SC   [] other: Patient Education: [x] Review HEP    [] Progressed/Changed HEP based on:   [] positioning   [] body mechanics   [] transfers   [] heat/ice application    [] other:      Other Objective/Functional Measures: Patient demonstrating decreased antalgia with gait pre-post session today     Pain Level (0-10 scale) post treatment: 2. Patient noting decreased L buttock/hip \"soreness\" post-session today. ASSESSMENT/Changes in Function:   The patient tolerated therapy visit moderately well at this date.  Held manual intervention to change therapeutic approach with exercise emphasis today; patient noting increased L buttock \"soreness\" however no radiating symptoms throughout visit. He continues to demonstrate and report overall centralizing symptoms to L buttock, with minimal report of L LE pain since last visit. He requires increased time to complete bed mobility and transfers throughout. Emphasis on standing exercise to improve standing/walking tolerance at this time. Patient requires frequent cues to maintain form and transversus abdominis/gluteal setting with therapeutic exercise. Patient very challenged to maintain anti-rotation positioning and required intermittent multimodal cueing to reduce ipsilateral trunk lean during pallof press, L > R. Continue to progress as tolerated. Patient will continue to benefit from skilled PT services to modify and progress therapeutic interventions, address functional mobility deficits, address ROM deficits, address strength deficits, analyze and address soft tissue restrictions, analyze and cue movement patterns, analyze and modify body mechanics/ergonomics, assess and modify postural abnormalities and instruct in home and community integration to attain remaining goals. [x]  See Plan of Care  []  See progress note/recertification  []  See Discharge Summary         Progress towards goals / Updated goals:    Short Term Goals: To be accomplished in 3-6 treatments: The patient will demonstrate full, pain-free multiplanar lumbar AROM to demonstrate increased independence with functional tasks. - Continue goal              The patient will demonstrate SL balance >= 10 seconds bilaterally toward improved gait and stair mechanics. - Continue goal  Long Term Goals: To be accomplished in 8-12 treatments: The patient will demonstrate gross bilateral multiplanar LE strength increased >= 1/2 MMT grade toward improved functional endurance with standing and walking. - Progressing              The patient will score >= 73 on FOTO to demonstrate significantly improved quality of life.  - Continue goal  Frequency / Duration: Patient to be seen 1-2 times per week for 4-8 treatments.     PLAN  [x]  Upgrade activities as tolerated     [x]  Continue plan of care  [x]  Update interventions per flow sheet       []  Discharge due to:_  []  Other:_      Subha Maya, PT, DPT 3/1/2021

## 2021-03-03 ENCOUNTER — HOSPITAL ENCOUNTER (EMERGENCY)
Age: 66
Discharge: HOME | End: 2021-03-03
Attending: STUDENT IN AN ORGANIZED HEALTH CARE EDUCATION/TRAINING PROGRAM | Admitting: STUDENT IN AN ORGANIZED HEALTH CARE EDUCATION/TRAINING PROGRAM
Payer: MEDICARE

## 2021-03-03 ENCOUNTER — HOSPITAL ENCOUNTER (OUTPATIENT)
Dept: PHYSICAL THERAPY | Age: 66
End: 2021-03-03
Payer: MEDICARE

## 2021-03-03 VITALS
HEART RATE: 99 BPM | TEMPERATURE: 98.1 F | DIASTOLIC BLOOD PRESSURE: 94 MMHG | SYSTOLIC BLOOD PRESSURE: 167 MMHG | RESPIRATION RATE: 18 BRPM | OXYGEN SATURATION: 95 %

## 2021-03-03 DIAGNOSIS — F41.1 ANXIETY STATE: ICD-10-CM

## 2021-03-03 DIAGNOSIS — Z20.822 ENCOUNTER FOR LABORATORY TESTING FOR COVID-19 VIRUS: Primary | ICD-10-CM

## 2021-03-03 LAB
SARS-COV-2, COV2: NORMAL
SARS-COV-2, XPLCVT: NOT DETECTED
SOURCE, COVRS: NORMAL

## 2021-03-03 PROCEDURE — U0005 INFEC AGEN DETEC AMPLI PROBE: HCPCS

## 2021-03-03 PROCEDURE — 99281 EMR DPT VST MAYX REQ PHY/QHP: CPT

## 2021-03-03 NOTE — ED TRIAGE NOTES
Second covid shot on Saturday and now he is itching and upset stomach. His sister  last week from [facility] and he would like to be tested.

## 2021-03-03 NOTE — ED PROVIDER NOTES
Please note that this dictation was completed with [employer], the computer voice recognition software.  Quite often unanticipated grammatical, syntax, homophones, and other interpretive errors are inadvertently transcribed by the computer software.  Please disregard these errors.  Please excuse any errors that have escaped final proofreading. Patient is a 47-year-old male with history of allergic rhinitis, dyspepsia, hypertension, sleep apnea, presenting to emergency department for evaluation of COVID-19 concern. He states that he had a second dose of the Covid vaccine 4 days ago, he did not have any side effects or adverse reactions to the vaccine. States that over the past several days he has been having itching of his feet and his head and he also feels like his stomach \"has been churning\". He also states that his son passed away from coronavirus 1 month ago and his sister  from Covid 1 week ago. He states he is very concerned he is Covid today. He denies fever, chills, nasal congestion, cough, chest pain, shortness of breath, vomiting, diarrhea, urinary changes, body aches, or any other medical complaints at this time.            Past Medical History:   Diagnosis Date    Allergic rhinitis 2017    Chest pain syndrome 2017    Comments: abnl stress cardiolite , subsequent to that, he had a normal cardiac cath   [employer] acquired pneumonia     Diarrhea 2017    Dyspnea 2017    Environmental allergies     Gout 2017    Hypertension     Insomnia 2017    Iron deficiency 2017    Left chronic serous otitis media 2017    Obesity 2017    Comments: morbid    Sleep apnea     Steatosis 2017    Comments: non-alcoholic       Past Surgical History:   Procedure Laterality Date    HX ORTHOPAEDIC      right knee         Family History:   Problem Relation Age of Onset    Hypertension Other     Heart Disease Other     Diabetes Other     Diabetes Mother     Diabetes Father        Social History     Socioeconomic History    Marital status:      Spouse name: Not on file    Number of children: Not on file    Years of education: Not on file    Highest education level: Not on file   Occupational History    Not on file   Social Needs    Financial resource strain: Not on file    Food insecurity     Worry: Not on file     Inability: Not on file    Transportation needs     Medical: Not on file     Non-medical: Not on file   Tobacco Use    Smoking status: Never Smoker    Smokeless tobacco: Never Used   Substance and Sexual Activity    Alcohol use: Yes     Comment: rarely    Drug use: Never    Sexual activity: Yes     Partners: Female   Lifestyle    Physical activity     Days per week: Not on file     Minutes per session: Not on file    Stress: Not on file   Relationships    Social connections     Talks on phone: Not on file     Gets together: Not on file     Attends Holiness service: Not on file     Active member of club or organization: Not on file     Attends meetings of clubs or organizations: Not on file     Relationship status: Not on file    Intimate partner violence     Fear of current or ex partner: Not on file     Emotionally abused: Not on file     Physically abused: Not on file     Forced sexual activity: Not on file   Other Topics Concern    Not on file   Social History Narrative    Not on file         ALLERGIES: Ace inhibitors    Review of Systems   Constitutional: Negative for chills and fever. HENT: Negative for ear pain and sore throat. Eyes: Negative for visual disturbance. Respiratory: Negative for cough and shortness of breath. Cardiovascular: Negative for chest pain. Gastrointestinal: Negative for abdominal pain, diarrhea, nausea and vomiting. Genitourinary: Negative for flank pain. Musculoskeletal: Negative for back pain. Skin: Negative for color change.    Neurological: Negative for dizziness and headaches. Psychiatric/Behavioral: Negative for confusion. The patient is nervous/anxious. Vitals:    03/03/21 0819   BP: (!) 167/94   Pulse: 99   Resp: 18   Temp: 98.1 °F (36.7 °C)   SpO2: 95%            Physical Exam  Vitals signs and nursing note reviewed. Constitutional:       General: He is not in acute distress. Appearance: Normal appearance. He is not ill-appearing. HENT:      Head: Normocephalic and atraumatic. Mouth/Throat:      Pharynx: Oropharynx is clear. Eyes:      Extraocular Movements: Extraocular movements intact. Conjunctiva/sclera: Conjunctivae normal.   Neck:      Musculoskeletal: No neck rigidity. Cardiovascular:      Rate and Rhythm: Normal rate and regular rhythm. Pulmonary:      Effort: Pulmonary effort is normal.      Breath sounds: Normal breath sounds. Abdominal:      Palpations: Abdomen is soft. Tenderness: There is no abdominal tenderness. Musculoskeletal: Normal range of motion. Skin:     General: Skin is warm and dry. Comments: No visible erythema, rashes, or lesions to the feet or scalp   Neurological:      General: No focal deficit present. Mental Status: He is alert and oriented to person, place, and time. Psychiatric:         Mood and Affect: Mood is anxious. MDM  Number of Diagnoses or Management Options  Anxiety state  Encounter for laboratory testing for COVID-19 virus  Diagnosis management comments: Patient is alert, afebrile, vitals stable. 2 family was recently passed away from COVID-23 infection. Patient is current increased stress and anxiety regarding having Covid. Second dose of vaccine 4 to 5 days ago. Itching feet and scalp without any evidence of infection, irritation, or rash at this time. Stomach \"churning\" without nausea or abdominal pain, abdomen is soft, nondistended, nontender. Patient is ambulatory in the emergency department without signs of shortness of breath.   Lungs are clear to auscultation bilaterally. Discharge COVID-19 test pending. Patient is discharged home with strict return precautions return to emergency department with any chest pain, dizziness, shortness of breath, or any other severe symptoms. Patient given information on respiratory isolation and current CDC guidelines. All questions answered at this time. 8:58 AM  Pt has been reevaluated. There are no new complaints, changes, or physical findings at this time. Medications have been reviewed w/ pt and/or family. Pt and/or family's questions have been answered. Pt and/or family expressed good understanding of the dx/tx/rx and is in agreement with plan of care. Pt instructed and agreed to f/u w/ PCP and to return to ED upon further deterioration. Pt is ready for discharge. IMPRESSION:  1. Encounter for laboratory testing for COVID-19 virus    2. Anxiety state        PLAN:  1. Current Discharge Medication List        2.    Follow-up Information     Follow up With Specialties Details Why Contact Info    Julissa Gomez MD Internal Medicine Schedule an appointment as soon as possible for a visit   [address] [place] [id number] [phone]              Return to ED if worse          Procedures

## 2021-03-04 ENCOUNTER — OFFICE VISIT (OUTPATIENT)
Dept: INTERNAL MEDICINE CLINIC | Age: 66
End: 2021-03-04
Payer: MEDICARE

## 2021-03-04 VITALS
HEIGHT: 71 IN | HEART RATE: 84 BPM | WEIGHT: 315 LBS | SYSTOLIC BLOOD PRESSURE: 108 MMHG | BODY MASS INDEX: 44.1 KG/M2 | RESPIRATION RATE: 18 BRPM | TEMPERATURE: 98.8 F | DIASTOLIC BLOOD PRESSURE: 70 MMHG | OXYGEN SATURATION: 93 %

## 2021-03-04 DIAGNOSIS — J01.10 ACUTE NON-RECURRENT FRONTAL SINUSITIS: Primary | ICD-10-CM

## 2021-03-04 DIAGNOSIS — I10 ESSENTIAL HYPERTENSION, BENIGN: ICD-10-CM

## 2021-03-04 DIAGNOSIS — F41.9 ANXIETY: ICD-10-CM

## 2021-03-04 DIAGNOSIS — E11.9 DIABETES MELLITUS TYPE 2, DIET-CONTROLLED (HCC): ICD-10-CM

## 2021-03-04 PROCEDURE — G8536 NO DOC ELDER MAL SCRN: HCPCS | Performed by: INTERNAL MEDICINE

## 2021-03-04 PROCEDURE — G8417 CALC BMI ABV UP PARAM F/U: HCPCS | Performed by: INTERNAL MEDICINE

## 2021-03-04 PROCEDURE — G8754 DIAS BP LESS 90: HCPCS | Performed by: INTERNAL MEDICINE

## 2021-03-04 PROCEDURE — 3044F HG A1C LEVEL LT 7.0%: CPT | Performed by: INTERNAL MEDICINE

## 2021-03-04 PROCEDURE — G8752 SYS BP LESS 140: HCPCS | Performed by: INTERNAL MEDICINE

## 2021-03-04 PROCEDURE — 3017F COLORECTAL CA SCREEN DOC REV: CPT | Performed by: INTERNAL MEDICINE

## 2021-03-04 PROCEDURE — G8427 DOCREV CUR MEDS BY ELIG CLIN: HCPCS | Performed by: INTERNAL MEDICINE

## 2021-03-04 PROCEDURE — G8432 DEP SCR NOT DOC, RNG: HCPCS | Performed by: INTERNAL MEDICINE

## 2021-03-04 PROCEDURE — 1101F PT FALLS ASSESS-DOCD LE1/YR: CPT | Performed by: INTERNAL MEDICINE

## 2021-03-04 PROCEDURE — 99214 OFFICE O/P EST MOD 30 MIN: CPT | Performed by: INTERNAL MEDICINE

## 2021-03-04 PROCEDURE — 2022F DILAT RTA XM EVC RTNOPTHY: CPT | Performed by: INTERNAL MEDICINE

## 2021-03-04 RX ORDER — GUAIFENESIN 600 MG/1
600 TABLET, EXTENDED RELEASE ORAL 2 TIMES DAILY
Qty: 10 TAB | Refills: 0 | Status: SHIPPED | OUTPATIENT
Start: 2021-03-04 | End: 2021-03-09

## 2021-03-04 RX ORDER — CEFUROXIME AXETIL 500 MG/1
500 TABLET ORAL 2 TIMES DAILY
Qty: 10 TAB | Refills: 0 | Status: SHIPPED | OUTPATIENT
Start: 2021-03-04 | End: 2021-03-09

## 2021-03-04 RX ORDER — HYDROXYZINE PAMOATE 25 MG/1
25 CAPSULE ORAL
Qty: 30 CAP | Refills: 0 | Status: SHIPPED | OUTPATIENT
Start: 2021-03-04 | End: 2021-03-14

## 2021-03-04 NOTE — PROGRESS NOTES
Ibeth Stone is a 77 y.o. male presenting for Anxiety and Sinus Pain  . 1. Have you been to the ER, urgent care clinic since your last visit? Hospitalized since your last visit? Yes When: 2/3/2021 Where: [facility] Reason for visit: anxiety    2. Have you seen or consulted any other health care providers outside of the [address] [place] [address] since your last visit? Include any pap smears or colon screening. No    Fall Risk Assessment, last 12 mths 1/14/2021   Able to walk? Yes   Fall in past 12 months? 0   Do you feel unsteady? 0   Are you worried about falling 0         Abuse Screening Questionnaire 1/14/2021   Do you ever feel afraid of your partner? N   Are you in a relationship with someone who physically or mentally threatens you? N   Is it safe for you to go home? Y       3 most recent PHQ Screens 3/4/2021   Little interest or pleasure in doing things More than half the days   Feeling down, depressed, irritable, or hopeless More than half the days   Total Score PHQ 2 4   Trouble falling or staying asleep, or sleeping too much Several days   Feeling tired or having little energy Several days   Poor appetite, weight loss, or overeating Not at all   Feeling bad about yourself - or that you are a failure or have let yourself or your family down Not at all   Trouble concentrating on things such as school, work, reading, or watching TV More than half the days   Moving or speaking so slowly that other people could have noticed; or the opposite being so fidgety that others notice Not at all   Thoughts of being better off dead, or hurting yourself in some way Not at all   PHQ 9 Score 8   How difficult have these problems made it for you to do your work, take care of your home and get along with others Somewhat difficult       There are no discontinued medications.

## 2021-03-04 NOTE — PATIENT INSTRUCTIONS
Recommend to take Ceftin 500 mg 2 times in a day for 5 days. Mucinex 600 mg p.o. twice daily for 5 days. Claritin as needed Take hydroxyzine as needed for anxiety. Anxiety Disorder: Care Instructions Your Care Instructions Anxiety is a normal reaction to stress. Difficult situations can cause you to have symptoms such as sweaty palms and a nervous feeling. In an anxiety disorder, the symptoms are far more severe. Constant worry, muscle tension, trouble sleeping, nausea and diarrhea, and other symptoms can make normal daily activities difficult or impossible. These symptoms may occur for no reason, and they can affect your work, school, or social life. Medicines, counseling, and self-care can all help. Follow-up care is a key part of your treatment and safety. Be sure to make and go to all appointments, and call your doctor if you are having problems. It's also a good idea to know your test results and keep a list of the medicines you take. How can you care for yourself at home? · Take medicines exactly as directed. Call your doctor if you think you are having a problem with your medicine. · Go to your counseling sessions and follow-up appointments. · Recognize and accept your anxiety. Then, when you are in a situation that makes you anxious, say to yourself, \"This is not an emergency. I feel uncomfortable, but I am not in danger. I can keep going even if I feel anxious. \" · Be kind to your body: 
? Relieve tension with exercise or a massage. ? Get enough rest. 
? Avoid alcohol, caffeine, nicotine, and illegal drugs. They can increase your anxiety level and cause sleep problems. ? Learn and do relaxation techniques. See below for more about these techniques. · Engage your mind. Get out and do something you enjoy. Go to a funny movie, or take a walk or hike. Plan your day. Having too much or too little to do can make you anxious. · Keep a record of your symptoms. Discuss your fears with a good friend or family member, or join a support group for people with similar problems. Talking to others sometimes relieves stress. · Get involved in social groups, or volunteer to help others. Being alone sometimes makes things seem worse than they are. · Get at least 30 minutes of exercise on most days of the week to relieve stress. Walking is a good choice. You also may want to do other activities, such as running, swimming, cycling, or playing tennis or team sports. Relaxation techniques Do relaxation exercises 10 to 20 minutes a day. You can play soothing, relaxing music while you do them, if you wish. · Tell others in your house that you are going to do your relaxation exercises. Ask them not to disturb you. · Find a comfortable place, away from all distractions and noise. · Lie down on your back, or sit with your back straight. · Focus on your breathing. Make it slow and steady. · Breathe in through your nose. Breathe out through either your nose or mouth. · Breathe deeply, filling up the area between your navel and your rib cage. Breathe so that your belly goes up and down. · Do not hold your breath. · Breathe like this for 5 to 10 minutes. Notice the feeling of calmness throughout your whole body. As you continue to breathe slowly and deeply, relax by doing the following for another 5 to 10 minutes: · Tighten and relax each muscle group in your body. You can begin at your toes and work your way up to your head. · Imagine your muscle groups relaxing and becoming heavy. · Empty your mind of all thoughts. · Let yourself relax more and more deeply. · Become aware of the state of calmness that surrounds you. · When your relaxation time is over, you can bring yourself back to alertness by moving your fingers and toes and then your hands and feet and then stretching and moving your entire body. Sometimes people fall asleep during relaxation, but they usually wake up shortly afterward. · Always give yourself time to return to full alertness before you drive a car or do anything that might cause an accident if you are not fully alert. Never play a relaxation tape while you drive a car. When should you call for help? Call 911 anytime you think you may need emergency care. For example, call if: 
  · You feel you cannot stop from hurting yourself or someone else. Keep the numbers for these national suicide hotlines: 1-392-579-TALK (4-891.915.6657) and 1-713-PQGIGRC (7-567.498.4095). If you or someone you know talks about suicide or feeling hopeless, get help right away. Watch closely for changes in your health, and be sure to contact your doctor if: 
  · You have anxiety or fear that affects your life.  
  · You have symptoms of anxiety that are new or different from those you had before. Where can you learn more? Go to http://www.[employer].com/ Enter P754 in the search box to learn more about \"Anxiety Disorder: Care Instructions. \" Current as of: January 31, 2020               Content Version: 12.6 © 1830-7334 [employer]. Care instructions adapted under license by [employer] (which disclaims liability or warranty for this information). If you have questions about a medical condition or this instruction, always ask your healthcare professional. [address] any warranty or liability for your use of this information.

## 2021-03-04 NOTE — PROGRESS NOTES
Jj Simmons is a 77 y.o. male and presents with Anxiety and Sinus Pain      Subjective:  Patient comes in today for his annual Medicare wellness visit and follow-up of his prediabetes, hypertension, dyslipidemia. Patient comes in today after ER follow-up. Reports he lost his son, sister and another family member to COVID-19 in the past 1 month. He has been having acute panic anxiety attacks and is very tearful during the interview process today. He was seen yesterday in the ER because he thought he had the Covid. He was tested negative for Covid and was told to follow-up with PCP. Patient reports he has been having sinus pressure, headache, nasal congestion, PND and cough. Denies fever or chills. Denies shortness of breath or wheezing. Recap-  Patient has acute on chronic back pain. He was seen last week for flareup. Was prescribed Medrol Dosepak, gabapentin (dose increased) and was given a Toradol shot while he was here. He reports he is 50% better. Chronic low back pain for which patient followed up with Dr. Remedios Go. No surgical intervention required. He was referred to physical therapy which he has been doing religiously. He tried to take Flexeril as needed however that upset his stomach. He has not yet followed up with Dr. Dominique Hinton.    Hypertensionblood pressure well controlled on atenolol and hydralazine. Does have chronic venous insufficiency and bilateral venous status dermatitis. On Bumex 2 mg p.o. daily. He does have a history of peripheral vascular disease? Not on statin? Takes a daily aspirin. Patient reports he is not sure if he had an KENNY done. He does not follow with a vascular surgeon. Sleep apnea on CPAP.     Past Medical History:   Diagnosis Date    Allergic rhinitis 11/22/2017    Chest pain syndrome 11/22/2017    Comments: abnl stress cardiolite 2006, subsequent to that, he had a normal cardiac cath   [facility] acquired pneumonia     Diarrhea 11/22/2017    Dyspnea 11/22/2017    Environmental allergies     Gout 11/22/2017    Hypertension     Insomnia 11/22/2017    Iron deficiency 11/22/2017    Left chronic serous otitis media 11/22/2017    Obesity 11/22/2017    Comments: morbid    Sleep apnea     Steatosis 11/22/2017    Comments: non-alcoholic     Past Surgical History:   Procedure Laterality Date    HX ORTHOPAEDIC      right knee     Allergies   Allergen Reactions    Ace Inhibitors Cough     Current Outpatient Medications   Medication Sig Dispense Refill    cefUROXime (CEFTIN) 500 mg tablet Take 1 Tab by mouth two (2) times a day for 5 days. 10 Tab 0    guaiFENesin ER (MUCINEX) 600 mg ER tablet Take 1 Tab by mouth two (2) times a day for 5 days. 10 Tab 0    hydrOXYzine pamoate (VISTARIL) 25 mg capsule Take 1 Cap by mouth three (3) times daily as needed for Anxiety for up to 10 days. 30 Cap 0    hydrALAZINE (APRESOLINE) 50 mg tablet TAKE 1 TABLET BY MOUTH TWICE DAILY FOR HIGH BLOOD PRESSURE 180 Tab 0    allopurinoL (ZYLOPRIM) 300 mg tablet Take 1 Tab by mouth daily. 30 Tab 2    bumetanide (BUMEX) 1 mg tablet TAKE 2 TABLETS BY MOUTH EVERY  Tab 0    diclofenac (VOLTAREN) 1 % gel Apply  to affected area four (4) times daily. 1 Each 1    atenoloL (TENORMIN) 50 mg tablet TAKE 1 TABLET BY MOUTH EVERY DAY 90 Tab 3    fenofibrate nanocrystallized (TRICOR) 145 mg tablet TAKE 1 TABLET BY MOUTH EVERY DAY 90 Tab 0    omeprazole (PRILOSEC OTC) 20 mg tablet Take 20 mg by mouth daily.  CE-OW-SP-Fe-Min-Lycopen-Lutein (CENTRUM) 0.4-162-18 mg Tab Take  by mouth.  aspirin 81 mg chewable tablet Take 81 mg by mouth daily.          Social History     Socioeconomic History    Marital status:      Spouse name: Not on file    Number of children: Not on file    Years of education: Not on file    Highest education level: Not on file   Tobacco Use    Smoking status: Never Smoker    Smokeless tobacco: Never Used   Substance and Sexual Activity  Alcohol use: Yes     Comment: rarely    Drug use: Never    Sexual activity: Yes     Partners: Female     Family History   Problem Relation Age of Onset    Hypertension Other     Heart Disease Other     Diabetes Other     Diabetes Mother     Diabetes Father        Review of Systems  ROS is unremarkable except for ones highlighted in bold.    Constitutional: negative for fevers, chills, anorexia and weight loss  Eyes:   negative for visual disturbance and irritation  ENT:   negative for tinnitus,sore throat,nasal congestion,ear pain,hoarseness  Respiratory:  negative for cough, hemoptysis, dyspnea,wheezing  CV:   negative for chest pain, palpitations, lower extremity edema  GI:   negative for nausea, vomiting, diarrhea, abdominal pain,melena  Endo:               negative for polyuria,polydipsia,polyphagia,heat intolerance  Genitourinary: negative for frequency, dysuria and hematuria  Integumentary: negative for rash and pruritus  Hematologic:  negative for easy bruising and gum/nose bleeding  Musculoskel: negative for myalgias, arthralgias, back pain, muscle weakness, joint pain  Neurological:  negative for headaches, dizziness, vertigo, memory problems and gait   Behavl/Psych: negative for feelings of anxiety, depression, mood changes  ROS otherwise negative     Objective:  Visit Vitals  /70 (BP 1 Location: Left upper arm, BP Patient Position: Sitting, BP Cuff Size: Adult)   Pulse 84   Temp 98.8 °F (37.1 °C)   Resp 18   Ht 5' 11\" (1.803 m)   Wt (!) 352 lb (159.7 kg)   SpO2 93%   BMI 49.09 kg/m²     Physical Exam:   General appearance - alert, well appearing, and in no distress  Mental status - alert, oriented to person, place, and time  EYE-TOÑITO, EOMI, fundi normal, corneas normal, no foreign bodies  ENT-ENT exam normal, no neck nodes or sinus tenderness  Nose - normal and patent, no erythema, discharge or polyps  Mouth - mucous membranes moist, pharynx normal without lesions  Neck - supple, no significant adenopathy   Chest - clear to auscultation, no wheezes, rales or rhonchi, symmetric air entry   Heart - normal rate, regular rhythm, normal S1, S2, no murmurs, rubs, clicks or gallops   Abdomen - soft, nontender, nondistended, no masses or organomegaly  Lymph- no adenopathy palpable  Ext-peripheral pulses normal, no pedal edema, no clubbing or cyanosis  Skin-Warm and dry. no hyperpigmentation, vitiligo, or suspicious lesions  Neuro -alert, oriented, normal speech, no focal findings or movement disorder noted  Musculoskeletal- FROM, no bony abnormalities, no point tenderness      Lab Review:  Results for orders placed or performed during the hospital encounter of 03/03/21   SARS-COV-2   Result Value Ref Range    SARS-CoV-2 Please find results under separate order     SARS-COV-2   Result Value Ref Range    Specimen source Nasopharyngeal      SARS-CoV-2 Not detected NOTD          Documenation Review:  Hypertension. Hyperlipidemia. Obesity. Sleep apnea, on CPAP, but not using it regularly. Gout. Metabolic syndrome. Prediabetes  Chest pain syndrome, followed by abnormal stress Cardiolite, 2006. Following the Cardiolite he had a cardiac catheterization that showed normal left ventricular function and no significant coronary artery disease. Assessment/Plan:    Diagnoses and all orders for this visit:    1. Acute non-recurrent frontal sinusitis  -     cefUROXime (CEFTIN) 500 mg tablet; Take 1 Tab by mouth two (2) times a day for 5 days.  -     guaiFENesin ER (MUCINEX) 600 mg ER tablet; Take 1 Tab by mouth two (2) times a day for 5 days. 2. Anxiety  -     hydrOXYzine pamoate (VISTARIL) 25 mg capsule; Take 1 Cap by mouth three (3) times daily as needed for Anxiety for up to 10 days. 3. Diabetes mellitus type 2, diet-controlled ([facility] [address]    4. Essential hypertension, benign      Recommend to take Ceftin 500 mg 2 times in a day for 5 days. Mucinex 600 mg p.o. twice daily for 5 days.   Claritin as needed    Take hydroxyzine as needed for anxiety. continue current meds. I will call with lab results and make further recommendations or adjustments if necessary. Discussed lifestyle modifications including Na restriction, low carb/fat diet, weight reduction and exercise (at least a walking program). ICD-10-CM ICD-9-CM    1. Acute non-recurrent frontal sinusitis  J01.10 461.1 cefUROXime (CEFTIN) 500 mg tablet      guaiFENesin ER (MUCINEX) 600 mg ER tablet   2. Anxiety  F41.9 300.00 hydrOXYzine pamoate (VISTARIL) 25 mg capsule   3. Diabetes mellitus type 2, diet-controlled (HCC)  E11.9 250.00    4. Essential hypertension, benign  I10 401.1              I have reviewed with the patient details of the assessment and plan and all questions were answered. Relevent patient education was performed. Verbal and/or written instructions (see AVS) provided. The most recent lab findings were reviewed with the patient. Plan was discussed with patient who verbally expressed understanding. An After Visit Summary was printed and given to the patient.     Anshul Fowler MD

## 2021-03-07 DIAGNOSIS — E78.1 HYPERTRIGLYCERIDEMIA: ICD-10-CM

## 2021-03-09 DIAGNOSIS — M54.42 ACUTE LEFT-SIDED LOW BACK PAIN WITH LEFT-SIDED SCIATICA: ICD-10-CM

## 2021-03-11 ENCOUNTER — HOSPITAL ENCOUNTER (OUTPATIENT)
Dept: PHYSICAL THERAPY | Age: 66
Discharge: HOME | End: 2021-03-11
Payer: MEDICARE

## 2021-03-11 PROCEDURE — 97110 THERAPEUTIC EXERCISES: CPT

## 2021-03-11 RX ORDER — FENOFIBRATE 145 MG/1
TABLET, COATED ORAL
Qty: 90 TAB | Refills: 0 | Status: SHIPPED | OUTPATIENT
Start: 2021-03-11 | End: 2021-06-09 | Stop reason: SDUPTHER

## 2021-03-11 NOTE — TELEPHONE ENCOUNTER
Last Refill: 12/6/2020  Last visit:2/22/2021    NOV: Visit date not found    Requested Prescriptions     Pending Prescriptions Disp Refills    fenofibrate nanocrystallized (TRICOR) 145 mg tablet [Pharmacy Med Name: FENOFIBRATE 145MG TABLETS] 90 Tab 0     Sig: TAKE 1 TABLET BY MOUTH EVERY DAY

## 2021-03-11 NOTE — PROGRESS NOTES
PT DAILY TREATMENT NOTE - [facility] 2-15    Patient Name: Myra Nelson Day  Date:3/11/2021  : 1955  [x]  Patient  Verified  [place] / Plan: [place] / Product Type: Medicare /    In time: 715  Out time:   Total Treatment Time (min): 48  Total Timed Codes (min): 46 (patient required restroom break from 12:24pm-12:26pm)  1:1 Treatment Time ([facility] only): 46   Visit #: 8    Treatment Area: Lumbago with sciatica, left side [M54.42]    SUBJECTIVE  Pain Level (0-10 scale): 3 in L LE  Any medication changes, allergies to medications, adverse drug reactions, diagnosis change, or new procedure performed?: [x] No    [] Yes (see summary sheet for update)  Subjective functional status/changes:   [] No changes reported    The patient reports he has been busy taking care of life events; he notes improvements in R leg and low back pain, however continues to report tenderness along L buttock and leg limiting function. He notes increased swelling in his legs when he wakes up in the morning. He has been walking and working on sit <-> stand transfers at home. He is able to stand for about 30 minutes at a time now. OBJECTIVE    46 min Therapeutic Exercise:  [x] See flow sheet :   Rationale: increase ROM, increase strength, improve coordination and increase proprioception to improve the patients ability to stand, walk, and exercise          With   [x] TE   [] TA   [] Neuro   [] SC   [] other: Patient Education: [x] Review HEP    [] Progressed/Changed HEP based on:   [] positioning   [] body mechanics   [] transfers   [] heat/ice application    [] other:      Other Objective/Functional Measures: Patient arrives to visit using SPC for ambulation, notes he does not require but likes having it with him for support     Pain Level (0-10 scale) post treatment: 0    ASSESSMENT/Changes in Function:   The patient tolerated therapy visit very well at this date.  Held manual intervention to change therapeutic approach with continued exercise emphasis today; patient noting intermittently increased L buttock \"soreness\" however no radiating symptoms throughout visit. He continues to demonstrate and report overall centralizing symptoms to L buttock, with minimal report of L LE pain since last visit. Continued emphasis on standing exercise to improve standing/walking tolerance at this time. Patient requires frequent cues to maintain form and transversus abdominis/gluteal setting with therapeutic exercise. Patient very challenged to maintain anti-rotation positioning and required intermittent multimodal cueing to reduce ipsilateral trunk lean during pallof press L > R and L loaded suitcase carry. Patient demonstrates difficulty decreasing lumbar hinging and knee flexion with decreased hip strategy during loaded sit <-> stand transfers. Patient noting no symptoms, significant fatigue at end of session. Continue to progress as tolerated. Patient will continue to benefit from skilled PT services to modify and progress therapeutic interventions, address functional mobility deficits, address ROM deficits, address strength deficits, analyze and address soft tissue restrictions, analyze and cue movement patterns, analyze and modify body mechanics/ergonomics, assess and modify postural abnormalities and instruct in home and community integration to attain remaining goals. [x]  See Plan of Care  []  See progress note/recertification  []  See Discharge Summary         Progress towards goals / Updated goals:    Short Term Goals: To be accomplished in 3-6 treatments: The patient will demonstrate full, pain-free multiplanar lumbar AROM to demonstrate increased independence with functional tasks. - Continue goal              The patient will demonstrate SL balance >= 10 seconds bilaterally toward improved gait and stair mechanics. - Continue goal  Long Term Goals: To be accomplished in 8-12 treatments:               The patient will demonstrate gross bilateral multiplanar LE strength increased >= 1/2 MMT grade toward improved functional endurance with standing and walking. - Progressing              The patient will score >= 73 on FOTO to demonstrate significantly improved quality of life. - Continue goal  Frequency / Duration: Patient to be seen 1-2 times per week for 4-8 treatments.     PLAN  [x]  Upgrade activities as tolerated     [x]  Continue plan of care  [x]  Update interventions per flow sheet       []  Discharge due to:_  []  Other:_      Bebo Jansen, PT, DPT 3/11/2021

## 2021-03-14 RX ORDER — MELOXICAM 15 MG/1
TABLET ORAL
Qty: 10 TAB | Refills: 0 | Status: SHIPPED | OUTPATIENT
Start: 2021-03-14 | End: 2021-03-21

## 2021-03-15 ENCOUNTER — HOSPITAL ENCOUNTER (OUTPATIENT)
Dept: PHYSICAL THERAPY | Age: 66
Discharge: HOME | End: 2021-03-15
Payer: MEDICARE

## 2021-03-15 PROCEDURE — 97110 THERAPEUTIC EXERCISES: CPT

## 2021-03-15 PROCEDURE — 97535 SELF CARE MNGMENT TRAINING: CPT

## 2021-03-15 NOTE — PROGRESS NOTES
PT DAILY TREATMENT NOTE - [facility] 2-15    Patient Name: Kervin Rodriguez Day  Date:3/15/2021  : 1955  [x]  Patient  Verified  Payor: [employer] / Plan: [place] MED[place] B / Product Type: Medicare /    In time: 8096  Out time: 0800  Total Treatment Time (min): 55  Total Timed Codes (min): 53 (patient required restroom break from 7:23-7:24am, 7:45-7:46am)  1:1 Treatment Time ( [address] only): 48   Visit #: 9    Treatment Area: Lumbago with sciatica, left side [M54.42]    SUBJECTIVE  Pain Level (0-10 scale): 3-4 in low back/L buttock  Any medication changes, allergies to medications, adverse drug reactions, diagnosis change, or new procedure performed?: [x] No    [] Yes (see summary sheet for update)  Subjective functional status/changes:   [] No changes reported    The patient reports he was very sore after last visit, in addition to washing his car later that day. He continues to feel pain in his L hip and low back, particularly when first waking up in the mornings. He did have a chance to work on hip flexor stretch yesterday in his bed. OBJECTIVE    41 min Therapeutic Exercise:  [x] See flow sheet :   Rationale: increase ROM, increase strength, improve coordination and increase proprioception to improve the patients ability to stand, walk, and exercise     12 min Self Care/Home Management:  []? See flow sheet : Discussed/educated patient on implementation of daily walking program with respect to increasing muscular/cardiovascular endurance due to anatomy/pathophysiology of current condition. Additionally discussed plan of care emphasizing role of physical therapy in medically managed care, with recommendation to continue 1-2x/week for 2-3 weeks toward transition to independent fitness program. Patient verbalizing and demonstrating understanding of provided education with 100% accuracy using teach back method.    Rationale: increase ROM, increase strength, improve coordination and increase proprioception  to improve the patients ability to self-manage condition in home environment             With   [x] TE   [] TA   [] Neuro   [x] SC   [] other: Patient Education: [x] Review HEP    [] Progressed/Changed HEP based on:   [] positioning   [] body mechanics   [] transfers   [] heat/ice application    [] other:      Other Objective/Functional Measures: Patient arrives to visit without assistive device today   Pain Level (0-10 scale) post treatment: 0    ASSESSMENT/Changes in Function:   The patient tolerated therapy visit very well at this date. Continued to hold manual intervention to change therapeutic approach with continued exercise emphasis today; patient noting minimal radiating symptoms throughout visit. He continues to demonstrate and report overall centralizing symptoms to L buttock, with moderate report of L LE pain since last visit. Continued emphasis on standing exercise to improve standing/walking tolerance; maintained program today similar to last visit due to reports of increased soreness post-visit. Patient requires frequent cues to maintain form and transversus abdominis/gluteal setting with therapeutic exercise. Patient very challenged to maintain anti-rotation positioning and required intermittent multimodal cueing to reduce trunk rotation during pot stirrers L > R and contralateral trunk lean with L loaded suitcase carry. Patient demonstrated improved mechanics with hip hinge during loaded sit <-> stand transfers. Patient noting no symptoms, significant fatigue at end of session. Continue to progress as tolerated.   Patient will continue to benefit from skilled PT services to modify and progress therapeutic interventions, address functional mobility deficits, address ROM deficits, address strength deficits, analyze and address soft tissue restrictions, analyze and cue movement patterns, analyze and modify body mechanics/ergonomics, assess and modify postural abnormalities and instruct in home and community integration to attain remaining goals. [x]  See Plan of Care  []  See progress note/recertification  []  See Discharge Summary         Progress towards goals / Updated goals:    Short Term Goals: To be accomplished in 3-6 treatments: The patient will demonstrate full, pain-free multiplanar lumbar AROM to demonstrate increased independence with functional tasks. - Continue goal              The patient will demonstrate SL balance >= 10 seconds bilaterally toward improved gait and stair mechanics. - Continue goal  Long Term Goals: To be accomplished in 8-12 treatments: The patient will demonstrate gross bilateral multiplanar LE strength increased >= 1/2 MMT grade toward improved functional endurance with standing and walking. - Progressing              The patient will score >= 73 on FOTO to demonstrate significantly improved quality of life. - Continue goal  Frequency / Duration: Patient to be seen 1-2 times per week for 4-8 treatments.     PLAN  [x]  Upgrade activities as tolerated     [x]  Continue plan of care  [x]  Update interventions per flow sheet       []  Discharge due to:_  []  Other:_      Claudetta Mulligan, PT, DPT 3/15/2021

## 2021-03-16 DIAGNOSIS — M79.2 NEUROPATHIC PAIN: ICD-10-CM

## 2021-03-16 RX ORDER — GABAPENTIN 300 MG/1
CAPSULE ORAL
Qty: 30 CAP | Refills: 0 | Status: SHIPPED | OUTPATIENT
Start: 2021-03-16 | End: 2021-04-22

## 2021-03-18 ENCOUNTER — HOSPITAL ENCOUNTER (OUTPATIENT)
Dept: PHYSICAL THERAPY | Age: 66
Discharge: HOME | End: 2021-03-18
Payer: MEDICARE

## 2021-03-18 PROCEDURE — 97110 THERAPEUTIC EXERCISES: CPT

## 2021-03-18 PROCEDURE — 97535 SELF CARE MNGMENT TRAINING: CPT

## 2021-03-18 NOTE — PROGRESS NOTES
PT DAILY TREATMENT NOTE - [facility] 2-15    Patient Name: Pritesh Cardenas Day  Date:3/18/2021  : 1955  [x]  Patient  Verified  Payor: [employer] / Plan: [place] MED[place] A & B / Product Type: Medicare /    In time: 9578  Out time: 1306  Total Treatment Time (min): 38  Total Timed Codes (min): 38 (patient required restroom break from 12:28-12:31 pm)  1:1 Treatment Time ( [address] only): 38   Visit #: 10    Treatment Area: Lumbago with sciatica, left side [M54.42]    SUBJECTIVE  Pain Level (0-10 scale): 3 across low back/bilateral buttocks  Any medication changes, allergies to medications, adverse drug reactions, diagnosis change, or new procedure performed?: [x] No    [] Yes (see summary sheet for update)  Subjective functional status/changes:   [] No changes reported    The patient reports he was sore across his buttocks after last session; he notes increased \"tightness\" across his low back and buttocks when first attempting to stand up in the morning. He has been walking somewhat around his house for exercise. OBJECTIVE    28 min Therapeutic Exercise:  [x] See flow sheet :   Rationale: increase ROM, increase strength, improve coordination and increase proprioception to improve the patients ability to stand, walk, and exercise     10 min Self Care/Home Management:  []? See flow sheet : Discussed recent onset of bilateral buttock \"soreness\" and differences between pathological and muscular soreness-induced symptoms. Reviewed appropriate management strategies to reduce buttock \"soreness\" when first rising from bed in the morning, to include seated hamstring stretch implemented today. Patient verbalizing and demonstrating understanding of provided education with 100% accuracy using teach back method.    Rationale: increase ROM, increase strength, improve coordination and increase proprioception  to improve the patients ability to self-manage condition in home environment             With   [x] TE   [] TA   [] Neuro [x] SC   [] other: Patient Education: [x] Review HEP    [] Progressed/Changed HEP based on:   [] positioning   [] body mechanics   [] transfers   [] heat/ice application    [] other:      Other Objective/Functional Measures: Patient arrives to visit using SPC for ambulation, notes he does not require but likes having it with him for support             Pain Level (0-10 scale) post treatment: 0    ASSESSMENT/Changes in Function:   The patient tolerated therapy visit very well at this date. Continued to hold manual intervention to change therapeutic approach with continued exercise emphasis today; patient noting no familiar radiating symptoms, only bilateral buttock \"soreness\", throughout visit. He continues to demonstrate and report overall centralizing symptoms to L buttock, with minimal report of L LE pain since last visit. Patient responding well to introduction of seated hamstring stretch for management of buttock \"soreness\" when rising from chairs; noting decrease in buttock symptoms with test-retest post-stretching. Continued emphasis on standing exercise to improve standing/walking tolerance. Patient requires frequent cues to maintain form and transversus abdominis/gluteal setting with therapeutic exercise. Patient demonstrating improved awareness of body positioning and maintenance of anti-rotation during pot stirrers R > L and demonstrating decreased contralateral trunk lean with L loaded suitcase carry. Patient requiring frequent verbal and visual cues for sequencing with sit <-> stand + resisted row. Patient demonstrating increased knee flexion versus hip hinge with deadlifts, improved with cueing and repetition. Patient noting decrease in symptoms, significant fatigue at end of session. Continue to progress as tolerated.   Patient will continue to benefit from skilled PT services to modify and progress therapeutic interventions, address functional mobility deficits, address ROM deficits, address strength deficits, analyze and address soft tissue restrictions, analyze and cue movement patterns, analyze and modify body mechanics/ergonomics, assess and modify postural abnormalities and instruct in home and community integration to attain remaining goals. [x]  See Plan of Care  []  See progress note/recertification  []  See Discharge Summary         Progress towards goals / Updated goals:    Short Term Goals: To be accomplished in 3-6 treatments: The patient will demonstrate full, pain-free multiplanar lumbar AROM to demonstrate increased independence with functional tasks. - Continue goal              The patient will demonstrate SL balance >= 10 seconds bilaterally toward improved gait and stair mechanics. - Continue goal  Long Term Goals: To be accomplished in 8-12 treatments: The patient will demonstrate gross bilateral multiplanar LE strength increased >= 1/2 MMT grade toward improved functional endurance with standing and walking. - Progressing              The patient will score >= 73 on FOTO to demonstrate significantly improved quality of life. - Continue goal  Frequency / Duration: Patient to be seen 1-2 times per week for 4-8 treatments.     PLAN  [x]  Upgrade activities as tolerated     [x]  Continue plan of care  [x]  Update interventions per flow sheet       []  Discharge due to:_  []  Other:_      Jj Strong, PT, DPT 3/18/2021

## 2021-03-21 DIAGNOSIS — M54.42 ACUTE LEFT-SIDED LOW BACK PAIN WITH LEFT-SIDED SCIATICA: ICD-10-CM

## 2021-03-21 RX ORDER — MELOXICAM 15 MG/1
TABLET ORAL
Qty: 10 TAB | Refills: 0 | Status: SHIPPED | OUTPATIENT
Start: 2021-03-21 | End: 2021-03-31

## 2021-03-24 ENCOUNTER — HOSPITAL ENCOUNTER (OUTPATIENT)
Dept: PHYSICAL THERAPY | Age: 66
Discharge: HOME | End: 2021-03-24
Payer: MEDICARE

## 2021-03-24 PROCEDURE — 97535 SELF CARE MNGMENT TRAINING: CPT

## 2021-03-24 PROCEDURE — 97110 THERAPEUTIC EXERCISES: CPT

## 2021-03-24 NOTE — PROGRESS NOTES
[facility]. Physical Therapy  [address] ([address] [place] [facility]  Phone: [phone] Fax: [phone]    Progress Note    Name: Kervin Rodriguez Day   : 1955   MD: Ravi Harp MD       Treatment Diagnosis: Lumbago with sciatica, left side [M54.42]  Start of Care: 21    Visits from Start of Care: 11  Missed Visits: 4    Summary of Care: Therapy has included therapeutic exercise, self-care interventions, and manual techniques to improve thoracic/lumbar/hip mobility, core/hip/LE ROM and strength, postural control, and functional endurance/independence due to functional deficits related to low back/L LE pain. Assessment / Recommendations: The patient is a 77year old male who has participated in 6 skilled physical therapy visits due to low back with L-sided LE pain. Patient has maintained improved compliance with attending visits, however continues to be limited by reduced HEP compliance outside of sessions. He demonstrates improved symptom-free multiplanar lumbar AROM, LE strength, however demonstrates continued single limb postural control and muscular endurance deficits (11.5 repetitions during 30 Second Sit to Stand test today). He scored 47 on FOTO today, demonstrating increased subjective report of function since last re-evaluation. He has partially met 1/4 physical therapy goals. He notes perceived benefits of regular participation in structured and progressive rehabilitation exercise program, and plans to transition to independent fitness program over next few weeks. Continued to spend extensive time discussing home strategies for pain management and importance of regular HEP compliance, and recommending patient participate prior to rising from bed in morning to improve activity tolerance.  The patient would continue to benefit from skilled physical therapy services to finalize HEP and taper to independent fitness program at a frequency of 1-2x/week for 2-4 visits toward improved independent management of current condition. Short Term Goals: To be accomplished in 3-6 treatments:              BARBARA patient will demonstrate full, pain-free multiplanar lumbar AROM to demonstrate increased independence with functional tasks. - Partially Met (patient limited with L rotation AROM)              The patient will demonstrate SL balance >= 10 seconds bilaterally toward improved gait and stair mechanics. - Continue goal  Long Term Goals: To be accomplished in 8-12 treatments:              The patient will demonstrate gross bilateral multiplanar LE strength increased >= 1/2 MMT grade toward improved functional endurance with standing and walking. - Progressing              The patient will score >= 73 on FOTO to demonstrate significantly improved quality of life. - Progressing  Frequency / Duration: Patient to be seen 1-2 times per week for 2-4 treatments.     Destin Brown, PT, DPT 3/24/2021

## 2021-03-24 NOTE — PROGRESS NOTES
PT DAILY TREATMENT NOTE - [facility] 2-15    Patient Name: Henrique Diaz Day  Date:3/24/2021  : 1955  [x]  Patient  Verified  Payor: Kenna Morel / Plan: [place] / Product Type: Medicare /    In time: 5668  Out time: 45  Total Treatment Time (min): 47  Total Timed Codes (min): 43 (patient required restroom breaks from 8:12-8:14am, 8:30-8:32am)  1:1 Treatment Time ([facility] only): 43   Visit #: 11    *Patient arrived 8 minutes late to therapy visit at this date*    Treatment Area: Lumbago with sciatica, left side [M54.42]    SUBJECTIVE  Pain Level (0-10 scale): 3 in L buttock/lateral L LE  Any medication changes, allergies to medications, adverse drug reactions, diagnosis change, or new procedure performed?: [x] No    [] Yes (see summary sheet for update)  Subjective functional status/changes:   [] No changes reported    The patient reports he continues to experience L buttock/leg \"tightness, like it feels different ; he has been working on washing his car, etc. He was not very sore after last visit. He continues to have more difficulty when first getting up in the morning. He has not performed HEP since last visit. He notes he may have \"strained something\" lifting a trailer this weekend, and reports increased \"strain\" in his groin area.      OBJECTIVE    Other Observations: Patient requiring increased time for bed mobility (rolling, supine <-> sit transfers), noting no familiar symptoms with bed rolling L/R  Gait and Functional Mobility: Patient ambulates with decreased nathaniel, bilateral Trendelenberg, increased lateral trunk sway, decreased bilateral step length R > L, decreased bilateral foot clearance  Palpation: Tender to palpation L > R QL; no tenderness to palpation along L posterolateral hip in sidelying position; patient noting familiar \"soreness\" along L-sided posterior hip in prone position                                                    Lumbar HOPG:                                                                                               R                      L                        Flexion                                     Fingertips to mid-tibia                        Extension                                75%  Side Bending                           Fingertips to knee joint line bilaterally (patient noting increased L buttock \"soreness\" with L side bending)  Rotation                                   75%                 50%                                            Hip PROM: WNL for flexion/ER/IR on L; noted decreased L hip extension PROM     LOWER QUARTER                            MUSCLE STRENGTH  KEY                                                                             R                      L  0 - No Contraction                   L1, L2 Psoas               5                      5  1 - Trace                                  L3 Quads                    5                      5  2 - Poor                                   L4 Tib Ant                    5                        5  3 - Fair                                       4 - Good                                    5 - Normal                               S2 Hams                     4+                      4+     Flexibility: Hamstring 90-90 Test (+) bilaterally; Carlos Test (+) bilaterally (iliopsoas)  Mobility Assessment: Hypomobile to thoracic PA joint mobilizations (T4-10)                                  MMT:               HIP Ext: R = 4+/L = 4+              HIP Abd: R = 4+/L = 4              HIP Add: L = 2 (unable to lift)/R = 5  Special Tests:               Trendelenberg: (+) bilaterally                                                    Repeated Motions: Extension (-) for peripheralizing symptoms (patient noting bilateral buttock soreness with repetition)  Functional Tests:  Single Limb Balance: ~1 second bilaterally.  Noted bilateral UE abduction to maintain postural control during assessment. 30 Second Sit to Stand: 11.5x (UEs on knees)    28 min Therapeutic Exercise:  [x] See flow sheet : Includes time spent on re-assessment tests and measures   Rationale: increase ROM, increase strength, improve coordination and increase proprioception to improve the patients ability to stand, walk, and exercise     15 min Self Care/Home Management:  []? See flow sheet : Discussed recent onset of bilateral buttock \"soreness\" and differences between pathological and muscular soreness-induced symptoms. Reviewed appropriate management strategies to reduce buttock \"soreness\" when first rising from bed in the morning, to include seated hamstring/figure four modification stretches and use of heat pack to reduce \"tightness\". Patient verbalizing and demonstrating understanding of provided education with 100% accuracy using teach back method. Rationale: increase ROM, increase strength, improve coordination and increase proprioception  to improve the patients ability to self-manage condition in home environment             With   [x] TE   [] TA   [] Neuro   [x] SC   [] other: Patient Education: [x] Review HEP    [] Progressed/Changed HEP based on:   [] positioning   [] body mechanics   [] transfers   [] heat/ice application    [] other:      Other Objective/Functional Measures: FOTO = 54             Pain Level (0-10 scale) post treatment: 0    ASSESSMENT/Changes in Function:   The patient is a 77year old male who has participated in 11 skilled physical therapy visits due to low back with L-sided LE pain. Patient has maintained improved compliance with attending visits, however continues to be limited by reduced HEP compliance outside of sessions. He demonstrates improved symptom-free multiplanar lumbar AROM, LE strength, however demonstrates continued single limb postural control and muscular endurance deficits (11.5 repetitions during 30 Second Sit to Stand test today).  He scored 54 on FOTO today, demonstrating increased subjective report of function since last re-evaluation. He has partially met 1/4 physical therapy goals. He notes perceived benefits of regular participation in structured and progressive rehabilitation exercise program, and plans to transition to independent fitness program over next few weeks. Continued to spend extensive time discussing home strategies for pain management and importance of regular HEP compliance, and recommending patient participate prior to rising from bed in morning to improve activity tolerance. The patient would continue to benefit from skilled physical therapy services to finalize HEP and taper to independent fitness program at a frequency of 1-2x/week for 2-4 visits toward improved independent management of current condition. The patient tolerated therapy visit very well at this date. Continued to hold manual intervention to change therapeutic approach with continued exercise emphasis today; patient noting no familiar radiating symptoms, only bilateral buttock \"soreness\", throughout visit. He continues to demonstrate and report overall centralizing symptoms to L buttock, with minimal report of L LE pain since last visit. Patient continues to respond well to exercise-based intervention. Continued emphasis on standing exercise to improve standing/walking tolerance; patient requiring significant bilateral UE support with lunge + hip adduction slides due to postural control deficits. Patient noting decrease in symptoms, significant fatigue at end of session. Continue to progress as tolerated.   Patient will continue to benefit from skilled PT services to modify and progress therapeutic interventions, address functional mobility deficits, address ROM deficits, address strength deficits, analyze and address soft tissue restrictions, analyze and cue movement patterns, analyze and modify body mechanics/ergonomics, assess and modify postural abnormalities and instruct in home and community integration to attain remaining goals. []  See Plan of Care  [x]  See progress note/recertification  []  See Discharge Summary         Progress towards goals / Updated goals:    Short Term Goals: To be accomplished in 3-6 treatments: The patient will demonstrate full, pain-free multiplanar lumbar AROM to demonstrate increased independence with functional tasks. - Partially Met (patient limited with L rotation AROM)              The patient will demonstrate SL balance >= 10 seconds bilaterally toward improved gait and stair mechanics. - Continue goal  Long Term Goals: To be accomplished in 8-12 treatments: The patient will demonstrate gross bilateral multiplanar LE strength increased >= 1/2 MMT grade toward improved functional endurance with standing and walking. - Progressing              The patient will score >= 73 on FOTO to demonstrate significantly improved quality of life. - Progressing  Frequency / Duration: Patient to be seen 1-2 times per week for 2-4 treatments.     PLAN  [x]  Upgrade activities as tolerated     [x]  Continue plan of care  [x]  Update interventions per flow sheet       []  Discharge due to:_  []  Other:_      Madison Scott, PT, DPT 3/24/2021

## 2021-03-30 ENCOUNTER — HOSPITAL ENCOUNTER (OUTPATIENT)
Dept: PHYSICAL THERAPY | Age: 66
Discharge: HOME | End: 2021-03-30
Payer: MEDICARE

## 2021-03-30 PROCEDURE — 97110 THERAPEUTIC EXERCISES: CPT

## 2021-03-30 PROCEDURE — 97535 SELF CARE MNGMENT TRAINING: CPT

## 2021-03-30 NOTE — PROGRESS NOTES
PT DAILY TREATMENT NOTE - [facility] 2-15    Patient Name: Yusuf Culp Day  Date:3/30/2021  : 1955  [x]  Patient  Verified  Payor: Korina Miranda / Plan: [place] MEDICARE PART A & B / Product Type: Medicare /    In time: 7700  Out time: 4256  Total Treatment Time (min): 45  Total Timed Codes (min): 45  1:1 Treatment Time ( only): 45   Visit #: 12    Treatment Area: Lumbago with sciatica, left side [M54.42]    SUBJECTIVE  Pain Level (0-10 scale): 2 in L buttock  Any medication changes, allergies to medications, adverse drug reactions, diagnosis change, or new procedure performed?: [x] No    [] Yes (see summary sheet for update)  Subjective functional status/changes:   [] No changes reported    The patient reports he continues to feel \"strain\" in muscles between his thighs; overall he feels he has made tremendous progress over plan of care. He has been active continuing to wash cars and move items over the weekend. He is motivated to participate in independent gym program after he leaves therapy. OBJECTIVE    35 min Therapeutic Exercise:  [x] See flow sheet :   Rationale: increase ROM, increase strength, improve coordination and increase proprioception to improve the patients ability to stand, walk, and exercise     10 min Self Care/Home Management:  []? See flow sheet : Continued to discuss difference between pathological and muscular soreness-induced symptoms. Reviewed appropriate management strategies to reduce buttock \"soreness\" when first rising from bed in the morning, to include seated hamstring/figure four modification stretches and use of heat pack to reduce \"tightness\". Patient verbalizing and demonstrating understanding of provided education with 100% accuracy using teach back method.    Rationale: increase ROM, increase strength, improve coordination and increase proprioception  to improve the patients ability to self-manage condition in home environment             With   [x] TE   [] TA   [] Neuro   [x] SC   [] other: Patient Education: [x] Review HEP    [] Progressed/Changed HEP based on:   [] positioning   [] body mechanics   [] transfers   [] heat/ice application    [] other:      Other Objective/Functional Measures: Patient arrives to visit without assistive device today            Pain Level (0-10 scale) post treatment: 0    ASSESSMENT/Changes in Function:   The patient tolerated therapy visit well at this date, however limited with weightbearing progressions due to \"soreness\" along the fronts of patient's lower legs, tracking tibialis anterior muscle bellies. Continued to hold manual intervention to change therapeutic approach with continued exercise emphasis today; patient noting minimal familiar L buttock pain, however increased LE muscle soreness, throughout visit. He continues to demonstrate and report overall centralizing symptoms to L buttock, with minimal report of L LE pain since last visit. Patient continues to respond well to exercise-based intervention. Continued emphasis on standing exercise to improve standing/walking tolerance; patient demonstrating ipsilateral trunk lean with fatigue during lateral walk out + pallof press R > L. Patient noting immediate decrease in lower leg \"soreness\" post-tibialis anterior stretch in seated position. Patient noting abolishment of familiar L buttock symptoms, significant muscular fatigue at end of session. Continue to progress as tolerated; plan to discharge to independent HEP/fitness program at local gym next visit. Patient will continue to benefit from skilled PT services to modify and progress therapeutic interventions, address functional mobility deficits, address ROM deficits, address strength deficits, analyze and address soft tissue restrictions, analyze and cue movement patterns, analyze and modify body mechanics/ergonomics, assess and modify postural abnormalities and instruct in home and community integration to attain remaining goals.      [x]  See Plan of Care  []  See progress note/recertification  []  See Discharge Summary         Progress towards goals / Updated goals:    Short Term Goals: To be accomplished in 3-6 treatments: The patient will demonstrate full, pain-free multiplanar lumbar AROM to demonstrate increased independence with functional tasks. - Partially Met (patient limited with L rotation AROM)              The patient will demonstrate SL balance >= 10 seconds bilaterally toward improved gait and stair mechanics. - Continue goal  Long Term Goals: To be accomplished in 8-12 treatments: The patient will demonstrate gross bilateral multiplanar LE strength increased >= 1/2 MMT grade toward improved functional endurance with standing and walking. - Progressing              The patient will score >= 73 on FOTO to demonstrate significantly improved quality of life. - Progressing  Frequency / Duration: Patient to be seen 1-2 times per week for 2-4 treatments.     PLAN  [x]  Upgrade activities as tolerated     [x]  Continue plan of care  [x]  Update interventions per flow sheet       []  Discharge due to:_  []  Other:_      Ruben Hummel PT, DPT 3/30/2021

## 2021-03-31 DIAGNOSIS — M54.42 ACUTE LEFT-SIDED LOW BACK PAIN WITH LEFT-SIDED SCIATICA: ICD-10-CM

## 2021-03-31 RX ORDER — MELOXICAM 15 MG/1
TABLET ORAL
Qty: 10 TAB | Refills: 0 | Status: SHIPPED | OUTPATIENT
Start: 2021-03-31 | End: 2021-04-22

## 2021-04-01 ENCOUNTER — HOSPITAL ENCOUNTER (OUTPATIENT)
Dept: PHYSICAL THERAPY | Age: 66
Discharge: HOME | End: 2021-04-01
Payer: MEDICARE

## 2021-04-01 PROCEDURE — 97535 SELF CARE MNGMENT TRAINING: CPT

## 2021-04-01 PROCEDURE — 97110 THERAPEUTIC EXERCISES: CPT

## 2021-04-01 PROCEDURE — 97140 MANUAL THERAPY 1/> REGIONS: CPT

## 2021-04-01 NOTE — PROGRESS NOTES
PT DAILY TREATMENT NOTE - [facility] 2-15    Patient Name: Mariano Suggs Day  Date:2021  : 1955  [x]  Patient  Verified  Payor: [place] MEDICARE / Plan: [place] MEDICARE PART A & B / Product Type: Medicare /    In time: 5692  Out time: 1450  Total Treatment Time (min): 47 (patient required time for restroom break 2:13pm-2:14pm)  Total Timed Codes (min): 42  1:1 Treatment Time ( only): 42   Visit #: 13    Treatment Area: Lumbago with sciatica, left side [M54.42]    SUBJECTIVE  Pain Level (0-10 scale): 3 in L buttock  Any medication changes, allergies to medications, adverse drug reactions, diagnosis change, or new procedure performed?: [x] No    [] Yes (see summary sheet for update)  Subjective functional status/changes:   [] No changes reported    The patient reports he has been very active over the past few days, picking up supplies at [employer] to do yard work this weekend. He continues to feel \"strain\" between his thighs, as well as L outer knee pain. Overall he feels he is walking and moving much better (85% back to PLOF) than he was when he first came in for therapy.      OBJECTIVE                                                    Lumbar RNWM:                                                                                               R                      L                        Flexion                                     Fingertips to mid-tibia                        Extension                                100%  Side Bending                           Fingertips to knee joint line bilaterally  Rotation                                   100%                 75%                                            LOWER QUARTER                            MUSCLE STRENGTH  KEY                                                                             R                      L  0 - No Contraction                   L1, L2 Psoas               5                      5  1 - Trace                                  L3 TDFTR                    4                      9  2 - Poor                                   L4 Tib Ant                    5                        5  3 - Fair                                       4 - Good                                    5 - Normal                               S2 Hams                     5                      5                                   MMT:               HIP Ext: R = 4+/L = 4+              HIP Abd: R = 4+/L = 4+              HIP Add: L = 4+/R = 5  Functional Tests:  Single Limb Balance: R = 5.5 seconds; L = 3 seconds. Noted bilateral UE abduction to maintain postural control during assessment. 12 min Therapeutic Exercise:  [x] See flow sheet : Includes time spent on re-assessment tests and measures   Rationale: increase ROM, increase strength, improve coordination and increase proprioception to improve the patients ability to stand, walk, and exercise    10 min Manual Therapy: L tibiofibular AP/PA glides (Grade III); STM/TPR L tibialis anterior proximal muscle belly; L knee flexion/extension PROM (seated edge of table)   Rationale: decrease pain, increase ROM, increase tissue extensibility, decrease trigger points and increase postural awareness to improve the patients ability to stand, walk, and exercise    20 min Self Care/Home Management:  []? See flow sheet : Reviewed updated/progressive HEP with emphasis on independent management in home environment, to include transition to independent fitness program at local gym. Reviewed appropriate management strategies to reduce buttock \"soreness\" when first rising from bed in the morning, to include seated hamstring/figure four modification stretches and use of heat pack to reduce \"tightness\". Patient verbalizing and demonstrating understanding of provided education with 100% accuracy using teach back method.    Rationale: increase ROM, increase strength, improve coordination and increase proprioception  to improve the patients ability to self-manage condition in home environment            With   [x] TE   [] TA   [] Neuro   [x] SC   [] other: Patient Education: [x] Review HEP    [] Progressed/Changed HEP based on:   [] positioning   [] body mechanics   [] transfers   [] heat/ice application    [] other:      Other Objective/Functional Measures: Patient arrives to visit without assistive device today. Patient noting significant decrease in L knee pain with test-retest pre-post manual intervention. Pain Level (0-10 scale) post treatment: 0    ASSESSMENT/Changes in Function:     The patient is a 77year old male who has participated in 15 skilled physical therapy visits due to low back with L-sided LE pain. Patient has maintained improved compliance with attending visits, however continues to be limited by reduced HEP compliance outside of sessions. He demonstrates symptom-free multiplanar lumbar AROM, improved multiplanar bilateral LE strength, and increased ability to maintain single limb stance bilaterally (5.5 seconds on R, 3 seconds on L). He scored 60 on FOTO today, demonstrating increased subjective report of function since last re-evaluation. He has met 2/4 physical therapy goals. He notes perceived benefits of regular participation in structured and progressive rehabilitation exercise program, and plans to transition to independent fitness program over next few weeks. Due to demonstrated progress over plan of care, as well as patient's independence with provided HEP and improved functional tolerance, the patient no longer requires skilled physical therapy services and is discharged to independent HEP with plan to initiate progressive fitness program at local gym. []  See Plan of Care  []  See progress note/recertification  [x]  See Discharge Summary         Progress towards goals / Updated goals:    Short Term Goals: To be accomplished in 3-6 treatments:               The patient will demonstrate full, pain-free multiplanar lumbar AROM to demonstrate increased independence with functional tasks. - Met              The patient will demonstrate SL balance >= 10 seconds bilaterally toward improved gait and stair mechanics. - Not Met  Long Term Goals: To be accomplished in 8-12 treatments: The patient will demonstrate gross bilateral multiplanar LE strength increased >= 1/2 MMT grade toward improved functional endurance with standing and walking. - Met              The patient will score >= 73 on FOTO to demonstrate significantly improved quality of life.  - Not Met    PLAN  []  Upgrade activities as tolerated     []  Continue plan of care  []  Update interventions per flow sheet       [x]  Discharge due to: Patient progress toward PT goals  []  Other:_      Thi Barrera, PT, DPT 4/1/2021

## 2021-04-01 NOTE — ANCILLARY DISCHARGE INSTRUCTIONS
[facility] Physical Therapy [address] ([address]), [address] [place] Phone: [phone] Fax: [phone] Discharge Summary 2-15 Patient name: Christine Baker Day  : 1955  Provider#: [id number] Referral source: Rogerio Roberts MD     
Medical/Treatment Diagnosis: Lumbago with sciatica, left side [M54.42] Prior Hospitalization: see medical history Comorbidities: See Plan of Care Prior Level of Function: See Plan of Care Medications: Verified on Patient Summary List 
 
Start of Care: 21      Onset Date: Chronic (acute episode mid-2021) Visits from Start of Care: 13     Missed Visits: 4 Reporting Period : 21 to 21 Assessment/Summary of care: The patient is a 77year old male who has participated in 13 skilled physical therapy visits due to low back with L-sided LE pain. Patient has maintained improved compliance with attending visits, however continues to be limited by reduced HEP compliance outside of sessions. He demonstrates symptom-free multiplanar lumbar AROM, improved multiplanar bilateral LE strength, and increased ability to maintain single limb stance bilaterally (5.5 seconds on R, 3 seconds on L). He scored 60 on FOTO today, demonstrating increased subjective report of function since last re-evaluation. He has met 2/4 physical therapy goals. He notes perceived benefits of regular participation in structured and progressive rehabilitation exercise program, and plans to transition to independent fitness program over next few weeks. Due to demonstrated progress over plan of care, as well as patient's independence with provided HEP and improved functional tolerance, the patient no longer requires skilled physical therapy services and is discharged to independent HEP with plan to initiate progressive fitness program at local gym.  
 
Short Term Goals: To be accomplished in 3-6 treatments:             ZDS patient will demonstrate full, pain-free multiplanar lumbar AROM to demonstrate increased independence with functional tasks. - Met 
            The patient will demonstrate SL balance >= 10 seconds bilaterally toward improved gait and stair mechanics. - Not Met Long Term Goals: To be accomplished in 8-12 treatments: 
            COMPA patient will demonstrate gross bilateral multiplanar LE strength increased >= 1/2 MMT grade toward improved functional endurance with standing and walking. - Met 
            The patient will score >= 73 on FOTO to demonstrate significantly improved quality of life. - Not Met 
  
RECOMMENDATIONS: 
[x]Discontinue therapy: [x]Patient has reached or is progressing toward set goals []Patient is non-compliant or has abdicated 
   []Due to lack of appreciable progress towards set goals []Other Janell Duran, PT, DPT 4/1/2021

## 2021-04-22 ENCOUNTER — OFFICE VISIT (OUTPATIENT)
Dept: INTERNAL MEDICINE CLINIC | Age: 66
End: 2021-04-22
Payer: MEDICARE

## 2021-04-22 VITALS
RESPIRATION RATE: 14 BRPM | BODY MASS INDEX: 44.1 KG/M2 | SYSTOLIC BLOOD PRESSURE: 124 MMHG | HEIGHT: 71 IN | TEMPERATURE: 98.4 F | HEART RATE: 64 BPM | OXYGEN SATURATION: 96 % | WEIGHT: 315 LBS | DIASTOLIC BLOOD PRESSURE: 82 MMHG

## 2021-04-22 DIAGNOSIS — E11.9 DIABETES MELLITUS TYPE 2, DIET-CONTROLLED (HCC): Primary | ICD-10-CM

## 2021-04-22 DIAGNOSIS — G89.29 CHRONIC LEFT-SIDED LOW BACK PAIN WITH LEFT-SIDED SCIATICA: ICD-10-CM

## 2021-04-22 DIAGNOSIS — F41.9 ANXIETY: ICD-10-CM

## 2021-04-22 DIAGNOSIS — I10 ESSENTIAL HYPERTENSION, BENIGN: ICD-10-CM

## 2021-04-22 DIAGNOSIS — G47.33 OSA (OBSTRUCTIVE SLEEP APNEA): ICD-10-CM

## 2021-04-22 DIAGNOSIS — E78.1 HYPERTRIGLYCERIDEMIA: ICD-10-CM

## 2021-04-22 DIAGNOSIS — M54.42 CHRONIC LEFT-SIDED LOW BACK PAIN WITH LEFT-SIDED SCIATICA: ICD-10-CM

## 2021-04-22 PROCEDURE — 3017F COLORECTAL CA SCREEN DOC REV: CPT | Performed by: INTERNAL MEDICINE

## 2021-04-22 PROCEDURE — G8752 SYS BP LESS 140: HCPCS | Performed by: INTERNAL MEDICINE

## 2021-04-22 PROCEDURE — G8432 DEP SCR NOT DOC, RNG: HCPCS | Performed by: INTERNAL MEDICINE

## 2021-04-22 PROCEDURE — G8427 DOCREV CUR MEDS BY ELIG CLIN: HCPCS | Performed by: INTERNAL MEDICINE

## 2021-04-22 PROCEDURE — 2022F DILAT RTA XM EVC RTNOPTHY: CPT | Performed by: INTERNAL MEDICINE

## 2021-04-22 PROCEDURE — G8417 CALC BMI ABV UP PARAM F/U: HCPCS | Performed by: INTERNAL MEDICINE

## 2021-04-22 PROCEDURE — G8536 NO DOC ELDER MAL SCRN: HCPCS | Performed by: INTERNAL MEDICINE

## 2021-04-22 PROCEDURE — 99214 OFFICE O/P EST MOD 30 MIN: CPT | Performed by: INTERNAL MEDICINE

## 2021-04-22 PROCEDURE — 1101F PT FALLS ASSESS-DOCD LE1/YR: CPT | Performed by: INTERNAL MEDICINE

## 2021-04-22 PROCEDURE — 3044F HG A1C LEVEL LT 7.0%: CPT | Performed by: INTERNAL MEDICINE

## 2021-04-22 PROCEDURE — G8754 DIAS BP LESS 90: HCPCS | Performed by: INTERNAL MEDICINE

## 2021-04-22 NOTE — PATIENT INSTRUCTIONS
Starting a Weight Loss Plan: Care Instructions Overview If you're thinking about losing weight, it can be hard to know where to start. Your doctor can help you set up a weight loss plan that best meets your needs. You may want to take a class on nutrition or exercise, or you could join a weight loss support group. If you have questions about how to make changes to your eating or exercise habits, ask your doctor about seeing a registered dietitian or an exercise specialist. 
It can be a big challenge to lose weight. But you don't have to make huge changes at once. Make small changes, and stick with them. When those changes become habit, add a few more changes. If you don't think you're ready to make changes right now, try to pick a date in the future. Make an appointment to see your doctor to discuss whether the time is right for you to start a plan. Follow-up care is a key part of your treatment and safety. Be sure to make and go to all appointments, and call your doctor if you are having problems. It's also a good idea to know your test results and keep a list of the medicines you take. How can you care for yourself at home? · Set realistic goals. Many people expect to lose much more weight than is likely. A weight loss of 5% to 10% of your body weight may be enough to improve your health. · Get family and friends involved to provide support. Talk to them about why you are trying to lose weight, and ask them to help. They can help by participating in exercise and having meals with you, even if they may be eating something different. · Find what works best for you. If you do not have time or do not like to cook, a program that offers meal replacement bars or shakes may be better for you. Or if you like to prepare meals, finding a plan that includes daily menus and recipes may be best. 
· Ask your doctor about other health professionals who can help you achieve your weight loss goals.  
? A dietitian can help you make healthy changes in your diet. ? An exercise specialist or  can help you develop a safe and effective exercise program. 
? A counselor or psychiatrist can help you cope with issues such as depression, anxiety, or family problems that can make it hard to focus on weight loss. · Consider joining a support group for people who are trying to lose weight. Your doctor can suggest groups in your area. Where can you learn more? Go to http://www.gray.com/ Enter M565 in the search box to learn more about \"Starting a Weight Loss Plan: Care Instructions. \" Current as of: September 23, 2020               Content Version: 12.8 © 5950-3055 [employer]wise, [employer]. Care instructions adapted under license by [employer] (which disclaims liability or warranty for this information). If you have questions about a medical condition or this instruction, always ask your healthcare professional. [address] any warranty or liability for your use of this information.

## 2021-04-22 NOTE — PROGRESS NOTES
Matthew Bingham is a 77 y.o. male presenting for Hypertension (3 mo fu)  . 1. Have you been to the ER, urgent care clinic since your last visit? Hospitalized since your last visit? No    2. Have you seen or consulted any other health care providers outside of the [address] [place] [address] since your last visit? Include any pap smears or colon screening. No    Fall Risk Assessment, last 12 mths 1/14/2021   Able to walk? Yes   Fall in past 12 months? 0   Do you feel unsteady? 0   Are you worried about falling 0         Abuse Screening Questionnaire 1/14/2021   Do you ever feel afraid of your partner? N   Are you in a relationship with someone who physically or mentally threatens you? N   Is it safe for you to go home? Y       3 most recent PHQ Screens 3/4/2021   Little interest or pleasure in doing things More than half the days   Feeling down, depressed, irritable, or hopeless More than half the days   Total Score PHQ 2 4   Trouble falling or staying asleep, or sleeping too much Several days   Feeling tired or having little energy Several days   Poor appetite, weight loss, or overeating Not at all   Feeling bad about yourself - or that you are a failure or have let yourself or your family down Not at all   Trouble concentrating on things such as school, work, reading, or watching TV More than half the days   Moving or speaking so slowly that other people could have noticed; or the opposite being so fidgety that others notice Not at all   Thoughts of being better off dead, or hurting yourself in some way Not at all   PHQ 9 Score 8   How difficult have these problems made it for you to do your work, take care of your home and get along with others Somewhat difficult       There are no discontinued medications.

## 2021-04-22 NOTE — PROGRESS NOTES
Talia Simmons is a 77 y.o. male and presents with Hypertension (3 mo fu)      Subjective:  Patient comes in today for 3-month follow-up. Last office visit patient is very stressed out and anxious since he lost 3 family members to COVID-19. He took Vistaril only as needed and is now much better. He has been working with a physical therapist for his back pain, lumbar degenerative disc. He reports his symptoms are much better now. Patient is scheduled for colonoscopy next week with Dr. Jaime Sahu. Hypertensionblood pressure well controlled on atenolol and hydralazine. Dyslipidemia/hypertriglyceridemiadiet controlled. Sleep apnea on CPAP. Past Medical History:   Diagnosis Date    Allergic rhinitis 11/22/2017    Chest pain syndrome 11/22/2017    Comments: abnl stress cardiolite 2006, subsequent to that, he had a normal cardiac cath   [facility] acquired pneumonia     Diarrhea 11/22/2017    Dyspnea 11/22/2017    Environmental allergies     Gout 11/22/2017    Hypertension     Insomnia 11/22/2017    Iron deficiency 11/22/2017    Left chronic serous otitis media 11/22/2017    Obesity 11/22/2017    Comments: morbid    Sleep apnea     Steatosis 11/22/2017    Comments: non-alcoholic     Past Surgical History:   Procedure Laterality Date    HX ORTHOPAEDIC      right knee     Allergies   Allergen Reactions    Ace Inhibitors Cough     Current Outpatient Medications   Medication Sig Dispense Refill    guaifenesin/pseudoephedrne HCl (MUCINEX D PO) Take  by mouth.  fenofibrate nanocrystallized (TRICOR) 145 mg tablet TAKE 1 TABLET BY MOUTH EVERY DAY 90 Tab 0    hydrALAZINE (APRESOLINE) 50 mg tablet TAKE 1 TABLET BY MOUTH TWICE DAILY FOR HIGH BLOOD PRESSURE 180 Tab 0    allopurinoL (ZYLOPRIM) 300 mg tablet Take 1 Tab by mouth daily.  30 Tab 2    bumetanide (BUMEX) 1 mg tablet TAKE 2 TABLETS BY MOUTH EVERY  Tab 0    diclofenac (VOLTAREN) 1 % gel Apply  to affected area four (4) times daily. 1 Each 1    atenoloL (TENORMIN) 50 mg tablet TAKE 1 TABLET BY MOUTH EVERY DAY 90 Tab 3    omeprazole (PRILOSEC OTC) 20 mg tablet Take 20 mg by mouth daily.  VD-VI-ZC-Fe-Min-Lycopen-Lutein (CENTRUM) 0.4-162-18 mg Tab Take  by mouth.  aspirin 81 mg chewable tablet Take 81 mg by mouth daily. Social History     Socioeconomic History    Marital status:      Spouse name: Not on file    Number of children: Not on file    Years of education: Not on file    Highest education level: Not on file   Tobacco Use    Smoking status: Never Smoker    Smokeless tobacco: Never Used   Substance and Sexual Activity    Alcohol use: Yes     Comment: rarely    Drug use: Never    Sexual activity: Yes     Partners: Female     Family History   Problem Relation Age of Onset    Hypertension Other     Heart Disease Other     Diabetes Other     Diabetes Mother     Diabetes Father        Review of Systems  ROS is unremarkable except for ones highlighted in bold.    Constitutional: negative for fevers, chills, anorexia and weight loss  Eyes:   negative for visual disturbance and irritation  ENT:   negative for tinnitus,sore throat,nasal congestion,ear pain,hoarseness  Respiratory:  negative for cough, hemoptysis, dyspnea,wheezing  CV:   negative for chest pain, palpitations, lower extremity edema  GI:   negative for nausea, vomiting, diarrhea, abdominal pain,melena  Endo:               negative for polyuria,polydipsia,polyphagia,heat intolerance  Genitourinary: negative for frequency, dysuria and hematuria  Integumentary: negative for rash and pruritus  Hematologic:  negative for easy bruising and gum/nose bleeding  Musculoskel: negative for myalgias, arthralgias, back pain, muscle weakness, joint pain  Neurological:  negative for headaches, dizziness, vertigo, memory problems and gait   Behavl/Psych: negative for feelings of anxiety, depression, mood changes  ROS otherwise negative Objective:  Visit Vitals  /82 (BP 1 Location: Left upper arm, BP Patient Position: Sitting, BP Cuff Size: Adult)   Pulse 64   Temp 98.4 °F (36.9 °C)   Resp 14   Ht 5' 11\" (1.803 m)   Wt (!) 357 lb (161.9 kg)   SpO2 96%   BMI 49.79 kg/m²     Physical Exam:   General appearance - alert, well appearing, and in no distress  Mental status - alert, oriented to person, place, and time  EYE-TOÑITO, EOMI, fundi normal, corneas normal, no foreign bodies  ENT-ENT exam normal, no neck nodes or sinus tenderness  Nose - normal and patent, no erythema, discharge or polyps  Mouth - mucous membranes moist, pharynx normal without lesions  Neck - supple, no significant adenopathy   Chest - clear to auscultation, no wheezes, rales or rhonchi, symmetric air entry   Heart - normal rate, regular rhythm, normal S1, S2, no murmurs, rubs, clicks or gallops   Abdomen - soft, nontender, nondistended, no masses or organomegaly  Lymph- no adenopathy palpable  Ext-peripheral pulses normal, no pedal edema, no clubbing or cyanosis  Skin-Warm and dry. no hyperpigmentation, vitiligo, or suspicious lesions  Neuro -alert, oriented, normal speech, no focal findings or movement disorder noted  Musculoskeletal- FROM, no bony abnormalities, no point tenderness      Lab Review:  Results for orders placed or performed during the hospital encounter of 03/03/21   SARS-COV-2   Result Value Ref Range    SARS-CoV-2 Please find results under separate order     SARS-COV-2   Result Value Ref Range    Specimen source Nasopharyngeal      SARS-CoV-2 Not detected NOTD          Documenation Review:  Hypertension. Hyperlipidemia. Obesity. Sleep apnea, on CPAP, but not using it regularly. Gout. Metabolic syndrome. Prediabetes  Chest pain syndrome, followed by abnormal stress Cardiolite, 2006.   Following the Cardiolite he had a cardiac catheterization that showed normal left ventricular function and no significant coronary artery disease. Assessment/Plan:    1. Diabetes mellitus type 2, diet-controlled ([address]  Continues to work on diet and exercise. He was under a lot of stress few months back as he lost family members to Covid but now is dealing with it much better. 2. Hypertriglyceridemia  Lifestyle modification    3. Essential hypertension, benign  Well-controlled on current meds    4. Anxiety  Much better controlled    5. Chronic left-sided low back pain with left-sided sciatica  Working with physical therapy. No longer using gabapentin    6. TERESA (obstructive sleep apnea)  On CPAP    continue current meds. I will call with lab results and make further recommendations or adjustments if necessary. Discussed lifestyle modifications including Na restriction, low carb/fat diet, weight reduction and exercise (at least a walking program). ICD-10-CM ICD-9-CM    1. Diabetes mellitus type 2, diet-controlled (HCC)  E11.9 250.00    2. Hypertriglyceridemia  E78.1 272.1    3. Essential hypertension, benign  I10 401.1    4. Anxiety  F41.9 300.00    5. Chronic left-sided low back pain with left-sided sciatica  M54.42 724.2     G89.29 724.3      338.29    6. TERESA (obstructive sleep apnea)  G47.33 327.23              I have reviewed with the patient details of the assessment and plan and all questions were answered. Relevent patient education was performed. Verbal and/or written instructions (see AVS) provided. The most recent lab findings were reviewed with the patient. Plan was discussed with patient who verbally expressed understanding. An After Visit Summary was printed and given to the patient.     Shoaib Andrew MD

## 2021-04-26 ENCOUNTER — HOSPITAL ENCOUNTER (OUTPATIENT)
Dept: PREADMISSION TESTING | Age: 66
Discharge: HOME | End: 2021-04-26
Payer: MEDICARE

## 2021-04-26 LAB — SARS-COV-2, COV2: NORMAL

## 2021-04-26 PROCEDURE — U0003 INFECTIOUS AGENT DETECTION BY NUCLEIC ACID (DNA OR RNA); SEVERE ACUTE RESPIRATORY SYNDROME CORONAVIRUS 2 (SARS-COV-2) (CORONAVIRUS DISEASE [COVID-19]), AMPLIFIED PROBE TECHNIQUE, MAKING USE OF HIGH THROUGHPUT TECHNOLOGIES AS DESCRIBED BY CMS-2020-01-R: HCPCS

## 2021-04-27 LAB — SARS-COV-2, COV2NT: NOT DETECTED

## 2021-04-30 ENCOUNTER — ANESTHESIA EVENT (OUTPATIENT)
Dept: ENDOSCOPY | Age: 66
End: 2021-04-30
Payer: MEDICARE

## 2021-04-30 ENCOUNTER — HOSPITAL ENCOUNTER (OUTPATIENT)
Age: 66
Setting detail: OUTPATIENT SURGERY
Discharge: HOME | End: 2021-04-30
Attending: INTERNAL MEDICINE | Admitting: INTERNAL MEDICINE
Payer: MEDICARE

## 2021-04-30 ENCOUNTER — ANESTHESIA (OUTPATIENT)
Dept: ENDOSCOPY | Age: 66
End: 2021-04-30
Payer: MEDICARE

## 2021-04-30 VITALS
DIASTOLIC BLOOD PRESSURE: 86 MMHG | WEIGHT: 315 LBS | TEMPERATURE: 97.9 F | BODY MASS INDEX: 42.66 KG/M2 | OXYGEN SATURATION: 98 % | HEIGHT: 72 IN | SYSTOLIC BLOOD PRESSURE: 140 MMHG | HEART RATE: 55 BPM | RESPIRATION RATE: 19 BRPM

## 2021-04-30 PROCEDURE — 2709999900 HC NON-CHARGEABLE SUPPLY: Performed by: INTERNAL MEDICINE

## 2021-04-30 PROCEDURE — 76060000031 HC ANESTHESIA FIRST 0.5 HR: Performed by: INTERNAL MEDICINE

## 2021-04-30 PROCEDURE — 74011250636 HC RX REV CODE- 250/636: Performed by: INTERNAL MEDICINE

## 2021-04-30 PROCEDURE — 74011250636 HC RX REV CODE- 250/636: Performed by: ANESTHESIOLOGY

## 2021-04-30 PROCEDURE — 74011000250 HC RX REV CODE- 250: Performed by: ANESTHESIOLOGY

## 2021-04-30 PROCEDURE — 76040000019: Performed by: INTERNAL MEDICINE

## 2021-04-30 RX ORDER — SODIUM CHLORIDE 9 MG/ML
100 INJECTION, SOLUTION INTRAVENOUS CONTINUOUS
Status: DISCONTINUED | OUTPATIENT
Start: 2021-04-30 | End: 2021-04-30 | Stop reason: HOSPADM

## 2021-04-30 RX ORDER — FLUMAZENIL 0.1 MG/ML
0.2 INJECTION INTRAVENOUS
Status: DISCONTINUED | OUTPATIENT
Start: 2021-04-30 | End: 2021-04-30 | Stop reason: HOSPADM

## 2021-04-30 RX ORDER — NALOXONE HYDROCHLORIDE 0.4 MG/ML
0.4 INJECTION, SOLUTION INTRAMUSCULAR; INTRAVENOUS; SUBCUTANEOUS
Status: DISCONTINUED | OUTPATIENT
Start: 2021-04-30 | End: 2021-04-30 | Stop reason: HOSPADM

## 2021-04-30 RX ORDER — PROPOFOL 10 MG/ML
INJECTION, EMULSION INTRAVENOUS AS NEEDED
Status: DISCONTINUED | OUTPATIENT
Start: 2021-04-30 | End: 2021-04-30 | Stop reason: HOSPADM

## 2021-04-30 RX ORDER — SODIUM CHLORIDE 0.9 % (FLUSH) 0.9 %
5-40 SYRINGE (ML) INJECTION AS NEEDED
Status: DISCONTINUED | OUTPATIENT
Start: 2021-04-30 | End: 2021-04-30 | Stop reason: HOSPADM

## 2021-04-30 RX ORDER — LIDOCAINE HYDROCHLORIDE 20 MG/ML
INJECTION, SOLUTION EPIDURAL; INFILTRATION; INTRACAUDAL; PERINEURAL AS NEEDED
Status: DISCONTINUED | OUTPATIENT
Start: 2021-04-30 | End: 2021-04-30 | Stop reason: HOSPADM

## 2021-04-30 RX ORDER — SODIUM CHLORIDE 0.9 % (FLUSH) 0.9 %
5-40 SYRINGE (ML) INJECTION EVERY 8 HOURS
Status: DISCONTINUED | OUTPATIENT
Start: 2021-04-30 | End: 2021-04-30 | Stop reason: HOSPADM

## 2021-04-30 RX ORDER — GUAIFENESIN 1200 MG/1
1200 TABLET, EXTENDED RELEASE ORAL 2 TIMES DAILY
COMMUNITY
End: 2022-05-03

## 2021-04-30 RX ORDER — DEXTROMETHORPHAN/PSEUDOEPHED 2.5-7.5/.8
1.2 DROPS ORAL
Status: DISCONTINUED | OUTPATIENT
Start: 2021-04-30 | End: 2021-04-30 | Stop reason: HOSPADM

## 2021-04-30 RX ORDER — ATROPINE SULFATE 0.1 MG/ML
0.5 INJECTION INTRAVENOUS
Status: DISCONTINUED | OUTPATIENT
Start: 2021-04-30 | End: 2021-04-30 | Stop reason: HOSPADM

## 2021-04-30 RX ADMIN — PROPOFOL 200 MG: 10 INJECTION, EMULSION INTRAVENOUS at 08:14

## 2021-04-30 RX ADMIN — SODIUM CHLORIDE 100 ML/HR: 900 INJECTION, SOLUTION INTRAVENOUS at 07:20

## 2021-04-30 RX ADMIN — LIDOCAINE HYDROCHLORIDE 40 MG: 20 INJECTION, SOLUTION EPIDURAL; INFILTRATION; INTRACAUDAL; PERINEURAL at 07:55

## 2021-04-30 NOTE — DISCHARGE INSTRUCTIONS
Zohra Larios MD  [facility], [address]  [place], [address] [facility]  [phone]  [email or web address] [facility] [place]  [id number]  1955    COLON DISCHARGE INSTRUCTIONS  Discomfort:  Redness at IV site- apply warm compress to area; if redness or soreness persist- contact your physician  There may be a slight amount of blood passed from the rectum  Gaseous discomfort- walking, belching will help relieve any discomfort  You may not operate a vehicle for 12 hours  You may not engage in an occupation involving machinery or appliances for rest of today  You may not drink alcoholic beverages for at least 12 hours  Avoid making any critical decisions for at least 24 hour  DIET:   High fiber diet. - however -  remember your colon is empty and a heavy meal will produce gas. Avoid these foods:  vegetables, fried / greasy foods, carbonated drinks for today      ACTIVITY:  You may resume your normal daily activities it is recommended that you spend the remainder of the day resting -  avoid any strenuous activity. CALL M.D. ANY SIGN OF:   Increasing pain, nausea, vomiting  Abdominal distension (swelling)  New increased bleeding (oral or rectal)  Fever (chills)  Pain in chest area  Bloody discharge from nose or mouth  Shortness of breath     COLONOSCOPY FINDINGS:  Your colonoscopy showed: NO polyps found. Do have some medium sized internal hemorrhoids. Follow-up Instructions:   Call Dr. Zohra Larios if any questions or problems. Telephone # [phone]    Should have a repeat colonoscopy in 5 years.

## 2021-04-30 NOTE — ANESTHESIA PREPROCEDURE EVALUATION
Anesthetic History   No history of anesthetic complications            Review of Systems / Medical History  Patient summary reviewed, nursing notes reviewed and pertinent labs reviewed    Pulmonary        Sleep apnea: CPAP  Shortness of breath (on exertion)         Neuro/Psych   Within defined limits           Cardiovascular    Hypertension: well controlled          Hyperlipidemia    Exercise tolerance: <4 METS     GI/Hepatic/Renal     GERD      Liver disease (Non-alc steatosis)    Comments: Hx of polyps      Hx of constipation Endo/Other    Diabetes (diet controlled, no meds)    Morbid obesity and arthritis     Other Findings   Comments: Environmental allergies  gout           Physical Exam    Airway  Mallampati: III  TM Distance: 4 - 6 cm  Neck ROM: normal range of motion   Mouth opening: Normal     Cardiovascular    Rhythm: regular  Rate: normal         Dental  No notable dental hx       Pulmonary  Breath sounds clear to auscultation               Abdominal  GI exam deferred       Other Findings            Anesthetic Plan    ASA: 3  Anesthesia type: total IV anesthesia          Induction: Intravenous  Anesthetic plan and risks discussed with: Patient

## 2021-04-30 NOTE — PROGRESS NOTES
Doug [place]anger Day  1955  [id number]    Situation:  Verbal report received from: JOHN Hillman  Procedure: Procedure(s):  COLONOSCOPY    Background:    Preoperative diagnosis: HISTORY OF POLYPS  Postoperative diagnosis: hemorrhoids    :  Dr. Long Handing  Assistant(s): Endoscopy Technician-1: Renetta Gomez  Endoscopy RN-1: Natalie Miranda RN    Specimens: * No specimens in log *  H. Pylori  no    Assessment:  Intra-procedure medications     Anesthesia gave intra-procedure sedation and medications, see anesthesia flow sheet yes    Intravenous fluids: NS@ KVO     Vital signs stable      Abdominal assessment: round and soft      Recommendation:  Discharge patient per MD order .   Family or Friend    Permission to share finding with family or friend yes

## 2021-04-30 NOTE — H&P
Claude Kennedy MD  [facility], [address]  [phone]  [email or web address]    Gastroenterology Outpatient History and Physical    Patient: Maddy Garces Day    Physician: Marion Viramontes MD    Vital Signs: Blood pressure (!) 141/81, pulse 65, temperature 98 °F (36.7 °C), resp. rate 22, height 6' (1.829 m), weight 158.8 kg (350 lb), SpO2 97 %. Allergies: Allergies   Allergen Reactions    Ace Inhibitors Cough       Chief Complaint: Hx of polyps    History of Present Illness: Personal history of colonic polyps. Last colonoscopy was 2016 and showed no polyps. Currently has no GI symptoms. No FH of colon cancer or polyps.       History:  Past Medical History:   Diagnosis Date    Allergic rhinitis 11/22/2017    Chest pain syndrome 11/22/2017    Comments: abnl stress cardiolite 2006, subsequent to that, he had a normal cardiac cath   [facility] acquired pneumonia     Diarrhea 11/22/2017    Dyspnea 11/22/2017    Environmental allergies     GERD (gastroesophageal reflux disease)     Gout 11/22/2017    Hypertension     Insomnia 11/22/2017    Iron deficiency 11/22/2017    Left chronic serous otitis media 11/22/2017    Obesity 11/22/2017    Comments: morbid    Sleep apnea     wears CPAP    Steatosis 11/22/2017    Comments: non-alcoholic      Past Surgical History:   Procedure Laterality Date    HX ORTHOPAEDIC      right knee      Social History     Socioeconomic History    Marital status:      Spouse name: Not on file    Number of children: Not on file    Years of education: Not on file    Highest education level: Not on file   Tobacco Use    Smoking status: Never Smoker    Smokeless tobacco: Never Used   Substance and Sexual Activity    Alcohol use: Yes     Comment: rarely    Drug use: Never    Sexual activity: Yes     Partners: Female      Family History   Problem Relation Age of Onset  Hypertension Other     Heart Disease Other     Diabetes Other     Diabetes Mother     Diabetes Father       Patient Active Problem List   Diagnosis Code    Essential hypertension, benign I10    Esophageal reflux K21.9    Dyslipidemia E78.5    TERESA (obstructive sleep apnea) S17.43    Metabolic syndrome J84.86    Gout M10.9    Insomnia G47.00    Obesity, morbid ([facility] E66.01    Annual physical exam Z00.00    Diabetes mellitus type 2, diet-controlled ([facility] E11.9    Hypertriglyceridemia E78.1    Peripheral vascular disease (HCC) I73.9    Type 2 diabetes mellitus with diabetic neuropathy ([facility] E11.40       Medications:   Prior to Admission medications    Medication Sig Start Date End Date Taking? Authorizing Provider   guaiFENesin (Mucinex) 1,200 mg Ta12 ER tablet Take 1,200 mg by mouth two (2) times a day. Yes Provider, Historical   fenofibrate nanocrystallized (TRICOR) 145 mg tablet TAKE 1 TABLET BY MOUTH EVERY DAY 3/11/21  Yes Nichol Dwyer MD   hydrALAZINE (APRESOLINE) 50 mg tablet TAKE 1 TABLET BY MOUTH TWICE DAILY FOR HIGH BLOOD PRESSURE 2/28/21  Yes Enriqueta Ramirez MD   allopurinoL (ZYLOPRIM) 300 mg tablet Take 1 Tab by mouth daily. 2/22/21  Yes Enriqueta Ramirez MD   bumetanide (BUMEX) 1 mg tablet TAKE 2 TABLETS BY MOUTH EVERY DAY 2/4/21  Yes Enriqueta Ramirez MD   diclofenac (VOLTAREN) 1 % gel Apply  to affected area four (4) times daily. 1/6/21  Yes Enriqueta Ramirez MD   atenoloL (TENORMIN) 50 mg tablet TAKE 1 TABLET BY MOUTH EVERY DAY 12/7/20  Yes Enriqueta Ramirez MD   omeprazole (PRILOSEC OTC) 20 mg tablet Take 20 mg by mouth daily. Yes Provider, Historical   QX-VE-BU-Fe-Min-Lycopen-Lutein (CENTRUM) 0.4-162-18 mg Tab Take  by mouth. Yes Other, MD Carlos   aspirin 81 mg chewable tablet Take 81 mg by mouth daily. Yes Other, MD Carlos   guaifenesin/pseudoephedrne HCl (MUCINEX D PO) Take  by mouth.     Provider, Historical   meloxicam (MOBIC) 15 mg tablet TAKE 1 TABLET BY MOUTH DAILY FOR 10 DAYS 1/13/21   Carol Mcnally MD       Physical Exam:     General: well developed, well nourished   HEENT: unremarkable   Heart: regular rhythm no mumur    Lungs: clear   Abdominal:  benign   Neurological: unremarkable   Extremities: no edema     Findings/Diagnosis: Personal history of colonic polyps  Plan of Care/Planned Procedure: Colonoscopy with monitored anesthesia care sedation    Signed:  Esperanza Loo MD 4/30/2021

## 2021-04-30 NOTE — ANESTHESIA POSTPROCEDURE EVALUATION
Procedure(s):  COLONOSCOPY.    total IV anesthesia    Anesthesia Post Evaluation        Patient location during evaluation: PACU  Note status: Adequate. Level of consciousness: responsive to verbal stimuli and sleepy but conscious  Pain management: satisfactory to patient  Airway patency: patent  Anesthetic complications: no  Cardiovascular status: acceptable  Respiratory status: acceptable  Hydration status: acceptable  Comments: +Post-Anesthesia Evaluation and Assessment    Patient: Anum Bowens MRN: [id number]  SSN: [id number]   YOB: 1955  Age: 77 y.o. Sex: male      Cardiovascular Function/Vital Signs    BP (!) 140/86   Pulse (!) 55   Temp 36.6 °C (97.9 °F)   Resp 19   Ht 6' (1.829 m)   Wt 158.8 kg (350 lb)   SpO2 98%   BMI 47.47 kg/m²     Patient is status post Procedure(s):  COLONOSCOPY. Nausea/Vomiting: Controlled. Postoperative hydration reviewed and adequate. Pain:  Pain Scale 1: Numeric (0 - 10) (04/30/21 1070)  Pain Intensity 1: 0 (04/30/21 3983)   Managed. Neurological Status: At baseline. Mental Status and Level of Consciousness: Arousable. Pulmonary Status:   O2 Device: None (Room air) (04/30/21 8281)   Adequate oxygenation and airway patent. Complications related to anesthesia: None    Post-anesthesia assessment completed. No concerns. Signed By: Tommy Dill DO    4/30/2021  Post anesthesia nausea and vomiting:  controlled      INITIAL Post-op Vital signs:   Vitals Value Taken Time   /86 04/30/21 0839   Temp 36.6 °C (97.9 °F) 04/30/21 0821   Pulse 56 04/30/21 0841   Resp 17 04/30/21 0841   SpO2 99 % 04/30/21 0841   Vitals shown include unvalidated device data.

## 2021-04-30 NOTE — PROCEDURES
[facility]                  Colonoscopy Operative Report    4/30/2021      Adele Devine Day  [id number]  1955    Procedure Type:   Colonoscopy --screening     Indications:    Personal history of colon polyps (screening only)     Pre-operative Diagnosis: see indication above    Post-operative Diagnosis:  See findings below    :  Nicole Gama MD    Referring Provider: Ade Sotelo MD      Sedation:  MAC anesthesia Propofol    Pre-Procedural Exam:      Airway: clear,  No airway problems anticipated  Heart: RRR, without gallops or rubs  Lungs: clear bilaterally without wheezes, crackles, or rhonchi  Abdomen: soft, nontender, nondistended, bowel sounds present  Mental Status: awake, alert and oriented to person, place and time     Procedure Details:  After informed consent was obtained with all risks and benefits of procedure explained and preoperative exam completed, the patient was taken to the endoscopy suite and placed in the left lateral decubitus position. Upon sequential sedation as per above, a digital rectal exam was performed . The Olympus videocolonoscope  was inserted in the rectum and carefully advanced to the cecum, which was identified by the ileocecal valve and appendiceal orifice. The cecum was identified by the ileocecal valve and appendiceal orifice. The quality of preparation was good. The colonoscope was slowly withdrawn with careful evaluation between folds. Retroflexion in the rectum was completed demonstrating internal hemorrhoids. Findings:   Rectum: Grade 1 internal hemorrhoid(s); Sigmoid: normal  Descending Colon: normal  Transverse Colon: normal  Ascending Colon: normal  Cecum: normal  Terminal Ileum: not intubated      Specimen Removed:  none    Complications: None. EBL:  None. Impression:    normal colonic mucosa throughout  hemorrhoids internal, Moderate in size    Recommendations: --Repeat colonoscopy in 5 years.  High fiber diet. Begin daily miralax along with the metamucil. Discharge Disposition:  Home in the company of a  when able to ambulate. Esperanza Loo MD    4/30/2021     JES Ford MD  [facility], [address] [place] [address]  [phone]  [email or web address]

## 2021-05-03 RX ORDER — BUMETANIDE 1 MG/1
TABLET ORAL
Qty: 180 TAB | Refills: 0 | Status: SHIPPED | OUTPATIENT
Start: 2021-05-03 | End: 2021-07-29

## 2021-05-24 DIAGNOSIS — I10 ESSENTIAL HYPERTENSION, BENIGN: ICD-10-CM

## 2021-05-24 RX ORDER — HYDRALAZINE HYDROCHLORIDE 50 MG/1
TABLET, FILM COATED ORAL
Qty: 180 TABLET | Refills: 0 | Status: SHIPPED | OUTPATIENT
Start: 2021-05-24 | End: 2021-05-30

## 2021-05-24 RX ORDER — ALLOPURINOL 300 MG/1
TABLET ORAL
Qty: 30 TABLET | Refills: 2 | Status: SHIPPED | OUTPATIENT
Start: 2021-05-24 | End: 2021-06-25

## 2021-05-24 NOTE — TELEPHONE ENCOUNTER
Last Refill: 2/28/2021  Last Visit: 4/22/2021   Next Visit: 7/29/2021    Requested Prescriptions     Pending Prescriptions Disp Refills    hydrALAZINE (APRESOLINE) 50 mg tablet 180 Tablet 0     Sig: TAKE 1 TABLET BY MOUTH TWICE DAILY FOR HIGH BLOOD PRESSURE

## 2021-05-28 DIAGNOSIS — I10 ESSENTIAL HYPERTENSION, BENIGN: ICD-10-CM

## 2021-05-30 RX ORDER — HYDRALAZINE HYDROCHLORIDE 50 MG/1
TABLET, FILM COATED ORAL
Qty: 180 TABLET | Refills: 0 | Status: SHIPPED | OUTPATIENT
Start: 2021-05-30 | End: 2021-08-27

## 2021-06-09 DIAGNOSIS — E78.1 HYPERTRIGLYCERIDEMIA: ICD-10-CM

## 2021-06-09 RX ORDER — FENOFIBRATE 145 MG/1
TABLET, COATED ORAL
Qty: 90 TABLET | Refills: 2 | Status: SHIPPED | OUTPATIENT
Start: 2021-06-09 | End: 2022-03-18 | Stop reason: SDUPTHER

## 2021-06-09 NOTE — TELEPHONE ENCOUNTER
Last Refill: 3/11/201  Last visit:4/22/2021    NOV: 7/29/2021    Requested Prescriptions     Pending Prescriptions Disp Refills    fenofibrate nanocrystallized (TRICOR) 145 mg tablet 90 Tablet 2     Sig: TAKE 1 TABLET BY MOUTH EVERY DAY

## 2021-06-25 RX ORDER — ALLOPURINOL 300 MG/1
TABLET ORAL
Qty: 90 TABLET | Refills: 2 | Status: SHIPPED | OUTPATIENT
Start: 2021-06-25 | End: 2021-07-23 | Stop reason: SDUPTHER

## 2021-07-23 RX ORDER — ALLOPURINOL 300 MG/1
TABLET ORAL
Qty: 90 TABLET | Refills: 2 | Status: SHIPPED | OUTPATIENT
Start: 2021-07-23 | End: 2021-07-29 | Stop reason: SDUPTHER

## 2021-07-23 RX ORDER — ALLOPURINOL 300 MG/1
TABLET ORAL
Qty: 90 TABLET | Refills: 2 | Status: SHIPPED | OUTPATIENT
Start: 2021-07-23 | End: 2022-10-17 | Stop reason: SDUPTHER

## 2021-07-23 NOTE — TELEPHONE ENCOUNTER
Requested Prescriptions     Pending Prescriptions Disp Refills    allopurinoL (ZYLOPRIM) 300 mg tablet 90 Tablet 2       Last Refill: 6/25/21  Next Appointment:7/29/21

## 2021-07-28 NOTE — PROGRESS NOTES
Nabeel Simmons is a 77 y.o. male and presents with Hypertension (3 mo fu) and Diabetes      Subjective:  Patient comes in today for 3-month follow-up. Last office visit patient underwent colonoscopy on 4/30/2021. Procedure performed by Dr. Dileep Zurita. He has a history of colon polyps. Postprocedureinternal hemorrhoids. Repeat 5 years. Hypertensionblood pressure well controlled on atenolol and hydralazine. Dyslipidemia/hypertriglyceridemiadiet controlled. Sleep apnea on CPAP. Gout symptoms well controlled on On allopurinol. Denies any hot ,tender joints. Morbid obesity, BMI greater than 50. Patient has gained 20 pounds since last office visit. Reiterated to work on his diet and exercise. Past Medical History:   Diagnosis Date    Allergic rhinitis 11/22/2017    Chest pain syndrome 11/22/2017    Comments: abnl stress cardiolite 2006, subsequent to that, he had a normal cardiac cath   [facility] acquired pneumonia     Diarrhea 11/22/2017    Dyspnea 11/22/2017    Environmental allergies     GERD (gastroesophageal reflux disease)     Gout 11/22/2017    Hypertension     Insomnia 11/22/2017    Iron deficiency 11/22/2017    Left chronic serous otitis media 11/22/2017    Obesity 11/22/2017    Comments: morbid    Sleep apnea     wears CPAP    Steatosis 11/22/2017    Comments: non-alcoholic     Past Surgical History:   Procedure Laterality Date    COLONOSCOPY N/A 4/30/2021    COLONOSCOPY performed by Cuauhtemoc Kinney MD at [address] [place] [address]; HI RISK IND  4/30/2021         HX ORTHOPAEDIC      right knee     Allergies   Allergen Reactions    Ace Inhibitors Cough     Current Outpatient Medications   Medication Sig Dispense Refill    ascorbic acid, vitamin C, (Vitamin C) 1,000 mg tablet Take 1,000 mg by mouth daily.  gummies      allopurinoL (ZYLOPRIM) 300 mg tablet TAKE 1 TABLET BY MOUTH DAILY 90 Tablet 2    fenofibrate nanocrystallized (TRICOR) 145 mg tablet TAKE 1 TABLET BY MOUTH EVERY DAY 90 Tablet 2    hydrALAZINE (APRESOLINE) 50 mg tablet TAKE 1 TABLET BY MOUTH TWICE DAILY FOR HIGH BLOOD PRESSURE 180 Tablet 0    bumetanide (BUMEX) 1 mg tablet TAKE 2 TABLETS BY MOUTH EVERY  Tab 0    guaiFENesin (Mucinex) 1,200 mg Ta12 ER tablet Take 1,200 mg by mouth two (2) times a day.  guaifenesin/pseudoephedrne HCl (MUCINEX D PO) Take  by mouth.  atenoloL (TENORMIN) 50 mg tablet TAKE 1 TABLET BY MOUTH EVERY DAY 90 Tab 3    omeprazole (PRILOSEC OTC) 20 mg tablet Take 20 mg by mouth daily.  FL-II-NA-Fe-Min-Lycopen-Lutein (CENTRUM) 0.4-162-18 mg Tab Take  by mouth.  aspirin 81 mg chewable tablet Take 81 mg by mouth daily. Social History     Socioeconomic History    Marital status:      Spouse name: Not on file    Number of children: Not on file    Years of education: Not on file    Highest education level: Not on file   Tobacco Use    Smoking status: Never Smoker    Smokeless tobacco: Never Used   Vaping Use    Vaping Use: Never used   Substance and Sexual Activity    Alcohol use: Yes     Comment: rarely    Drug use: Never    Sexual activity: Yes     Partners: Female     Social Determinants of Health     Financial Resource Strain:     Difficulty of Paying Living Expenses:    Food Insecurity:     Worried About Running Out of Food in the Last Year:     [address] in the Last Year:    Transportation Needs:     Lack of Transportation (Medical):      Lack of Transportation (Non-Medical):    Physical Activity:     Days of Exercise per Week:     Minutes of Exercise per Session:    Stress:     Feeling of Stress :    Social Connections:     Frequency of Communication with Friends and Family:     Frequency of Social Gatherings with Friends and Family:     Attends Hindu Services:     Active Member of Clubs or Organizations:     Attends Club or Organization Meetings:     Marital Status:      Family History Problem Relation Age of Onset    Hypertension Other     Heart Disease Other     Diabetes Other     Diabetes Mother     Diabetes Father        Review of Systems  ROS is unremarkable except for ones highlighted in bold.    Constitutional: negative for fevers, chills, anorexia and weight loss  Eyes:   negative for visual disturbance and irritation  ENT:   negative for tinnitus,sore throat,nasal congestion,ear pain,hoarseness  Respiratory:  negative for cough, hemoptysis, dyspnea,wheezing  CV:   negative for chest pain, palpitations, lower extremity edema  GI:   negative for nausea, vomiting, diarrhea, abdominal pain,melena  Endo:               negative for polyuria,polydipsia,polyphagia,heat intolerance  Genitourinary: negative for frequency, dysuria and hematuria  Integumentary: negative for rash and pruritus  Hematologic:  negative for easy bruising and gum/nose bleeding  Musculoskel: negative for myalgias, arthralgias, back pain, muscle weakness, joint pain  Neurological:  negative for headaches, dizziness, vertigo, memory problems and gait   Behavl/Psych: negative for feelings of anxiety, depression, mood changes  ROS otherwise negative     Objective:  Visit Vitals  /76 (BP 1 Location: Left upper arm, BP Patient Position: Sitting, BP Cuff Size: Adult)   Pulse 67   Temp 98.4 °F (36.9 °C)   Resp 16   Ht 5' 11\" (1.803 m)   Wt (!) 370 lb (167.8 kg)   SpO2 96%   BMI 51.60 kg/m²     Physical Exam:   General appearance - alert, well appearing, and in no distress  Mental status - alert, oriented to person, place, and time  EYE-TOÑITO, EOMI, fundi normal, corneas normal, no foreign bodies  ENT-ENT exam normal, no neck nodes or sinus tenderness  Nose - normal and patent, no erythema, discharge or polyps  Mouth - mucous membranes moist, pharynx normal without lesions  Neck - supple, no significant adenopathy   Chest - clear to auscultation, no wheezes, rales or rhonchi, symmetric air entry   Heart - normal rate, regular rhythm, normal S1, S2, no murmurs, rubs, clicks or gallops   Abdomen - soft, nontender, nondistended, no masses or organomegaly  Lymph- no adenopathy palpable  Ext-peripheral pulses normal, no pedal edema, no clubbing or cyanosis  Skin-Warm and dry. no hyperpigmentation, vitiligo, or suspicious lesions  Neuro -alert, oriented, normal speech, no focal findings or movement disorder noted  Musculoskeletal- FROM, no bony abnormalities, no point tenderness      Lab Review:  Results for orders placed or performed during the hospital encounter of 04/26/21   SARS-COV-2   Result Value Ref Range    SARS-CoV-2 Please find results under separate order     NOVEL CORONAVIRUS (COVID-19)   Result Value Ref Range    SARS-CoV-2 Not Detected Not Detected          Documenation Review:      Assessment/Plan:        Diagnoses and all orders for this visit:    1. Essential hypertension, benign  -     CBC W/O DIFF; Future  -     METABOLIC PANEL, COMPREHENSIVE; Future    2. Diabetes mellitus type 2, diet-controlled ([facility] [address]    3. Hypertriglyceridemia  -     LIPID PANEL; Future    4. Metabolic syndrome    5. Chronic gout without tophus, unspecified cause, unspecified site  -     URIC ACID; Future    6. Obesity, morbid ([address]      Recommended patient to follow-up with weight loss clinic however he would want to hold off on that for now. He does understand he has to work on his portion size. He is followed with a nutritionist in the past.  continue current meds. I will call with lab results and make further recommendations or adjustments if necessary. Discussed lifestyle modifications including Na restriction, low carb/fat diet, weight reduction and exercise (at least a walking program). ICD-10-CM ICD-9-CM    1. Essential hypertension, benign  I10 401.1 CBC W/O DIFF      METABOLIC PANEL, COMPREHENSIVE   2. Diabetes mellitus type 2, diet-controlled (HCC)  E11.9 250.00    3. Hypertriglyceridemia  E78.1 272.1 LIPID PANEL   4. Metabolic syndrome  Z72.51 277.7    5. Chronic gout without tophus, unspecified cause, unspecified site  M1A. 9XX0 274.02 URIC ACID   6. Obesity, morbid ([facility]  E66.01 278.01              I have reviewed with the patient details of the assessment and plan and all questions were answered. Relevent patient education was performed. Verbal and/or written instructions (see AVS) provided. The most recent lab findings were reviewed with the patient. Plan was discussed with patient who verbally expressed understanding. An After Visit Summary was printed and given to the patient.     Guille Lopez MD

## 2021-07-29 ENCOUNTER — TELEPHONE (OUTPATIENT)
Dept: INTERNAL MEDICINE CLINIC | Age: 66
End: 2021-07-29

## 2021-07-29 ENCOUNTER — OFFICE VISIT (OUTPATIENT)
Dept: INTERNAL MEDICINE CLINIC | Age: 66
End: 2021-07-29
Payer: MEDICARE

## 2021-07-29 VITALS
BODY MASS INDEX: 44.1 KG/M2 | TEMPERATURE: 98.4 F | SYSTOLIC BLOOD PRESSURE: 118 MMHG | HEIGHT: 71 IN | WEIGHT: 315 LBS | HEART RATE: 67 BPM | RESPIRATION RATE: 16 BRPM | DIASTOLIC BLOOD PRESSURE: 76 MMHG | OXYGEN SATURATION: 96 %

## 2021-07-29 DIAGNOSIS — M1A.9XX0 CHRONIC GOUT WITHOUT TOPHUS, UNSPECIFIED CAUSE, UNSPECIFIED SITE: ICD-10-CM

## 2021-07-29 DIAGNOSIS — I10 ESSENTIAL HYPERTENSION, BENIGN: Primary | ICD-10-CM

## 2021-07-29 DIAGNOSIS — E88.81 METABOLIC SYNDROME: ICD-10-CM

## 2021-07-29 DIAGNOSIS — E11.9 DIABETES MELLITUS TYPE 2, DIET-CONTROLLED (HCC): ICD-10-CM

## 2021-07-29 DIAGNOSIS — E66.01 OBESITY, MORBID (HCC): ICD-10-CM

## 2021-07-29 DIAGNOSIS — E78.1 HYPERTRIGLYCERIDEMIA: ICD-10-CM

## 2021-07-29 DIAGNOSIS — E66.01 OBESITY, MORBID (HCC): Primary | ICD-10-CM

## 2021-07-29 LAB
ALBUMIN SERPL-MCNC: 3.8 G/DL (ref 3.5–5)
ALBUMIN/GLOB SERPL: 1.2 {RATIO} (ref 1.1–2.2)
ALP SERPL-CCNC: 61 U/L (ref 45–117)
ALT SERPL-CCNC: 31 U/L (ref 12–78)
ANION GAP SERPL CALC-SCNC: 5 MMOL/L (ref 5–15)
AST SERPL-CCNC: 18 U/L (ref 15–37)
BILIRUB SERPL-MCNC: 0.4 MG/DL (ref 0.2–1)
BUN SERPL-MCNC: 27 MG/DL (ref 6–20)
BUN/CREAT SERPL: 18 (ref 12–20)
CALCIUM SERPL-MCNC: 9.2 MG/DL (ref 8.5–10.1)
CHLORIDE SERPL-SCNC: 105 MMOL/L (ref 97–108)
CHOLEST SERPL-MCNC: 172 MG/DL
CO2 SERPL-SCNC: 32 MMOL/L (ref 21–32)
CREAT SERPL-MCNC: 1.47 MG/DL (ref 0.7–1.3)
ERYTHROCYTE [DISTWIDTH] IN BLOOD BY AUTOMATED COUNT: 15.7 % (ref 11.5–14.5)
GLOBULIN SER CALC-MCNC: 3.2 G/DL (ref 2–4)
GLUCOSE SERPL-MCNC: 91 MG/DL (ref 65–100)
HCT VFR BLD AUTO: 40.1 % (ref 36.6–50.3)
HDLC SERPL-MCNC: 43 MG/DL
HDLC SERPL: 4 {RATIO} (ref 0–5)
HGB BLD-MCNC: 12.6 G/DL (ref 12.1–17)
LDLC SERPL CALC-MCNC: 107.8 MG/DL (ref 0–100)
MCH RBC QN AUTO: 29.5 PG (ref 26–34)
MCHC RBC AUTO-ENTMCNC: 31.4 G/DL (ref 30–36.5)
MCV RBC AUTO: 93.9 FL (ref 80–99)
NRBC # BLD: 0 K/UL (ref 0–0.01)
NRBC BLD-RTO: 0 PER 100 WBC
PLATELET # BLD AUTO: 217 K/UL (ref 150–400)
PMV BLD AUTO: 11.5 FL (ref 8.9–12.9)
POTASSIUM SERPL-SCNC: 4.2 MMOL/L (ref 3.5–5.1)
PROT SERPL-MCNC: 7 G/DL (ref 6.4–8.2)
RBC # BLD AUTO: 4.27 M/UL (ref 4.1–5.7)
SODIUM SERPL-SCNC: 142 MMOL/L (ref 136–145)
TRIGL SERPL-MCNC: 106 MG/DL (ref ?–150)
URATE SERPL-MCNC: 4.4 MG/DL (ref 3.5–7.2)
VLDLC SERPL CALC-MCNC: 21.2 MG/DL
WBC # BLD AUTO: 6.4 K/UL (ref 4.1–11.1)

## 2021-07-29 PROCEDURE — 3044F HG A1C LEVEL LT 7.0%: CPT | Performed by: INTERNAL MEDICINE

## 2021-07-29 PROCEDURE — G8536 NO DOC ELDER MAL SCRN: HCPCS | Performed by: INTERNAL MEDICINE

## 2021-07-29 PROCEDURE — 2022F DILAT RTA XM EVC RTNOPTHY: CPT | Performed by: INTERNAL MEDICINE

## 2021-07-29 PROCEDURE — G8417 CALC BMI ABV UP PARAM F/U: HCPCS | Performed by: INTERNAL MEDICINE

## 2021-07-29 PROCEDURE — 99214 OFFICE O/P EST MOD 30 MIN: CPT | Performed by: INTERNAL MEDICINE

## 2021-07-29 PROCEDURE — G8754 DIAS BP LESS 90: HCPCS | Performed by: INTERNAL MEDICINE

## 2021-07-29 PROCEDURE — G8427 DOCREV CUR MEDS BY ELIG CLIN: HCPCS | Performed by: INTERNAL MEDICINE

## 2021-07-29 PROCEDURE — G8432 DEP SCR NOT DOC, RNG: HCPCS | Performed by: INTERNAL MEDICINE

## 2021-07-29 PROCEDURE — 3017F COLORECTAL CA SCREEN DOC REV: CPT | Performed by: INTERNAL MEDICINE

## 2021-07-29 PROCEDURE — 1101F PT FALLS ASSESS-DOCD LE1/YR: CPT | Performed by: INTERNAL MEDICINE

## 2021-07-29 PROCEDURE — G8752 SYS BP LESS 140: HCPCS | Performed by: INTERNAL MEDICINE

## 2021-07-29 RX ORDER — VITAMIN E 1000 UNIT
1000 CAPSULE ORAL DAILY
COMMUNITY

## 2021-07-29 RX ORDER — BUMETANIDE 1 MG/1
TABLET ORAL
Qty: 180 TABLET | Refills: 0 | Status: SHIPPED | OUTPATIENT
Start: 2021-07-29 | End: 2021-10-19 | Stop reason: SDUPTHER

## 2021-07-29 NOTE — TELEPHONE ENCOUNTER
Patient's wife would like a call back with information about the nutrition class that was discussed at patient's appt today.      [phone]

## 2021-07-29 NOTE — TELEPHONE ENCOUNTER
Patient would like a referral to see a nutritionist at [facility] please advise

## 2021-07-29 NOTE — TELEPHONE ENCOUNTER
Yudith Gave, MD  You 1 minute ago (11:18 AM)   [facility]  Referral for nutritionist placed    Message text

## 2021-07-29 NOTE — PATIENT INSTRUCTIONS
Starting a Weight Loss Plan: Care Instructions  Overview     If you're thinking about losing weight, it can be hard to know where to start. Your doctor can help you set up a weight loss plan that best meets your needs. You may want to take a class on nutrition or exercise, or you could join a weight loss support group. If you have questions about how to make changes to your eating or exercise habits, ask your doctor about seeing a registered dietitian or an exercise specialist.  It can be a big challenge to lose weight. But you don't have to make huge changes at once. Make small changes, and stick with them. When those changes become habit, add a few more changes. If you don't think you're ready to make changes right now, try to pick a date in the future. Make an appointment to see your doctor to discuss whether the time is right for you to start a plan. Follow-up care is a key part of your treatment and safety. Be sure to make and go to all appointments, and call your doctor if you are having problems. It's also a good idea to know your test results and keep a list of the medicines you take. How can you care for yourself at home? · Set realistic goals. Many people expect to lose much more weight than is likely. A weight loss of 5% to 10% of your body weight may be enough to improve your health. · Get family and friends involved to provide support. Talk to them about why you are trying to lose weight, and ask them to help. They can help by participating in exercise and having meals with you, even if they may be eating something different. · Find what works best for you. If you do not have time or do not like to cook, a program that offers meal replacement bars or shakes may be better for you. Or if you like to prepare meals, finding a plan that includes daily menus and recipes may be best.  · Ask your doctor about other health professionals who can help you achieve your weight loss goals.   ? A dietitian can help you make healthy changes in your diet. ? An exercise specialist or  can help you develop a safe and effective exercise program.  ? A counselor or psychiatrist can help you cope with issues such as depression, anxiety, or family problems that can make it hard to focus on weight loss. · Consider joining a support group for people who are trying to lose weight. Your doctor can suggest groups in your area. Where can you learn more? Go to http://www.gray.com/  Enter U357 in the search box to learn more about \"Starting a Weight Loss Plan: Care Instructions. \"  Current as of: September 23, 2020               Content Version: 12.8  © 8910-7606 [employer]wise, [employer]. Care instructions adapted under license by [employer] (which disclaims liability or warranty for this information). If you have questions about a medical condition or this instruction, always ask your healthcare professional. [address] any warranty or liability for your use of this information.

## 2021-07-29 NOTE — PROGRESS NOTES
Tamie Vickers is a 77 y.o. male presenting for Hypertension (3 mo fu) and Diabetes  . 1. Have you been to the ER, urgent care clinic since your last visit? Hospitalized since your last visit? No    2. Have you seen or consulted any other health care providers outside of the [address] [place] [address] since your last visit? Include any pap smears or colon screening. No    Fall Risk Assessment, last 12 mths 1/14/2021   Able to walk? Yes   Fall in past 12 months? 0   Do you feel unsteady? 0   Are you worried about falling 0         Abuse Screening Questionnaire 1/14/2021   Do you ever feel afraid of your partner? N   Are you in a relationship with someone who physically or mentally threatens you? N   Is it safe for you to go home?  Y       3 most recent PHQ Screens 3/4/2021   Little interest or pleasure in doing things More than half the days   Feeling down, depressed, irritable, or hopeless More than half the days   Total Score PHQ 2 4   Trouble falling or staying asleep, or sleeping too much Several days   Feeling tired or having little energy Several days   Poor appetite, weight loss, or overeating Not at all   Feeling bad about yourself - or that you are a failure or have let yourself or your family down Not at all   Trouble concentrating on things such as school, work, reading, or watching TV More than half the days   Moving or speaking so slowly that other people could have noticed; or the opposite being so fidgety that others notice Not at all   Thoughts of being better off dead, or hurting yourself in some way Not at all   PHQ 9 Score 8   How difficult have these problems made it for you to do your work, take care of your home and get along with others Somewhat difficult       Medications Discontinued During This Encounter   Medication Reason    allopurinoL (ZYLOPRIM) 846 mg tablet DUPLICATE ORDER supple/normal/no JVD/normal thyroid gland

## 2021-07-30 ENCOUNTER — TRANSCRIBE ORDER (OUTPATIENT)
Dept: SCHEDULING | Age: 66
End: 2021-07-30

## 2021-07-30 DIAGNOSIS — E66.01 MORBID OBESITY (HCC): Primary | ICD-10-CM

## 2021-08-04 ENCOUNTER — TELEPHONE (OUTPATIENT)
Dept: CARDIAC REHAB | Age: 66
End: 2021-08-04

## 2021-08-04 ENCOUNTER — TRANSCRIBE ORDER (OUTPATIENT)
Dept: SCHEDULING | Age: 66
End: 2021-08-04

## 2021-08-04 DIAGNOSIS — E66.01 MORBID OBESITY (HCC): Primary | ICD-10-CM

## 2021-08-04 NOTE — TELEPHONE ENCOUNTER
8/4/2021 [facility]: Called Mr. Sandra Simmons to discuss outpatient nutrition appointment on 8/20/23021 and verify that he has not already met with another dietician or in a medical weight loss program. He has not seen any dieticians or started with any weight loss programs and wants to meet with Michaela. Mr. Sandra Simmons is without questions or concerns.  Darius Shoemaker

## 2021-08-13 ENCOUNTER — APPOINTMENT (OUTPATIENT)
Dept: CARDIAC REHAB | Age: 66
End: 2021-08-13
Attending: INTERNAL MEDICINE

## 2021-08-17 ENCOUNTER — TELEPHONE (OUTPATIENT)
Dept: CARDIAC REHAB | Age: 66
End: 2021-08-17

## 2021-08-17 NOTE — TELEPHONE ENCOUNTER
TELEPHONE ENCOUNTER: OP NUTRITION APPOINTMENT     Reminder call placed on 8/17/2021    Outcome:                 [x] Patient confirmed appointment              [] Patient rescheduled appointment              [] Unable to reach              [] Left message              [] Other:        Ignacio Cox RD

## 2021-08-20 ENCOUNTER — HOSPITAL ENCOUNTER (OUTPATIENT)
Dept: CARDIAC REHAB | Age: 66
Discharge: HOME | End: 2021-08-20
Attending: INTERNAL MEDICINE
Payer: MEDICARE

## 2021-08-20 ENCOUNTER — HOSPITAL ENCOUNTER (OUTPATIENT)
Dept: CARDIAC REHAB | Age: 66
End: 2021-08-20
Attending: INTERNAL MEDICINE

## 2021-08-20 VITALS — BODY MASS INDEX: 44.1 KG/M2 | HEIGHT: 71 IN | WEIGHT: 315 LBS

## 2021-08-20 PROCEDURE — 97802 MEDICAL NUTRITION INDIV IN: CPT | Performed by: DIETITIAN, REGISTERED

## 2021-08-20 NOTE — PROGRESS NOTES
[address] NOTE  DATE: 2021      REFERRING PHYSICIAN: Dr. Kaya Oswald    NAME: Nabeel Simmons : 1955 AGE: 77 y.o. GENDER: male    REASON FOR VISIT: morbid obesity, Type 2 diabetes    PAST MEDICAL HISTORY:   Patient Active Problem List   Diagnosis Code    Essential hypertension, benign I10    Esophageal reflux K21.9    Dyslipidemia E78.5    TERESA (obstructive sleep apnea) C67.32    Metabolic syndrome I46.04    Gout M10.9    Insomnia G47.00    Obesity, morbid ([address] E66.01    Annual physical exam Z00.00    Diabetes mellitus type 2, diet-controlled ([facility] E11.9    Hypertriglyceridemia E78.1    Peripheral vascular disease (HCC) I73.9    Type 2 diabetes mellitus with diabetic neuropathy (HCC) E11.40     Wears cPap at least 4 hours per night; sleeps 4 to 5 hours per night    LABS:   Lab Results   Component Value Date/Time    Cholesterol, total 172 2021 09:39 AM    Cholesterol (POC) 172 2018 08:32 AM    HDL Cholesterol 43 2021 09:39 AM    HDL Cholesterol (POC) 29 (A) 2018 08:32 AM    LDL Cholesterol (POC) 108.2 2018 08:32 AM    LDL, calculated 107.8 (H) 2021 09:39 AM    VLDL, calculated 21.2 2021 09:39 AM    Triglyceride 106 2021 09:39 AM    Triglycerides (POC) 174 2018 08:32 AM    CHOL/HDL Ratio 4.0 2021 09:39 AM     Lab Results   Component Value Date/Time    Hemoglobin A1c 5.5 01/15/2021 08:04 AM    Hemoglobin A1c (POC) 5.6 2018 08:32 AM     No SMBG      MEDICATIONS/SUPPLEMENTS:   [unfilled]  Prior to Admission medications    Medication Sig Start Date End Date Taking? Authorizing Provider   bumetanide (BUMEX) 1 mg tablet TAKE 2 TABLETS BY MOUTH EVERY DAY 21   Neela Hayes MD   ascorbic acid, vitamin C, (Vitamin C) 1,000 mg tablet Take 1,000 mg by mouth daily.  gummies    Provider, Historical   allopurinoL (ZYLOPRIM) 300 mg tablet TAKE 1 TABLET BY MOUTH DAILY 21   Neela Hayes MD   allopurinoL (ZYLOPRIM) 300 mg tablet TAKE 1 TABLET BY MOUTH DAILY 6/25/21   Kirt Arredondo MD   fenofibrate nanocrystallized (TRICOR) 145 mg tablet TAKE 1 TABLET BY MOUTH EVERY DAY 6/9/21   Kirt Arredondo MD   hydrALAZINE (APRESOLINE) 50 mg tablet TAKE 1 TABLET BY MOUTH TWICE DAILY FOR HIGH BLOOD PRESSURE 5/30/21   Kirt Arredondo MD   guaiFENesin (Mucinex) 1,200 mg Ta12 ER tablet Take 1,200 mg by mouth two (2) times a day. Provider, Historical   guaifenesin/pseudoephedrne HCl (MUCINEX D PO) Take  by mouth. Provider, Historical   meloxicam (MOBIC) 15 mg tablet TAKE 1 TABLET BY MOUTH DAILY FOR 10 DAYS 1/13/21   Kirt Arredondo MD   atenoloL (TENORMIN) 50 mg tablet TAKE 1 TABLET BY MOUTH EVERY DAY 12/7/20   Kirt Arredondo MD   omeprazole (PRILOSEC OTC) 20 mg tablet Take 20 mg by mouth daily. Provider, Historical   FS-UZ-PI-Fe-Min-Lycopen-Lutein (CENTRUM) 0.4-162-18 mg Tab Take  by mouth. Other, MD Carlos   aspirin 81 mg chewable tablet Take 81 mg by mouth daily.       Other, MD Carlos     Takes Miralax and Metamucil to help with constipation    EXERCISE/PHYSICAL ACTIVITY: sedentary; has a [employer] membership    ALCOHOL / TOBACCO USE: wine 2x/month; no tobacco products    SOCIAL HISTORY: his supportive wife is with him today, he is retired     REPORTED WEIGHT HISTORY: down to 352 lbs with physical therapy in March of this year then regained weight; 391 lb was his heaviest; 6 years ago did [facility] program        ANTHROPOMETRICS:    Ht Readings from Last 1 Encounters:   08/20/21 5' 11\" (1.803 m)      Wt Readings from Last 10 Encounters:   08/20/21 (!) 168.9 kg (372 lb 6.4 oz)   07/29/21 (!) 167.8 kg (370 lb)   04/30/21 158.8 kg (350 lb)   04/22/21 (!) 161.9 kg (357 lb)   03/04/21 (!) 159.7 kg (352 lb)   01/14/21 (!) 167.4 kg (369 lb)   10/27/20 (!) 168.7 kg (372 lb)   10/12/20 (!) 169.6 kg (374 lb)   10/06/20 (!) 172.5 kg (380 lb 6.4 oz)   06/25/20 (!) 177.4 kg (391 lb)      IBW:172 # +/- 10%  %IBW: 217 % +/- 10%    BMI: 51.9 kg/M2 Category: morbid obesity          NUTRITION ASSESSMENT:    FOOD ALLERGIES/INTOLERANCES: no known    24 HOUR DIET RECALL  Breakfast  Eggs (3) scrambled w/ cheese in canola oil, salas (3), grape jelly & butter on toast (2); diet tea   Snack     Lunch  [place] ([facility] & cheese (1/2 slice) sandwich w/ sliced tomato and rothman; popcorn; diet gingerale   Snack     Dinner  Ar[employer] meal: Baked chicken w/ skin (thigh & leg), broccoli & squash w/ butter, 1/2 baked potato w/ butter, zucchini muffin (2); water   Snack  Popcorn (katy) - whole bag (usually will split it)     Sandra Johnson Day states sometimes eats more sweets than yesterday, been trying to wean off. Also cut back on drinking sodas, has been diet. Night snack is more of a habit while watching TV than actual hunger. NUTRITION DIAGNOSIS:  Obesity related to food preferences, nutrition related knowledge deficit and large portions as evidenced by BMI >40 and diet history. ESTIMATED ENERGY NEEDS:  2000 (using [address] with AF 1.2; - 1000 for weight loss)    NUTRITION INTERVENTION:  Nutrition 60 minute one-on-one education & goal setting with Sandra Simmons    Reviewed with Sandra Simmons relevant labs compared to ideals.     Reviewed weight history and patient's verbalized weight goal as well as any real or perceived barriers to obtaining the goal. Collaborated with patient to set a specific weight goal.     Conducted a verbal diet recall and assessed for environmental, financial, psychosocial, physical and comorbidities that may impact the food and eating patterns / behaviors of Jose Croninchrissy with patient to set specific nutrient goals as well as specific food / behavior changes that will help patient meet the overall goal of: weight loss    NUTRITION EDUCATION / HANDOUTS PROVIDED:  - Label reading  - Diabetes Plate  - Fast Food Best Bites  - Academy of Nutrition and Dietetics patient education \"Weight Loss Tips\", including recommended food lists  - Academy of Nutrition and Dietetics patient education \"Weight Management Cooking Tips\"  - 3 Sample 2000 calorie per day menus and link to additional menus  - [employer] program (for diabetes)    PATIENT GOALS:    Weight Goal:    Short Term Weight Goal: 250 lbs  Long Term Weight Goal: 200 lbs    Nutrition Goals:    Calories: 2000 /day     - read and compare food labels  - measure portions  - use smaller plates & bowls  - separate TV from eating  - plan meals in advance  - decrease added fats and /or switch to reduced calorie \"light\" options  - make non-starchy vegetables the biggest portion of the meal              Specific tips and techniques to facilitate compliance with above recommendations were provided and discussed. Sandra Simmons verbalized understanding. The patient was encouraged to contact me as needed.       Follow-up: in 4 weeks              Nicole Ferreira RD, CDCES

## 2021-08-27 DIAGNOSIS — I10 ESSENTIAL HYPERTENSION, BENIGN: ICD-10-CM

## 2021-08-27 RX ORDER — HYDRALAZINE HYDROCHLORIDE 50 MG/1
TABLET, FILM COATED ORAL
Qty: 180 TABLET | Refills: 0 | Status: SHIPPED | OUTPATIENT
Start: 2021-08-27 | End: 2022-04-18 | Stop reason: SDUPTHER

## 2021-09-16 ENCOUNTER — TELEPHONE (OUTPATIENT)
Dept: CARDIAC REHAB | Age: 66
End: 2021-09-16

## 2021-09-16 NOTE — TELEPHONE ENCOUNTER
TELEPHONE ENCOUNTER: OP NUTRITION APPOINTMENT     Reminder call placed on 9/16/2021    Outcome:                 [x] Patient confirmed appointment              [] Patient rescheduled appointment              [] Unable to reach              [] Left message              [] Other:        Reza Lake RD

## 2021-09-17 ENCOUNTER — HOSPITAL ENCOUNTER (OUTPATIENT)
Dept: CARDIAC REHAB | Age: 66
Discharge: HOME | End: 2021-09-17
Attending: INTERNAL MEDICINE
Payer: MEDICARE

## 2021-09-17 VITALS — WEIGHT: 315 LBS | BODY MASS INDEX: 51.1 KG/M2

## 2021-09-17 PROCEDURE — 97803 MED NUTRITION INDIV SUBSEQ: CPT | Performed by: DIETITIAN, REGISTERED

## 2021-09-17 NOTE — PROGRESS NOTES
[address] NOTE  DATE: 2021      REFERRING PHYSICIAN: Dr. Natanael Langford    NAME: Marquis Simmons : 1955 AGE: 77 y.o. GENDER: male    REASON FOR VISIT: morbid obesity, Type 2 diabetes FOLLOW-UP    PAST MEDICAL HISTORY:   Patient Active Problem List   Diagnosis Code    Essential hypertension, benign I10    Esophageal reflux K21.9    Dyslipidemia E78.5    TERESA (obstructive sleep apnea) V39.73    Metabolic syndrome I83.54    Gout M10.9    Insomnia G47.00    Obesity, morbid ([facility] E66.01    Annual physical exam Z00.00    Diabetes mellitus type 2, diet-controlled ([facility] [address] [facility] E11.9    Hypertriglyceridemia E78.1    Peripheral vascular disease (HCC) I73.9    Type 2 diabetes mellitus with diabetic neuropathy (HCC) E11.40     Wears cPap at least 4 hours per night; sleeps 4 to 5 hours per night    LABS:   Lab Results   Component Value Date/Time    Cholesterol, total 172 2021 09:39 AM    Cholesterol (POC) 172 2018 08:32 AM    HDL Cholesterol 43 2021 09:39 AM    HDL Cholesterol (POC) 29 (A) 2018 08:32 AM    LDL Cholesterol (POC) 108.2 2018 08:32 AM    LDL, calculated 107.8 (H) 2021 09:39 AM    VLDL, calculated 21.2 2021 09:39 AM    Triglyceride 106 2021 09:39 AM    Triglycerides (POC) 174 2018 08:32 AM    CHOL/HDL Ratio 4.0 2021 09:39 AM     Lab Results   Component Value Date/Time    Hemoglobin A1c 5.5 01/15/2021 08:04 AM    Hemoglobin A1c (POC) 5.6 2018 08:32 AM     No SMBG      MEDICATIONS/SUPPLEMENTS:   [unfilled]  Prior to Admission medications    Medication Sig Start Date End Date Taking? Authorizing Provider   hydrALAZINE (APRESOLINE) 50 mg tablet TAKE 1 TABLET BY MOUTH TWICE DAILY FOR HIGH BLOOD PRESSURE 21   Nate Jimenez MD   bumetanide (BUMEX) 1 mg tablet TAKE 2 TABLETS BY MOUTH EVERY DAY 21   Nate Jimenez MD   ascorbic acid, vitamin C, (Vitamin C) 1,000 mg tablet Take 1,000 mg by mouth daily.  gummies Provider, Historical   allopurinoL (ZYLOPRIM) 300 mg tablet TAKE 1 TABLET BY MOUTH DAILY 7/23/21   Kirt Arredondo MD   allopurinoL (ZYLOPRIM) 300 mg tablet TAKE 1 TABLET BY MOUTH DAILY 6/25/21   Kirt Arredondo MD   fenofibrate nanocrystallized (TRICOR) 145 mg tablet TAKE 1 TABLET BY MOUTH EVERY DAY 6/9/21   Kirt Arredondo MD   guaiFENesin (Mucinex) 1,200 mg Ta12 ER tablet Take 1,200 mg by mouth two (2) times a day. Provider, Historical   guaifenesin/pseudoephedrne HCl (MUCINEX D PO) Take  by mouth. Provider, Historical   meloxicam (MOBIC) 15 mg tablet TAKE 1 TABLET BY MOUTH DAILY FOR 10 DAYS 1/13/21   Kirt Arredondo MD   atenoloL (TENORMIN) 50 mg tablet TAKE 1 TABLET BY MOUTH EVERY DAY 12/7/20   Kirt Arredondo MD   omeprazole (PRILOSEC OTC) 20 mg tablet Take 20 mg by mouth daily. Provider, Historical   OA-GV-BQ-Fe-Min-Lycopen-Lutein (CENTRUM) 0.4-162-18 mg Tab Take  by mouth. Other, MD Carlos   aspirin 81 mg chewable tablet Take 81 mg by mouth daily.       Other, MD Carlos     Takes Miralax and Metamucil to help with constipation    EXERCISE/PHYSICAL ACTIVITY: sedentary; has a [employer] membership     ALCOHOL / TOBACCO USE: wine 2x/month; no tobacco products    SOCIAL HISTORY: his supportive wife is with him today, he is retired     REPORTED WEIGHT HISTORY: down to 352 lbs with physical therapy in March of this year then regained weight; 391 lb was his heaviest; 6 years ago did [employer] program        ANTHROPOMETRICS:    Ht Readings from Last 1 Encounters:   08/20/21 5' 11\" (1.803 m)      Wt Readings from Last 10 Encounters:   09/17/21 (!) 166.2 kg (366 lb 6.4 oz)   08/20/21 (!) 168.9 kg (372 lb 6.4 oz)   07/29/21 (!) 167.8 kg (370 lb)   04/30/21 158.8 kg (350 lb)   04/22/21 (!) 161.9 kg (357 lb)   03/04/21 (!) 159.7 kg (352 lb)   01/14/21 (!) 167.4 kg (369 lb)   10/27/20 (!) 168.7 kg (372 lb)   10/12/20 (!) 169.6 kg (374 lb)   10/06/20 (!) 172.5 kg (380 lb 6.4 oz)      IBW:172 # +/- 10%  %IBW: 213 % +/- 10%    BMI: 51.0 kg/M2 Category: morbid obesity    Weight Change Since Initial Consult: - 6 lbs      NUTRITION ASSESSMENT:    FOOD ALLERGIES/INTOLERANCES: no known    24 HOUR DIET RECALL  Breakfast     Snack     Lunch     Snack     Dinner     Snack       Gunner Simmons states tried and enjoyed the flax cereal.  Has cut back portion sizes. Still having some second helpings. Eating a variety of fruits if hungry. Not eating as late, no food after 8 pm. Has cut back on added sugars (e.g. jelly). Wife is working on planning meals in advance. Continues to eat in front of the TV when football is on. Looking for more snack ideas. NUTRITION DIAGNOSIS:  Obesity related to food preferences, nutrition related knowledge deficit and large portions as evidenced by BMI >40 and diet history. ESTIMATED ENERGY NEEDS:  2000 (using [address] with AF 1.2; - 1000 for weight loss)    NUTRITION INTERVENTION:  Nutrition 30  minute follow-up one-on-one education & goal setting with Armond Simmons    Reviewed with Armond Simmons previous goals and plans established at last visit, any real or perceived barriers to obtaining the goal / adhering to plan, and progress towards goal made.       Collaborated with patient to set specific nutrient goals as well as specific food / behavior changes that will help patient meet the overall goal of: weight loss    NUTRITION EDUCATION / HANDOUTS PROVIDED:  - list of lower calorie products to try (light Ranch, fat free sour cream, light butter popcorn)  - reviewed label reading for calories and total carbs (vs only sugar content)  - reviewed calories in each food group and portion comparison of what 100 calories looks like across various foods      PATIENT GOALS:    Weight Goal:    Short Term Weight Goal: 250 lbs  Long Term Weight Goal: 200 lbs    Nutrition Goals: (CONTINUE)    Calories: 2000 /day     - read and compare food labels  - measure portions  - use smaller plates & bowls  - separate TV from eating  - plan meals in advance  - decrease added fats and /or switch to reduced calorie \"light\" options  - make non-starchy vegetables the biggest portion of the meal      Exercise Goals: (NEW)    - take walks at least every other day (no time requirement for now)          Specific tips and techniques to facilitate compliance with above recommendations were provided and discussed. Elaine Simmons verbalized understanding. The patient was encouraged to contact me as needed.       Follow-up: in 4 weeks              Paco Su RD, [facility]

## 2021-10-13 ENCOUNTER — TELEPHONE (OUTPATIENT)
Dept: CARDIAC REHAB | Age: 66
End: 2021-10-13

## 2021-10-13 NOTE — TELEPHONE ENCOUNTER
TELEPHONE ENCOUNTER: OP NUTRITION APPOINTMENT     Reminder call placed on 10/13/2021    Outcome:                 [x] Patient confirmed appointment              [] Patient rescheduled appointment              [] Unable to reach              [] Left message              [] Other:        Samantha Reyes RD

## 2021-10-15 ENCOUNTER — HOSPITAL ENCOUNTER (OUTPATIENT)
Dept: CARDIAC REHAB | Age: 66
Discharge: HOME | End: 2021-10-15
Attending: INTERNAL MEDICINE
Payer: MEDICARE

## 2021-10-15 VITALS — WEIGHT: 315 LBS | BODY MASS INDEX: 51.13 KG/M2

## 2021-10-15 PROCEDURE — 97803 MED NUTRITION INDIV SUBSEQ: CPT | Performed by: DIETITIAN, REGISTERED

## 2021-10-15 NOTE — PROGRESS NOTES
[address] NOTE  DATE: 10/15/2021      REFERRING PHYSICIAN: Dr. Xiong Primer    NAME: Regina Simmons : 1955 AGE: 77 y.o. GENDER: male    REASON FOR VISIT: morbid obesity, Type 2 diabetes FOLLOW-UP    PAST MEDICAL HISTORY:   Patient Active Problem List   Diagnosis Code    Essential hypertension, benign I10    Esophageal reflux K21.9    Dyslipidemia E78.5    TERESA (obstructive sleep apnea) E09.67    Metabolic syndrome J14.11    Gout M10.9    Insomnia G47.00    Obesity, morbid ([facility] E66.01    Annual physical exam Z00.00    Diabetes mellitus type 2, diet-controlled ([facility] E11.9    Hypertriglyceridemia E78.1    Peripheral vascular disease (HCC) I73.9    Type 2 diabetes mellitus with diabetic neuropathy ([facility] E11.40     Wears cPap at least 4 hours per night; sleeps 4 to 5 hours per night, a few nights got 6 to 7 hours    LABS:   Lab Results   Component Value Date/Time    Cholesterol, total 172 2021 09:39 AM    Cholesterol (POC) 172 2018 08:32 AM    HDL Cholesterol 43 2021 09:39 AM    HDL Cholesterol (POC) 29 (A) 2018 08:32 AM    LDL Cholesterol (POC) 108.2 2018 08:32 AM    LDL, calculated 107.8 (H) 2021 09:39 AM    VLDL, calculated 21.2 2021 09:39 AM    Triglyceride 106 2021 09:39 AM    Triglycerides (POC) 174 2018 08:32 AM    CHOL/HDL Ratio 4.0 2021 09:39 AM     Lab Results   Component Value Date/Time    Hemoglobin A1c 5.5 01/15/2021 08:04 AM    Hemoglobin A1c (POC) 5.6 2018 08:32 AM     No SMBG  A1c to be checked on 10/28/21. MEDICATIONS/SUPPLEMENTS:   [unfilled]  Prior to Admission medications    Medication Sig Start Date End Date Taking?  Authorizing Provider   hydrALAZINE (APRESOLINE) 50 mg tablet TAKE 1 TABLET BY MOUTH TWICE DAILY FOR HIGH BLOOD PRESSURE 21   Michelle Avelar MD   bumetanide (BUMEX) 1 mg tablet TAKE 2 TABLETS BY MOUTH EVERY DAY 21   Michelle Avelar MD   ascorbic acid, vitamin C, (Vitamin C) 1,000 mg tablet Take 1,000 mg by mouth daily. gummies    Provider, Historical   allopurinoL (ZYLOPRIM) 300 mg tablet TAKE 1 TABLET BY MOUTH DAILY 7/23/21   Beverly Harden MD   allopurinoL (ZYLOPRIM) 300 mg tablet TAKE 1 TABLET BY MOUTH DAILY 6/25/21   Beverly Harden MD   fenofibrate nanocrystallized (TRICOR) 145 mg tablet TAKE 1 TABLET BY MOUTH EVERY DAY 6/9/21   Beverly Harden MD   guaiFENesin (Mucinex) 1,200 mg Ta12 ER tablet Take 1,200 mg by mouth two (2) times a day. Provider, Historical   guaifenesin/pseudoephedrne HCl (MUCINEX D PO) Take  by mouth. Provider, Historical   meloxicam (MOBIC) 15 mg tablet TAKE 1 TABLET BY MOUTH DAILY FOR 10 DAYS 1/13/21   Beverly Harden MD   atenoloL (TENORMIN) 50 mg tablet TAKE 1 TABLET BY MOUTH EVERY DAY 12/7/20   Beverly Harden MD   omeprazole (PRILOSEC OTC) 20 mg tablet Take 20 mg by mouth daily. Provider, Historical   PV-RA-OZ-Fe-Min-Lycopen-Lutein (CENTRUM) 0.4-162-18 mg Tab Take  by mouth. Other, MD Carlos   aspirin 81 mg chewable tablet Take 81 mg by mouth daily.       Other, MD Carlos     Takes Miralax and Metamucil to help with constipation    EXERCISE/PHYSICAL ACTIVITY: sedentary / light house & yard work; has a [employer] membership but not gone    ALCOHOL / TOBACCO USE: wine 2x/month; no tobacco products    SOCIAL HISTORY: his supportive wife is with him today, he is retired     REPORTED WEIGHT HISTORY: down to 352 lbs with physical therapy in March of this year then regained weight; 391 lb was his heaviest; 6 years ago did [facility] [place] program        ANTHROPOMETRICS:    Ht Readings from Last 1 Encounters:   08/20/21 5' 11\" (1.803 m)      Wt Readings from Last 10 Encounters:   10/15/21 (!) 166.3 kg (366 lb 9.6 oz)   09/17/21 (!) 166.2 kg (366 lb 6.4 oz)   08/20/21 (!) 168.9 kg (372 lb 6.4 oz)   07/29/21 (!) 167.8 kg (370 lb)   04/30/21 158.8 kg (350 lb)   04/22/21 (!) 161.9 kg (357 lb)   03/04/21 (!) 159.7 kg (352 lb)   01/14/21 (!) 167.4 kg (369 lb)   10/27/20 (!) 168.7 kg (372 lb)   10/12/20 (!) 169.6 kg (374 lb)      IBW:172 # +/- 10%  %IBW: 213 % +/- 10%    BMI: 51.0 kg/M2 Category: morbid obesity    Weight Change Since Initial Consult: - 6 lbs  Weight Change Since Last Consult: 0 lbs      NUTRITION ASSESSMENT:    FOOD ALLERGIES/INTOLERANCES: no known    24 HOUR DIET RECALL  Breakfast  Eggs, Danish salas, turkey sausage, coffee & water   Snack     Lunch  Grilled chicken sandwich ([employer]) plain; diet gingerale   Snack     Dinner  [employer] garden salad w/ grilled chicken, light vinaigrette dressing; diet Coke   Snack  Frozen yogurt from [employer] with chocolate on it     [facility] he went on vacation for 5 days. Using no sugar added barbecue sauce and ketchup. On vacation got off track. Wife states she was able to cook enough for 3 days to increase home made meals, still working on meal prepping. Continues to eat in front of the TV when football is on. NUTRITION DIAGNOSIS:  Obesity related to food preferences, nutrition related knowledge deficit and large portions as evidenced by BMI >40 and diet history. ESTIMATED ENERGY NEEDS:  2000 (using [address] with AF 1.2; - 1000 for weight loss)    NUTRITION INTERVENTION:  Nutrition 30  minute follow-up one-on-one education & goal setting with Joy Simmons    Reviewed with Joy Simmons previous goals and plans established at last visit, any real or perceived barriers to obtaining the goal / adhering to plan, and progress towards goal made.       Collaborated with patient to set specific nutrient goals as well as specific food / behavior changes that will help patient meet the overall goal of: weight loss    NUTRITION EDUCATION / HANDOUTS PROVIDED:  - Healthy Mix'n'Match Meal Builder  - Fast Food Best Bites  - Diabetes/ My Plate       PATIENT GOALS:    Weight Goal:    Short Term Weight Goal: 250 lbs  Long Term Weight Goal: 200 lbs    Nutrition Goals: (CONTINUE)    Calories: 2000 /day     - read and compare food labels   - measure portions  - use smaller plates & bowls  - separate TV from eating  - plan meals in advance  - decrease added fats and /or switch to reduced calorie \"light\" options  - make non-starchy vegetables the biggest portion of the meal      Exercise Goals: (Not Met, Continue)    - take walks at least every other day (no time requirement for now)            Specific tips and techniques to facilitate compliance with above recommendations were provided and discussed. Raudel Simmons verbalized understanding. The patient was encouraged to contact me as needed.       Follow-up: in 4 weeks              Pablo Brewer RD, CDCES

## 2021-10-19 RX ORDER — BUMETANIDE 1 MG/1
2 TABLET ORAL DAILY
Qty: 180 TABLET | Refills: 0 | Status: SHIPPED | OUTPATIENT
Start: 2021-10-19 | End: 2022-01-18

## 2021-10-19 NOTE — TELEPHONE ENCOUNTER
Requested Prescriptions     Pending Prescriptions Disp Refills    bumetanide (BUMEX) 1 mg tablet 180 Tablet 0     Sig: Take 2 Tablets by mouth daily.        Last Refill: 7/29/21  Next Appointment:10/28/21

## 2021-11-04 ENCOUNTER — OFFICE VISIT (OUTPATIENT)
Dept: SLEEP MEDICINE | Age: 66
End: 2021-11-04
Payer: MEDICARE

## 2021-11-04 VITALS
HEIGHT: 71 IN | OXYGEN SATURATION: 97 % | DIASTOLIC BLOOD PRESSURE: 74 MMHG | HEART RATE: 60 BPM | SYSTOLIC BLOOD PRESSURE: 137 MMHG | WEIGHT: 315 LBS | BODY MASS INDEX: 44.1 KG/M2

## 2021-11-04 DIAGNOSIS — E66.01 OBESITY, MORBID (HCC): ICD-10-CM

## 2021-11-04 DIAGNOSIS — G47.33 OSA (OBSTRUCTIVE SLEEP APNEA): Primary | ICD-10-CM

## 2021-11-04 DIAGNOSIS — I10 ESSENTIAL HYPERTENSION, BENIGN: ICD-10-CM

## 2021-11-04 PROCEDURE — G8752 SYS BP LESS 140: HCPCS | Performed by: SPECIALIST

## 2021-11-04 PROCEDURE — 99213 OFFICE O/P EST LOW 20 MIN: CPT | Performed by: SPECIALIST

## 2021-11-04 PROCEDURE — G8427 DOCREV CUR MEDS BY ELIG CLIN: HCPCS | Performed by: SPECIALIST

## 2021-11-04 PROCEDURE — G8536 NO DOC ELDER MAL SCRN: HCPCS | Performed by: SPECIALIST

## 2021-11-04 PROCEDURE — G8432 DEP SCR NOT DOC, RNG: HCPCS | Performed by: SPECIALIST

## 2021-11-04 PROCEDURE — G8754 DIAS BP LESS 90: HCPCS | Performed by: SPECIALIST

## 2021-11-04 PROCEDURE — G8417 CALC BMI ABV UP PARAM F/U: HCPCS | Performed by: SPECIALIST

## 2021-11-04 PROCEDURE — 1101F PT FALLS ASSESS-DOCD LE1/YR: CPT | Performed by: SPECIALIST

## 2021-11-04 PROCEDURE — 3017F COLORECTAL CA SCREEN DOC REV: CPT | Performed by: SPECIALIST

## 2021-11-04 NOTE — PROGRESS NOTES
[facility] [place]. [place], [address]  Tel.  [phone]  Fax. 100 [facility] 60  [place], [address] [facility]  Tel.  [phone]  Fax. [phone] [address] [facility] [place] [address]  Tel.  [phone]  Fax. [phone]         Chief Complaint       Chief Complaint   Patient presents with    Sleep Problem     yearly follow up         HPI        Sade Simmons is a 77 y.o. male seen for follow-up.  In lab split sleep test 7/2018 showed AHI of 87.9/hr with a lowest SaO2 of 52%, duration of SaO2 < 88% 92.7 min.   BiLevel device set up 8/1/2018. On 6/30/20 average usage 3 hours 40 minutes. CMS compliance: 9%. AHI 2.2/h. Recent compliance demonstrated that during 30 days, BiPAP use during 20 days with CMS compliance criteria of 37%. Average usage 3.9 hours. AHI 2.8/h but AHI variable. Allergies   Allergen Reactions    Ace Inhibitors Cough       Current Outpatient Medications   Medication Sig Dispense Refill    bumetanide (BUMEX) 1 mg tablet Take 2 Tablets by mouth daily. 180 Tablet 0    hydrALAZINE (APRESOLINE) 50 mg tablet TAKE 1 TABLET BY MOUTH TWICE DAILY FOR HIGH BLOOD PRESSURE 180 Tablet 0    ascorbic acid, vitamin C, (Vitamin C) 1,000 mg tablet Take 1,000 mg by mouth daily. gummies      allopurinoL (ZYLOPRIM) 300 mg tablet TAKE 1 TABLET BY MOUTH DAILY 90 Tablet 2    fenofibrate nanocrystallized (TRICOR) 145 mg tablet TAKE 1 TABLET BY MOUTH EVERY DAY 90 Tablet 2    guaiFENesin (Mucinex) 1,200 mg Ta12 ER tablet Take 1,200 mg by mouth two (2) times a day.  guaifenesin/pseudoephedrne HCl (MUCINEX D PO) Take  by mouth.  atenoloL (TENORMIN) 50 mg tablet TAKE 1 TABLET BY MOUTH EVERY DAY 90 Tab 3    omeprazole (PRILOSEC OTC) 20 mg tablet Take 20 mg by mouth daily.  DX-AD-TM-Fe-Min-Lycopen-Lutein (CENTRUM) 0.4-162-18 mg Tab Take  by mouth.  aspirin 81 mg chewable tablet Take 81 mg by mouth daily.             He  has a past medical history of Allergic rhinitis (11/22/2017), Chest pain syndrome (11/22/2017), Community acquired pneumonia, Diarrhea (11/22/2017), Dyspnea (11/22/2017), Environmental allergies, GERD (gastroesophageal reflux disease), Gout (11/22/2017), Hypertension, Insomnia (11/22/2017), Iron deficiency (11/22/2017), Left chronic serous otitis media (11/22/2017), Obesity (11/22/2017), Sleep apnea, and Steatosis (11/22/2017). He  has a past surgical history that includes hx orthopaedic; colorectal scrn; hi risk ind (4/30/2021); and colonoscopy (N/A, 4/30/2021). He family history includes Diabetes in his father, mother, and another family member; Heart Disease in an other family member; Hypertension in an other family member. He  reports that he has never smoked. He has never used smokeless tobacco. He reports current alcohol use. He reports that he does not use drugs. Review of Systems:  Unchanged per patient      Objective:     Visit Vitals  /74   Pulse 60   Ht 5' 11\" (1.803 m)   Wt (!) 361 lb (163.7 kg)   SpO2 97%   BMI 50.35 kg/m²     Body mass index is 50.35 kg/m². General:   Conversant, cooperative   Eyes:   no nystagmus                Chest/Lungs:  Clear on auscultation    CVS:  Normal rate, regular rhythm        Neuro:  Speech fluent, face symmetrical             Assessment:       ICD-10-CM ICD-9-CM    1. TERESA (obstructive sleep apnea)  G47.33 327.23    2. Essential hypertension, benign  I10 401.1    3. Ob[facility], morbid ([facility]  E66.01 278.01      History of sleep disordered breathing, responding to BiPAP. Compliance continues to be an issue. Importance of consistent usage, potential effects of untreated sleep apnea reviewed in detail. Patient will need compliance criteria satisfied for DME. Follow-up will be scheduled. Was advised that untreated sleep apnea may adversely affect control of hypertension and limited ability to lose weight.   Plan:   No orders of the defined types were placed in this encounter. *A copy of compliance data was provided to the patient and reviewed in detail. BiPAP will be continued at the above pressure settings. The patient is to contact the office if there are problems with either mask or pressure settings. Follow-up will be scheduled at which time compliance data will be reviewed. * He was provided information on sleep apnea including corresponding risk factors and the importance of proper treatment. * Treatment options if indicated were reviewed today. Potential benefit of weight reduction       Bam Vanegas MD, FAASM  Electronically signed 11/04/21        This note was created using voice recognition software. Despite editing, there may be syntax errors. This note will not be viewable in [address]

## 2021-11-08 ENCOUNTER — OFFICE VISIT (OUTPATIENT)
Dept: INTERNAL MEDICINE CLINIC | Age: 66
End: 2021-11-08
Payer: MEDICARE

## 2021-11-08 VITALS
WEIGHT: 315 LBS | OXYGEN SATURATION: 96 % | HEIGHT: 71 IN | SYSTOLIC BLOOD PRESSURE: 114 MMHG | RESPIRATION RATE: 16 BRPM | BODY MASS INDEX: 44.1 KG/M2 | HEART RATE: 61 BPM | TEMPERATURE: 97.9 F | DIASTOLIC BLOOD PRESSURE: 65 MMHG

## 2021-11-08 DIAGNOSIS — I10 ESSENTIAL HYPERTENSION, BENIGN: ICD-10-CM

## 2021-11-08 DIAGNOSIS — M62.838 MUSCLE SPASM: ICD-10-CM

## 2021-11-08 DIAGNOSIS — M79.18 GLUTEAL PAIN: Primary | ICD-10-CM

## 2021-11-08 DIAGNOSIS — B35.6 JOCK ITCH: ICD-10-CM

## 2021-11-08 DIAGNOSIS — Z23 ENCOUNTER FOR IMMUNIZATION: ICD-10-CM

## 2021-11-08 DIAGNOSIS — M25.552 LEFT HIP PAIN: ICD-10-CM

## 2021-11-08 DIAGNOSIS — N52.9 ERECTILE DYSFUNCTION, UNSPECIFIED ERECTILE DYSFUNCTION TYPE: ICD-10-CM

## 2021-11-08 PROCEDURE — G8432 DEP SCR NOT DOC, RNG: HCPCS | Performed by: NURSE PRACTITIONER

## 2021-11-08 PROCEDURE — G8427 DOCREV CUR MEDS BY ELIG CLIN: HCPCS | Performed by: NURSE PRACTITIONER

## 2021-11-08 PROCEDURE — G8752 SYS BP LESS 140: HCPCS | Performed by: NURSE PRACTITIONER

## 2021-11-08 PROCEDURE — 1101F PT FALLS ASSESS-DOCD LE1/YR: CPT | Performed by: NURSE PRACTITIONER

## 2021-11-08 PROCEDURE — 99214 OFFICE O/P EST MOD 30 MIN: CPT | Performed by: NURSE PRACTITIONER

## 2021-11-08 PROCEDURE — G8754 DIAS BP LESS 90: HCPCS | Performed by: NURSE PRACTITIONER

## 2021-11-08 PROCEDURE — G8417 CALC BMI ABV UP PARAM F/U: HCPCS | Performed by: NURSE PRACTITIONER

## 2021-11-08 PROCEDURE — 90694 VACC AIIV4 NO PRSRV 0.5ML IM: CPT | Performed by: NURSE PRACTITIONER

## 2021-11-08 PROCEDURE — 3017F COLORECTAL CA SCREEN DOC REV: CPT | Performed by: NURSE PRACTITIONER

## 2021-11-08 PROCEDURE — G8536 NO DOC ELDER MAL SCRN: HCPCS | Performed by: NURSE PRACTITIONER

## 2021-11-08 PROCEDURE — G0008 ADMIN INFLUENZA VIRUS VAC: HCPCS | Performed by: NURSE PRACTITIONER

## 2021-11-08 RX ORDER — TIZANIDINE 4 MG/1
4 TABLET ORAL
Qty: 30 TABLET | Refills: 0 | Status: SHIPPED | OUTPATIENT
Start: 2021-11-08 | End: 2022-05-03

## 2021-11-08 RX ORDER — NYSTATIN 100000 U/G
CREAM TOPICAL 2 TIMES DAILY
Qty: 30 G | Refills: 2 | Status: SHIPPED | OUTPATIENT
Start: 2021-11-08 | End: 2022-05-03

## 2021-11-08 RX ORDER — NYSTATIN 100000 [USP'U]/G
POWDER TOPICAL
Qty: 60 G | Refills: 2 | Status: SHIPPED | OUTPATIENT
Start: 2021-11-08 | End: 2022-05-03

## 2021-11-08 NOTE — PROGRESS NOTES
Chief Complaint   Patient presents with    Leg Swelling     acute care       SUBJECTIVE:    Tevin Simmons is a 77 y.o. male who presents today with complaints of left-sided gluteal pain with radiation to thigh and left hip pain. He states the onset of his pain occurred after having intercourse with his wife, and began bothering him shortly thereafter. He states this is been going on for about 4 days. He has not been using anything over-the-counter to treat his discomfort with. He has been doing some stretching to the area. He denies any falls, but uses a cane regularly due to his habitus and instability while ambulating. Patient also complains of some intermittent itching to his groin area. He has not been using anything over-the-counter to treat his symptoms. He states this is been off and on for quite some time. He states there is a significant amount of itching which occur sometimes after bathing. He denies any discharge or redness. He states there is a musky odor to the area occasionally. He is also being managed for hypertension, and is taking his medications as prescribed. He denies any adverse side effects. His blood pressure appears well controlled at this time. He denies any chest pain, chest pressure, shortness of breath, headaches, dizziness, blurred vision, palpitations, or syncope episodes. He is requesting a flu shot today. Current Outpatient Medications   Medication Sig Dispense Refill    nystatin (MYCOSTATIN) topical cream Apply  to affected area two (2) times a day. 30 g 2    nystatin (MYCOSTATIN) powder Dust area twice daily with powder after applying cream. 60 g 2    tiZANidine (ZANAFLEX) 4 mg tablet Take 1 Tablet by mouth three (3) times daily as needed for Muscle Spasm(s). Indications: muscle spasm 30 Tablet 0    bumetanide (BUMEX) 1 mg tablet Take 2 Tablets by mouth daily.  180 Tablet 0    hydrALAZINE (APRESOLINE) 50 mg tablet TAKE 1 TABLET BY MOUTH TWICE DAILY FOR HIGH BLOOD PRESSURE 180 Tablet 0    ascorbic acid, vitamin C, (Vitamin C) 1,000 mg tablet Take 1,000 mg by mouth daily. gummies      allopurinoL (ZYLOPRIM) 300 mg tablet TAKE 1 TABLET BY MOUTH DAILY 90 Tablet 2    fenofibrate nanocrystallized (TRICOR) 145 mg tablet TAKE 1 TABLET BY MOUTH EVERY DAY 90 Tablet 2    guaiFENesin (Mucinex) 1,200 mg Ta12 ER tablet Take 1,200 mg by mouth two (2) times a day.  guaifenesin/pseudoephedrne HCl (MUCINEX D PO) Take  by mouth.  atenoloL (TENORMIN) 50 mg tablet TAKE 1 TABLET BY MOUTH EVERY DAY 90 Tab 3    omeprazole (PRILOSEC OTC) 20 mg tablet Take 20 mg by mouth daily.  SN-JJ-WM-Fe-Min-Lycopen-Lutein (CENTRUM) 0.4-162-18 mg Tab Take  by mouth.  aspirin 81 mg chewable tablet Take 81 mg by mouth daily.          Past Medical History:   Diagnosis Date    Allergic rhinitis 11/22/2017    Chest pain syndrome 11/22/2017    Comments: abnl stress cardiolite 2006, subsequent to that, he had a normal cardiac cath   [facility] acquired pneumonia     Diarrhea 11/22/2017    Dyspnea 11/22/2017    Environmental allergies     GERD (gastroesophageal reflux disease)     Gout 11/22/2017    Hypertension     Insomnia 11/22/2017    Iron deficiency 11/22/2017    Left chronic serous otitis media 11/22/2017    Obesity 11/22/2017    Comments: morbid    Sleep apnea     wears CPAP    Steatosis 11/22/2017    Comments: non-alcoholic     Past Surgical History:   Procedure Laterality Date    COLONOSCOPY N/A 4/30/2021    [facility] HI RISK IND  4/30/2021         HX ORTHOPAEDIC      right knee     Allergies   Allergen Reactions    Ace Inhibitors Cough       REVIEW OF SYSTEMS:                                        POSITIVE= bold text  Negative = regular text    General:                     fever, chills, sweats, generalized weakness, weight loss/gain, loss of appetite   Eyes:                           blurred vision, eye pain, loss of vision, double vision  ENT:                            rhinorrhea, pharyngitis   Respiratory:               cough, sputum production, SOB, MARKHAM, wheezing, pleuritic pain   Cardiology:                chest pain, palpitations, orthopnea, PND, edema, syncope   Gastrointestinal:       abdominal pain , N/V, diarrhea, dysphagia, constipation, bleeding   Genitourinary:           frequency, urgency, dysuria, hematuria, incontinence, erectile dysfunction  Muskuloskeletal :      arthralgia, myalgia, back pain  Hematology:              easy bruising, nose or gum bleeding, lymphadenopathy   Dermatological:         rash, ulceration, pruritis, color change / jaundice  Endocrine:                 hot flashes or polydipsia   Neurological:             headache, dizziness, confusion, focal weakness, paresthesia,                                      Speech difficulties, memory loss, gait difficulty  Psychological:          Feelings of anxiety, depression, agitation        Social History     Socioeconomic History    Marital status:    Tobacco Use    Smoking status: Never Smoker    Smokeless tobacco: Never Used   Vaping Use    Vaping Use: Never used   Substance and Sexual Activity    Alcohol use: Yes     Comment: rarely    Drug use: Never    Sexual activity: Yes     Partners: Female     Family History   Problem Relation Age of Onset    Hypertension Other     Heart Disease Other     Diabetes Other     Diabetes Mother     Diabetes Father        OBJECTIVE:     Visit Vitals  /65 (BP 1 Location: Left upper arm, BP Patient Position: Sitting, BP Cuff Size: Large adult)   Pulse 61   Temp 97.9 °F (36.6 °C)   Resp 16   Ht 5' 11\" (1.803 m)   Wt (!) 364 lb 6.4 oz (165.3 kg)   SpO2 96%   BMI 50.82 kg/m²       Constitutional: He appears well nourished, of stated age, and dressed appropriately.   Eyes: Sclera anicteric, PERRLA, EOMI  Neck: Supple without lymphadenopathy. Thyroid normal, No JVD or bruits  Respiratory: Clear to ascultation X5, normal inspiratory effort, no adventitious breath sounds. Cardiovascular: Regular rate and rhythm, no significant murmurs, rubs or gallops, PMI not displaced, No thrills  Hematologic: No purpura, petechiae or unexplained bruising  Lymphatic: No lymph node enlargemant. Musculoskeletal: Left hip pain is stated. Tender gluteal to left side. Integumentary: No unusual rashes to groin present. Slight musky odor present  Neuro: Non-focal exam, A & O X 3.  Psychiatric: Appropriate affect and demeanor, pleasant and cooperative. Patient's thought content and thought processing appear to be within normal limits. ASSESSMENT/PLAN:     ICD-10-CM ICD-9-CM    1. Gluteal pain  M79.18 729.1 tiZANidine (ZANAFLEX) 4 mg tablet   2. Muscle spasm  M62.838 728.85 tiZANidine (ZANAFLEX) 4 mg tablet   3. Left hip pain  M25.552 719.45    4. Encounter for immunization  Z23 V03.89 FLU (FLUAD QUAD INFLUENZA VACCINE,QUAD,ADJUVANTED)   5. Jock itch  B35.6 110.3 nystatin (MYCOSTATIN) topical cream      nystatin (MYCOSTATIN) powder   6. Essential hypertension, benign  I10 401.1    7. Erectile dysfunction, unspecified erectile dysfunction type  N52.9 607.84      1: Patient will be prescribed nystatin cream and powder to be applied twice daily to groin area as discussed. 2: Patient to use heating pad and perform regular stretching to hip, thigh, and gluteal region. May apply heating pad as desired. 3: Patient to continue current medications for management of hypertension. Continue to monitor regularly. 4: Patient educated/counseled on use of medication for erectile dysfunction. Risks, benefits, and options were thoroughly discussed. 5: Patient to follow-up with me at next scheduled appointment as planned. Patient states understanding and agrees with plan.     Orders Placed This Encounter    FLU (FLUAD QUAD INFLUENZA VACCINE,QUAD,ADJUVANTED)(53504)    nystatin (MYCOSTATIN) topical cream    nystatin (MYCOSTATIN) powder    tiZANidine (ZANAFLEX) 4 mg tablet         ATTENTION:   This medical record was transcribed using an electronic medical records system. Although proofread, it may and can contain electronic and spelling errors. Other human spelling and other errors may be present. Corrections may be executed at a later time. Please feel free to contact us for any clarifications as needed. Follow-up and Dispositions    · Return if symptoms worsen or fail to improve. Signed,  Kain Jamison.  Theodore Bailey, MSN APRN FNP-BC

## 2021-11-08 NOTE — PROGRESS NOTES
After obtaining consent, and per orders of YANETH ZARAGOZA,NP injection of FLU VACCINE QUAD given by Wesley Sims. Patient instructed to remain in clinic for 20 minutes afterwards, and to report any adverse reaction to me immediately. Administered FLU VACCINE QUAD in LEFT DELTOID patient tolerated well.     LOT #: [id number]    EXP:5/24/22    LMS:[id number]

## 2021-11-08 NOTE — PROGRESS NOTES
Ari Sauer is a 77 y.o. male    Chief Complaint   Patient presents with    Leg Swelling     acute care       Visit Vitals  /65 (BP 1 Location: Left upper arm, BP Patient Position: Sitting, BP Cuff Size: Large adult)   Pulse 61   Temp 97.9 °F (36.6 °C)   Resp 16   Ht 5' 11\" (1.803 m)   Wt (!) 364 lb 6.4 oz (165.3 kg)   SpO2 96%   BMI 50.82 kg/m²           1. Have you been to the ER, urgent care clinic since your last visit? Hospitalized since your last visit? NO    2. Have you seen or consulted any other health care providers outside of the [address] [place] [address] since your last visit? Include any pap smears or colon screening.  NO

## 2021-11-11 ENCOUNTER — TELEPHONE (OUTPATIENT)
Dept: INTERNAL MEDICINE CLINIC | Age: 66
End: 2021-11-11

## 2021-11-11 NOTE — TELEPHONE ENCOUNTER
Ralph Simmons called in regarding pt. ... Stated patient is still experiencing quite a bit of pain and wanted to know if there is a pain medication that could be called in that could be taken alongside the muscle relaxers that were prescribed on Monday?     CB: [phone]

## 2021-11-14 ENCOUNTER — HOSPITAL ENCOUNTER (EMERGENCY)
Age: 66
Discharge: HOME | End: 2021-11-14
Attending: EMERGENCY MEDICINE | Admitting: EMERGENCY MEDICINE
Payer: MEDICARE

## 2021-11-14 ENCOUNTER — APPOINTMENT (OUTPATIENT)
Dept: GENERAL RADIOLOGY | Age: 66
End: 2021-11-14
Attending: EMERGENCY MEDICINE
Payer: MEDICARE

## 2021-11-14 VITALS
HEART RATE: 69 BPM | RESPIRATION RATE: 18 BRPM | DIASTOLIC BLOOD PRESSURE: 91 MMHG | WEIGHT: 315 LBS | BODY MASS INDEX: 44.1 KG/M2 | OXYGEN SATURATION: 97 % | SYSTOLIC BLOOD PRESSURE: 165 MMHG | TEMPERATURE: 98.1 F | HEIGHT: 71 IN

## 2021-11-14 DIAGNOSIS — M54.42 ACUTE LEFT-SIDED LOW BACK PAIN WITH LEFT-SIDED SCIATICA: Primary | ICD-10-CM

## 2021-11-14 PROCEDURE — 99282 EMERGENCY DEPT VISIT SF MDM: CPT

## 2021-11-14 PROCEDURE — 74011000250 HC RX REV CODE- 250: Performed by: EMERGENCY MEDICINE

## 2021-11-14 PROCEDURE — 74011250636 HC RX REV CODE- 250/636: Performed by: EMERGENCY MEDICINE

## 2021-11-14 PROCEDURE — 96372 THER/PROPH/DIAG INJ SC/IM: CPT

## 2021-11-14 PROCEDURE — 72100 X-RAY EXAM L-S SPINE 2/3 VWS: CPT

## 2021-11-14 RX ORDER — LIDOCAINE 50 MG/G
PATCH TOPICAL
Qty: 5 EACH | Refills: 0 | Status: SHIPPED | OUTPATIENT
Start: 2021-11-14 | End: 2022-05-03

## 2021-11-14 RX ORDER — NAPROXEN 500 MG/1
500 TABLET ORAL 2 TIMES DAILY WITH MEALS
Qty: 14 TABLET | Refills: 0 | Status: SHIPPED | OUTPATIENT
Start: 2021-11-14 | End: 2021-11-21

## 2021-11-14 RX ORDER — LIDOCAINE 4 G/100G
1 PATCH TOPICAL EVERY 24 HOURS
Status: DISCONTINUED | OUTPATIENT
Start: 2021-11-14 | End: 2021-11-14 | Stop reason: HOSPADM

## 2021-11-14 RX ORDER — METHYLPREDNISOLONE 4 MG/1
TABLET ORAL
Qty: 1 DOSE PACK | Refills: 0 | Status: SHIPPED | OUTPATIENT
Start: 2021-11-14 | End: 2022-05-03

## 2021-11-14 RX ORDER — KETOROLAC TROMETHAMINE 30 MG/ML
30 INJECTION, SOLUTION INTRAMUSCULAR; INTRAVENOUS
Status: COMPLETED | OUTPATIENT
Start: 2021-11-14 | End: 2021-11-14

## 2021-11-14 RX ADMIN — KETOROLAC TROMETHAMINE 30 MG: 30 INJECTION, SOLUTION INTRAMUSCULAR; INTRAVENOUS at 11:41

## 2021-11-14 NOTE — DISCHARGE INSTRUCTIONS
Continue muscle relaxer prescribed by your primary care physician    Steroid taper, take with breakfast each morning for the next 5 days. Follow instructions on packaging. Naproxen every 12 hours for 7-10 days. Use on a schedule for the  next 3 days at a minimum, even if not having pain. Naproxen is an anti-inflammatory medication called an NSAID. Do not take ibuprofen/motrin while on this medication, it is okay to take Tylenol. It will help to treat the source of your pain. It is best to use this medication on a schedule. You need to keep the blood levels of the medication up for effective treatment. Narcotic pain medications only mask the pain and do not treat the source of your pain. Gentle stretching of the lower back    You should use ice or heat for your injury and pain. You may use one or the other or alternate between the two. ICE ONLY FOR FIRST 50 HOURS after your injury! If it has been longer than 48 hours you may start with either. If you use ice: apply the ice pack in 20 minute intervals. 20 minutes on then 20 minutes off. Make sure to protect the skin to prevent frost bite. Never apply ice or a plastic bag with ice directly to the skin. If you use heat: Do NOT lay or sleep on a heating pad. You will burn your skin. Instead, use microwave style heat packs or Thermacare packs. These are safer than traditional heating pads. Monitor your skin to prevent burns. Should you develop inability to stand, controlling your bowel or bladder, numbness in the groin were your wipe yourself after using the bathroom, develop fevers or chills you should return to the emergency department immediately.

## 2021-11-14 NOTE — ED NOTES
Pt arrives to ED via wheelchair with c/o left lower back pain radiating into leg onset 9 days ago. \"I went to [facility] 9 days ago and have been hurting since then. I've had sciatic pain before and got a shot\". Denies injury, dysuria, numbness, incontinence or fever. PCP prescribed Tizanidine \"but I haven't been taking it as prescribed\".

## 2021-11-14 NOTE — ED PROVIDER NOTES
Please note that this dictation was completed with [employer], the computer voice recognition software.  Quite often unanticipated grammatical, syntax, homophones, and other interpretive errors are inadvertently transcribed by the computer software.  Please disregard these errors.  Please excuse any errors that have escaped final proofreading. Patient is a 58-year-old male with past medical history as below presenting to ED for evaluation of left-sided back pain. He states the pain is mainly located in the left buttock and radiates down the back of his left leg to his mid calf. He states that he has had this pain many times in the past and has been to physical therapy for this, but has not been recently. He was seen by his PCP about a week ago and was started on a muscle relaxer and Aleve without any relief. He denies any recent falls or injury. He denies fever, chills, chest pain, abdominal pain, saddle paresthesia, bladder or bowel dysfunction, dysuria, weakness, or any other medical complaints at this time. He states he has been able to walk, but has been using the assistance of a cane due to pain.            Past Medical History:   Diagnosis Date    Allergic rhinitis 11/22/2017    Chest pain syndrome 11/22/2017    Comments: abnl stress cardiolite 2006, subsequent to that, he had a normal cardiac cath   [facility] acquired pneumonia     Diarrhea 11/22/2017    Dyspnea 11/22/2017    Environmental allergies     GERD (gastroesophageal reflux disease)     Gout 11/22/2017    Hypertension     Insomnia 11/22/2017    Iron deficiency 11/22/2017    Left chronic serous otitis media 11/22/2017    Obesity 11/22/2017    Comments: morbid    Sleep apnea     wears CPAP    Steatosis 11/22/2017    Comments: non-alcoholic       Past Surgical History:   Procedure Laterality Date    COLONOSCOPY N/A 4/30/2021    COLONOSCOPY performed by Renny Brennan MD at [address] [place] [address]; HI RISK IND  4/30/2021         HX ORTHOPAEDIC      right knee         Family History:   Problem Relation Age of Onset    Hypertension Other     Heart Disease Other     Diabetes Other     Diabetes Mother     Diabetes Father        Social History     Socioeconomic History    Marital status:      Spouse name: Not on file    Number of children: Not on file    Years of education: Not on file    Highest education level: Not on file   Occupational History    Not on file   Tobacco Use    Smoking status: Never Smoker    Smokeless tobacco: Never Used   Vaping Use    Vaping Use: Never used   Substance and Sexual Activity    Alcohol use: Yes     Comment: rarely    Drug use: Never    Sexual activity: Yes     Partners: Female   Other Topics Concern    Not on file   Social History Narrative    Not on file     Social Determinants of Health     Financial Resource Strain:     Difficulty of Paying Living Expenses: Not on file   Food Insecurity:     Worried About Running Out of Food in the Last Year: Not on file    Erasmo of Food in the Last Year: Not on file   Transportation Needs:     Lack of Transportation (Medical): Not on file    Lack of Transportation (Non-Medical):  Not on file   Physical Activity:     Days of Exercise per Week: Not on file    Minutes of Exercise per Session: Not on file   Stress:     Feeling of Stress : Not on file   Social Connections:     Frequency of Communication with Friends and Family: Not on file    Frequency of Social Gatherings with Friends and Family: Not on file    Attends Worship Services: Not on file    Active Member of Clubs or Organizations: Not on file    Attends Club or Organization Meetings: Not on file    Marital Status: Not on file   Intimate Partner Violence:     Fear of Current or Ex-Partner: Not on file    Emotionally Abused: Not on file    Physically Abused: Not on file    Sexually Abused: Not on file   Housing Stability:     Unable to Pay for Housing in the Last Year: Not on file    Number of Places Lived in the Last Year: Not on file    Unstable Housing in the Last Year: Not on file         ALLERGIES: Ace inhibitors    Review of Systems   Constitutional: Negative for chills and fever. HENT: Negative for ear pain and sore throat. Eyes: Negative for visual disturbance. Respiratory: Negative for cough and shortness of breath. Cardiovascular: Negative for chest pain. Gastrointestinal: Negative for abdominal pain, diarrhea, nausea and vomiting. Genitourinary: Negative for flank pain. Musculoskeletal: Positive for back pain. Skin: Negative for color change. Neurological: Negative for dizziness and headaches. Psychiatric/Behavioral: Negative for confusion. Vitals:    11/14/21 1103   BP: (!) 165/91   Pulse: 69   Resp: 18   Temp: 98.1 °F (36.7 °C)   SpO2: 97%   Weight: (!) 165.6 kg (365 lb)   Height: 5' 11\" (1.803 m)            Physical Exam  Vitals and nursing note reviewed. Constitutional:       General: He is not in acute distress. Appearance: Normal appearance. He is not ill-appearing. HENT:      Head: Normocephalic and atraumatic. Mouth/Throat:      Pharynx: Oropharynx is clear. Eyes:      Extraocular Movements: Extraocular movements intact. Conjunctiva/sclera: Conjunctivae normal.   Cardiovascular:      Rate and Rhythm: Normal rate and regular rhythm. Pulmonary:      Effort: Pulmonary effort is normal.      Breath sounds: Normal breath sounds. Abdominal:      Palpations: Abdomen is soft. Tenderness: There is no abdominal tenderness. Musculoskeletal:      Cervical back: Normal. No rigidity. Thoracic back: Normal.      Lumbar back: Tenderness present. No swelling, deformity or bony tenderness. Positive left straight leg raise test (Modified). Comments: Tenderness palpation in the left lower back/buttock sciatic region. Full range of movement.   No midline tenderness, crepitus, step-offs, or deformities. Lower extremity strength 5/5 bilaterally. BLE distal pulses and gross sensation intact and equal.     Skin:     General: Skin is warm and dry. Neurological:      General: No focal deficit present. Mental Status: He is alert and oriented to person, place, and time. Psychiatric:         Mood and Affect: Mood normal.          MDM  Number of Diagnoses or Management Options  Acute left-sided low back pain with left-sided sciatica  Diagnosis management comments: Patient is alert, afebrile, vitals stable. Presents with chronic back pain with acute left-sided atraumatic flareup. Seen by his PCP for this and started on tizanidine and Aleve without relief. No red flag symptoms of fever, weight loss, midline tenderness, saddle anesthesia, weakness, fecal/urinary incontinence or urinary retention. Lumbar x-ray unremarkable for acute abnormality. Exam consistent with sciatica. Will start him on steroid taper, anti-inflammatories, lidocaine patches, continuation of his muscle relaxers, and follow-up with his physical therapist. Will also refer to orthospine due to chronic degenerative disease seen on xray. Return precautions outlined. All questions answered at this time. Amount and/or Complexity of Data Reviewed  Discuss the patient with other providers: yes (Discussed patient with ED attending Priya Jade MD who agrees with current management plan. )      ED Course as of 11/14/21 1150   Sun Nov 14, 2021   1150 XR SPINE LUMB 2 OR 3 V     IMPRESSION     No acute compression fracture nor subluxation. Chronic mild loss of vertebral  body heights at T12 and L5  [EP]      ED Course User Index  [EP] Bishop Joshua PA     11:53 AM  Pt has been reevaluated. There are no new complaints, changes, or physical findings at this time. All results have been reviewed with patient and/or family. Medications have been reviewed w/ pt and/or family. Pt and/or family's questions have been answered. Pt and/or family expressed good understanding of the dx/tx/rx and is in agreement with plan of care. Pt instructed and agreed to f/u w/ PT and ortho and to return to ED upon further deterioration. Pt is ready for discharge. IMPRESSION:  1. Acute left-sided low back pain with left-sided sciatica        PLAN:  1. Current Discharge Medication List      START taking these medications    Details   naproxen (NAPROSYN) 500 mg tablet Take 1 Tablet by mouth two (2) times daily (with meals) for 7 days. Indications: pain  Qty: 14 Tablet, Refills: 0  Start date: 11/14/2021, End date: 11/21/2021      lidocaine (Lidoderm) 5 % Apply patch to the affected area for 12 hours a day and remove for 12 hours a day. Qty: 5 Each, Refills: 0  Start date: 11/14/2021      methylPREDNISolone (Medrol, Yair,) 4 mg tablet Follow instructions on packaging  Qty: 1 Dose Pack, Refills: 0  Start date: 11/14/2021           2.    Follow-up Information     Follow up With Specialties Details Why Contact [facility] Department At [facility]  Schedule an appointment as soon as possible for a visit   [address] [place] [address]    Yaron Guzmán MD Orthopedic Surgery Schedule an appointment as soon as possible for a visit  Orthopedic back specialist [address]  [phone]              Return to ED if worse     Procedures

## 2021-11-15 ENCOUNTER — TELEPHONE (OUTPATIENT)
Dept: INTERNAL MEDICINE CLINIC | Age: 66
End: 2021-11-15

## 2021-11-15 DIAGNOSIS — M54.32 LEFT SIDED SCIATICA: Primary | ICD-10-CM

## 2021-11-15 NOTE — TELEPHONE ENCOUNTER
, Janee Benites, from [place] [address] is requesting a referral for patient to get Physical Therapy with someone under [place] December. Pt went ER on 11/14/21 and was diagnosed with left side sciatica. Lizbeth Currie can be reached by staff message on epic as well as by phone. Please put the order in. Thank you.

## 2021-11-17 ENCOUNTER — TELEPHONE (OUTPATIENT)
Dept: CARDIAC REHAB | Age: 66
End: 2021-11-17

## 2021-11-17 NOTE — TELEPHONE ENCOUNTER
TELEPHONE ENCOUNTER: OP NUTRITION APPOINTMENT     Reminder call placed on 11/17/2021    Outcome:                 [] Patient confirmed appointment              [] Patient rescheduled appointment              [] Unable to reach              [x] Left message              [] Other:        Orlando Palma RD

## 2021-11-22 ENCOUNTER — HOSPITAL ENCOUNTER (OUTPATIENT)
Dept: CARDIAC REHAB | Age: 66
Discharge: HOME | End: 2021-11-22
Attending: INTERNAL MEDICINE
Payer: MEDICARE

## 2021-11-22 VITALS — WEIGHT: 315 LBS | BODY MASS INDEX: 50.04 KG/M2

## 2021-11-22 PROCEDURE — 97803 MED NUTRITION INDIV SUBSEQ: CPT | Performed by: DIETITIAN, REGISTERED

## 2021-11-22 NOTE — PROGRESS NOTES
[address] NOTE  DATE: 2021      REFERRING PHYSICIAN: Dr. Richie Lee    NAME: Vivian Simmons : 1955 AGE: 77 y.o. GENDER: male    REASON FOR VISIT: morbid obesity, Type 2 diabetes FOLLOW-UP    PAST MEDICAL HISTORY:   Patient Active Problem List   Diagnosis Code    Essential hypertension, benign I10    Esophageal reflux K21.9    Dyslipidemia E78.5    TERESA (obstructive sleep apnea) A82.27    Metabolic syndrome V68.24    Gout M10.9    Insomnia G47.00    Obesity, morbid ([address] E66.01    Annual physical exam Z00.00    Diabetes mellitus type 2, diet-controlled ([facility] E11.9    Hypertriglyceridemia E78.1    Peripheral vascular disease (HCC) I73.9    Type 2 diabetes mellitus with diabetic neuropathy ([facility] E11.40     Sleep has improved from 4-5 up to now 6-7 hours sleep per night since getting a new fit CPap mask    LABS:   Lab Results   Component Value Date/Time    Cholesterol, total 172 2021 09:39 AM    Cholesterol (POC) 172 2018 08:32 AM    HDL Cholesterol 43 2021 09:39 AM    HDL Cholesterol (POC) 29 (A) 2018 08:32 AM    LDL Cholesterol (POC) 108.2 2018 08:32 AM    LDL, calculated 107.8 (H) 2021 09:39 AM    VLDL, calculated 21.2 2021 09:39 AM    Triglyceride 106 2021 09:39 AM    Triglycerides (POC) 174 2018 08:32 AM    CHOL/HDL Ratio 4.0 2021 09:39 AM     Lab Results   Component Value Date/Time    Hemoglobin A1c 5.5 01/15/2021 08:04 AM    Hemoglobin A1c (POC) 5.6 2018 08:32 AM     No SMBG  A1c to be checked on are after appointment with new doctor December 10, 2021    MEDICATIONS/SUPPLEMENTS:   [unfilled]  Prior to Admission medications    Medication Sig Start Date End Date Taking? Authorizing Provider   naproxen (NAPROSYN) 500 mg tablet Take 1 Tablet by mouth two (2) times daily (with meals) for 7 days.  Indications: pain 21  Rogerio Joshua PA   lidocaine (Lidoderm) 5 % Apply patch to the affected area for 12 hours a day and remove for 12 hours a day. 11/14/21   Kai Joshua PA   methylPREDNISolone (Medrol, Yair,) 4 mg tablet Follow instructions on packaging 11/14/21   Tg Joshua PA   nystatin (MYCOSTATIN) topical cream Apply  to affected area two (2) times a day. 11/8/21   Mary ACEVEDO NP   nystatin (MYCOSTATIN) powder Dust area twice daily with powder after applying cream. 11/8/21   Mary Gelbrendan ACEVEDO NP   tiZANidine (ZANAFLEX) 4 mg tablet Take 1 Tablet by mouth three (3) times daily as needed for Muscle Spasm(s). Indications: muscle spasm 11/8/21   Mary ACEVEDO NP   bumetanide (BUMEX) 1 mg tablet Take 2 Tablets by mouth daily. 10/19/21   Mary ACEVEDO NP   hydrALAZINE (APRESOLINE) 50 mg tablet TAKE 1 TABLET BY MOUTH TWICE DAILY FOR HIGH BLOOD PRESSURE 8/27/21   Elvis Bundy MD   ascorbic acid, vitamin C, (Vitamin C) 1,000 mg tablet Take 1,000 mg by mouth daily. gummies    Provider, Historical   allopurinoL (ZYLOPRIM) 300 mg tablet TAKE 1 TABLET BY MOUTH DAILY 7/23/21   Elvis Bundy MD   allopurinoL (ZYLOPRIM) 300 mg tablet TAKE 1 TABLET BY MOUTH DAILY 6/25/21   Elvis Bundy MD   fenofibrate nanocrystallized (TRICOR) 145 mg tablet TAKE 1 TABLET BY MOUTH EVERY DAY 6/9/21   Elvis Bundy MD   guaiFENesin (Mucinex) 1,200 mg Ta12 ER tablet Take 1,200 mg by mouth two (2) times a day. Provider, Historical   guaifenesin/pseudoephedrne HCl (MUCINEX D PO) Take  by mouth. Provider, Historical   meloxicam (MOBIC) 15 mg tablet TAKE 1 TABLET BY MOUTH DAILY FOR 10 DAYS 1/13/21   Elvis Bundy MD   atenoloL (TENORMIN) 50 mg tablet TAKE 1 TABLET BY MOUTH EVERY DAY 12/7/20   Elvis Bundy MD   omeprazole (PRILOSEC OTC) 20 mg tablet Take 20 mg by mouth daily. Provider, Historical   YR-DV-GM-Fe-Min-Lycopen-Lutein (CENTRUM) 0.4-162-18 mg Tab Take  by mouth. Other, MD Carlos   aspirin 81 mg chewable tablet Take 81 mg by mouth daily.       Other, MD Carlos     Takes Miralax PRN and Metamucil to help with constipation; reports BM improved with diet changes made since initial consult but still believes not moving his bowels well enough or often enough - possibly related to pain medications    EXERCISE/PHYSICAL ACTIVITY: sedentary / light house & yard work; has a [employer] but not gone; currently difficult to walk due to excruciating pain in legs; will see physical therapist after Thanksgiving    ALCOHOL / TOBACCO USE: wine 2x/month; no tobacco products    SOCIAL HISTORY:  he is retired , he lives with his wife    REPORTED WEIGHT HISTORY: down to 352 lbs with physical therapy in March of this year then regained weight; 391 lb was his heaviest; 6 years ago did [employer] program        ANTHROPOMETRICS:    Ht Readings from Last 1 Encounters:   11/14/21 5' 11\" (1.803 m)      Wt Readings from Last 10 Encounters:   11/22/21 (!) 162.8 kg (358 lb 12.8 oz)   11/14/21 (!) 165.6 kg (365 lb)   11/08/21 (!) 165.3 kg (364 lb 6.4 oz)   11/04/21 (!) 163.7 kg (361 lb)   10/15/21 (!) 166.3 kg (366 lb 9.6 oz)   09/17/21 (!) 166.2 kg (366 lb 6.4 oz)   08/20/21 (!) 168.9 kg (372 lb 6.4 oz)   07/29/21 (!) 167.8 kg (370 lb)   04/30/21 158.8 kg (350 lb)   04/22/21 (!) 161.9 kg (357 lb)      IBW:172 # +/- 10%  %IBW: 213 % +/- 10%    BMI: 51.0 kg/M2 Category: morbid obesity    Weight Change Since Initial Consult: - 12.2 lbs  Weight Change Since Last Consult: -6.2 lbs      NUTRITION ASSESSMENT:    FOOD ALLERGIES/INTOLERANCES: no known    24 HOUR DIET RECALL  Breakfast  Eggs scrambled (w/ some cheese in olive oil), sausage patties (2), little bit of jelly, green tea w/ Stevia, water   Snack     Lunch     Snack     Dinner  Greens cooked w/ smoked turkey, baked chicken wings (3) cooked in water w/ onions, deviled eggs (2 halves), 1 cup fried potatoes & onions, beets   Snack  2 cups Sugar free ice cream & 2 sheets of lavon crackers     Carmenza Gorman C Day states he is making better choices.  Wife is trying to meal prep dinner. Having fewer restaurant meals. Still going to [employer] - orders sweet potato w/ butter on the side, salad and a protein. Has cut back on bread - not having any with dinner. Eating oatmeal or flax cereal for breakfast often. Drinking more water. Using sugar free options. NUTRITION DIAGNOSIS:  Obesity related to food preferences, nutrition related knowledge deficit and large portions as evidenced by BMI >40 and diet history. ESTIMATED ENERGY NEEDS:  2000 (using [address] with AF 1.2; - 1000 for weight loss)    NUTRITION INTERVENTION:  Nutrition 30  minute follow-up one-on-one education & goal setting with Sade Simmons    Reviewed with Sade Simmons previous goals and plans established at last visit, any real or perceived barriers to obtaining the goal / adhering to plan, and progress towards goal made. Collaborated with patient to set specific nutrient goals as well as specific food / behavior changes that will help patient meet the overall goal of: weight loss    NUTRITION EDUCATION / HANDOUTS PROVIDED:  - Healthy Holiday Menu & Recipes  - November Recipes  - [facility] nutrition guide      PATIENT GOALS:    Weight Goal:    Short Term Weight Goal: 250 lbs  Long Term Weight Goal: 200 lbs    Nutrition Goals: (CONTINUE)    Calories: 2000 /day     - read and compare food labels   - measure portions  - use smaller plates & bowls  - separate TV from eating  - plan meals in advance  - decrease added fats and /or switch to reduced calorie \"light\" options  - make non-starchy vegetables the biggest portion of the meal      Exercise Goals: (Not Met, Continue)    - take walks at least every other day (no time requirement for now)            Specific tips and techniques to facilitate compliance with above recommendations were provided and discussed. Sade Simmons verbalized understanding. The patient was encouraged to contact me as needed.       Follow-up: in [address], [facility]

## 2021-11-29 ENCOUNTER — HOSPITAL ENCOUNTER (OUTPATIENT)
Dept: PHYSICAL THERAPY | Age: 66
Discharge: HOME | End: 2021-11-29
Payer: MEDICARE

## 2021-11-29 PROCEDURE — 97162 PT EVAL MOD COMPLEX 30 MIN: CPT

## 2021-11-29 PROCEDURE — 97140 MANUAL THERAPY 1/> REGIONS: CPT

## 2021-11-29 PROCEDURE — 97110 THERAPEUTIC EXERCISES: CPT

## 2021-11-29 NOTE — PROGRESS NOTES
PT INITIAL EVALUATION NOTE - [facility] 2-15    Patient Name: Nuno Simmons \"Rainer\"  Date:2021  : 1955  [x]  Patient  Verified  Payor: Evelyne Ruth / Plan: [place] / Product Type: Medicare /    In time: 1184  Out time: 7196  Total Treatment Time (min): 78  Total Timed Codes (min): 25  1:1 Treatment Time ( only): 25  Visit #: 1     Treatment Area: Right leg pain [M79.604]    SUBJECTIVE  Pain Level (0-10 scale): 8 currently in L-sided low back/buttock/hip/anterior thigh/knee. The patient describes as an \"aching\" pain. Any medication changes, allergies to medications, adverse drug reactions, diagnosis change, or new procedure performed?: [] No    [x] Yes (see summary sheet for update)  Subjective:      *The patient is R hand dominant*    From previous initial evaluation 21: The patient reports longstanding history of chronic low back pain, however notes exacerbation of symptoms when he attempted to rise from bed approximately two weeks ago, and found he could not walk due to excruciating low back/L-sided leg pain. He followed up with referring physician, who performed lumbar injection and prescribed gabapentin/meloxicam, which patient feels has alleviated symptoms somewhat. Currently, he notes \"constant\" low back pain and \"strained, tired\" feeling along the outsides of both legs to his feet. He also notes occasional pain into the bottoms of both feet. He notes frustration due to many practitioners telling him he needs to lose weight, as he exercises, fatigues, and needs to take rest breaks to continue. The patient denies falls, (-) bowel/bladder symptoms, however he does feel he is accumulating fluid/fat mass on the insides of both thighs. Current functional limitations include: bending/lifting, walking > 5-10 minutes, standing > 5-10 minutes, tying his shoes, and sleeping. Goals: \"eliminate the pain! \"    From today's evaluation: The patient reports he was crossing L leg over his R in bed around the first weekend of November 2021, with recurrent and immediate onset of familiar L low back/buttock/groin/knee/lateral leg pain since beginning of November; he went to ED in mid-November due to constant excruciating pain, where X-rays (-) for pathology and was prescribed various pain medications and steroid dose pack, which patient reports did not help symptoms. He does report some relief with Aspercreme and bending forward to alleviate pain. He has not fallen, but does note his L leg will give out on him; he has been working on previous HEP here and there. He is also currently working with a nutritionist and would like to lose weight and initiate fitness program at local gym (enjoys elliptical). Current functional limitations include: standing > 5-10 minutes; navigating stairs; getting out of bed; and performing sit <-> stand transfers. Goals: \"Get my legs better so I can really start working out like I should. I want to be able to walk a couple of miles to lose weight. \"    OBJECTIVE/EXAMINATION    Posture: R shoulder lower than L, bilateral LE ER, increased lumbar lordosis, narrow base of support, forward head posture, bilateral shoulder internal rotation  Other Observations: Squat: wide base of support, increased lumbar flexion, increased knee flexion  Gait and Functional Mobility: Patient ambulates using SPC in R UE, with decreased nathaniel, wide base of support, bilateral LE ER, R trunk lean, and bilateral Trendelenberg  Palpation: No noted tenderness to palpation along L posterolateral hip (gluteus yrn/medius/minimus/piriformis)/QL; noted familiar tenderness to PA joint mobilizations along L SI joint/sacral base        Lumbar AROM:          R  L    Flexion    Fingertips to distal tibias    Extension   50% (low back/L LE p!)      Side Bending   NT  NT    Rotation   NT  NT      Hip ROM (A/PROM)              R                     L  Flexion                                    71/122            70 (p!)/108  Extension                               Moderately limited          Severely limited  Internal Rotation                    38                   30  External Rotation                   43                   43    LOWER QUARTER   MUSCLE STRENGTH  KEY       R  L  0 - No Contraction  L1, L2 Psoas  4+  4+  1 - Trace   L3 Quads  4+  4  2 - Poor   L4 Tib Ant  5  5  3 - Fair    L5 EHL  5  4+  4 - Good   S1 Peroneals  4+  4+  5 - Normal   S2 Hams  4+  4    Heel Raise Test: R = NT; L = NT    Flexibility: Hamstring 90-90 Test = (+) bilaterally; Carlos Test = (+) bilaterally (iliopsoas)    Mobility Assessment: Noted L innominate hypomobility with standing Gillet Test     MMT:               HIP Ext: R = 4/L = 4              HIP Abd: R = 4+/L = 4              HIP Add: R = 4/L = 3    Special Tests:    Trendelenberg: (+) bilaterally    FABERS: (-) bilaterally    Slump: MSK response bilaterally                                SLR: MSK response bilaterally              Prone Instability Test: NT              Repeated Motions: Flexion = (+) for centralization;  Extension = (+) for peripheralization              Laslett Cluster: (-) on L    Functional Tests:  Single Limb Balance: R = NT; L = NT  Two Minute Walk Test: 1' with use of SPC and close (S) for safety; patient self-terminated test due to fatigue and increasing L LE pain at approximately one minute 45 seconds    15 min Therapeutic Exercise:  [x] See flow sheet :   Rationale: increase ROM, increase strength, improve coordination, improve balance and increase proprioception to improve the patients ability to stand and walk for prolonged periods    10 min Manual Therapy: Bilateral hip PROM, all planes with gentle overpressure to tolerance; L sacral base PA joint mobilizations (Grade II-III)   Rationale: decrease pain, increase ROM, increase tissue extensibility, decrease trigger points and increase postural awareness to improve the patients ability to stand and walk for prolonged periods          With   [x] TE   [] TA   [] Neuro   [] SC   [] other: Patient Education: [x] Review HEP    [] Progressed/Changed HEP based on:   [] positioning   [] body mechanics   [] transfers   [] heat/ice application    [] other:      Other Objective/Functional Measures: FOTO Functional Measure: 52/100 (lumbar spine); 43/100 (upper leg)                Pain Level (0-10 scale) post treatment: Patient notes improvement in symptoms post innominate rotation self-MET; notes very fatigued and sore post-evaluation    ASSESSMENT/Changes in Function:     [x]  See Plan of [facility] [place], PT, DPT 11/29/2021

## 2021-11-29 NOTE — PROGRESS NOTES
[facility]  [address] ([address]), [address] [place]  Phone: [phone] Fax: [phone]     Plan of Care/Statement of Necessity for Physical Therapy Services  2-15    Patient name: Jj Simmons  : 1955  Provider#: [id number]  Referral source: Jenifer Gongora MD      Medical/Treatment Diagnosis: Right leg pain [M79.604]     Prior Hospitalization: see medical history     Comorbidities: Hypertension, TERESA, gout, insomnia, obesity, diabetes, PVD  Prior Level of Function: Independent, moderately active  Medications: Verified on Patient Summary List  Start of Care: 21      Onset Date: Chronic with infrequent exacerbations (most recently 2021)  The Plan of Care and following information is based on the information from the initial evaluation. Assessment/ key information:     The patient is a 77year old male who presents to physical therapy services due to chronic L-sided low back/L leg pain with infrequent exacerbations. He is well known to this clinic, having participated in previous plan of care to address similar chief complaint. He demonstrates signs and symptoms consistent with mobility deficits secondary to lumbar radiculopathy, including (+) repeated motions testing (lumbar flexion centralizes symptoms; extension peripheralizes symptoms), decreased and painful multiplanar lumbar and bilateral hip A/PROM, decreased multiplanar bilateral hip strength, decreased functional endurance (270' with use of SPC during Two Minute Walk Test; patient self-terminated test due to fatigue and increasing L LE pain), and aberrant movement patterns with transfers and ambulation. The patient would benefit from continued skilled physical therapy services to improve symptom management and safety/endurance/independence with functional tasks such as prolonged standing and walking activities. Lumbar AROM:                                                                                               R                      L                        Flexion                                     Fingertips to distal tibias                       Extension                                50% (low back/L LE p!)                                                 Side Bending                           NT                   NT                     Rotation                                   NT                   NT                        Hip ROM (A/PROM)              R                     L  Flexion                                    71/122            70 (p!)/108  Extension                               Moderately limited          Severely limited  Internal Rotation                    38                   30  External Rotation                   43                   43    Evaluation Complexity History HIGH Complexity :3+ comorbidities / personal factors will impact the outcome/ POC ; Examination HIGH Complexity : 4+ Standardized tests and measures addressing body structure, function, activity limitation and / or participation in recreation  ;Presentation MEDIUM Complexity : Evolving with changing characteristics  ; Clinical Decision Making MEDIUM Complexity : FOTO score of 26-74  Overall Complexity Rating: MEDIUM    Problem List: pain affecting function, decrease ROM, decrease strength, edema affecting function, impaired gait/ balance, decrease ADL/ functional abilitiies, decrease activity tolerance, decrease flexibility/ joint mobility and decrease transfer abilities   Treatment Plan may include any combination of the following: Therapeutic exercise, Therapeutic activities, Neuromuscular re-education, Physical agent/modality, Gait/balance training, Manual therapy, Patient education, Self Care training, Functional mobility training, Home safety training, Stair training and Other: Dry Needling  Patient / Family readiness to learn indicated by: asking questions, trying to perform skills and interest  Persons(s) to be included in education: patient (P)  Barriers to Learning/Limitations: None  Patient Goal (s): Get my legs better so I can really start working out like I should. I want to be able to walk a couple of miles to lose weight. \"  Patient Self Reported Health Status: good  Rehabilitation Potential: good    Short Term Goals: To be accomplished in 6-8 treatments: The patient will demonstrate understanding of and compliance with updated and progressive HEP toward improved participation in physical therapy plan of care. The patient will demonstrate (-) repeated motions testing toward improved functional endurance with prolonged standing and walking tasks. The patient will demonstrate bilateral hip flexion AROM >= 90 degrees without onset of familiar symptoms toward improved mechanics with stair navigation and transfers. Long Term Goals: To be accomplished in 12-16 treatments: The patient will demonstrate multiplanar bilateral hip strength increased by >= 1/2 MMT grade toward improved endurance with functional activities such as prolonged standing and walking tasks. The patient will demonstrate ability to complete >= 360' using LRAD at a level of I during Two Minute Walk Test toward improved endurance with prolonged walking tasks. The patient will score >= 62 on lumbar FOTO and >= 58 on upper leg FOTO to demonstrate significantly improved subjective report of function. Frequency / Duration: Patient to be seen 2 times per week for 12-16 treatments.     Patient/ Caregiver education and instruction: self care, activity modification and exercises    [x]  Plan of care has been reviewed with PTA    Certification Period: 11/29/21-02/27/22    Trish Scheuermann, PT, DPT 11/29/2021     ________________________________________________________________________    I certify that the above Therapy Services are being furnished while the patient is under my care. I agree with the treatment plan and certify that this therapy is necessary.     Physician's Signature:____________________  Date:____________Time: _________         Megha Norton MD

## 2021-12-06 ENCOUNTER — HOSPITAL ENCOUNTER (OUTPATIENT)
Dept: PHYSICAL THERAPY | Age: 66
Discharge: HOME | End: 2021-12-06
Payer: MEDICARE

## 2021-12-06 PROCEDURE — 97140 MANUAL THERAPY 1/> REGIONS: CPT

## 2021-12-06 PROCEDURE — 97110 THERAPEUTIC EXERCISES: CPT

## 2021-12-06 RX ORDER — ATENOLOL 50 MG/1
TABLET ORAL
Qty: 90 TABLET | Refills: 3 | Status: SHIPPED | OUTPATIENT
Start: 2021-12-06

## 2021-12-06 NOTE — TELEPHONE ENCOUNTER
PCP: Maryuri Grant NP    Last appt: 11/8/2021  Future Appointments   Date Time Provider Binh Cardenas   12/8/2021  1:30 PM Nundarashaad Ley, PT [facility]   12/10/2021 10:30 AM Maryuri Grant NP [facility]   12/13/2021  2:00 PM Nunda Polio, PT [facility]   12/15/2021  1:30 PM Nunda Polio, PT [facility]   12/17/2021  9:00 AM Lianne Britton RD [facility]   12/20/2021  2:00 PM Deena Polio, PT [facility]   12/22/2021  1:30 PM May Pedrito, PTA [facility]   12/27/2021  2:00 PM Deena Polio, PT [facility]   12/29/2021  1:30 PM Deena Polio, PT [facility]   12/30/2021 11:00 AM Herminia Russell MD [facility]       Last refilled:12/7/20    Requested Prescriptions     Pending Prescriptions Disp Refills    atenoloL (TENORMIN) 50 mg tablet 90 Tablet 3     Sig: TAKE 1 TABLET BY MOUTH EVERY DAY

## 2021-12-06 NOTE — PROGRESS NOTES
PT DAILY TREATMENT NOTE - [facility] 2-15    Patient Name: Henrique Diaz Day  Date:2021  : 1955  [x]  Patient  Verified  Payor: Kenna Maria Teresa / Plan: [place] MED[place] PART [place] & [place] / Product Type: Medicare /    In time: 1400  Out time:   Total Treatment Time (min): 53  Total Timed Codes (min): 53  1:1 Treatment Time ( only): 29  Visit #: 2    Treatment Area: Right leg pain [M79.604]    SUBJECTIVE  Pain Level (0-10 scale): 6-7 in L leg  Any medication changes, allergies to medications, adverse drug reactions, diagnosis change, or new procedure performed?: [x] No    [] Yes (see summary sheet for update)  Subjective functional status/changes:   [] No changes reported    The patient reports he has been doing better since initial evaluation; he has been using his cane less to walk. He notes he feels exercises have helped. OBJECTIVE    45 min Therapeutic Exercise:  [x] See flow sheet : Includes time spent educating patient on anatomy/pathophysiology of current condition and plan of care, including planned transition to fitness center to maintain current gains. Patient verbalizing and demonstrating understanding of provided education with 100% accuracy using teach back method.    Rationale: increase ROM, increase strength, improve coordination, improve balance and increase proprioception to improve the patients ability to stand and walk for prolonged periods    8 min Manual Therapy: L hip PROM, all planes with gentle overpressure to tolerance; L hip flexor release (iliacus/psoas, hooklying position)   Rationale: decrease pain, increase ROM, increase tissue extensibility, decrease trigger points and increase postural awareness to improve the patients ability to stand and walk for prolonged periods          With   [x] TE   [] TA   [] Neuro   [] SC   [] other: Patient Education: [x] Review HEP    [] Progressed/Changed HEP based on:   [] positioning   [] body mechanics   [] transfers   [] heat/ice application    [] other: Other Objective/Functional Measures:                                                     Lumbar AROM:                                                                                               R                      L                                                                Side Bending                          Fingertips 10cm from knee joint line        3cm from knee joint line                   Rotation                                  50%                   50% (p!)     Single Limb Balance: R = 2.2 seconds; L = 2.4 seconds (p!)     Pain Level (0-10 scale) post treatment: 5 in low back/L LE    ASSESSMENT/Changes in Function:   The patient tolerated therapy visit well at this date. He demonstrates improvement in standing tolerance versus at initial evaluation, however limited with L single limb stance by L hip pain. Patient benefits from flexion-based exercise emphasis to offload lumbar structures driving current condition. Noted rapid fatigue with isometric holds, particularly during bird dog progression and L hip adductor lifts; patient requires several brief rest breaks throughout bout to maintain mechanics. Patient intermittently drags L LE with ambulation, may contribute to persistent soreness throughout L proximal tibialis anterior muscle belly. Patient noting significant fatigue, decrease in symptoms post-session today. Continue to progress as tolerated. Patient will continue to benefit from skilled PT services to modify and progress therapeutic interventions, address functional mobility deficits, address ROM deficits, address strength deficits, analyze and address soft tissue restrictions, analyze and cue movement patterns, analyze and modify body mechanics/ergonomics, assess and modify postural abnormalities, address imbalance/dizziness and instruct in home and community integration to attain remaining goals.      [x]  See Plan of Care  []  See progress note/recertification  []  See Discharge Summary         Progress towards goals / Updated goals:    Short Term Goals: To be accomplished in 6-8 treatments: The patient will demonstrate understanding of and compliance with updated and progressive HEP toward improved participation in physical therapy plan of care. - Progressing              The patient will demonstrate (-) repeated motions testing toward improved functional endurance with prolonged standing and walking tasks. - Progressing              The patient will demonstrate bilateral hip flexion AROM >= 90 degrees without onset of familiar symptoms toward improved mechanics with stair navigation and transfers. - Progressing  Long Term Goals: To be accomplished in 12-16 treatments: The patient will demonstrate multiplanar bilateral hip strength increased by >= 1/2 MMT grade toward improved endurance with functional activities such as prolonged standing and walking tasks. - Progressing              The patient will demonstrate ability to complete >= 360' using LRAD at a level of I during Two Minute Walk Test toward improved endurance with prolonged walking tasks. - Progressing              The patient will score >= 62 on lumbar FOTO and >= 58 on upper leg FOTO to demonstrate significantly improved subjective report of function. - Progressing  Frequency / Duration: Patient to be seen 2 times per week for 12-16 treatments.     PLAN  [x]  Upgrade activities as tolerated     [x]  Continue plan of care  [x]  Update interventions per flow sheet       []  Discharge due to:_  []  Other:_      Merle Reed PT, DPT 12/6/2021

## 2021-12-08 ENCOUNTER — HOSPITAL ENCOUNTER (OUTPATIENT)
Dept: PHYSICAL THERAPY | Age: 66
Discharge: HOME | End: 2021-12-08
Payer: MEDICARE

## 2021-12-08 PROCEDURE — 97110 THERAPEUTIC EXERCISES: CPT

## 2021-12-08 PROCEDURE — 97140 MANUAL THERAPY 1/> REGIONS: CPT

## 2021-12-08 PROCEDURE — 97535 SELF CARE MNGMENT TRAINING: CPT

## 2021-12-08 NOTE — PROGRESS NOTES
PT DAILY TREATMENT NOTE - [facility] 2-15    Patient Name: Bora Simmons  Date:2021  : 1955  [x]  Patient  Verified  Payor: Carolina Wade / Plan: [place] & [place] / Product Type: Medicare /    In time: 2935  Out time: 1448  Total Treatment Time (min): 68  Total Timed Codes (min): 68  1:1 Treatment Time ( only): 68  Visit #: 3    *The patient presented with his wife Oral Caputo, who was present throughout therapy visit at this date*    Treatment Area: Right leg pain [M79.604]    SUBJECTIVE  Pain Level (0-10 scale): 5 in low back/L leg  Any medication changes, allergies to medications, adverse drug reactions, diagnosis change, or new procedure performed?: [x] No    [] Yes (see summary sheet for update)  Subjective functional status/changes:   [] No changes reported    The patient reports he has been doing well overall; he was very sore after last visit and required his cane to walk. He has been limping less and feeling better. He would like his wife to see what he has been working on at home to help him as needed. OBJECTIVE    38 min Therapeutic Exercise:  [x] See flow sheet :   Rationale: increase ROM, increase strength, improve coordination, improve balance and increase proprioception to improve the patients ability to stand and walk for prolonged periods    20 min Self Care/Home Management:  []  See flow sheet : Includes time spent educating patient and his wife on general nutritional principles, including 80% exercise/20% nutritional balance to achieve specific weight loss and fitness goals. Additionally, includes time spent educating patient and his wife on anatomy/pathophysiology of current condition and plan of care, including planned transition to fitness center to maintain current gains. Patient verbalizing and demonstrating understanding of provided education with 100% accuracy using teach back method.    Rationale: increase ROM, increase strength, improve coordination, improve balance and increase proprioception  to improve the patients ability to self-manage current condition    10 min Manual Therapy: L hip PROM, all planes with gentle overpressure to tolerance; sidelying L hip extension (PA) joint mobilization (Grade II-IV); L hip flexor release (iliacus/psoas, hooklying position); L QL release; L QL MWM (hip hike, 10x)   Rationale: decrease pain, increase ROM, increase tissue extensibility, decrease trigger points and increase postural awareness to improve the patients ability to stand and walk for prolonged periods          With   [x] TE   [] TA   [] Neuro   [x] SC   [] other: Patient Education: [x] Review HEP    [] Progressed/Changed HEP based on:   [] positioning   [] body mechanics   [] transfers   [] heat/ice application    [] other:      Other Objective/Functional Measures: Patient ambulating intermittently using SPC at arrival and after session today; noted improved mechanics and no use of assistive device with ambulation post-manual intervention     Pain Level (0-10 scale) post treatment: 3 in low back/L LE    ASSESSMENT/Changes in Function:   The patient tolerated therapy visit well at this date. Patient continues to benefit from flexion-based exercise emphasis to offload lumbar structures driving current condition. Noted rapid fatigue with isometric holds, particularly during L hip adductor lift progressions; patient requires several brief rest breaks throughout bout to maintain mechanics. Patient with significant difficulty recruiting transversus abdominis in seated position due to decreased proprioception, improved with tactile cues to gluteals. Patient demonstrates increased transverse plane pelvic rotation with supine marches, improved with transversus abdominis setting. Noted decreased L LE shuffling with ambulation particularly post-session. Patient noting significant fatigue, decrease in symptoms post-session today. Continue to progress as tolerated.   Patient will continue to benefit from skilled PT services to modify and progress therapeutic interventions, address functional mobility deficits, address ROM deficits, address strength deficits, analyze and address soft tissue restrictions, analyze and cue movement patterns, analyze and modify body mechanics/ergonomics, assess and modify postural abnormalities, address imbalance/dizziness and instruct in home and community integration to attain remaining goals. [x]  See Plan of Care  []  See progress note/recertification  []  See Discharge Summary         Progress towards goals / Updated goals:    Short Term Goals: To be accomplished in 6-8 treatments: The patient will demonstrate understanding of and compliance with updated and progressive HEP toward improved participation in physical therapy plan of care. - Progressing              The patient will demonstrate (-) repeated motions testing toward improved functional endurance with prolonged standing and walking tasks. - Progressing              The patient will demonstrate bilateral hip flexion AROM >= 90 degrees without onset of familiar symptoms toward improved mechanics with stair navigation and transfers. - Progressing  Long Term Goals: To be accomplished in 12-16 treatments: The patient will demonstrate multiplanar bilateral hip strength increased by >= 1/2 MMT grade toward improved endurance with functional activities such as prolonged standing and walking tasks. - Progressing              The patient will demonstrate ability to complete >= 360' using LRAD at a level of I during Two Minute Walk Test toward improved endurance with prolonged walking tasks. - Progressing              The patient will score >= 62 on lumbar FOTO and >= 58 on upper leg FOTO to demonstrate significantly improved subjective report of function. - Progressing  Frequency / Duration: Patient to be seen 2 times per week for 12-16 treatments.     PLAN  [x]  Upgrade activities as tolerated     [x]  Continue plan of care  [x]  Update interventions per flow sheet       []  Discharge due to:_  []  Other:_      Jaymie Padilla, PT, DPT 12/8/2021

## 2021-12-10 ENCOUNTER — OFFICE VISIT (OUTPATIENT)
Dept: INTERNAL MEDICINE CLINIC | Age: 66
End: 2021-12-10
Payer: MEDICARE

## 2021-12-10 VITALS
SYSTOLIC BLOOD PRESSURE: 129 MMHG | OXYGEN SATURATION: 96 % | HEIGHT: 71 IN | RESPIRATION RATE: 16 BRPM | BODY MASS INDEX: 44.1 KG/M2 | TEMPERATURE: 98.2 F | DIASTOLIC BLOOD PRESSURE: 78 MMHG | WEIGHT: 315 LBS | HEART RATE: 72 BPM

## 2021-12-10 DIAGNOSIS — I10 ESSENTIAL HYPERTENSION, BENIGN: ICD-10-CM

## 2021-12-10 DIAGNOSIS — N18.31 TYPE 2 DIABETES MELLITUS WITH STAGE 3A CHRONIC KIDNEY DISEASE, WITHOUT LONG-TERM CURRENT USE OF INSULIN (HCC): Primary | ICD-10-CM

## 2021-12-10 DIAGNOSIS — M54.32 LEFT SIDED SCIATICA: ICD-10-CM

## 2021-12-10 DIAGNOSIS — E11.22 TYPE 2 DIABETES MELLITUS WITH STAGE 3A CHRONIC KIDNEY DISEASE, WITHOUT LONG-TERM CURRENT USE OF INSULIN (HCC): Primary | ICD-10-CM

## 2021-12-10 DIAGNOSIS — E66.01 CLASS 3 SEVERE OBESITY DUE TO EXCESS CALORIES WITH SERIOUS COMORBIDITY AND BODY MASS INDEX (BMI) OF 45.0 TO 49.9 IN ADULT (HCC): ICD-10-CM

## 2021-12-10 DIAGNOSIS — M62.838 MUSCLE SPASM: ICD-10-CM

## 2021-12-10 DIAGNOSIS — E11.69 HYPERLIPIDEMIA ASSOCIATED WITH TYPE 2 DIABETES MELLITUS (HCC): ICD-10-CM

## 2021-12-10 DIAGNOSIS — N52.9 ERECTILE DYSFUNCTION, UNSPECIFIED ERECTILE DYSFUNCTION TYPE: ICD-10-CM

## 2021-12-10 DIAGNOSIS — E78.5 HYPERLIPIDEMIA ASSOCIATED WITH TYPE 2 DIABETES MELLITUS (HCC): ICD-10-CM

## 2021-12-10 LAB
ALBUMIN SERPL-MCNC: 3.9 G/DL (ref 3.5–5)
ALBUMIN/GLOB SERPL: 1.1 {RATIO} (ref 1.1–2.2)
ALP SERPL-CCNC: 64 U/L (ref 45–117)
ALT SERPL-CCNC: 26 U/L (ref 12–78)
ANION GAP SERPL CALC-SCNC: 6 MMOL/L (ref 5–15)
AST SERPL-CCNC: 18 U/L (ref 15–37)
BILIRUB SERPL-MCNC: 0.6 MG/DL (ref 0.2–1)
BUN SERPL-MCNC: 33 MG/DL (ref 6–20)
BUN/CREAT SERPL: 24 (ref 12–20)
CALCIUM SERPL-MCNC: 9.7 MG/DL (ref 8.5–10.1)
CHLORIDE SERPL-SCNC: 104 MMOL/L (ref 97–108)
CHOLEST SERPL-MCNC: 183 MG/DL
CO2 SERPL-SCNC: 28 MMOL/L (ref 21–32)
CREAT SERPL-MCNC: 1.4 MG/DL (ref 0.7–1.3)
EST. AVERAGE GLUCOSE BLD GHB EST-MCNC: 108 MG/DL
GLOBULIN SER CALC-MCNC: 3.4 G/DL (ref 2–4)
GLUCOSE SERPL-MCNC: 95 MG/DL (ref 65–100)
HBA1C MFR BLD: 5.4 % (ref 4–5.6)
HDLC SERPL-MCNC: 43 MG/DL
HDLC SERPL: 4.3 {RATIO} (ref 0–5)
LDLC SERPL CALC-MCNC: 116.8 MG/DL (ref 0–100)
POTASSIUM SERPL-SCNC: 3.7 MMOL/L (ref 3.5–5.1)
PROT SERPL-MCNC: 7.3 G/DL (ref 6.4–8.2)
SODIUM SERPL-SCNC: 138 MMOL/L (ref 136–145)
TRIGL SERPL-MCNC: 116 MG/DL (ref ?–150)
VLDLC SERPL CALC-MCNC: 23.2 MG/DL

## 2021-12-10 PROCEDURE — G8754 DIAS BP LESS 90: HCPCS | Performed by: NURSE PRACTITIONER

## 2021-12-10 PROCEDURE — G8752 SYS BP LESS 140: HCPCS | Performed by: NURSE PRACTITIONER

## 2021-12-10 PROCEDURE — G8536 NO DOC ELDER MAL SCRN: HCPCS | Performed by: NURSE PRACTITIONER

## 2021-12-10 PROCEDURE — 2022F DILAT RTA XM EVC RTNOPTHY: CPT | Performed by: NURSE PRACTITIONER

## 2021-12-10 PROCEDURE — 3017F COLORECTAL CA SCREEN DOC REV: CPT | Performed by: NURSE PRACTITIONER

## 2021-12-10 PROCEDURE — G8432 DEP SCR NOT DOC, RNG: HCPCS | Performed by: NURSE PRACTITIONER

## 2021-12-10 PROCEDURE — G8427 DOCREV CUR MEDS BY ELIG CLIN: HCPCS | Performed by: NURSE PRACTITIONER

## 2021-12-10 PROCEDURE — 99214 OFFICE O/P EST MOD 30 MIN: CPT | Performed by: NURSE PRACTITIONER

## 2021-12-10 PROCEDURE — 1101F PT FALLS ASSESS-DOCD LE1/YR: CPT | Performed by: NURSE PRACTITIONER

## 2021-12-10 PROCEDURE — G8417 CALC BMI ABV UP PARAM F/U: HCPCS | Performed by: NURSE PRACTITIONER

## 2021-12-10 PROCEDURE — 3044F HG A1C LEVEL LT 7.0%: CPT | Performed by: NURSE PRACTITIONER

## 2021-12-10 RX ORDER — SILDENAFIL 100 MG/1
100 TABLET, FILM COATED ORAL
Qty: 30 TABLET | Refills: 1 | Status: SHIPPED | OUTPATIENT
Start: 2021-12-10 | End: 2022-05-03

## 2021-12-10 NOTE — PROGRESS NOTES
Ari Sauer is a 77 y.o. male    Chief Complaint   Patient presents with    Establish Care     follow up/       Visit Vitals  /78 (BP 1 Location: Left upper arm, BP Patient Position: Sitting, BP Cuff Size: Large adult)   Pulse 72   Temp 98.2 °F (36.8 °C)   Resp 16   Ht 5' 11\" (1.803 m)   Wt (!) 357 lb 3.2 oz (162 kg)   SpO2 96%   BMI 49.82 kg/m²           1. Have you been to the ER, urgent care clinic since your last visit? Hospitalized since your last visit? NO    2. Have you seen or consulted any other health care providers outside of the [address] [place] [address] since your last visit? Include any pap smears or colon screening.  NO

## 2021-12-10 NOTE — PROGRESS NOTES
Chief Complaint   Patient presents with    Establish Care     follow up/       SUBJECTIVE:    Marisol Simmons is a 77 y.o. male who is here today for a follow up appointment regarding his hypertension, type 2 diabetes with chronic kidney disease and hyperlipidemia, and ongoing left side sciatica with muscle spasms. Patient states he continues to take his medications as prescribed for management of his hypertension. His blood pressure appears well controlled at this time. He does not check his blood pressures on a regular basis. He denies any chest pain, chest pressure, shortness of breath, headaches, dizziness, blurred vision, palpitations, or syncope episodes. His diabetes is currently managed by diet alone, and he does not check his sugars on a regular basis. His last hemoglobin A1c was approximately 5.5%. He is currently not on a statin. He is currently being seen by physical therapy on a regular basis for management of his sciatica and muscle spasms. He states his pain is better overall, but still bothers him somewhat. The patient also suffers from symptoms of erectile dysfunction. He states this has been ongoing for quite some time now, and he is interested in trying medication to help treat this. Current Outpatient Medications   Medication Sig Dispense Refill    atenoloL (TENORMIN) 50 mg tablet TAKE 1 TABLET BY MOUTH EVERY DAY 90 Tablet 3    nystatin (MYCOSTATIN) topical cream Apply  to affected area two (2) times a day. 30 g 2    nystatin (MYCOSTATIN) powder Dust area twice daily with powder after applying cream. 60 g 2    bumetanide (BUMEX) 1 mg tablet Take 2 Tablets by mouth daily. 180 Tablet 0    hydrALAZINE (APRESOLINE) 50 mg tablet TAKE 1 TABLET BY MOUTH TWICE DAILY FOR HIGH BLOOD PRESSURE 180 Tablet 0    ascorbic acid, vitamin C, (Vitamin C) 1,000 mg tablet Take 1,000 mg by mouth daily.  gummies      allopurinoL (ZYLOPRIM) 300 mg tablet TAKE 1 TABLET BY MOUTH DAILY 90 Tablet 2    fenofibrate nanocrystallized (TRICOR) 145 mg tablet TAKE 1 TABLET BY MOUTH EVERY DAY 90 Tablet 2    guaiFENesin (Mucinex) 1,200 mg Ta12 ER tablet Take 1,200 mg by mouth two (2) times a day.  guaifenesin/pseudoephedrne HCl (MUCINEX D PO) Take  by mouth.  omeprazole (PRILOSEC OTC) 20 mg tablet Take 20 mg by mouth daily.  JO-SB-WC-Fe-Min-Lycopen-Lutein (CENTRUM) 0.4-162-18 mg Tab Take  by mouth.  aspirin 81 mg chewable tablet Take 81 mg by mouth daily.  lidocaine (Lidoderm) 5 % Apply patch to the affected area for 12 hours a day and remove for 12 hours a day. (Patient not taking: Reported on 12/10/2021) 5 Each 0    methylPREDNISolone (Medrol, Yair,) 4 mg tablet Follow instructions on packaging (Patient not taking: Reported on 12/10/2021) 1 Dose Pack 0    tiZANidine (ZANAFLEX) 4 mg tablet Take 1 Tablet by mouth three (3) times daily as needed for Muscle Spasm(s).  Indications: muscle spasm (Patient not taking: Reported on 12/10/2021) 30 Tablet 0     Past Medical History:   Diagnosis Date    Allergic rhinitis 11/22/2017    Chest pain syndrome 11/22/2017    Comments: abnl stress cardiolite 2006, subsequent to that, he had a normal cardiac cath   [facility] acquired pneumonia     Diarrhea 11/22/2017    Dyspnea 11/22/2017    Environmental allergies     GERD (gastroesophageal reflux disease)     Gout 11/22/2017    Hypertension     Insomnia 11/22/2017    Iron deficiency 11/22/2017    Left chronic serous otitis media 11/22/2017    Obesity 11/22/2017    Comments: morbid    Sleep apnea     wears CPAP    Steatosis 11/22/2017    Comments: non-alcoholic     Past Surgical History:   Procedure Laterality Date    COLONOSCOPY N/A 4/30/2021    COLONOSCOPY performed by Rizwana Vargas MD at [address] [facility] [place]; HI RISK IND  4/30/2021         HX ORTHOPAEDIC      right knee     Allergies   Allergen Reactions    Ace Inhibitors Cough REVIEW OF SYSTEMS:                                        POSITIVE= bold text  Negative = regular text    General:                     fever, chills, sweats, generalized weakness, weight loss/gain,                                       loss of appetite   Eyes:                           blurred vision, eye pain, loss of vision, double vision  ENT:                            rhinorrhea, pharyngitis   Respiratory:               cough, sputum production, SOB, MARKHAM, wheezing, pleuritic pain   Cardiology:                chest pain, palpitations, orthopnea, PND, edema, syncope   Gastrointestinal:       abdominal pain , N/V, diarrhea, dysphagia, constipation, bleeding   Genitourinary:           frequency, urgency, dysuria, hematuria, incontinence   Muskuloskeletal :      arthralgia, myalgia, back pain  Hematology:              easy bruising, nose or gum bleeding, lymphadenopathy   Dermatological:         rash, ulceration, pruritis, color change / jaundice  Endocrine:                 hot flashes or polydipsia   Neurological:             headache, dizziness, confusion, focal weakness, paresthesia,                                      Speech difficulties, memory loss, gait difficulty  Psychological:          Feelings of anxiety, depression, agitation        Social History     Socioeconomic History    Marital status:    Tobacco Use    Smoking status: Never Smoker    Smokeless tobacco: Never Used   Vaping Use    Vaping Use: Never used   Substance and Sexual Activity    Alcohol use: Yes     Comment: rarely    Drug use: Never    Sexual activity: Yes     Partners: Female     Family History   Problem Relation Age of Onset    Hypertension Other     Heart Disease Other     Diabetes Other     Diabetes Mother     Diabetes Father        OBJECTIVE:     Visit Vitals  /78 (BP 1 Location: Left upper arm, BP Patient Position: Sitting, BP Cuff Size: Large adult)   Pulse 72   Temp 98.2 °F (36.8 °C)   Resp 16   Ht 5' 11\" (1.803 m)   Wt (!) 357 lb 3.2 oz (162 kg)   SpO2 96%   BMI 49.82 kg/m²       Constitutional: He appears well nourished, of stated age, and dressed appropriately. Eyes: Sclera anicteric, PERRLA, EOMI  Neck: Supple without lymphadenopathy. Thyroid normal, No JVD or bruits  Respiratory: Clear to ascultation X5, normal inspiratory effort, no adventitious breath sounds. Cardiovascular: Regular rate and rhythm, no rubs or gallops, PMI not displaced, No thrills, no peripheral edema  Hematologic: No purpura, petechiae or unexplained bruising  Lymphatic: No lymph node enlargemant. Neuro: Non-focal exam, A & O X 3.   Psychiatric: Appropriate affect and demeanor, pleasant and cooperative. Patient's thought content and thought processing appear to be within normal limits. ASSESSMENT/PLAN:       ICD-10-CM ICD-9-CM    1. Type 2 diabetes mellitus with stage 3a chronic kidney disease, without long-term current use of insulin ([facility])  E11.22 250.40 HEMOGLOBIN A1C WITH EAG    N18.31 585.3 HEMOGLOBIN A1C WITH EAG   2. Hyperlipidemia associated with type 2 diabetes mellitus ([facility])  Q33.93 258.53 METABOLIC PANEL, COMPREHENSIVE    E78.5 272.4 LIPID PANEL      LIPID PANEL      METABOLIC PANEL, COMPREHENSIVE   3. Essential hypertension, benign  I78 024.6 METABOLIC PANEL, COMPREHENSIVE      LIPID PANEL      LIPID PANEL      METABOLIC PANEL, COMPREHENSIVE   4. Left sided sciatica  M54.32 724.3    5. Muscle spasm  M62.838 728.85    6. Class 3 severe obesity due to excess calories with serious comorbidity and body mass index (BMI) of 45.0 to 49.9 in adult ([facility])  E66.01 278.01     Z68.42 V85.42      1: We will repeat labs today including: CMP, hemoglobin A1c, and lipid panel. 2: Patient to continue current medications for management of hypertension and triglycerides. 3: Patient to continue physical therapy for management of sciatica and pain issues.   4: Patient to work on healthy lifestyle management including: Low-fat/low-cholesterol diet, adequate amounts of fiber water, and exercise as tolerated. 5: Patient will be prescribed Viagra 100 mg to take 1/2 to 1 tablet as needed for sexual activity. Risks, benefits, and options were thoroughly discussed with patient today patient acknowledges and states understanding. 6: Patient to follow-up with me in approximately 3 months, or sooner as needed. Patient states understanding and agrees with plan. ATTENTION:   This medical record was transcribed using an electronic medical records system. Although proofread, it may and can contain electronic and spelling errors. Other human spelling and other errors may be present. Corrections may be executed at a later time. Please feel free to contact us for any clarifications as needed. Signed,  James Manzano.  Jacky Walden MSN APRN FNP-BC

## 2021-12-13 ENCOUNTER — HOSPITAL ENCOUNTER (OUTPATIENT)
Dept: PHYSICAL THERAPY | Age: 66
Discharge: HOME | End: 2021-12-13
Payer: MEDICARE

## 2021-12-13 PROCEDURE — 97140 MANUAL THERAPY 1/> REGIONS: CPT

## 2021-12-13 PROCEDURE — 97110 THERAPEUTIC EXERCISES: CPT

## 2021-12-13 RX ORDER — PRAVASTATIN SODIUM 10 MG/1
10 TABLET ORAL
Qty: 90 TABLET | Refills: 1 | Status: SHIPPED | OUTPATIENT
Start: 2021-12-13 | End: 2022-03-18 | Stop reason: SDUPTHER

## 2021-12-13 NOTE — PROGRESS NOTES
Hemoglobin A1c is normal.    Cholesterol levels are a bit higher than normal.  Triglycerides appear well controlled. To improve this, I am going to add a low-dose cholesterol-lowering medicine to take with your fenofibrate. Kidney functions are somewhat improved since last time and stable.   Liver functions and electrolytes are normal.

## 2021-12-13 NOTE — PROGRESS NOTES
PT DAILY TREATMENT NOTE - [facility] 2-15    Patient Name: Regan Night Day  Date:2021  : 1955  [x]  Patient  Verified  Payor: Lewis Aileen / Plan: [place] MED[place] PART A & [place] / Product Type: Medicare /    In time: 1400  Out time: 8403  Total Treatment Time (min): 67  Total Timed Codes (min): 67  1:1 Treatment Time ( only): 66  Visit #: 4    Treatment Area: Right leg pain [M79.604]    SUBJECTIVE  Pain Level (0-10 scale): 3 in L knee/lower leg  Any medication changes, allergies to medications, adverse drug reactions, diagnosis change, or new procedure performed?: [x] No    [] Yes (see summary sheet for update)  Subjective functional status/changes:   [] No changes reported    The patient reports his back and hip are feeling good; he notes increased L knee and lower leg \"aching\", however has not worked on [employer] since last visit.     OBJECTIVE    57 min Therapeutic Exercise:  [x] See flow sheet :   Rationale: increase ROM, increase strength, improve coordination, improve balance and increase proprioception to improve the patients ability to stand and walk for prolonged periods    10 min Manual Therapy: L knee/ankle PROM, all planes to tolerance; L tibiofemoral IR/ER joint mobilizations with flexion/extension PROM (Grade III); STM/TPR L distal hamstrings/proximal gastrocnemius/soleus/tibialis anterior; L tibialis anterior MWM (ankle plantar flexion/dorsiflexion, 20x)   Rationale: decrease pain, increase ROM, increase tissue extensibility, decrease trigger points and increase postural awareness to improve the patients ability to stand and walk for prolonged periods          With   [x] TE   [] TA   [] Neuro   [] SC   [] other: Patient Education: [x] Review HEP    [] Progressed/Changed HEP based on:   [] positioning   [] body mechanics   [] transfers   [] heat/ice application    [] other:      Other Objective/Functional Measures: Patient ambulating intermittently using SPC at arrival; noted improved mechanics and no use of assistive device with ambulation post-manual intervention     Pain Level (0-10 scale) post treatment: Patient notes increased muscle-induced soreness along L-sided low back and LE    ASSESSMENT/Changes in Function:   The patient tolerated therapy visit well at this date. Emphasis on improving L LE flexibility/mobility due to persistent L LE \"soreness\" reproduced with palpation to soft tissue structures; patient requires significant multimodal cues to perform banded ankle dorsiflexion self-mobilizations, improved with increased proprioceptive input. Increased weight bearing exercise time with patient requiring intermittent rest breaks due to aching and fatigue; notes no onset of pain throughout. Noted decreased L LE shuffling with ambulation particularly post-session. Patient noting significant fatigue, increase in exercise-induced soreness post-session today. Continue to progress as tolerated. Patient will continue to benefit from skilled PT services to modify and progress therapeutic interventions, address functional mobility deficits, address ROM deficits, address strength deficits, analyze and address soft tissue restrictions, analyze and cue movement patterns, analyze and modify body mechanics/ergonomics, assess and modify postural abnormalities, address imbalance/dizziness and instruct in home and community integration to attain remaining goals. [x]  See Plan of Care  []  See progress note/recertification  []  See Discharge Summary         Progress towards goals / Updated goals:    Short Term Goals: To be accomplished in 6-8 treatments: The patient will demonstrate understanding of and compliance with updated and progressive HEP toward improved participation in physical therapy plan of care. - Progressing              The patient will demonstrate (-) repeated motions testing toward improved functional endurance with prolonged standing and walking tasks.  - Progressing              The patient will demonstrate bilateral hip flexion AROM >= 90 degrees without onset of familiar symptoms toward improved mechanics with stair navigation and transfers. - Progressing  Long Term Goals: To be accomplished in 12-16 treatments: The patient will demonstrate multiplanar bilateral hip strength increased by >= 1/2 MMT grade toward improved endurance with functional activities such as prolonged standing and walking tasks. - Progressing              The patient will demonstrate ability to complete >= 360' using LRAD at a level of I during Two Minute Walk Test toward improved endurance with prolonged walking tasks. - Progressing              The patient will score >= 62 on lumbar FOTO and >= 58 on upper leg FOTO to demonstrate significantly improved subjective report of function. - Progressing  Frequency / Duration: Patient to be seen 2 times per week for 12-16 treatments.     PLAN  [x]  Upgrade activities as tolerated     [x]  Continue plan of care  [x]  Update interventions per flow sheet       []  Discharge due to:_  []  Other:_      Destin Brown, PT, DPT 12/13/2021

## 2021-12-14 ENCOUNTER — TELEPHONE (OUTPATIENT)
Dept: CARDIAC REHAB | Age: 66
End: 2021-12-14

## 2021-12-14 NOTE — TELEPHONE ENCOUNTER
TELEPHONE ENCOUNTER: OP NUTRITION APPOINTMENT     Reminder call placed on 12/14/2021    Outcome:                 [] Patient confirmed appointment              [] Patient rescheduled appointment              [] Unable to reach              [x] Left message              [] Other:        Mike Martinez RD

## 2021-12-15 ENCOUNTER — HOSPITAL ENCOUNTER (OUTPATIENT)
Dept: PHYSICAL THERAPY | Age: 66
Discharge: HOME | End: 2021-12-15
Payer: MEDICARE

## 2021-12-15 PROCEDURE — 97110 THERAPEUTIC EXERCISES: CPT

## 2021-12-15 NOTE — PROGRESS NOTES
PT DAILY TREATMENT NOTE - [facility] 2-15    Patient Name: Mary Thomson Day  Date:12/15/2021  : 1955  [x]  Patient  Verified  Payor: Yoni Ovalle / Plan: [place] & [place] / Product Type: Medicare /    In time: 1330  Out time: 1430  Total Treatment Time (min): 60  Total Timed Codes (min): 60  1:1 Treatment Time ( only): 32  Visit #: 5    Treatment Area: Right leg pain [M79.604]    SUBJECTIVE  Pain Level (0-10 scale): 3 in L knee/lower leg  Any medication changes, allergies to medications, adverse drug reactions, diagnosis change, or new procedure performed?: [x] No    [] Yes (see summary sheet for update)  Subjective functional status/changes:   [] No changes reported    The patient reports he was very tired and sore after last visit, however has been feeling good and walking better since that time. He notes continued \"aching\" in his L lower leg. He plans to start fitness center activity in the new year. OBJECTIVE    60 min Therapeutic Exercise:  [x] See flow sheet :   Rationale: increase ROM, increase strength, improve coordination, improve balance and increase proprioception to improve the patients ability to stand and walk for prolonged periods          With   [x] TE   [] TA   [] Neuro   [] SC   [] other: Patient Education: [x] Review HEP    [] Progressed/Changed HEP based on:   [] positioning   [] body mechanics   [] transfers   [] heat/ice application    [] other:      Other Objective/Functional Measures: Patient ambulating intermittently using SPC at arrival; noted improved mechanics and no use of assistive device with ambulation post-manual intervention     Pain Level (0-10 scale) post treatment: Patient notes increased muscle-induced soreness along L-sided low back and LE    ASSESSMENT/Changes in Function:   The patient tolerated therapy visit well at this date.  Patient requires significant multimodal cues to perform banded ankle dorsiflexion self-mobilizations, improved with increased proprioceptive input. Increased weight bearing exercise time with patient requiring intermittent rest breaks due to aching and fatigue; notes no onset of pain throughout. Patient challenged by increased endurance bias with standing pallof press and retro walk outs; noting increased L-sided \"soreness\" and fatigue post-intervention. Noted decreased L LE shuffling with ambulation particularly post-session. Patient noting significant fatigue, increase in exercise-induced soreness post-session today. Continue to progress as tolerated; plan to initiate transition to independent fitness program over coming 1-2 weeks due to demonstrated progress toward continued maintenance and gains. Patient will continue to benefit from skilled PT services to modify and progress therapeutic interventions, address functional mobility deficits, address ROM deficits, address strength deficits, analyze and address soft tissue restrictions, analyze and cue movement patterns, analyze and modify body mechanics/ergonomics, assess and modify postural abnormalities, address imbalance/dizziness and instruct in home and community integration to attain remaining goals. [x]  See Plan of Care  []  See progress note/recertification  []  See Discharge Summary         Progress towards goals / Updated goals:    Short Term Goals: To be accomplished in 6-8 treatments: The patient will demonstrate understanding of and compliance with updated and progressive HEP toward improved participation in physical therapy plan of care. - Progressing              The patient will demonstrate (-) repeated motions testing toward improved functional endurance with prolonged standing and walking tasks. - Progressing              The patient will demonstrate bilateral hip flexion AROM >= 90 degrees without onset of familiar symptoms toward improved mechanics with stair navigation and transfers. - Progressing  Long Term Goals:  To be accomplished in 12-16 treatments: The patient will demonstrate multiplanar bilateral hip strength increased by >= 1/2 MMT grade toward improved endurance with functional activities such as prolonged standing and walking tasks. - Progressing              The patient will demonstrate ability to complete >= 360' using LRAD at a level of I during Two Minute Walk Test toward improved endurance with prolonged walking tasks. - Progressing              The patient will score >= 62 on lumbar FOTO and >= 58 on upper leg FOTO to demonstrate significantly improved subjective report of function. - Progressing  Frequency / Duration: Patient to be seen 2 times per week for 12-16 treatments.     PLAN  [x]  Upgrade activities as tolerated     [x]  Continue plan of care  [x]  Update interventions per flow sheet       []  Discharge due to:_  []  Other:_      Madison Scott, PT, DPT 12/15/2021

## 2021-12-17 ENCOUNTER — HOSPITAL ENCOUNTER (OUTPATIENT)
Dept: CARDIAC REHAB | Age: 66
Discharge: HOME | End: 2021-12-17
Attending: INTERNAL MEDICINE
Payer: MEDICARE

## 2021-12-17 VITALS — WEIGHT: 315 LBS | BODY MASS INDEX: 50.13 KG/M2

## 2021-12-17 PROCEDURE — 97803 MED NUTRITION INDIV SUBSEQ: CPT | Performed by: DIETITIAN, REGISTERED

## 2021-12-17 NOTE — PROGRESS NOTES
[address] NOTE  DATE: 2021      REFERRING PHYSICIAN: Dr. Arielle Robb    NAME: Reina Holiness Day : 1955 AGE: 77 y.o. GENDER: male    REASON FOR VISIT: morbid obesity, Type 2 diabetes FOLLOW-UP    PAST MEDICAL HISTORY:   Patient Active Problem List   Diagnosis Code    Essential hypertension, benign I10    Esophageal reflux K21.9    Hyperlipidemia associated with type 2 diabetes mellitus ([facility] E11.69, E78.5    TERESA (obstructive sleep apnea) U22.87    Metabolic syndrome D88.91    Gout M10.9    Insomnia G47.00    Obesity, morbid ([address] E66.01    Annual physical exam Z00.00    Diabetes mellitus type 2, diet-controlled ([facility] E11.9    Hypertriglyceridemia E78.1    Peripheral vascular disease (HCC) I73.9    Type 2 diabetes mellitus with diabetic neuropathy ([facility] [facility] E11.40    Type 2 diabetes mellitus with chronic kidney disease ([facility] E11.22     Sleep has improved from 4-5 up to now 6-7 hours sleep per night since getting a new fit CPap mask    LABS:   Lab Results   Component Value Date/Time    Cholesterol, total 183 12/10/2021 11:03 AM    Cholesterol (POC) 172 2018 08:32 AM    HDL Cholesterol 43 12/10/2021 11:03 AM    HDL Cholesterol (POC) 29 (A) 2018 08:32 AM    LDL Cholesterol (POC) 108.2 2018 08:32 AM    LDL, calculated 116.8 (H) 12/10/2021 11:03 AM    VLDL, calculated 23.2 12/10/2021 11:03 AM    Triglyceride 116 12/10/2021 11:03 AM    Triglycerides (POC) 174 2018 08:32 AM    CHOL/HDL Ratio 4.3 12/10/2021 11:03 AM     Lab Results   Component Value Date/Time    Hemoglobin A1c 5.4 12/10/2021 11:03 AM    Hemoglobin A1c (POC) 5.6 2018 08:32 AM     No SMBG      MEDICATIONS/SUPPLEMENTS:   [unfilled]  Prior to Admission medications    Medication Sig Start Date End Date Taking? Authorizing Provider   pravastatin (PRAVACHOL) 10 mg tablet Take 1 Tablet by mouth nightly.  21   Cate Nieves D, NP   sildenafil citrate (Viagra) 100 mg tablet Take 1 Tablet by mouth daily as needed for Erectile Dysfunction. Indications: the inability to have an erection 12/10/21   Reginaldo ACEVEDO NP   atenoloL (TENORMIN) 50 mg tablet TAKE 1 TABLET BY MOUTH EVERY DAY 12/6/21   Reginaldo ACEVEDO NP   lidocaine (Lidoderm) 5 % Apply patch to the affected area for 12 hours a day and remove for 12 hours a day. Patient not taking: Reported on 12/10/2021 11/14/21   Maldonado Joshua PA   methylPREDNISolone (Medrol, Yair,) 4 mg tablet Follow instructions on packaging  Patient not taking: Reported on 12/10/2021 11/14/21   [facility] RORY JOLLY   nystatin (MYCOSTATIN) topical cream Apply  to affected area two (2) times a day. 11/8/21   Reginaldo ACEVEDO NP   nystatin (MYCOSTATIN) powder Dust area twice daily with powder after applying cream. 11/8/21   Reginaldo ACEVEDO NP   tiZANidine (ZANAFLEX) 4 mg tablet Take 1 Tablet by mouth three (3) times daily as needed for Muscle Spasm(s). Indications: muscle spasm  Patient not taking: Reported on 12/10/2021 11/8/21   Reginaldo ACEVEDO NP   bumetanide (BUMEX) 1 mg tablet Take 2 Tablets by mouth daily. 10/19/21   Reginaldo ACEVEDO NP   hydrALAZINE (APRESOLINE) 50 mg tablet TAKE 1 TABLET BY MOUTH TWICE DAILY FOR HIGH BLOOD PRESSURE 8/27/21   Juani Su MD   ascorbic acid, vitamin C, (Vitamin C) 1,000 mg tablet Take 1,000 mg by mouth daily. gummies    Provider, Historical   allopurinoL (ZYLOPRIM) 300 mg tablet TAKE 1 TABLET BY MOUTH DAILY 7/23/21   Juani Su MD   fenofibrate nanocrystallized (TRICOR) 145 mg tablet TAKE 1 TABLET BY MOUTH EVERY DAY 6/9/21   Juani Su MD   guaiFENesin (Mucinex) 1,200 mg Ta12 ER tablet Take 1,200 mg by mouth two (2) times a day. Provider, Historical   guaifenesin/pseudoephedrne HCl (MUCINEX D PO) Take  by mouth. Provider, Historical   omeprazole (PRILOSEC OTC) 20 mg tablet Take 20 mg by mouth daily. Provider, Historical   SI-VO-VK-Fe-Min-Lycopen-Lutein (CENTRUM) 0.4-162-18 mg Tab Take  by mouth.       Other, MD Carlos aspirin 81 mg chewable tablet Take 81 mg by mouth daily. Other, MD Carlos     Takes Miralax PRN and Metamucil to help with constipation; reports BM improved with diet changes made since initial consult but still believes not moving his bowels well enough or often enough - possibly related to pain medications    EXERCISE/PHYSICAL ACTIVITY: since end of November has started twice a week of physical therapy and walking in his yard about 10 minutes twice a week.      ALCOHOL / TOBACCO USE: wine 2x/month; no tobacco products    SOCIAL HISTORY:  he is retired , he lives with his wife    REPORTED WEIGHT HISTORY: down to 352 lbs with physical therapy in March of this year then regained weight; 391 lb was his heaviest; 6 years ago did FITiST program        ANTHROPOMETRICS:    Ht Readings from Last 1 Encounters:   12/10/21 5' 11\" (1.803 m)      Wt Readings from Last 10 Encounters:   12/10/21 (!) 162 kg (357 lb 3.2 oz)   11/22/21 (!) 162.8 kg (358 lb 12.8 oz)   11/14/21 (!) 165.6 kg (365 lb)   11/08/21 (!) 165.3 kg (364 lb 6.4 oz)   11/04/21 (!) 163.7 kg (361 lb)   10/15/21 (!) 166.3 kg (366 lb 9.6 oz)   09/17/21 (!) 166.2 kg (366 lb 6.4 oz)   08/20/21 (!) 168.9 kg (372 lb 6.4 oz)   07/29/21 (!) 167.8 kg (370 lb)   04/30/21 158.8 kg (350 lb)     Today's weight: 361.4 lbs upon arrival; rechecked weight after 3 trips to restroom to urinate during the 30 minute visit, post visit weight was 359.4 lbs     IBW:172 # +/- 10%  %IBW: 210% +/- 10%    BMI: 50.1 kg/M2 Category: morbid obesity    Weight Change Since Initial Consult: - 13 lbs  Weight Change Since Last Consult: +1 lbs (based on 359.4 lbs)      NUTRITION ASSESSMENT:    FOOD ALLERGIES/INTOLERANCES: no known    24 HOUR DIET RECALL  Breakfast  2 egg whites, sausage patties on a bun, diet soda   Snack     Lunch  6\" sub (turkey, ham, lettuce, tomato, onions, rothman), diet soda   Snack     Dinner   salad (turkey, ham, a little cheese, egg, tomatoes, onion) w/ Ranch dressing; [employer] roast beef sandwich (1.5 sandwiches), Diet Coke, water   Snack  Sugar free Jello (2)     Viet Simmons states limiting sausage / salas to about once a twice a week and no more. Also cut back on cheese in scrambled eggs. At least twice a week eating oatmeal or the flax cereal. Also eating [place] light yogurt. NUTRITION DIAGNOSIS:  Obesity related to food preferences, nutrition related knowledge deficit and large portions as evidenced by BMI >40 and diet history. ESTIMATED ENERGY NEEDS:  2000 (using [address] with AF 1.2; - 1000 for weight loss)    NUTRITION INTERVENTION:  Nutrition 30  minute follow-up one-on-one education & goal setting with Adair Simmons    Reviewed with dAair Simmons previous goals and plans established at last visit, any real or perceived barriers to obtaining the goal / adhering to plan, and progress towards goal made.       Collaborated with patient to set specific nutrient goals as well as specific food / behavior changes that will help patient meet the overall goal of: weight loss and improved LDL    NUTRITION EDUCATION / HANDOUTS PROVIDED:  - Healthy Holiday Tips   - December heart Healthy Recipes  - AND patient education \"cholesterol lowering nutrition therapy\" including recommended food list and sample menu  - heart healthy fats  - reading labels for saturated fat and heart healthy claims      PATIENT GOALS:    Weight Goal:    Short Term Weight Goal: 250 lbs  Long Term Weight Goal: 200 lbs    Nutrition Goals: (CONTINUE)    Calories: 2000 /day   Saturated Fat: no more than 13 g / day (7% calories)    - read and compare food labels   - measure portions  - use smaller plates & bowls  - separate TV from eating  - plan meals in advance  - decrease added fats and /or switch to reduced calorie \"light\" options  - make non-starchy vegetables the biggest portion of the meal      Exercise Goals: (Partiallyt Met, Continue)    - take walks at least every other day (no time requirement for now)            Specific tips and techniques to facilitate compliance with above recommendations were provided and discussed. Jj Thao Day verbalized understanding. The patient was encouraged to contact me as needed.       Follow-up: in 8 weeks              Patsy Olguin RD, [facility]

## 2021-12-20 ENCOUNTER — HOSPITAL ENCOUNTER (OUTPATIENT)
Dept: PHYSICAL THERAPY | Age: 66
Discharge: HOME | End: 2021-12-20
Payer: MEDICARE

## 2021-12-20 PROCEDURE — 97535 SELF CARE MNGMENT TRAINING: CPT

## 2021-12-20 PROCEDURE — 97110 THERAPEUTIC EXERCISES: CPT

## 2021-12-20 NOTE — PROGRESS NOTES
PT DAILY TREATMENT NOTE - [facility] 2-15    Patient Name: Asim Russ Day  Date:2021  : 1955  [x]  Patient  Verified  Payor: Yamini Kali / Plan: [place] & [place] / Product Type: Medicare /    In time: 9227  Out time: 2566  Total Treatment Time (min): 56 (patient required restroom break from 14:02-14:08pm; 14:45-14:46pm)  Total Timed Codes (min): 56  1:1 Treatment Time ([facility] only): 56  Visit #: 6    Treatment Area: Right leg pain [M79.604]    SUBJECTIVE  Pain Level (0-10 scale): 3 in L knee/lower leg  Any medication changes, allergies to medications, adverse drug reactions, diagnosis change, or new procedure performed?: [x] No    [] Yes (see summary sheet for update)  Subjective functional status/changes:   [] No changes reported    The patient reports he followed up with PCP a week ago Friday 12/10/21, who prescribed new statin medication. He followed up with registered dietitian this past week, who further discussed dietary changes which patient is implementing. He continues to experience L lower leg \"aching\" when standing for longer periods such as when in the shower, however low back and buttock pain has improved. He continues to slowly build up walking program as tolerated. OBJECTIVE    35 min Therapeutic Exercise:  [x] See flow sheet :   Rationale: increase ROM, increase strength, improve coordination, improve balance and increase proprioception to improve the patients ability to stand and walk for prolonged periods    21 min Self Care/Home Management:  []  See flow sheet : Includes time spent on patient education regarding comprehensive care, transition to independent fitness program, and general nutrition principles to maximize fitness and understanding of current condition. Patient verbalizing understanding of provided education with 100% accuracy using teach back method.    Rationale: increase ROM, increase strength, improve coordination, improve balance and increase proprioception  to improve the patients ability to stand and walk for prolonged periods          With   [x] TE   [] TA   [] Neuro   [x] SC   [] other: Patient Education: [x] Review HEP    [] Progressed/Changed HEP based on:   [] positioning   [] body mechanics   [] transfers   [] heat/ice application    [] other:      Other Objective/Functional Measures: Patient ambulating carrying SPC at arrival; noted improved mechanics and no use of assistive device with ambulation post-manual intervention     Pain Level (0-10 scale) post treatment: Patient notes increased muscle-induced soreness along L buttock and lateral lower leg    ASSESSMENT/Changes in Function:   The patient tolerated therapy visit well at this date. Continued emphasis on increased weight bearing exercise time with patient requiring intermittent rest breaks due to L LE aching and fatigue; notes no onset of pain throughout. Patient challenged by increased endurance bias with standing lateral/retro walk outs; noting increased L-sided \"soreness\" and fatigue post-intervention. Noted mild contralateral trunk lean with introduction of loaded suitcase carries, R > L. Patient very challenged due to fatigue, sequencing, and single limb postural control with weighted power ups L > R. Noted decreased L LE shuffling with ambulation particularly post-session. Patient noting significant fatigue, increase in L-sided exercise-induced soreness post-session today. Continue to progress as tolerated; plan to initiate transition to independent fitness program over coming 1-2 weeks due to demonstrated progress toward continued maintenance and gains.   Patient will continue to benefit from skilled PT services to modify and progress therapeutic interventions, address functional mobility deficits, address ROM deficits, address strength deficits, analyze and address soft tissue restrictions, analyze and cue movement patterns, analyze and modify body mechanics/ergonomics, assess and modify postural abnormalities, address imbalance/dizziness and instruct in home and community integration to attain remaining goals. [x]  See Plan of Care  []  See progress note/recertification  []  See Discharge Summary         Progress towards goals / Updated goals:    Short Term Goals: To be accomplished in 6-8 treatments: The patient will demonstrate understanding of and compliance with updated and progressive HEP toward improved participation in physical therapy plan of care. - Progressing              The patient will demonstrate (-) repeated motions testing toward improved functional endurance with prolonged standing and walking tasks. - Progressing              The patient will demonstrate bilateral hip flexion AROM >= 90 degrees without onset of familiar symptoms toward improved mechanics with stair navigation and transfers. - Progressing  Long Term Goals: To be accomplished in 12-16 treatments: The patient will demonstrate multiplanar bilateral hip strength increased by >= 1/2 MMT grade toward improved endurance with functional activities such as prolonged standing and walking tasks. - Progressing              The patient will demonstrate ability to complete >= 360' using LRAD at a level of I during Two Minute Walk Test toward improved endurance with prolonged walking tasks. - Progressing              The patient will score >= 62 on lumbar FOTO and >= 58 on upper leg FOTO to demonstrate significantly improved subjective report of function. - Progressing  Frequency / Duration: Patient to be seen 2 times per week for 12-16 treatments.     PLAN  [x]  Upgrade activities as tolerated     [x]  Continue plan of care  [x]  Update interventions per flow sheet       []  Discharge due to:_  []  Other:_      Arnaldo Hernandez, PT, DPT 12/20/2021

## 2021-12-22 ENCOUNTER — HOSPITAL ENCOUNTER (OUTPATIENT)
Dept: PHYSICAL THERAPY | Age: 66
Discharge: HOME | End: 2021-12-22
Payer: MEDICARE

## 2021-12-22 PROCEDURE — 97110 THERAPEUTIC EXERCISES: CPT

## 2021-12-22 PROCEDURE — 97535 SELF CARE MNGMENT TRAINING: CPT

## 2021-12-22 NOTE — PROGRESS NOTES
PT DAILY TREATMENT NOTE - [facility] 2-15    Patient Name: Karina Giron Day  Date:2021  : 1955  [x]  Patient  Verified  Payor: Tanisha Ring / Plan: [place] MEDICARE PART A & B / Product Type: Medicare /    In time: 130 Out time: 232  Total Treatment Time (min): 62  Total Timed Codes (min): 40  1:1 Treatment Time ([facility] only): 40  Visit #: 7    Treatment Area: Right leg pain [M79.604]    SUBJECTIVE  Pain Level (0-10 scale): 4 in L knee/lower leg  Any medication changes, allergies to medications, adverse drug reactions, diagnosis change, or new procedure performed?: [x] No    [] Yes (see summary sheet for update)  Subjective functional status/changes:   [] No changes reported  Patient reports he has been maintaining his activity level. OBJECTIVE    47 min Therapeutic Exercise:  [x] See flow sheet :   Rationale: increase ROM, increase strength, improve coordination, improve balance and increase proprioception to improve the patients ability to stand and walk for prolonged periods    15 min Self Care/Home Management:  []  See flow sheet : Includes time spent on patient education regarding comprehensive care, transition to independent fitness program, and general nutrition principles to maximize fitness and understanding of current condition. Patient verbalizing understanding of provided education with 100% accuracy using teach back method.    Rationale: increase ROM, increase strength, improve coordination, improve balance and increase proprioception  to improve the patients ability to stand and walk for prolonged periods          With   [x] TE   [] TA   [] Neuro   [x] SC   [] other: Patient Education: [x] Review HEP    [] Progressed/Changed HEP based on:   [] positioning   [] body mechanics   [] transfers   [] heat/ice application    [] other:      Other Objective/Functional Measures: none noted     Pain Level (0-10 scale) post treatment: Patient notes increased muscle-induced soreness along L buttock and lateral lower leg    ASSESSMENT/Changes in Function:   Patient continues to require rest breaks throughout session, focused on different ways to incorporate his HEP into a gym program. Tolerated all therex well, will continue to progress as tolerated. Patient will continue to benefit from skilled PT services to modify and progress therapeutic interventions, address functional mobility deficits, address ROM deficits, address strength deficits, analyze and address soft tissue restrictions, analyze and cue movement patterns, analyze and modify body mechanics/ergonomics, assess and modify postural abnormalities, address imbalance/dizziness and instruct in home and community integration to attain remaining goals. [x]  See Plan of Care  []  See progress note/recertification  []  See Discharge Summary         Progress towards goals / Updated goals:    Short Term Goals: To be accomplished in 6-8 treatments: The patient will demonstrate understanding of and compliance with updated and progressive HEP toward improved participation in physical therapy plan of care. - Progressing              The patient will demonstrate (-) repeated motions testing toward improved functional endurance with prolonged standing and walking tasks. - Progressing              The patient will demonstrate bilateral hip flexion AROM >= 90 degrees without onset of familiar symptoms toward improved mechanics with stair navigation and transfers. - Progressing  Long Term Goals: To be accomplished in 12-16 treatments: The patient will demonstrate multiplanar bilateral hip strength increased by >= 1/2 MMT grade toward improved endurance with functional activities such as prolonged standing and walking tasks. - Progressing              The patient will demonstrate ability to complete >= 360' using LRAD at a level of I during Two Minute Walk Test toward improved endurance with prolonged walking tasks.  - Progressing              The patient will score >= 62 on lumbar FOTO and >= 58 on upper leg FOTO to demonstrate significantly improved subjective report of function. - Progressing  Frequency / Duration: Patient to be seen 2 times per week for 12-16 treatments.     PLAN  [x]  Upgrade activities as tolerated     [x]  Continue plan of care  [x]  Update interventions per flow sheet       []  Discharge due to:_  []  Other:_      Severo Hartigan, PTA, DPT 12/22/2021

## 2021-12-27 ENCOUNTER — HOSPITAL ENCOUNTER (OUTPATIENT)
Dept: PHYSICAL THERAPY | Age: 66
Discharge: HOME | End: 2021-12-27
Payer: MEDICARE

## 2021-12-27 PROCEDURE — 97110 THERAPEUTIC EXERCISES: CPT

## 2021-12-27 PROCEDURE — 97535 SELF CARE MNGMENT TRAINING: CPT

## 2021-12-27 NOTE — PROGRESS NOTES
PT DAILY TREATMENT NOTE - [facility] 2-15    Patient Name: Regan Night Day  Date:2021  : 1955  [x]  Patient  Verified  Payor: Lewis Aileen / Plan: [place] & B / Product Type: Medicare /    In time: 8962 Out time: 1500  Total Treatment Time (min): 54 (patient required restroom breaks from 02:11pm-02:13pm, 02:54pm-02:56pm)  Total Timed Codes (min): 54  1:1 Treatment Time ( only): 54  Visit #: 8    Treatment Area: Right leg pain [M79.604]    SUBJECTIVE  Pain Level (0-10 scale): 1 \"achiness\" in L buttock/hip  Any medication changes, allergies to medications, adverse drug reactions, diagnosis change, or new procedure performed?: [x] No    [] Yes (see summary sheet for update)  Subjective functional status/changes:   [] No changes reported    The patient reports he had a nice holiday; he has some \"aching\" in his L-sided buttock however otherwise has been feeling good. He notes some stiffness when he first wakes up which eases once he moves. OBJECTIVE    44 min Therapeutic Exercise:  [x] See flow sheet :   Rationale: increase ROM, increase strength, improve coordination, improve balance and increase proprioception to improve the patients ability to stand and walk for prolonged periods    10 min Self Care/Home Management:  []  See flow sheet : Includes time spent on patient education regarding comprehensive care, transition to independent fitness program, and general nutrition principles to maximize fitness and understanding of current condition. Patient verbalizing understanding of provided education with 100% accuracy using teach back method.    Rationale: increase ROM, increase strength, improve coordination, improve balance and increase proprioception  to improve the patients ability to stand and walk for prolonged periods          With   [x] TE   [] TA   [] Neuro   [x] SC   [] other: Patient Education: [x] Review HEP    [] Progressed/Changed HEP based on:   [] positioning   [] body mechanics [] transfers   [] heat/ice application    [] other:      Other Objective/Functional Measures: None noted     Pain Level (0-10 scale) post treatment: Patient notes increased muscle-induced soreness along L buttock and lateral lower leg    ASSESSMENT/Changes in Function:   The patient tolerated therapy visit well at this date. Continued emphasis on increased weight bearing exercise time with patient requiring intermittent rest breaks due to L LE aching and fatigue; notes no onset of pain throughout. Patient challenged by increased endurance bias with standing lateral/retro walk outs. Patient able to complete standing chops without rest breaks between sets today. Patient very challenged due to fatigue, sequencing, and single limb postural control with weighted power ups L > R. Patient demonstrates good form with unloaded hip hinges, loss of form with addition of 20# sandbag due to processing overload. Noted decreased L LE shuffling with ambulation particularly post-session. Patient noting significant fatigue, increase in L-sided exercise-induced soreness post-session today. Continue to progress as tolerated; plan to initiate transition to independent fitness program next visit due to demonstrated progress toward continued maintenance and gains. Patient will continue to benefit from skilled PT services to modify and progress therapeutic interventions, address functional mobility deficits, address ROM deficits, address strength deficits, analyze and address soft tissue restrictions, analyze and cue movement patterns, analyze and modify body mechanics/ergonomics, assess and modify postural abnormalities, address imbalance/dizziness and instruct in home and community integration to attain remaining goals. [x]  See Plan of Care  []  See progress note/recertification  []  See Discharge Summary         Progress towards goals / Updated goals:    Short Term Goals: To be accomplished in 6-8 treatments:               The patient will demonstrate understanding of and compliance with updated and progressive HEP toward improved participation in physical therapy plan of care. - Progressing              The patient will demonstrate (-) repeated motions testing toward improved functional endurance with prolonged standing and walking tasks. - Progressing              The patient will demonstrate bilateral hip flexion AROM >= 90 degrees without onset of familiar symptoms toward improved mechanics with stair navigation and transfers. - Progressing  Long Term Goals: To be accomplished in 12-16 treatments: The patient will demonstrate multiplanar bilateral hip strength increased by >= 1/2 MMT grade toward improved endurance with functional activities such as prolonged standing and walking tasks. - Progressing              The patient will demonstrate ability to complete >= 360' using LRAD at a level of I during Two Minute Walk Test toward improved endurance with prolonged walking tasks. - Progressing              The patient will score >= 62 on lumbar FOTO and >= 58 on upper leg FOTO to demonstrate significantly improved subjective report of function. - Progressing  Frequency / Duration: Patient to be seen 2 times per week for 12-16 treatments.     PLAN  [x]  Upgrade activities as tolerated     [x]  Continue plan of care  [x]  Update interventions per flow sheet       []  Discharge due to:_  []  Other:_      Dorothea Marmolejo, PT, DPT 12/27/2021

## 2021-12-28 ENCOUNTER — OFFICE VISIT (OUTPATIENT)
Dept: SLEEP MEDICINE | Age: 66
End: 2021-12-28
Payer: MEDICARE

## 2021-12-28 VITALS
HEIGHT: 71 IN | HEART RATE: 65 BPM | BODY MASS INDEX: 44.1 KG/M2 | WEIGHT: 315 LBS | SYSTOLIC BLOOD PRESSURE: 138 MMHG | DIASTOLIC BLOOD PRESSURE: 79 MMHG | OXYGEN SATURATION: 99 %

## 2021-12-28 DIAGNOSIS — G47.33 OSA (OBSTRUCTIVE SLEEP APNEA): Primary | ICD-10-CM

## 2021-12-28 DIAGNOSIS — E66.01 MORBID OBESITY WITH BMI OF 50.0-59.9, ADULT (HCC): ICD-10-CM

## 2021-12-28 DIAGNOSIS — I10 ESSENTIAL HYPERTENSION: ICD-10-CM

## 2021-12-28 PROCEDURE — G8417 CALC BMI ABV UP PARAM F/U: HCPCS | Performed by: SPECIALIST

## 2021-12-28 PROCEDURE — 1101F PT FALLS ASSESS-DOCD LE1/YR: CPT | Performed by: SPECIALIST

## 2021-12-28 PROCEDURE — 99213 OFFICE O/P EST LOW 20 MIN: CPT | Performed by: SPECIALIST

## 2021-12-28 PROCEDURE — G8428 CUR MEDS NOT DOCUMENT: HCPCS | Performed by: SPECIALIST

## 2021-12-28 PROCEDURE — G8752 SYS BP LESS 140: HCPCS | Performed by: SPECIALIST

## 2021-12-28 PROCEDURE — G8536 NO DOC ELDER MAL SCRN: HCPCS | Performed by: SPECIALIST

## 2021-12-28 PROCEDURE — 3017F COLORECTAL CA SCREEN DOC REV: CPT | Performed by: SPECIALIST

## 2021-12-28 PROCEDURE — G8432 DEP SCR NOT DOC, RNG: HCPCS | Performed by: SPECIALIST

## 2021-12-28 PROCEDURE — G8754 DIAS BP LESS 90: HCPCS | Performed by: SPECIALIST

## 2021-12-28 NOTE — PROGRESS NOTES
[address] [facility] [place]. [place], [address]  Tel.  [phone]  Fax. [address] [facility] [address]  [place], [address]  Tel.  [phone]  Fax. [phone] [address] [facility] [place] [address]  Tel.  [phone]  Fax. [phone]         Chief Complaint       Chief Complaint   Patient presents with    Sleep Problem     6 wk f/u_DL in chart         HPI        Tiara London is a 77 y.o. male seen for follow-up. He was evaluated with a sleep study which demonstrated  AHI of 87.9/hr with a lowest SaO2 of 52%, duration of SaO2 < 88% 92.7 min. BIPAP 18/13 cm set up 8/1/2018. When seen 11/4/2021 CMS compliance criteria 37%. AHI 2.8/h but variable. CPAP clinic: Provided AirFit (L) fullface mask. Compliance data downloaded and reviewed in detail with the patient today. During the past 30 days, BIPAP used during 30 days with the average daily use of 5 hours. CMS compliance criteria 77%. AHI 1.5 per hour. Notes difficulty with current pressure of 18/13 cm. Allergies   Allergen Reactions    Ace Inhibitors Cough     Other reaction(s): Cough       Current Outpatient Medications   Medication Sig Dispense Refill    pravastatin (PRAVACHOL) 10 mg tablet Take 1 Tablet by mouth nightly. 90 Tablet 1    sildenafil citrate (Viagra) 100 mg tablet Take 1 Tablet by mouth daily as needed for Erectile Dysfunction. Indications: the inability to have an erection 30 Tablet 1    atenoloL (TENORMIN) 50 mg tablet TAKE 1 TABLET BY MOUTH EVERY DAY 90 Tablet 3    lidocaine (Lidoderm) 5 % Apply patch to the affected area for 12 hours a day and remove for 12 hours a day. 5 Each 0    methylPREDNISolone (Medrol, Yair,) 4 mg tablet Follow instructions on packaging 1 Dose Pack 0    nystatin (MYCOSTATIN) topical cream Apply  to affected area two (2) times a day.  30 g 2    nystatin (MYCOSTATIN) powder Dust area twice daily with powder after applying cream. 60 g 2    tiZANidine (ZANAFLEX) 4 mg tablet Take 1 Tablet by mouth three (3) times daily as needed for Muscle Spasm(s). Indications: muscle spasm 30 Tablet 0    bumetanide (BUMEX) 1 mg tablet Take 2 Tablets by mouth daily. 180 Tablet 0    hydrALAZINE (APRESOLINE) 50 mg tablet TAKE 1 TABLET BY MOUTH TWICE DAILY FOR HIGH BLOOD PRESSURE 180 Tablet 0    ascorbic acid, vitamin C, (Vitamin C) 1,000 mg tablet Take 1,000 mg by mouth daily. gummies      allopurinoL (ZYLOPRIM) 300 mg tablet TAKE 1 TABLET BY MOUTH DAILY 90 Tablet 2    fenofibrate nanocrystallized (TRICOR) 145 mg tablet TAKE 1 TABLET BY MOUTH EVERY DAY 90 Tablet 2    guaiFENesin (Mucinex) 1,200 mg Ta12 ER tablet Take 1,200 mg by mouth two (2) times a day.  guaifenesin/pseudoephedrne HCl (MUCINEX D PO) Take  by mouth.  omeprazole (PRILOSEC OTC) 20 mg tablet Take 20 mg by mouth daily.  GZ-QN-JX-Fe-Min-Lycopen-Lutein (CENTRUM) 0.4-162-18 mg Tab Take  by mouth.  aspirin 81 mg chewable tablet Take 81 mg by mouth daily. He  has a past medical history of Allergic rhinitis (11/22/2017), Chest pain syndrome (11/22/2017), Community acquired pneumonia, Diarrhea (11/22/2017), Dyspnea (11/22/2017), Environmental allergies, GERD (gastroesophageal reflux disease), Gout (11/22/2017), Hypertension, Insomnia (11/22/2017), Iron deficiency (11/22/2017), Left chronic serous otitis media (11/22/2017), Obesity (11/22/2017), Sleep apnea, and Steatosis (11/22/2017). He  has a past surgical history that includes hx orthopaedic; colorectal scrn; hi risk ind (4/30/2021); and colonoscopy (N/A, 4/30/2021). He family history includes Diabetes in his father, mother, and another family member; Heart Disease in an other family member; Hypertension in an other family member. He  reports that he has never smoked. He has never used smokeless tobacco. He reports current alcohol use. He reports that he does not use drugs.      Review of Systems:  Unchanged per patient      Objective: Visit Vitals  /79 (BP 1 Location: Left arm, BP Patient Position: Sitting, BP Cuff Size: Adult)   Pulse 65   Ht 5' 11\" (1.803 m)   Wt (!) 366 lb (166 kg)   SpO2 99%   BMI 51.05 kg/m²     Body mass index is 51.05 kg/m². General:   Conversant, cooperative                    Chest/Lungs:  Clear on auscultation    CVS:  Normal rate, regular rhythm        Neuro:  Speech fluent, face symmetrical             Assessment:       ICD-10-CM ICD-9-CM    1. TERESA (obstructive sleep apnea)  G47.33 327.23 AMB SUPPLY ORDER   2. Essential hypertension  I10 401.9    3. Morbid obesity with BMI of 50.0-59.9, adult ([facility]  E66.01 278.01     Z68.43 V85.43        History of sleep disordered breathing. Patient having difficulty tolerating current BiPAP settings; will be reduced to 16/11 cm. Air touch fullface mask may be preferable. Compliance review will be scheduled. PAP continues to benefit patient and remains necessary for control of his sleep apnea. Was advised that consistent control of sleep apnea benefits hypertension control and would also benefit weight reduction measures. Plan:     Orders Placed This Encounter    AMB SUPPLY ORDER     Diagnosis: Obstructive Sleep Apnea ICD-10 Code (G47.33)      CPAP mask and supplies-  Patient preference, headgear, heated tubing, and filter;  heated humidifier. Wireless modem. Remote monitoring enrollment.  Oral/Nasal Combo Mask 1 every 3 months.  Oral Cushion Combo Mask (Replace) 2 per month.  Nasal Pillows Combo Mask (Replace) 2 per month.  Full Face Mask 1 every 3 months.  Full Face Mask Cushion 1 per month.  Nasal Cushion (Replace) 2 per month.  Nasal Pillows (Replace) 2 per month.  Nasal Interface Mask 1 every 3 months.  Headgear 1 every 6 months.  Chinstrap 1 every 6 months.  Tubing 1 every 3 months.  Filter(s) Disposable 2 per month.  Filter(s) Non-Disposable 1 every 6 months.     Oral Interface 1 every 3 months. [address] for Randolph Castillo (Replace) 1 every 6 months.  Tubing with heating element 1 every 3 months.                 Katina Meigs, MD, [facility]  Diplomate, American Board of Sleep Medicine  NPI [id number]  Electronically signed 12/28/21       *A copy of compliance data was provided to the patient and reviewed in detail. *BiPAP will be  continued at the above pressure settings. The patient is to contact the office if there are problems with either mask or pressure settings. Follow-up will be scheduled at which time compliance data will be reviewed. * Patient has a history and examination consistent with the diagnosis of sleep apnea. * He was provided information on sleep apnea including corresponding risk factors and the importance of proper treatment. * Treatment options if indicated were reviewed today. * Potential benefit of weight reduction      Katina Meigs, MD, [facility]  Electronically signed 12/28/21        This note was created using voice recognition software. Despite editing, there may be syntax errors. This note will not be viewable in [address]

## 2021-12-29 ENCOUNTER — HOSPITAL ENCOUNTER (OUTPATIENT)
Dept: PHYSICAL THERAPY | Age: 66
Discharge: HOME | End: 2021-12-29
Payer: MEDICARE

## 2021-12-29 PROCEDURE — 97110 THERAPEUTIC EXERCISES: CPT

## 2021-12-29 NOTE — PROGRESS NOTES
[facility] Physical Therapy  [address] [place]  Phone: [phone] Fax: [phone]    Discharge Summary 2-15    Patient name: Pratima Simmons  : 1955  Provider#: [id number]  Referral source: Mauro Kelly MD      Medical/Treatment Diagnosis: Right leg pain [M79.604]     Prior Hospitalization: see medical history     Comorbidities: See Plan of Care  Prior Level of Function: See Plan of Care  Medications: Verified on Patient Summary List    Start of Care: 21      Onset Date: Chronic with infrequent exacerbations (most recently 2021)   Visits from Start of Care: 9     Missed Visits: 0  Reporting Period : 21 to 21    Assessment/Summary of care: The patient is a 69 year old Jonathan Alexandro has participated in 9 skilled physical therapy visits due to chronic L-sided low back/L leg pain with infrequent exacerbations. He is well known to this clinic, having participated in previous plan of care to address similar chief complaint. He demonstrates significantly improved signs and symptoms consistent with mobility deficits secondary to lumbar radiculopathy, including (-) repeated motions testing, increased multiplanar lumbar and bilateral hip A/PROM, increased multiplanar bilateral hip strength, increased functional endurance (450' without use of AD during Two Minute Walk Test), and improved movement patterns with transfers and ambulation. The patient scored 63 on lumbar FOTO and 65 on upper leg FOTO, demonstrating significantly improved subjective report of function over plan of care. He has met 5/6 physical therapy goals at this time.  Due to demonstrated significant subjective and objective progress over physical therapy plan of care, 5/6 physical therapy goals met, and demonstrated independence with updated and progressive HEP, the patient no longer requires skilled physical therapy services and is discharged to progressive HEP with transition to local sports performance/personal training services at this date.                                                   Lumbar AROM:                                                                                               R                      L                        Flexion                                     Fingertips to distal tibias                       Extension                                100%                                                                       Hip ROM (A/PROM)              R                     L  Flexion                                    79/108            70/108  Internal Rotation                     35                    41  External Rotation                   49                     50    Short Term Goals: To be accomplished in 6-8 treatments:              ZGB patient will demonstrate understanding of and compliance with updated and progressive HEP toward improved participation in physical therapy plan of care. - Met              The patient will demonstrate (-) repeated motions testing toward improved functional endurance with prolonged standing and walking tasks. - Met              The patient will demonstrate bilateral hip flexion AROM >= 90 degrees without onset of familiar symptoms toward improved mechanics with stair navigation and transfers. - Not Met  Long Term Goals: To be accomplished in 12-16 treatments:              The patient will demonstrate multiplanar bilateral hip strength increased by >= 1/2 MMT grade toward improved endurance with functional activities such as prolonged standing and walking tasks. - Met              The patient will demonstrate ability to complete >= 360' using LRAD at a level of I during Two Minute Walk Test toward improved endurance with prolonged walking tasks.  - Met              The patient will score >= 62 on lumbar FOTO and >= 58 on upper leg FOTO to demonstrate significantly improved subjective report of function. - Met    RECOMMENDATIONS:  [x]Discontinue therapy: [x]Patient has reached or is progressing toward set goals     []Patient is non-compliant or has abdicated     []Due to lack of appreciable progress towards set goals     []Other    Bao Bettencourt, PT, DPT 12/29/2021

## 2021-12-29 NOTE — PROGRESS NOTES
PT DAILY TREATMENT NOTE - [facility] 2-15    Patient Name: Sheryle Boys Day  Date:2021  : 1955  [x]  Patient  Verified  Payor: Carter Hdz / Plan: [place] & B / Product Type: Medicare /    In time: 5983 Out time:   Total Treatment Time (min): 41 (patient required restroom breaks from 14:02-14:04 and 14:12-14:14)  Total Timed Codes (min): 41  1:1 Treatment Time ( [address] only): 30  Visit #: 9    *The patient presented with his wife Marisol Vlilafuerte, who was present throughout therapy visit at this date*    Treatment Area: Right leg pain [M79.604]    SUBJECTIVE  Pain Level (0-10 scale): 0  Any medication changes, allergies to medications, adverse drug reactions, diagnosis change, or new procedure performed?: [x] No    [] Yes (see summary sheet for update)  Subjective functional status/changes:   [] No changes reported    The patient reports he has been doing well; he looks forward to initiating fitness program through [employer] next week. He notes he is walking better and no longer using his cane. He continues to report \"achiness\" into his L leg which alleviates when he rubs it and/or sits down.     OBJECTIVE    Gait and Functional Mobility: Patient ambulates without assistive device, demonstrating decreased nathaniel, wide base of support, bilateral LE ER, mild R trunk lean, and bilateral Trendelenberg                                                     Lumbar AROM:                                                                                               R                      L                        Flexion                                     Fingertips to distal tibias                       Extension                                100%                                                                       Hip ROM (A/PROM)              R                     L  Flexion                                    79/108            70/108  Internal Rotation                     35                   41  External Rotation                   49                     50     LOWER QUARTER                            MUSCLE STRENGTH  KEY                                                                             R                      L  0 - No Contraction                   L1, L2 Psoas               5                      5  1 - Trace                                  L3 Quads                    5                      4+  2 - Poor                                   L4 Tib Ant                    5                       5  3 - Fair                                     L5 EHL                        5                       5  4 - Good                                  S1 Peroneals              4+                     4+  5 - Normal                               S2 Hams                     4+                      5     Flexibility: Hamstring 90-90 Test = (+) bilaterally; Carlos Test = (+) bilaterally (iliopsoas)                            MMT:               HIP Ext: R = 4+/L = 4+              HIP Abd: R = 4+/L = 4+              HIP Add: R = 4/L = 4     Special Tests:               Trendelenberg: (+) bilaterally                                      FABERS: (-) bilaterally              Repeated Motions: Flexion = (-); Extension = (-)     Functional Tests:  Two Minute Walk Test: 450' without AD and close (S) for safety    41 min Therapeutic Exercise:  [x] See flow sheet : Includes time spent on re-assessment/discharge tests and measures. Additionally, includes time spent educating patient on appropriate and continued maintenance and HEP progressions at minimum 3-4x/week to maintain gains, as well as on potential benefits of transition to fitness program planned for next week. Patient and his wife verbalizing and demonstrating understanding of provided education with 100% accuracy using teach back method.    Rationale: increase ROM, increase strength, improve coordination, improve balance and increase proprioception to improve the patients ability to stand and walk for prolonged periods          With   [x] TE   [] TA   [] Neuro   [] SC   [] other: Patient Education: [x] Review HEP    [] Progressed/Changed HEP based on:   [] positioning   [] body mechanics   [] transfers   [] heat/ice application    [] other:      Other Objective/Functional Measures: FOTO Functional Measure: 63/100 (lumbar spine); 65/100 (upper leg)     Pain Level (0-10 scale) post treatment: Patient notes increased muscle-induced soreness along L buttock and lateral lower leg with ambulation post-session today    ASSESSMENT/Changes in Function:   The patient is a 77year old male who has participated in 9 skilled physical therapy visits due to chronic L-sided low back/L leg pain with infrequent exacerbations. He is well known to this clinic, having participated in previous plan of care to address similar chief complaint. He demonstrates significantly improved signs and symptoms consistent with mobility deficits secondary to lumbar radiculopathy, including (-) repeated motions testing, increased multiplanar lumbar and bilateral hip A/PROM, increased multiplanar bilateral hip strength, increased functional endurance (450' without use of AD during Two Minute Walk Test), and improved movement patterns with transfers and ambulation. The patient scored 63 on lumbar FOTO and 65 on upper leg FOTO, demonstrating significantly improved subjective report of function over plan of care. He has met 5/6 physical therapy goals at this time. Due to demonstrated significant subjective and objective progress over physical therapy plan of care, 5/6 physical therapy goals met, and demonstrated independence with updated and progressive HEP, the patient no longer requires skilled physical therapy services and is discharged to progressive HEP with transition to local sports performance/personal training services at this date.      []  See Plan of Care  []  See progress note/recertification  [x]  See Discharge Summary         Progress towards goals / Updated goals:    Short Term Goals: To be accomplished in 6-8 treatments: The patient will demonstrate understanding of and compliance with updated and progressive HEP toward improved participation in physical therapy plan of care. - Met              The patient will demonstrate (-) repeated motions testing toward improved functional endurance with prolonged standing and walking tasks. - Met              The patient will demonstrate bilateral hip flexion AROM >= 90 degrees without onset of familiar symptoms toward improved mechanics with stair navigation and transfers. - Not Met  Long Term Goals: To be accomplished in 12-16 treatments: The patient will demonstrate multiplanar bilateral hip strength increased by >= 1/2 MMT grade toward improved endurance with functional activities such as prolonged standing and walking tasks. - Met              The patient will demonstrate ability to complete >= 360' using LRAD at a level of I during Two Minute Walk Test toward improved endurance with prolonged walking tasks.  - Met              The patient will score >= 62 on lumbar FOTO and >= 58 on upper leg FOTO to demonstrate significantly improved subjective report of function. - Met    PLAN  []  Upgrade activities as tolerated     []  Continue plan of care  []  Update interventions per flow sheet       [x]  Discharge due to: Patient has met 5/6 physical therapy goals and plans to transition to local fitness center for personal training services at this time  []  Other:_      Nonda Breana, PT, DPT 12/29/2021

## 2021-12-30 ENCOUNTER — DOCUMENTATION ONLY (OUTPATIENT)
Dept: SLEEP MEDICINE | Age: 66
End: 2021-12-30

## 2022-01-18 RX ORDER — BUMETANIDE 1 MG/1
2 TABLET ORAL DAILY
Qty: 180 TABLET | Refills: 0 | Status: SHIPPED | OUTPATIENT
Start: 2022-01-18 | End: 2022-04-18

## 2022-02-08 ENCOUNTER — OFFICE VISIT (OUTPATIENT)
Dept: SLEEP MEDICINE | Age: 67
End: 2022-02-08
Payer: MEDICARE

## 2022-02-08 VITALS
OXYGEN SATURATION: 96 % | WEIGHT: 315 LBS | HEIGHT: 71 IN | HEART RATE: 58 BPM | BODY MASS INDEX: 44.1 KG/M2 | SYSTOLIC BLOOD PRESSURE: 137 MMHG | TEMPERATURE: 98.7 F | DIASTOLIC BLOOD PRESSURE: 81 MMHG

## 2022-02-08 DIAGNOSIS — E66.01 MORBID OBESITY WITH BMI OF 50.0-59.9, ADULT (HCC): ICD-10-CM

## 2022-02-08 DIAGNOSIS — G47.33 OSA (OBSTRUCTIVE SLEEP APNEA): Primary | ICD-10-CM

## 2022-02-08 PROCEDURE — 3017F COLORECTAL CA SCREEN DOC REV: CPT | Performed by: SPECIALIST

## 2022-02-08 PROCEDURE — G8752 SYS BP LESS 140: HCPCS | Performed by: SPECIALIST

## 2022-02-08 PROCEDURE — G8417 CALC BMI ABV UP PARAM F/U: HCPCS | Performed by: SPECIALIST

## 2022-02-08 PROCEDURE — G8536 NO DOC ELDER MAL SCRN: HCPCS | Performed by: SPECIALIST

## 2022-02-08 PROCEDURE — 99213 OFFICE O/P EST LOW 20 MIN: CPT | Performed by: SPECIALIST

## 2022-02-08 PROCEDURE — G8427 DOCREV CUR MEDS BY ELIG CLIN: HCPCS | Performed by: SPECIALIST

## 2022-02-08 PROCEDURE — 1101F PT FALLS ASSESS-DOCD LE1/YR: CPT | Performed by: SPECIALIST

## 2022-02-08 PROCEDURE — G8754 DIAS BP LESS 90: HCPCS | Performed by: SPECIALIST

## 2022-02-08 PROCEDURE — G8432 DEP SCR NOT DOC, RNG: HCPCS | Performed by: SPECIALIST

## 2022-02-08 NOTE — PROGRESS NOTES
[address] [facility] [place]. [place], [facility]  Tel.  [phone]  Fax. [phone] [facility], [address] [facility]  Tel.  [phone]  Fax. [phone] [address]   Tel.  [phone]  Fax. [phone]         Chief Complaint       No chief complaint on file. MUKESH Talley is a 74 y.o. male seen for follow-up. He was evaluated with a sleep study which demonstrated  AHI of 87.9/hr with a lowest SaO2 of 52%, duration of SaO2 < 88% 92.7 min. BIPAP 18/13 cm set up 8/1/2018.      When seen 11/4/2021 CMS compliance criteria 37%. AHI 2.8/h but variable. CPAP clinic: Provided AirFit (L) fullface mask. At visit 12/28/21 noted difficulty with current pressure of 18/13 cm. Reduced to I 16./E 11 cm. Compliance data downloaded and reviewed in detail with the patient today. During the past 30 days, BIPAP used during 30 days with the average daily use of 4.4 hours. CMS compliance criteria 87%. AHI 2 per hour    Is not experiencing significant daytime sleepiness when using BiPAP. [place] Sleepiness Scale: 9    Allergies   Allergen Reactions    Ace Inhibitors Cough     Other reaction(s): Cough       Current Outpatient Medications   Medication Sig Dispense Refill    bumetanide (BUMEX) 1 mg tablet TAKE 2 TABLETS BY MOUTH DAILY 180 Tablet 0    pravastatin (PRAVACHOL) 10 mg tablet Take 1 Tablet by mouth nightly. 90 Tablet 1    atenoloL (TENORMIN) 50 mg tablet TAKE 1 TABLET BY MOUTH EVERY DAY 90 Tablet 3    nystatin (MYCOSTATIN) topical cream Apply  to affected area two (2) times a day. 30 g 2    nystatin (MYCOSTATIN) powder Dust area twice daily with powder after applying cream. 60 g 2    hydrALAZINE (APRESOLINE) 50 mg tablet TAKE 1 TABLET BY MOUTH TWICE DAILY FOR HIGH BLOOD PRESSURE 180 Tablet 0    ascorbic acid, vitamin C, (Vitamin C) 1,000 mg tablet Take 1,000 mg by mouth daily.  gummies      allopurinoL (ZYLOPRIM) 300 mg tablet TAKE 1 TABLET BY MOUTH DAILY 90 Tablet 2    fenofibrate nanocrystallized (TRICOR) 145 mg tablet TAKE 1 TABLET BY MOUTH EVERY DAY 90 Tablet 2    guaiFENesin (Mucinex) 1,200 mg Ta12 ER tablet Take 1,200 mg by mouth two (2) times a day.  guaifenesin/pseudoephedrne HCl (MUCINEX D PO) Take  by mouth.  omeprazole (PRILOSEC OTC) 20 mg tablet Take 20 mg by mouth daily.  JV-PX-NC-Fe-Min-Lycopen-Lutein (CENTRUM) 0.4-162-18 mg Tab Take  by mouth.  aspirin 81 mg chewable tablet Take 81 mg by mouth daily.  sildenafil citrate (Viagra) 100 mg tablet Take 1 Tablet by mouth daily as needed for Erectile Dysfunction. Indications: the inability to have an erection (Patient not taking: Reported on 2/8/2022) 30 Tablet 1    lidocaine (Lidoderm) 5 % Apply patch to the affected area for 12 hours a day and remove for 12 hours a day. (Patient not taking: Reported on 2/8/2022) 5 Each 0    methylPREDNISolone (Medrol, Yair,) 4 mg tablet Follow instructions on packaging (Patient not taking: Reported on 2/8/2022) 1 Dose Pack 0    tiZANidine (ZANAFLEX) 4 mg tablet Take 1 Tablet by mouth three (3) times daily as needed for Muscle Spasm(s). Indications: muscle spasm (Patient not taking: Reported on 2/8/2022) 30 Tablet 0        He  has a past medical history of Allergic rhinitis (11/22/2017), Chest pain syndrome (11/22/2017), Community acquired pneumonia, Diarrhea (11/22/2017), Dyspnea (11/22/2017), Environmental allergies, GERD (gastroesophageal reflux disease), Gout (11/22/2017), Hypertension, Insomnia (11/22/2017), Iron deficiency (11/22/2017), Left chronic serous otitis media (11/22/2017), Obesity (11/22/2017), Sleep apnea, and Steatosis (11/22/2017). He  has a past surgical history that includes hx orthopaedic; colorectal scrn; hi risk ind (4/30/2021); and colonoscopy (N/A, 4/30/2021).     He family history includes Diabetes in his father, mother, and another family member; Heart Disease in an other family member; Hypertension in an other family member. He  reports that he has never smoked. He has never used smokeless tobacco. He reports current alcohol use. He reports that he does not use drugs. Review of Systems:  Unchanged per patient      Objective:     Visit Vitals  /81   Pulse (!) 58   Temp 98.7 °F (37.1 °C)   Ht 5' 11\" (1.803 m)   Wt (!) 366 lb (166 kg)   SpO2 96%   BMI 51.05 kg/m²     Body mass index is 51.05 kg/m². General:   Conversant, cooperative   Eyes:   no nystagmus                   CVS:  Normal rate, regular rhythm        Neuro:  Speech fluent, face symmetrical             Assessment:       ICD-10-CM ICD-9-CM    1. TERESA (obstructive sleep apnea)  G47.33 327.23 AMB SUPPLY ORDER   2. Morbid obesity with BMI of 50.0-59.9, adult ([facility])  E66.01 278.01     Z68.43 V85.43        he is compliant with PAP therapy and PAP continues to benefit patient and remains necessary for control of his sleep apnea. Patient would benefit from weight reduction. He was advised that weight loss measures tend to be less effective if sleep disordered breathing not consistently treated. Plan:     Orders Placed This Encounter    AMB SUPPLY ORDER     CPAP mask and supplies-  Patient preference, headgear, heated tubing, and filter;  heated humidifier. Wireless modem. Remote monitoring enrollment.  Oral/Nasal Combo Mask 1 every 3 months.  Oral Cushion Combo Mask (Replace) 2 per month.  Nasal Pillows Combo Mask (Replace) 2 per month.  Full Face Mask 1 every 3 months.  Full Face Mask Cushion 1 per month.  Nasal Cushion (Replace) 2 per month.  Nasal Pillows (Replace) 2 per month.  Nasal Interface Mask 1 every 3 months.  Headgear 1 every 6 months.  Chinstrap 1 every 6 months.  Tubing 1 every 3 months.  Filter(s) Disposable 2 per month.  Filter(s) Non-Disposable 1 every 6 months.  Oral Interface 1 every 3 months.     Water Chamber for Randolph Castillo (Replace) 1 every 6 months.  Tubing with heating element 1 every 3 months.                 Pearl Kaufman MD, [facility], [facility]  Diplomate, American Board of Sleep Medicine  NPI [id number]  Electronically signed 2/8/22       *A copy of compliance data was provided to the patient and reviewed in detail. *BiPAP will be continued at the above pressure settings. The patient is to contact the office if there are problems with either mask or pressure settings. Follow-up will be scheduled at which time compliance data will be reviewed. * Patient has a history and examination consistent with the diagnosis of sleep apnea. * He was provided information on sleep apnea including corresponding risk factors and the importance of proper treatment. * Treatment options if indicated were reviewed today. * Potential benefit of weight reduction was discussed      Pearl Kaufman MD, [facility]  Electronically signed 02/08/22        This note was created using voice recognition software. Despite editing, there may be syntax errors. This note will not be viewable in [address]

## 2022-02-15 ENCOUNTER — TELEPHONE (OUTPATIENT)
Dept: CARDIAC REHAB | Age: 67
End: 2022-02-15

## 2022-02-15 NOTE — TELEPHONE ENCOUNTER
TELEPHONE ENCOUNTER: OP NUTRITION APPOINTMENT     Reminder call placed on 2/15/2022    Outcome:                 [x] Patient confirmed appointment              [] Patient rescheduled appointment              [] Unable to reach              [] Left message              [] Other:        Keeley Delacruz RD

## 2022-02-18 ENCOUNTER — HOSPITAL ENCOUNTER (OUTPATIENT)
Dept: CARDIAC REHAB | Age: 67
Discharge: HOME | End: 2022-02-18
Attending: INTERNAL MEDICINE
Payer: MEDICARE

## 2022-02-18 VITALS — BODY MASS INDEX: 51.13 KG/M2 | WEIGHT: 315 LBS

## 2022-02-18 PROCEDURE — 97803 MED NUTRITION INDIV SUBSEQ: CPT | Performed by: DIETITIAN, REGISTERED

## 2022-02-18 NOTE — PROGRESS NOTES
[address] NOTE  DATE: 2022      REFERRING PHYSICIAN: Dr. Kathy Silva    NAME: Wanda Simmons : 1955 AGE: 74 y.o. GENDER: male    REASON FOR VISIT: morbid obesity, Type 2 diabetes FOLLOW-UP    PAST MEDICAL HISTORY:   Patient Active Problem List   Diagnosis Code    Essential hypertension, benign I10    Esophageal reflux K21.9    Hyperlipidemia associated with type 2 diabetes mellitus ([facility] [address] E11.69, E78.5    TERESA (obstructive sleep apnea) Z12.10    Metabolic syndrome U57.07    Gout M10.9    Insomnia G47.00    Obesity, morbid ([facility] E66.01    Annual physical exam Z00.00    Diabetes mellitus type 2, diet-controlled ([facility] E11.9    Hypertriglyceridemia E78.1    Peripheral vascular disease (HCC) I73.9    Type 2 diabetes mellitus with diabetic neuropathy ([facility] E11.40    Type 2 diabetes mellitus with chronic kidney disease ([facility] E11.22     Sleep had improved from 4-5 up to  6-7 hours sleep per night since getting a new fit CPap mask but patient states now back down to just over 4 hours a night    LABS:   Lab Results   Component Value Date/Time    Cholesterol, total 183 12/10/2021 11:03 AM    Cholesterol (POC) 172 2018 08:32 AM    HDL Cholesterol 43 12/10/2021 11:03 AM    HDL Cholesterol (POC) 29 (A) 2018 08:32 AM    LDL Cholesterol (POC) 108.2 2018 08:32 AM    LDL, calculated 116.8 (H) 12/10/2021 11:03 AM    VLDL, calculated 23.2 12/10/2021 11:03 AM    Triglyceride 116 12/10/2021 11:03 AM    Triglycerides (POC) 174 2018 08:32 AM    CHOL/HDL Ratio 4.3 12/10/2021 11:03 AM     Lab Results   Component Value Date/Time    Hemoglobin A1c 5.4 12/10/2021 11:03 AM    Hemoglobin A1c (POC) 5.6 2018 08:32 AM     No SMBG      MEDICATIONS/SUPPLEMENTS:   [unfilled]  Prior to Admission medications    Medication Sig Start Date End Date Taking?  Authorizing Provider   bumetanide (BUMEX) 1 mg tablet TAKE 2 TABLETS BY MOUTH DAILY 22   Tamia Meadows NP   pravastatin (PRAVACHOL) 10 mg tablet Take 1 Tablet by mouth nightly. 12/13/21   Donis ACEVEDO NP   sildenafil citrate (Viagra) 100 mg tablet Take 1 Tablet by mouth daily as needed for Erectile Dysfunction. Indications: the inability to have an erection  Patient not taking: Reported on 2/8/2022 12/10/21   Donis ACEVEDO NP   atenoloL (TENORMIN) 50 mg tablet TAKE 1 TABLET BY MOUTH EVERY DAY 12/6/21   Donis ACEVEDO NP   lidocaine (Lidoderm) 5 % Apply patch to the affected area for 12 hours a day and remove for 12 hours a day. Patient not taking: Reported on 2/8/2022 11/14/21   Konstantin Joshua PA   methylPREDNISolone (Medrol, Yair,) 4 mg tablet Follow instructions on packaging  Patient not taking: Reported on 2/8/2022 11/14/21   [facility] RORY JOLLY   nystatin (MYCOSTATIN) topical cream Apply  to affected area two (2) times a day. 11/8/21   Donis ACEVEDO NP   nystatin (MYCOSTATIN) powder Dust area twice daily with powder after applying cream. 11/8/21   Donis ACEVEDO NP   tiZANidine (ZANAFLEX) 4 mg tablet Take 1 Tablet by mouth three (3) times daily as needed for Muscle Spasm(s). Indications: muscle spasm  Patient not taking: Reported on 2/8/2022 11/8/21   Donis ACEVEDO NP   hydrALAZINE (APRESOLINE) 50 mg tablet TAKE 1 TABLET BY MOUTH TWICE DAILY FOR HIGH BLOOD PRESSURE 8/27/21   Odessa Izquierdo MD   ascorbic acid, vitamin C, (Vitamin C) 1,000 mg tablet Take 1,000 mg by mouth daily. gummies    Provider, Historical   allopurinoL (ZYLOPRIM) 300 mg tablet TAKE 1 TABLET BY MOUTH DAILY 7/23/21   Odessa Izquierdo MD   fenofibrate nanocrystallized (TRICOR) 145 mg tablet TAKE 1 TABLET BY MOUTH EVERY DAY 6/9/21   Odessa Izquierdo MD   guaiFENesin (Mucinex) 1,200 mg Ta12 ER tablet Take 1,200 mg by mouth two (2) times a day. Provider, Historical   guaifenesin/pseudoephedrne HCl (MUCINEX D PO) Take  by mouth. Provider, Historical   omeprazole (PRILOSEC OTC) 20 mg tablet Take 20 mg by mouth daily.       Provider, Historical AX-NM-LW-Fe-Min-Lycopen-Lutein (CENTRUM) 0.4-162-18 mg Tab Take  by mouth. Carlos Coppola MD   aspirin 81 mg chewable tablet Take 81 mg by mouth daily. Carlos Coppola MD     Takes Miralax PRN and Metamucil to help with constipation; reports BM improved with diet changes made since initial consult but still believes not moving his bowels well enough or often enough - possibly related to pain medications    EXERCISE/PHYSICAL ACTIVITY: working out with [employer] twice a week since January, after physical therapy ended (was twice a week in December)      ALCOHOL / TOBACCO USE: wine 2x/month; no tobacco products    SOCIAL HISTORY:  he is retired , he lives with his wife    REPORTED WEIGHT HISTORY: down to 352 lbs with physical therapy in March of this year then regained weight; 391 lb was his heaviest; 6 years ago did [employer] program        ANTHROPOMETRICS:    Ht Readings from Last 1 Encounters:   02/08/22 5' 11\" (1.803 m)      Wt Readings from Last 10 Encounters:   02/18/22 (!) 166.3 kg (366 lb 9.6 oz)   02/08/22 (!) 166 kg (366 lb)   12/28/21 (!) 166 kg (366 lb)   12/17/21 (!) 163 kg (359 lb 6.4 oz)   12/10/21 (!) 162 kg (357 lb 3.2 oz)   11/22/21 (!) 162.8 kg (358 lb 12.8 oz)   11/14/21 (!) 165.6 kg (365 lb)   11/08/21 (!) 165.3 kg (364 lb 6.4 oz)   11/04/21 (!) 163.7 kg (361 lb)   10/15/21 (!) 166.3 kg (366 lb 9.6 oz)          IBW:172 # +/- 10%  %IBW: 210% +/- 10%    BMI: 50.1 kg/M2 Category: morbid obesity    Weight Change Since Initial Consult: - 5.8 lbs  Weight Change Since Last Consult: +7.2 lbs    Patient states he noticed weight gain and cough after starting pravastatin      NUTRITION ASSESSMENT:    FOOD ALLERGIES/INTOLERANCES: no known      Darral Conchis C Day states reading labels, eating more vegetables than meat. Purchasing smaller size sweet potatoes and eating the peel now too.  Choosing [employer]anes's soft serve, lavon crackers w/ PB, or sugar free Jello with whipped cream on it, for snack. Using SF gum to help curb snacking. Did plan some meals in advance and found it helpful, but states needs to do it more often. NUTRITION DIAGNOSIS:  Obesity related to food preferences, nutrition related knowledge deficit and large portions as evidenced by BMI >40 and diet history. ESTIMATED ENERGY NEEDS:  2000 (using [employer] [place] with AF 1.2; - 1000 for weight loss)    NUTRITION INTERVENTION:  Nutrition 30  minute follow-up one-on-one education & goal setting with Jaqui Simmons    Reviewed with Jaqui Simmons previous goals and plans established at last visit, any real or perceived barriers to obtaining the goal / adhering to plan, and progress towards goal made.       Collaborated with patient to set specific nutrient goals as well as specific food / behavior changes that will help patient meet the overall goal of: weight loss, prevention of fluid retention, and improved LDL    NUTRITION EDUCATION / HANDOUTS PROVIDED:  - Healthy Holiday Tips   - December heart Healthy Recipes  - AND patient education \"cholesterol lowering nutrition therapy\" including recommended food list and sample menu  - heart healthy fats  - reading labels for saturated fat and heart healthy claims      PATIENT GOALS:    Weight Goal:    Short Term Weight Goal: 250 lbs  Long Term Weight Goal: 200 lbs    Nutrition Goals: (CONTINUE)    Calories: 2000 /day   Saturated Fat: no more than 13 g / day (7% calories)  Sodium: < 1500 to 2000 mg per day    - read and compare food labels   - measure portions  - use smaller plates & bowls  - plan meals in advance  - decrease added fats and /or switch to reduced calorie \"light\" options  - make non-starchy vegetables the biggest portion of the meal  - Switch from soft serve ice cream to [employer] [place] yogurt  - separate TV from eating      Exercise Goals: (Partiallyt Met, Continue)    - take walks or work out at least every other day (no time requirement for now) Specific tips and techniques to facilitate compliance with above recommendations were provided and discussed. Ravin Simmons verbalized understanding. The patient was encouraged to contact me as needed.       Follow-up: in 8 weeks              Justin Collet, RD, [facility]

## 2022-03-18 DIAGNOSIS — E78.5 HYPERLIPIDEMIA ASSOCIATED WITH TYPE 2 DIABETES MELLITUS (HCC): ICD-10-CM

## 2022-03-18 DIAGNOSIS — E11.69 HYPERLIPIDEMIA ASSOCIATED WITH TYPE 2 DIABETES MELLITUS (HCC): ICD-10-CM

## 2022-03-18 DIAGNOSIS — E78.1 HYPERTRIGLYCERIDEMIA: ICD-10-CM

## 2022-03-18 PROBLEM — M10.9 GOUT: Status: ACTIVE | Noted: 2017-11-22

## 2022-03-18 PROBLEM — E66.01 OBESITY, MORBID (HCC): Status: ACTIVE | Noted: 2017-12-04

## 2022-03-18 PROBLEM — Z00.00 ANNUAL PHYSICAL EXAM: Status: ACTIVE | Noted: 2017-12-04

## 2022-03-18 PROBLEM — G47.00 INSOMNIA: Status: ACTIVE | Noted: 2017-11-22

## 2022-03-18 RX ORDER — FENOFIBRATE 145 MG/1
TABLET, COATED ORAL
Qty: 90 TABLET | Refills: 0 | Status: SHIPPED | OUTPATIENT
Start: 2022-03-18 | End: 2022-06-17 | Stop reason: SDUPTHER

## 2022-03-18 RX ORDER — PRAVASTATIN SODIUM 10 MG/1
10 TABLET ORAL
Qty: 90 TABLET | Refills: 0 | Status: SHIPPED | OUTPATIENT
Start: 2022-03-18 | End: 2022-06-17 | Stop reason: SDUPTHER

## 2022-03-18 NOTE — TELEPHONE ENCOUNTER
Last Refill: 6-9-21  Last Visit: 12/10/2021   Next Visit: 5/3/2022     Requested Prescriptions     Pending Prescriptions Disp Refills    fenofibrate nanocrystallized (TRICOR) 145 mg tablet 90 Tablet 0     Sig: TAKE 1 TABLET BY MOUTH EVERY DAY    pravastatin (PRAVACHOL) 10 mg tablet 90 Tablet 0     Sig: Take 1 Tablet by mouth nightly.

## 2022-03-19 PROBLEM — E78.1 HYPERTRIGLYCERIDEMIA: Status: ACTIVE | Noted: 2020-10-05

## 2022-03-19 PROBLEM — E11.22 TYPE 2 DIABETES MELLITUS WITH CHRONIC KIDNEY DISEASE (HCC): Status: ACTIVE | Noted: 2021-12-10

## 2022-03-19 PROBLEM — I73.9 PERIPHERAL VASCULAR DISEASE (HCC): Status: ACTIVE | Noted: 2020-10-12

## 2022-03-19 PROBLEM — E11.69 HYPERLIPIDEMIA ASSOCIATED WITH TYPE 2 DIABETES MELLITUS (HCC): Status: ACTIVE | Noted: 2017-09-11

## 2022-03-19 PROBLEM — E78.5 HYPERLIPIDEMIA ASSOCIATED WITH TYPE 2 DIABETES MELLITUS (HCC): Status: ACTIVE | Noted: 2017-09-11

## 2022-03-20 PROBLEM — G47.33 OSA (OBSTRUCTIVE SLEEP APNEA): Status: ACTIVE | Noted: 2017-09-11

## 2022-03-20 PROBLEM — E88.810 METABOLIC SYNDROME: Status: ACTIVE | Noted: 2017-09-11

## 2022-03-20 PROBLEM — E11.40 TYPE 2 DIABETES MELLITUS WITH DIABETIC NEUROPATHY (HCC): Status: ACTIVE | Noted: 2020-10-12

## 2022-03-20 PROBLEM — E11.9 DIABETES MELLITUS TYPE 2, DIET-CONTROLLED (HCC): Status: ACTIVE | Noted: 2018-01-22

## 2022-03-20 PROBLEM — E88.81 METABOLIC SYNDROME: Status: ACTIVE | Noted: 2017-09-11

## 2022-04-13 ENCOUNTER — TELEPHONE (OUTPATIENT)
Dept: CARDIAC REHAB | Age: 67
End: 2022-04-13

## 2022-04-13 NOTE — TELEPHONE ENCOUNTER
TELEPHONE ENCOUNTER: OP NUTRITION APPOINTMENT     Reminder call placed on 4/13/2022    Outcome:                 [] Patient confirmed appointment              [] Patient rescheduled appointment              [] Unable to reach              [x] Left message              [] Other:        Lashay Santo RD

## 2022-04-15 ENCOUNTER — HOSPITAL ENCOUNTER (OUTPATIENT)
Dept: CARDIAC REHAB | Age: 67
Discharge: HOME | End: 2022-04-15
Attending: INTERNAL MEDICINE
Payer: MEDICARE

## 2022-04-15 VITALS — BODY MASS INDEX: 51.3 KG/M2 | WEIGHT: 315 LBS

## 2022-04-15 PROCEDURE — 97803 MED NUTRITION INDIV SUBSEQ: CPT | Performed by: DIETITIAN, REGISTERED

## 2022-04-15 NOTE — PROGRESS NOTES
[address] NOTE  DATE: 4/15/2022      REFERRING PHYSICIAN: Dr. Daniel Bran    NAME: Alexis Simmons : 1955 AGE: 74 y.o. GENDER: male    REASON FOR VISIT: morbid obesity, Type 2 diabetes FOLLOW-UP    PAST MEDICAL HISTORY:   Patient Active Problem List   Diagnosis Code    Essential hypertension, benign I10    Esophageal reflux K21.9    Hyperlipidemia associated with type 2 diabetes mellitus ([facility] E11.69, E78.5    TERESA (obstructive sleep apnea) R74.44    Metabolic syndrome H13.17    Gout M10.9    Insomnia G47.00    Obesity, morbid ([address] E66.01    Annual physical exam Z00.00    Diabetes mellitus type 2, diet-controlled ([facility] [address] E11.9    Hypertriglyceridemia E78.1    Peripheral vascular disease (HCC) I73.9    Type 2 diabetes mellitus with diabetic neuropathy ([facility] [address] E11.40    Type 2 diabetes mellitus with chronic kidney disease ([facility] [address] E11.22     Sleep had improved from 4-5 up to  6-7 hours sleep per night since getting a new fit CPap mask but patient states now back down to just over 4 hours a night    LABS:   Lab Results   Component Value Date/Time    Cholesterol, total 183 12/10/2021 11:03 AM    Cholesterol (POC) 172 2018 08:32 AM    HDL Cholesterol 43 12/10/2021 11:03 AM    HDL Cholesterol (POC) 29 (A) 2018 08:32 AM    LDL Cholesterol (POC) 108.2 2018 08:32 AM    LDL, calculated 116.8 (H) 12/10/2021 11:03 AM    VLDL, calculated 23.2 12/10/2021 11:03 AM    Triglyceride 116 12/10/2021 11:03 AM    Triglycerides (POC) 174 2018 08:32 AM    CHOL/HDL Ratio 4.3 12/10/2021 11:03 AM     Lab Results   Component Value Date/Time    Hemoglobin A1c 5.4 12/10/2021 11:03 AM    Hemoglobin A1c (POC) 5.6 2018 08:32 AM     No SMBG    Will see a new PCP on 5/3/22    MEDICATIONS/SUPPLEMENTS:   [unfilled]  Prior to Admission medications    Medication Sig Start Date End Date Taking?  Authorizing Provider   fenofibrate nanocrystallized (TRICOR) 145 mg tablet TAKE 1 TABLET BY MOUTH EVERY DAY 3/18/22   David ACEVEDO NP   pravastatin (PRAVACHOL) 10 mg tablet Take 1 Tablet by mouth nightly. 3/18/22   David ACEVEDO NP   bumetanide (BUMEX) 1 mg tablet TAKE 2 TABLETS BY MOUTH DAILY 1/18/22   David ACEVEDO NP   sildenafil citrate (Viagra) 100 mg tablet Take 1 Tablet by mouth daily as needed for Erectile Dysfunction. Indications: the inability to have an erection  Patient not taking: Reported on 2/8/2022 12/10/21   David ACEVEDO NP   atenoloL (TENORMIN) 50 mg tablet TAKE 1 TABLET BY MOUTH EVERY DAY 12/6/21   David ACEVEDO NP   lidocaine (Lidoderm) 5 % Apply patch to the affected area for 12 hours a day and remove for 12 hours a day. Patient not taking: Reported on 2/8/2022 11/14/21   Brandon Joshua PA   methylPREDNISolone (Medrol, Yair,) 4 mg tablet Follow instructions on packaging  Patient not taking: Reported on 2/8/2022 11/14/21   Livier Joshua PA   nystatin (MYCOSTATIN) topical cream Apply  to affected area two (2) times a day. 11/8/21   David ACEVEDO NP   nystatin (MYCOSTATIN) powder Dust area twice daily with powder after applying cream. 11/8/21   David ACEVEDO NP   tiZANidine (ZANAFLEX) 4 mg tablet Take 1 Tablet by mouth three (3) times daily as needed for Muscle Spasm(s). Indications: muscle spasm  Patient not taking: Reported on 2/8/2022 11/8/21   David ACEVEDO NP   hydrALAZINE (APRESOLINE) 50 mg tablet TAKE 1 TABLET BY MOUTH TWICE DAILY FOR HIGH BLOOD PRESSURE 8/27/21   Phil Officer, MD   ascorbic acid, vitamin C, (Vitamin C) 1,000 mg tablet Take 1,000 mg by mouth daily. gummies    Provider, Historical   allopurinoL (ZYLOPRIM) 300 mg tablet TAKE 1 TABLET BY MOUTH DAILY 7/23/21   Phil OfficeMD sabine   guaiFENesin (Mucinex) 1,200 mg Ta12 ER tablet Take 1,200 mg by mouth two (2) times a day. Provider, Historical   guaifenesin/pseudoephedrne HCl (MUCINEX D PO) Take  by mouth. Provider, Historical   omeprazole (PRILOSEC OTC) 20 mg tablet Take 20 mg by mouth daily.       Provider, Historical   WV-GV-DZ-Fe-Min-Lycopen-Lutein (CENTRUM) 0.4-162-18 mg Tab Take  by mouth. Carlos Coppola MD   aspirin 81 mg chewable tablet Take 81 mg by mouth daily. Carlos Coppola MD     Takes Miralax PRN and Metamucil daily to help with constipation; reports BM improved with diet changes made since initial consult but still believes not moving his bowels well enough or often enough - possibly related to pain medications    EXERCISE/PHYSICAL ACTIVITY: working out with [employer] twice a week since January, strength training primarily, not cardio    ALCOHOL / TOBACCO USE: wine 2x/month; no tobacco products    SOCIAL HISTORY:  he is retired , he lives with his wife    REPORTED WEIGHT HISTORY: down to 352 lbs with physical therapy in March of this year then regained weight; 391 lb was his heaviest; 6 years ago did [employer] program        ANTHROPOMETRICS:    Ht Readings from Last 1 Encounters:   02/08/22 5' 11\" (1.803 m)      Wt Readings from Last 10 Encounters:   04/15/22 (!) 166.8 kg (367 lb 12.8 oz)   02/18/22 (!) 166.3 kg (366 lb 9.6 oz)   02/08/22 (!) 166 kg (366 lb)   12/28/21 (!) 166 kg (366 lb)   12/17/21 (!) 163 kg (359 lb 6.4 oz)   12/10/21 (!) 162 kg (357 lb 3.2 oz)   11/22/21 (!) 162.8 kg (358 lb 12.8 oz)   11/14/21 (!) 165.6 kg (365 lb)   11/08/21 (!) 165.3 kg (364 lb 6.4 oz)   11/04/21 (!) 163.7 kg (361 lb)          IBW:172 # +/- 10%  %IBW: 210% +/- 10%    BMI: 51.3 kg/M2 Category: morbid obesity    Weight Change Since Initial Consult: - 4.6 lbs  Weight Change Since Last Consult: +1.2 lbs    Patient states he noticed weight gain and cough after starting pravastatin      NUTRITION ASSESSMENT:    FOOD ALLERGIES/INTOLERANCES: no known      Kather[facility] [employer] [facility] Day states focusing on bigger vegetable portions, smaller meat portions, sugar free drinks and few to no sweets. Having [employer], [place] yogurt, sugar free jell-o for snacks vs a meal for snacks.   Uncertain why no weight loss, feels making better choices. NUTRITION DIAGNOSIS:  Obesity related to food preferences, nutrition related knowledge deficit and large portions as evidenced by BMI >40 and diet history. ESTIMATED ENERGY NEEDS:  2000 (using [address] with AF 1.2; - 1000 for weight loss)    NUTRITION INTERVENTION:  Nutrition 30  minute follow-up one-on-one education & goal setting with Woodrow Simmons    Reviewed with Woodrow Simmons previous goals and plans established at last visit, any real or perceived barriers to obtaining the goal / adhering to plan, and progress towards goal made. Collaborated with patient to set specific nutrient goals as well as specific food / behavior changes that will help patient meet the overall goal of: weight loss, prevention of fluid retention, and improved LDL    NUTRITION EDUCATION / HANDOUTS PROVIDED:  - myfitnesspal      PATIENT GOALS:    Weight Goal:    Short Term Weight Goal: 250 lbs  Long Term Weight Goal: 200 lbs    Nutrition Goals: (CONTINUE)    Calories: 2000 /day   Saturated Fat: no more than 13 g / day (7% calories)  Sodium: < 1500 to 2000 mg per day    - read and compare food labels   - measure portions  - use smaller plates & bowls  - plan meals in advance  - make non-starchy vegetables the biggest portion of the meal  - separate TV from eating    New:    - track on myfitnesspal      Exercise Goals: (Partiallyt Met, Continue)    - 30 minutes cardio every other day (and continue gym twice a week)          Specific tips and techniques to facilitate compliance with above recommendations were provided and discussed. Woodrow Simmons verbalized understanding. The patient was encouraged to contact me as needed.       Follow-up: pt to call back and schedule a f/u in about 3 months              Rosa Rojas RD, [facility]

## 2022-04-18 DIAGNOSIS — I10 ESSENTIAL HYPERTENSION, BENIGN: ICD-10-CM

## 2022-04-18 RX ORDER — HYDRALAZINE HYDROCHLORIDE 50 MG/1
TABLET, FILM COATED ORAL
Qty: 180 TABLET | Refills: 0 | Status: SHIPPED | OUTPATIENT
Start: 2022-04-18 | End: 2022-07-18

## 2022-04-18 RX ORDER — BUMETANIDE 1 MG/1
2 TABLET ORAL DAILY
Qty: 180 TABLET | Refills: 0 | Status: SHIPPED | OUTPATIENT
Start: 2022-04-18 | End: 2022-07-18

## 2022-04-19 NOTE — PROGRESS NOTES
Subjective:     Chief Complaint   Patient presents with   [address] Day is a 74 y.o. M. He has a history of peripheral vascular disease, morbid obesity, hyperlipidemia, and diastolic dysfunction, who was seen most recently in the medicine clinic in November 2021. At the time, he had been complaining of left-sided gluteal pain with radiation to the thigh and left hip. His blood pressure was felt to be reasonably managed with a blood pressure of 114/65 mmHg. Physical examination was notable for massive obesity with a BMI of 50.82 kg/m². He was provided nystatin cream and powder for jock itch, and advised to continue with his usual medication regimen. He presented to the emergency room about a week later for complaint of acute left-sided lower back pain with left-sided sciatica. Straight leg raise test was positive on the left. An x-ray showed no acute compression fractures or other subluxations, and he was given Naprosyn and Lidoderm for treatment. In December 2021, he was seen in the sleep clinic for follow-up. Previous sleep study had demonstrated an AHI of 87.9 events per hour. His CMS compliance criteria was noted to be low at 37%. He was having some difficulty with BiPAP 18/13 cm. His settings had been reduced to 16/11 cm, and repeat compliance review was scheduled. Today, the patient comes in for routine follow-up and a Medicare wellness examination. In general, he feels about the same as usual, without any significant changes since his last visit. He denies any chest pain, shortness of breath, or any further cardiopulmonary symptoms. He continues on a complex antihypertensive medication regimen that includes bumetanide 2 mg daily, hydralazine 50 mg twice daily, and atenolol 50 mg daily. He reports tolerating the medication well without any lightheadedness or dizziness, but says that he does not measure his blood pressure readings at home.   His previous blood pressure readings are noted to be in the 130 to 140 mmHg range systolic; his initial blood pressure today of 94/56 is noted to be significantly lower than previous readings. (Repeat manual was 118/80 mmHg). He also continues on both pravastatin 10 mg daily and fenofibrate 145 mg daily. He reports tolerating this well without any myalgias, GI symptoms, or other problems. Despite his best efforts at losing weight, to include dietary modifications, referral to previous dietitian, and increased exercise, he has been unable to lose weight. I discussed with him today the possibility of pharmacotherapy for this or referral to bariatric surgery, but he is not interested in these. He continues on CPAP for his history of sleep apnea. He reports tolerating this well and says that his energy level has been relatively overall stable. Preventive services are reviewed. He is due for an eye examination and foot examination given his history of diabetes, although this was noted to have resolved biochemically on most recent testing several months ago. He is due for a tetanus vaccination and will get this at an outside pharmacy. He is also due for repeat Prevnar vaccination given his age. He is otherwise noted to be up-to-date with regard to routine screening examinations. His review of systems is otherwise negative. He lives at home with his spouse, and reports completing all activities and instrumental activities of daily living including ambulation without any difficulty. He walks at home without the use of aids on there are no pieces of safety equipment required in the household. He denies any personal history of memory problems, visual problems, hearing problems, or other problems over the past year. He denies any history of abuse, and denies any depressive symptoms including any insomnia, guilty feelings, change in energy level, overt depressed mood, or suicidal ideation.     Physical examination is notable for morbid obesity but is otherwise unremarkable. Repeat blood pressure noted to be 118/80 mmHg. Past Medical History:  Past Medical History:   Diagnosis Date    Allergic rhinitis 11/22/2017    Chest pain syndrome 11/22/2017    Comments: abnl stress cardiolite 2006, subsequent to that, he had a normal cardiac cath    Dyspnea 11/22/2017    Environmental allergies     GERD (gastroesophageal reflux disease)     Gout 11/22/2017    History of diarrhea 11/22/2017    History of pneumonia     Hypertension     Insomnia 11/22/2017    Iron deficiency 11/22/2017    Left chronic serous otitis media 11/22/2017    Obesity 11/22/2017    Comments: morbid    Sleep apnea     wears CPAP    Steatosis ([address] 11/22/2017    Comments: non-alcoholic       Past Surgical Histor:  Past Surgical History:   Procedure Laterality Date    COLONOSCOPY N/A 4/30/2021    COLONOSCOPY performed by Andrés Balderas MD at [facility]; HI RISK IND  4/30/2021         HX ORTHOPAEDIC      right knee       Allergies: Allergies   Allergen Reactions    Ace Inhibitors Cough     Other reaction(s): Cough       Medications:  Current Outpatient Medications   Medication Sig Dispense Refill    bumetanide (BUMEX) 1 mg tablet TAKE 2 TABLETS BY MOUTH DAILY 180 Tablet 0    hydrALAZINE (APRESOLINE) 50 mg tablet TAKE 1 TABLET BY MOUTH TWICE DAILY FOR HIGH BLOOD PRESSURE 180 Tablet 0    fenofibrate nanocrystallized (TRICOR) 145 mg tablet TAKE 1 TABLET BY MOUTH EVERY DAY 90 Tablet 0    pravastatin (PRAVACHOL) 10 mg tablet Take 1 Tablet by mouth nightly. 90 Tablet 0    atenoloL (TENORMIN) 50 mg tablet TAKE 1 TABLET BY MOUTH EVERY DAY 90 Tablet 3    ascorbic acid, vitamin C, (Vitamin C) 1,000 mg tablet Take 1,000 mg by mouth daily. gummies      allopurinoL (ZYLOPRIM) 300 mg tablet TAKE 1 TABLET BY MOUTH DAILY 90 Tablet 2    omeprazole (PRILOSEC OTC) 20 mg tablet Take 20 mg by mouth daily.         PA-DQ-KB-Fe-Min-Lycopen-Lutein (CENTRUM) 0.4-162-18 mg Tab Take  by mouth.  aspirin 81 mg chewable tablet Take 81 mg by mouth daily.            Social History:  Social History     Socioeconomic History    Marital status:    Tobacco Use    Smoking status: Never Smoker    Smokeless tobacco: Never Used   Vaping Use    Vaping Use: Never used   Substance and Sexual Activity    Alcohol use: Yes     Comment: rarely    Drug use: Never    Sexual activity: Yes     Partners: Female       Family History:  Family History   Problem Relation Age of Onset    Hypertension Other     Heart Disease Other     Diabetes Other     Diabetes Mother     Diabetes Father        Immunizations:  Immunization History   Administered Date(s) Administered    COVID-19, Pfizer Purple top, DILUTE for use, 12+ yrs, 30mcg/0.3mL dose 01/30/2021, 02/27/2021, 10/21/2021    Influenza Vaccine 10/01/2012    Influenza Vaccine (Quad) PF (>6 Mo Flulaval, Fluarix, and >3 Yrs Afluria, Fluzone [id number]) 11/07/2017, 10/22/2018    Influenza Vaccine (Tri) Adjuvanted (>65 Yrs FLUAD TRI [id number]) 01/09/2020    Influenza, Quadrivalent, Adjuvanted (>65 Yrs FLUAD QUAD [id number]) 10/06/2020, 11/08/2021    Pneumococcal Conjugate (PCV-13) 01/14/2021    Pneumococcal Polysaccharide (PPSV-23) 10/01/2012    TB Skin Test (PPD) 11/01/2005    Td 12/01/2014    Zoster Vaccine, Live 12/01/2014        Healthcare Maintenance:  Health Maintenance   Topic Date Due    Eye Exam Retinal or Dilated  Never done    Shingrix Vaccine Age 50> (1 of 2) Never done    DTaP/Tdap/Td series (1 - Tdap) 12/02/2014    Pneumococcal 65+ years (3 - PPSV23 or PCV20) 01/14/2022    MICROALBUMIN Q1  01/15/2022    Foot Exam Q1  01/17/2022    Depression Screen  12/10/2022    A1C test (Diabetic or Prediabetic)  12/10/2022    Lipid Screen  12/10/2022    Medicare Yearly Exam  05/04/2023    Colorectal Cancer Screening Combo  04/30/2031    Hepatitis C Screening  Completed    Flu Vaccine  Completed    COVID-19 Vaccine  Completed        Review of Systems:  ROS:  Review of Systems   Constitutional: Negative. HENT: Negative. Eyes: Negative. Respiratory: Negative. Cardiovascular: Negative. Gastrointestinal: Negative. Genitourinary: Negative. Musculoskeletal: Negative. Skin: Negative. Neurological: Negative. Endo/Heme/Allergies: Negative. Psychiatric/Behavioral: Negative. ROS otherwise negative      Objective:     Vital Signs:  Visit Vitals  BP (!) 94/56 (BP 1 Location: Right arm, BP Patient Position: Sitting, BP Cuff Size: Large adult)   Pulse (!) 59   Temp 97.9 °F (36.6 °C) (Oral)   Resp 16   Ht 5' 11\" (1.803 m)   Wt (!) 369 lb 6.4 oz (167.6 kg)   SpO2 93%   BMI 51.52 kg/m²       BMI:  Body mass index is 51.52 kg/m². Physical Examination:  Physical Exam  Vitals reviewed. Constitutional:       Appearance: Normal appearance. He is obese. HENT:      Head: Normocephalic and atraumatic. Nose: Nose normal.      Mouth/Throat:      Mouth: Mucous membranes are moist.   Eyes:      Extraocular Movements: Extraocular movements intact. Conjunctiva/sclera: Conjunctivae normal.      Pupils: Pupils are equal, round, and reactive to light. Cardiovascular:      Rate and Rhythm: Normal rate and regular rhythm. Pulses: Normal pulses. Heart sounds: Normal heart sounds. No murmur heard. No friction rub. No gallop. Pulmonary:      Effort: Pulmonary effort is normal. No respiratory distress. Breath sounds: Normal breath sounds. No wheezing, rhonchi or rales. Abdominal:      General: Bowel sounds are normal. There is no distension. Palpations: Abdomen is soft. There is no mass. Tenderness: There is no abdominal tenderness. There is no guarding or rebound. Musculoskeletal:         General: No tenderness, deformity or signs of injury. Normal range of motion. Cervical back: Normal range of motion and neck supple.       Right lower leg: No edema. Left lower leg: No edema. Skin:     General: Skin is warm and dry. Findings: No bruising, lesion or rash. Neurological:      General: No focal deficit present. Mental Status: He is alert and oriented to person, place, and time. Mental status is at baseline. Cranial Nerves: No cranial nerve deficit. Sensory: No sensory deficit. Motor: No weakness. Coordination: Coordination normal.      Gait: Gait normal.      Deep Tendon Reflexes: Reflexes normal.   Psychiatric:         Mood and Affect: Mood normal.         Behavior: Behavior normal.          Physical exam otherwise negative    Diagnostic Testing:    Laboratory Studies:  Office Visit on 12/10/2021   Component Date Value Ref Range Status    Hemoglobin A1c 12/10/2021 5.4  4.0 - 5.6 % Final    Comment: NEW METHOD PLEASE NOTE NEW REFERENCE RANGE  (NOTE)  HbA1C Interpretive Ranges  <5.7              Normal  5.7 - 6.4         Consider Prediabetes  >6.5              Consider Diabetes      Est. average glucose 12/10/2021 108  mg/dL Final    Cholesterol, total 12/10/2021 183  <200 MG/DL Final    Triglyceride 12/10/2021 116  <150 MG/DL Final    Comment: Based on NCEP-ATP III:  Triglycerides <150 mg/dL  is considered normal, 150-199  mg/dL  borderline high,  200-499 mg/dL high and  greater than or equal to 500  mg/dL very high.  HDL Cholesterol 12/10/2021 43  MG/DL Final    Comment: Based on NCEP ATP III, HDL Cholesterol <40 mg/dL is considered low and >60  mg/dL is elevated.       LDL, calculated 12/10/2021 116.8* 0 - 100 MG/DL Final    Comment: Based on the NCEP-ATP: LDL-C concentrations are considered  optimal <100 mg/dL,  near optimal/above Normal 100-129 mg/dL Borderline High: 130-159, High: 160-189  mg/dL Very High: Greater than or equal to 190 mg/dL      VLDL, calculated 12/10/2021 23.2  MG/DL Final    CHOL/HDL Ratio 12/10/2021 4.3  0.0 - 5.0   Final    Sodium 12/10/2021 138  136 - 145 mmol/L Final  Potassium 12/10/2021 3.7  3.5 - 5.1 mmol/L Final    Chloride 12/10/2021 104  97 - 108 mmol/L Final    CO2 12/10/2021 28  21 - 32 mmol/L Final    Anion gap 12/10/2021 6  5 - 15 mmol/L Final    Glucose 12/10/2021 95  65 - 100 mg/dL Final    BUN 12/10/2021 33* 6 - 20 MG/DL Final    Creatinine 12/10/2021 1.40* 0.70 - 1.30 MG/DL Final    BUN/Creatinine ratio 12/10/2021 24* 12 - 20   Final    GFR est AA 12/10/2021 >60  >60 ml/min/1.73m2 Final    GFR est non-AA 12/10/2021 51* >60 ml/min/1.73m2 Final    Comment: Estimated GFR is calculated using the IDMS-traceable Modification of Diet in  Renal Disease (MDRD) Study equation, reported for both  Americans  (GFRAA) and non- Americans (GFRNA), and normalized to 1.73m2 body  surface area. The physician must decide which value applies to the patient.  Calcium 12/10/2021 9.7  8.5 - 10.1 MG/DL Final    Bilirubin, total 12/10/2021 0.6  0.2 - 1.0 MG/DL Final    ALT (SGPT) 12/10/2021 26  12 - 78 U/L Final    AST (SGOT) 12/10/2021 18  15 - 37 U/L Final    Alk.  phosphatase 12/10/2021 64  45 - 117 U/L Final    Protein, total 12/10/2021 7.3  6.4 - 8.2 g/dL Final    Albumin 12/10/2021 3.9  3.5 - 5.0 g/dL Final    Globulin 12/10/2021 3.4  2.0 - 4.0 g/dL Final    A-G Ratio 12/10/2021 1.1  1.1 - 2.2   Final   Office Visit on 07/29/2021   Component Date Value Ref Range Status    Uric acid 07/29/2021 4.4  3.5 - 7.2 MG/DL Final    Sodium 07/29/2021 142  136 - 145 mmol/L Final    Potassium 07/29/2021 4.2  3.5 - 5.1 mmol/L Final    Chloride 07/29/2021 105  97 - 108 mmol/L Final    CO2 07/29/2021 32  21 - 32 mmol/L Final    Anion gap 07/29/2021 5  5 - 15 mmol/L Final    Glucose 07/29/2021 91  65 - 100 mg/dL Final    BUN 07/29/2021 27* 6 - 20 MG/DL Final    Creatinine 07/29/2021 1.47* 0.70 - 1.30 MG/DL Final    BUN/Creatinine ratio 07/29/2021 18  12 - 20   Final    GFR est AA 07/29/2021 58* >60 ml/min/1.73m2 Final    GFR est non-AA 07/29/2021 48* >60 ml/min/1.73m2 Final    Comment: Estimated GFR is calculated using the IDMS-traceable Modification of Diet in  Renal Disease (MDRD) Study equation, reported for both  Americans  (GFRAA) and non- Americans (GFRNA), and normalized to 1.73m2 body  surface area. The physician must decide which value applies to the patient.  Calcium 07/29/2021 9.2  8.5 - 10.1 MG/DL Final    Bilirubin, total 07/29/2021 0.4  0.2 - 1.0 MG/DL Final    ALT (SGPT) 07/29/2021 31  12 - 78 U/L Final    AST (SGOT) 07/29/2021 18  15 - 37 U/L Final    Alk. phosphatase 07/29/2021 61  45 - 117 U/L Final    Protein, total 07/29/2021 7.0  6.4 - 8.2 g/dL Final    Albumin 07/29/2021 3.8  3.5 - 5.0 g/dL Final    Globulin 07/29/2021 3.2  2.0 - 4.0 g/dL Final    A-G Ratio 07/29/2021 1.2  1.1 - 2.2   Final    Cholesterol, total 07/29/2021 172  <200 MG/DL Final    Triglyceride 07/29/2021 106  <150 MG/DL Final    Comment: Based on NCEP-ATP III:  Triglycerides <150 mg/dL  is considered normal, 150-199  mg/dL  borderline high,  200-499 mg/dL high and  greater than or equal to 500  mg/dL very high.  HDL Cholesterol 07/29/2021 43  MG/DL Final    Comment: Based on NCEP ATP III, HDL Cholesterol <40 mg/dL is considered low and >60  mg/dL is elevated.  LDL, calculated 07/29/2021 107.8* 0 - 100 MG/DL Final    Comment: Based on the NCEP-ATP: LDL-C concentrations are considered  optimal <100 mg/dL,  near optimal/above Normal 100-129 mg/dL Borderline High: 130-159, High: 160-189  mg/dL Very High: Greater than or equal to 190 mg/dL      VLDL, calculated 07/29/2021 21.2  MG/DL Final    CHOL/HDL Ratio 07/29/2021 4.0  0.0 - 5.0   Final    WBC 07/29/2021 6.4  4.1 - 11.1 K/uL Final    RBC 07/29/2021 4.27  4. 10 - 5.70 M/uL Final    HGB 07/29/2021 12.6  12.1 - 17.0 g/dL Final    HCT 07/29/2021 40.1  36.6 - 50.3 % Final    MCV 07/29/2021 93.9  80.0 - 99.0 FL Final    MCH 07/29/2021 29.5  26.0 - 34.0 PG Final    MCHC 07/29/2021 31.4 30.0 - 36.5 g/dL Final    RDW 07/29/2021 15.7* 11.5 - 14.5 % Final    PLATELET 99/61/4779 198  150 - 400 K/uL Final    MPV 07/29/2021 11.5  8.9 - 12.9 FL Final    NRBC 07/29/2021 0.0  0  WBC Final    ABSOLUTE NRBC 07/29/2021 0.00  0.00 - 0.01 K/uL Final         Radiographic Studies:  XR Results (most recent):  Results from Hospital Encounter encounter on 11/14/21    XR SPINE LUMB 2 OR 3 V    Narrative  EXAM: XR SPINE LUMB 2 OR 3 V    INDICATION: Left lower back pain    COMPARISON: Lumbosacral spine radiographs 12/1/2006, abdominal radiographs  2/8/2021, CT abdomen 5/21/2014. TECHNIQUE: Lumbosacral spine radiographs, 3 views    FINDINGS:  Alignment is anatomic. Chronic mild loss of vertebral body heights at T12 and  L5. Multilevel degenerative endplate osteophytes and facet arthritis. Impression  No acute compression fracture nor subluxation. Chronic mild loss of vertebral  body heights at T12 and L5     ALLEN Results (most recent):  No results found for this or any previous visit. CT Results (most recent):  Results from [place] encounter on 01/10/15    CTA CHEST W WO CONT    Narrative  **Final Report**      ICD Codes / Adm. Diagnosis: 602291  486 / Shortness of Breath  Examination:  CT CHEST ANGIOGRAPHY  - [id number] - [place] 10 2015 11:17PM  Accession No:  [id number]  Reason:  Chest pain R/O PE      REPORT:  INDICATION: Chest pain. Dyspnea. TECHNIQUE:  Routine noncontrast imaging the chest was performed for localization  purposes. Then, following the uneventful intravenous administration of 100  cc Optiray 246 , thin helical axial images were obtained through the chest.  3D image postprocessing was performed. FINDINGS:  No filling defect is seen within the pulmonary arterial system to suggest  pulmonary embolus. The main pulmonary artery is normal in caliber. The  thoracic aorta is normal in caliber and demonstrates no evidence of  dissection or aneurysm.  The heart size is normal.    There is no hilar or mediastinal lymphadenopathy. The trachea and esophagus  are unremarkable. The thyroid gland demonstrates no abnormality. There is left lower lobe consolidation. There is bilateral dependent  atelectasis No pulmonary nodule or mass is seen. There are no pleural  effusions. Limited evaluation of the upper abdomen demonstrates no abnormality. The  adrenal glands are normal.  Review of the bone windows demonstrates no  evidence of destructive bone lesion. Impression  :  Left lower lobe consolidation likely represents pneumonia; radiographic  followup is recommended to assure resolution. No evidence of pulmonary  embolus. Signing/Reading Doctor: Coleen Queen ([id number])  Approved: Coleen Queen ([id number])  [place] 10 2015 11:31PM     DEXA Results (most recent):  No results found for this or any previous visit. MRI Results (most recent):  No results found for this or any previous visit. Assessment/Plan:       ICD-10-CM ICD-9-CM    1. Routine adult health maintenance  Z00.00 V70.0 CBC WITH AUTOMATED DIFF      METABOLIC PANEL, COMPREHENSIVE      HIV 1/2 AG/AB, 4TH GENERATION,W RFLX CONFIRM   2. Hyperlipidemia associated with type 2 diabetes mellitus ([facility])  Y45.06 407.94 METABOLIC PANEL, COMPREHENSIVE    E78.5 272.4 LIPID PANEL   3. Claudication ([facility])  I73.9 443.9    4. Type 2 diabetes mellitus with stage 3a chronic kidney disease, without long-term current use of insulin ([facility])  E11.22 250.40 MICROALBUMIN, UR, RAND W/ MICROALB/CREAT RATIO    N18.31 585.3 REFERRAL TO PODIATRY      HEMOGLOBIN A1C WITH EAG      CBC WITH AUTOMATED DIFF      METABOLIC PANEL, COMPREHENSIVE   5. Peripheral vascular disease (HCC)  I73.9 443.9    6. TERESA (obstructive sleep apnea)  G47.33 327.23    7. Essential hypertension, benign  I10 401.1    8. Encounter for immunization  Z23 V03.89 ADMIN PNEUMOCOCCAL VACCINE      PNEUMOCOCCAL CONJUGATE PCV20, PF (PREVNAR 21)   9.  Medicare annual wellness visit, subsequent  Z00.00 V70.0    10. Chronic gout without tophus, unspecified cause, unspecified site  M1A. 9XX0 274.02 URIC ACID   11. Ob[facility], morbid ([facility]  E66.01 278.01           This pleasant 41-year-old -American male has a significant, severe morbid obesity, hyperlipidemia, and reported history of type 2 diabetes, although this latter problem has been found to have resolved biochemically on most recent testing with normal A1c and no evidence of hypoglycemia. He has sleep apnea which is currently being treated by sleep medicine with CPAP. He would clearly benefit in the long-term from additional weight loss, but he is not interested in additional modalities to include pharmacotherapy for this or referral to bariatric surgery even after discussion today. His LDL for his history of hyperlipidemia is not at target, which would be set for this patient at less than 100 mg/dL (ideally less than 70 mg/dL). He has a reported history of peripheral vascular disease, but is asymptomatic in this regard. Healthcare maintenance:  - Fasting laboratory studies as noted above, patient will have to come in for a fasting lab appointment  - He reports being up-to-date with regard to his eye examination. Referral for foot examination is placed as noted above. - Prevnar vaccine provided today, patient will go to pharmacy for Tdap vaccination. Hyperlipidemia:  - History of reported diabetes mellitus, although as noted above this was resolved biochemically most recently on testing.  - Target LDL for this patient given his questionable history of peripheral vascular disease would be less than ideally 70 mg/dL, at minimum less than 100 mg/dL. Continuing with Pravachol, fenofibrate at current dosing, with consideration for increasing pravastatin as needed to achieve LDL target. Claudication:  - Reported history of same, although he is asymptomatic currently in this regard.   Possible that he also may have pseudoclaudication, given his obesity and likelihood of underlying degenerative joint disease and possible lumbar spinal stenosis. Continue to monitor, cardiovascular management as noted below. Type 2 diabetes mellitus:  - Not currently taking medications for this, A1c notably normal at most recent check. Reassessing A1c, fasting glucose at next lab draw. Podiatry referral as noted above. Peripheral vascular disease:  - Reported history of same, although he is otherwise asymptomatic in his regard. Continuing with aspirin, pravastatin, fenofibrate, LDL management as noted above. Blood pressure target less than 130/80 mmHg; he is clearly at this today and is otherwise asymptomatic and tolerated medications well. Obstructive sleep apnea:  - Following with suggested for same. Tolerating CPAP. Energy level stable. Continuing with current care. Essential hypertension:  - Target blood pressure for this patient given his comorbidities would be less than 130/80 mmHg. He is clearly at this today and tolerating therapy well. Continuing with current care. Patient advised to monitor blood pressure readings at home. Obesity:  - As evidenced on physical examination. He obviously would benefit from additional weight loss, but is not interested in pharmacotherapy or bariatric surgery. Strongly consider these in the future to include potential for use of Ozempic, or Saxenda. Gout:  - Asymptomatic, tolerating therapy with Uloric. Continuing current care, rechecking uric acid levels. Follow with me in 6 months. Follow-up and Dispositions    · Return in about 6 months (around 11/3/2022), or if symptoms worsen or fail to improve. Krystian Noble MD    Please note that this dictation was completed with [employer], the computer voice recognition software. Quite often unanticipated grammatical, syntax, homophones, and other interpretive errors are inadvertently transcribed by the computer software. Please disregard these errors. Please excuse any errors that have escaped final proofreading. This is the Subsequent Medicare Annual Wellness Exam, performed 12 months or more after the Initial AWV or the last Subsequent AWV    I have reviewed the patient's medical history in detail and updated the computerized patient record. Assessment/Plan   Education and counseling provided:  Are appropriate based on today's review and evaluation  Pneumococcal Vaccine  Cardiovascular screening blood test  Diabetes screening test    1. Routine adult health maintenance  -     CBC WITH AUTOMATED DIFF; Future  -     METABOLIC PANEL, COMPREHENSIVE; Future  -     HIV 1/2 AG/AB, 4TH GENERATION,W RFLX CONFIRM; Future  2. Hyperlipidemia associated with type 2 diabetes mellitus (HCC)  -     METABOLIC PANEL, COMPREHENSIVE; Future  -     LIPID PANEL; Future  3. [facility] ([facility]  4. Type 2 diabetes mellitus with stage 3a chronic kidney disease, without long-term current use of insulin (HCC)  -     MICROALBUMIN, UR, RAND W/ MICROALB/CREAT RATIO; Future  -     REFERRAL TO PODIATRY; Future  -     HEMOGLOBIN A1C WITH EAG; Future  -     CBC WITH AUTOMATED DIFF; Future  -     METABOLIC PANEL, COMPREHENSIVE; Future  5. Peripheral vascular disease ([facility]  6. TERESA (obstructive sleep apnea)  7. Essential hypertension, benign  8. Encounter for immunization  -     [address] [place] [address]; Future  -     PNEUMOCOCCAL CONJUGATE PCV20, PF (PREVNAR 20); Future  9. Medicare annual wellness visit, subsequent  10. Chronic gout without tophus, unspecified cause, unspecified site  -     URIC ACID; Future  11.  Obesity, morbid ([facility]       Depression Risk Factor Screening     3 most recent PHQ Screens 12/10/2021   Little interest or pleasure in doing things Not at all   Feeling down, depressed, irritable, or hopeless Not at all   Total Score PHQ 2 0   Trouble falling or staying asleep, or sleeping too much -   Feeling tired or having little energy - Poor appetite, weight loss, or overeating -   Feeling bad about yourself - or that you are a failure or have let yourself or your family down -   Trouble concentrating on things such as school, work, reading, or watching TV -   Moving or speaking so slowly that other people could have noticed; or the opposite being so fidgety that others notice -   Thoughts of being better off dead, or hurting yourself in some way -   PHQ 9 Score -   How difficult have these problems made it for you to do your work, take care of your home and get along with others -       Alcohol & Drug Abuse Risk Screen    Do you average more than 1 drink per night or more than 7 drinks a week: No    In the past three months have you have had more than 4 drinks containing alcohol on one occasion: No          Functional Ability and Level of Safety    Hearing: Hearing is good. Activities of Daily Living: The home contains: no safety equipment. Patient does total self care      Ambulation: with mild difficulty     Fall Risk:  Fall Risk Assessment, last 12 mths 12/10/2021   Able to walk? Yes   Fall in past 12 months? 0   Do you feel unsteady?  0   Are you worried about falling 0      Abuse Screen:  Patient is not abused       Cognitive Screening    Has your family/caregiver stated any concerns about your memory: no     Cognitive Screening: Normal - Verbal Fluency Test    Health Maintenance Due     Health Maintenance Due   Topic Date Due    Eye Exam Retinal or Dilated  Never done    Shingrix Vaccine Age 50> (1 of 2) Never done    DTaP/Tdap/Td series (1 - Tdap) 12/02/2014    Pneumococcal 65+ years (3 - PPSV23 or PCV20) 01/14/2022    MICROALBUMIN Q1  01/15/2022    Foot Exam Q1  01/17/2022       Patient Care Team   Patient Care Team:  Sherrie Medley NP as PCP - General (Internal Medicine)  Sherrie Medley NP as PCP - [facility] Empaneled Provider  Jules Francisco MD as Physician (Sleep Medicine)    History     Patient Active Problem List Diagnosis Code    Essential hypertension, benign I10    Esophageal reflux K21.9    Hyperlipidemia associated with type 2 diabetes mellitus ([facility] E11.69, E78.5    TERESA (obstructive sleep apnea) R24.65    Metabolic syndrome C08.63    Gout M10.9    Insomnia G47.00    Obesity, morbid (HCC) E66.01    Annual physical exam Z00.00    Diabetes mellitus type 2, diet-controlled (HCC) E11.9    Hypertriglyceridemia E78.1    Peripheral vascular disease (HCC) I73.9    Type 2 diabetes mellitus with diabetic neuropathy (HCC) E11.40    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.22    Steatosis (HCC) E88.89    Sleep apnea G47.30    Obesity E66.9    Left chronic serous otitis media H65.22    Iron deficiency E61.1    Hypertension I10    History of pneumonia Z87.01    History of diarrhea Z87.898    GERD (gastroesophageal reflux disease) K21.9    Environmental allergies Z91.09    Dyspnea R06.00    Chest pain syndrome R07.9    Allergic rhinitis J30.9     Past Medical History:   Diagnosis Date    Allergic rhinitis 11/22/2017    Chest pain syndrome 11/22/2017    Comments: abnl stress cardiolite 2006, subsequent to that, he had a normal cardiac cath    Dyspnea 11/22/2017    Environmental allergies     GERD (gastroesophageal reflux disease)     Gout 11/22/2017    History of diarrhea 11/22/2017    History of pneumonia     Hypertension     Insomnia 11/22/2017    Iron deficiency 11/22/2017    Left chronic serous otitis media 11/22/2017    Obesity 11/22/2017    Comments: morbid    Sleep apnea     wears CPAP    Steatosis ([facility] 11/22/2017    Comments: non-alcoholic      Past Surgical History:   Procedure Laterality Date    COLONOSCOPY N/A 4/30/2021    COLONOS[facility]ainab [facility]ha [facility] at [facility] ENDOSCOPY    COLORECTAL SCRN; HI RISK IND  4/30/2021         HX ORTHOPAEDIC      right knee     Current Outpatient Medications   Medication Sig Dispense Refill    bumetanide (BUMEX) 1 mg tablet TAKE 2 TABLETS BY MOUTH DAILY 180 Tablet 0    hydrALAZINE (APRESOLINE) 50 mg tablet TAKE 1 TABLET BY MOUTH TWICE DAILY FOR HIGH BLOOD PRESSURE 180 Tablet 0    fenofibrate nanocrystallized (TRICOR) 145 mg tablet TAKE 1 TABLET BY MOUTH EVERY DAY 90 Tablet 0    pravastatin (PRAVACHOL) 10 mg tablet Take 1 Tablet by mouth nightly. 90 Tablet 0    atenoloL (TENORMIN) 50 mg tablet TAKE 1 TABLET BY MOUTH EVERY DAY 90 Tablet 3    ascorbic acid, vitamin C, (Vitamin C) 1,000 mg tablet Take 1,000 mg by mouth daily. gummies      allopurinoL (ZYLOPRIM) 300 mg tablet TAKE 1 TABLET BY MOUTH DAILY 90 Tablet 2    omeprazole (PRILOSEC OTC) 20 mg tablet Take 20 mg by mouth daily.  RP-MW-WA-Fe-Min-Lycopen-Lutein (CENTRUM) 0.4-162-18 mg Tab Take  by mouth.  aspirin 81 mg chewable tablet Take 81 mg by mouth daily.          Allergies   Allergen Reactions    Ace Inhibitors Cough     Other reaction(s): Cough       Family History   Problem Relation Age of Onset    Hypertension Other     Heart Disease Other     Diabetes Other     Diabetes Mother     Diabetes Father      Social History     Tobacco Use    Smoking status: Never Smoker    Smokeless tobacco: Never Used   Substance Use Topics    Alcohol use: Yes     Comment: rarely         Judit Willson MD

## 2022-05-03 ENCOUNTER — OFFICE VISIT (OUTPATIENT)
Dept: INTERNAL MEDICINE CLINIC | Age: 67
End: 2022-05-03
Payer: MEDICARE

## 2022-05-03 VITALS
RESPIRATION RATE: 16 BRPM | HEIGHT: 71 IN | DIASTOLIC BLOOD PRESSURE: 56 MMHG | OXYGEN SATURATION: 93 % | WEIGHT: 315 LBS | BODY MASS INDEX: 44.1 KG/M2 | HEART RATE: 59 BPM | SYSTOLIC BLOOD PRESSURE: 94 MMHG | TEMPERATURE: 97.9 F

## 2022-05-03 DIAGNOSIS — E11.22 TYPE 2 DIABETES MELLITUS WITH STAGE 3A CHRONIC KIDNEY DISEASE, WITHOUT LONG-TERM CURRENT USE OF INSULIN (HCC): ICD-10-CM

## 2022-05-03 DIAGNOSIS — I10 ESSENTIAL HYPERTENSION, BENIGN: ICD-10-CM

## 2022-05-03 DIAGNOSIS — Z00.00 ROUTINE ADULT HEALTH MAINTENANCE: Primary | ICD-10-CM

## 2022-05-03 DIAGNOSIS — E66.01 OBESITY, MORBID (HCC): ICD-10-CM

## 2022-05-03 DIAGNOSIS — Z00.00 MEDICARE ANNUAL WELLNESS VISIT, SUBSEQUENT: ICD-10-CM

## 2022-05-03 DIAGNOSIS — I73.9 CLAUDICATION (HCC): ICD-10-CM

## 2022-05-03 DIAGNOSIS — Z23 ENCOUNTER FOR IMMUNIZATION: ICD-10-CM

## 2022-05-03 DIAGNOSIS — N18.31 TYPE 2 DIABETES MELLITUS WITH STAGE 3A CHRONIC KIDNEY DISEASE, WITHOUT LONG-TERM CURRENT USE OF INSULIN (HCC): ICD-10-CM

## 2022-05-03 DIAGNOSIS — E11.69 HYPERLIPIDEMIA ASSOCIATED WITH TYPE 2 DIABETES MELLITUS (HCC): ICD-10-CM

## 2022-05-03 DIAGNOSIS — I73.9 PERIPHERAL VASCULAR DISEASE (HCC): ICD-10-CM

## 2022-05-03 DIAGNOSIS — E78.5 HYPERLIPIDEMIA ASSOCIATED WITH TYPE 2 DIABETES MELLITUS (HCC): ICD-10-CM

## 2022-05-03 DIAGNOSIS — G47.33 OSA (OBSTRUCTIVE SLEEP APNEA): ICD-10-CM

## 2022-05-03 DIAGNOSIS — M1A.9XX0 CHRONIC GOUT WITHOUT TOPHUS, UNSPECIFIED CAUSE, UNSPECIFIED SITE: ICD-10-CM

## 2022-05-03 PROBLEM — E88.89 STEATOSIS (HCC): Status: ACTIVE | Noted: 2017-11-22

## 2022-05-03 PROBLEM — H65.22 LEFT CHRONIC SEROUS OTITIS MEDIA: Status: ACTIVE | Noted: 2017-11-22

## 2022-05-03 PROBLEM — E61.1 IRON DEFICIENCY: Status: ACTIVE | Noted: 2017-11-22

## 2022-05-03 PROBLEM — E66.9 OBESITY: Status: ACTIVE | Noted: 2017-11-22

## 2022-05-03 PROBLEM — R06.00 DYSPNEA: Status: ACTIVE | Noted: 2017-11-22

## 2022-05-03 PROBLEM — R07.9 CHEST PAIN SYNDROME: Status: ACTIVE | Noted: 2017-11-22

## 2022-05-03 PROBLEM — J30.9 ALLERGIC RHINITIS: Status: ACTIVE | Noted: 2017-11-22

## 2022-05-03 PROBLEM — Z87.898 HISTORY OF DIARRHEA: Status: ACTIVE | Noted: 2017-11-22

## 2022-05-03 PROCEDURE — G0009 ADMIN PNEUMOCOCCAL VACCINE: HCPCS | Performed by: INTERNAL MEDICINE

## 2022-05-03 PROCEDURE — G0439 PPPS, SUBSEQ VISIT: HCPCS | Performed by: INTERNAL MEDICINE

## 2022-05-03 PROCEDURE — 99214 OFFICE O/P EST MOD 30 MIN: CPT | Performed by: INTERNAL MEDICINE

## 2022-05-03 PROCEDURE — 3044F HG A1C LEVEL LT 7.0%: CPT | Performed by: INTERNAL MEDICINE

## 2022-05-03 NOTE — PROGRESS NOTES
After obtaining written consent and per orders of Dr. Ami Modi, injection of [id number] given by Gail Griffiths, [address] [place] [address]. Order and injection/medication verified by Evie Rdz. Patient tolerated procedure well. VIS was given to them. No reactions noted.

## 2022-05-03 NOTE — PATIENT INSTRUCTIONS
Please continue monitoring your blood pressure at home using your home blood pressure monitor. Please record your readings and bring these with you to your next visit. Learning About the [address] Diet  What is the Mediterranean diet? The Mediterranean diet is a style of eating rather than a diet plan. It features foods eaten in [place], [place], [place] and [place], and other countries along the [facility]. It emphasizes eating foods like fish, fruits, vegetables, beans, high-fiber breads and whole grains, nuts, and olive oil. This style of eating includes limited red meat, cheese, and sweets. Why choose the Mediterranean diet? A Mediterranean-style diet may improve heart health. It contains more fat than other heart-healthy diets. But the fats are mainly from nuts, unsaturated oils (such as fish oils and olive oil), and certain nut or seed oils (such as canola, soybean, or flaxseed oil). These fats may help protect the heart and blood vessels. How can you get started on the Mediterranean diet? Here are some things you can do to switch to a more Mediterranean way of eating. What to eat  · Eat a variety of fruits and vegetables each day, such as grapes, blueberries, tomatoes, broccoli, peppers, figs, olives, spinach, eggplant, beans, lentils, and chickpeas. · Eat a variety of whole-grain foods each day, such as oats, brown rice, and whole wheat bread, pasta, and couscous. · Eat fish at least 2 times a week. Try tuna, salmon, mackerel, lake trout, herring, or sardines. · Eat moderate amounts of low-fat dairy products, such as milk, cheese, or yogurt. · Eat moderate amounts of poultry and eggs. · Choose healthy (unsaturated) fats, such as nuts, olive oil, and certain nut or seed oils like canola, soybean, and flaxseed. · Limit unhealthy (saturated) fats, such as butter, palm oil, and coconut oil.  And limit fats found in animal products, such as meat and dairy products made with whole milk. Try to eat red meat only a few times a month in very small amounts. · Limit sweets and desserts to only a few times a week. This includes sugar-sweetened drinks like soda. The Mediterranean diet may also include red wine with your meal--1 glass each day for women and up to 2 glasses a day for men. Tips for eating at home  · Use herbs, spices, garlic, lemon zest, and citrus juice instead of salt to add flavor to foods. · Add avocado slices to your sandwich instead of salas. · Have fish for lunch or dinner instead of red meat. Brush the fish with olive oil, and broil or grill it. · Sprinkle your salad with seeds or nuts instead of cheese. · Cook with olive or canola oil instead of butter or oils that are high in saturated fat. · Switch from 2% milk or whole milk to 1% or fat-free milk. · Dip raw vegetables in a vinaigrette dressing or hummus instead of dips made from mayonnaise or sour cream.  · Have a piece of fruit for dessert instead of a piece of cake. Try baked apples, or have some dried fruit. Tips for eating out  · Try broiled, grilled, baked, or poached fish instead of having it fried or breaded. · Ask your  to have your meals prepared with olive oil instead of butter. · Order dishes made with marinara sauce or sauces made from olive oil. Avoid sauces made from cream or mayonnaise. · Choose whole-grain breads, whole wheat pasta and pizza crust, brown rice, beans, and lentils. · Cut back on butter or margarine on bread. Instead, you can dip your bread in a small amount of olive oil. · Ask for a side salad or grilled vegetables instead of french fries or chips. Where can you learn more? Go to http://www.ng.com/  Enter O407 in the search box to learn more about \"Learning About the Mediterranean Diet. \"  Current as of: September 8, 2021               Content Version: 13.2  © 4753-2050 Healthwise, [facility].    Care instructions adapted under license by [employer] (which disclaims liability or warranty for this information). If you have questions about a medical condition or this instruction, always ask your healthcare professional. [address] any warranty or liability for your use of this information. Medicare Wellness Visit, Male    The best way to live healthy is to have a lifestyle where you eat a well-balanced diet, exercise regularly, limit alcohol use, and quit all forms of tobacco/nicotine, if applicable. Regular preventive services are another way to keep healthy. Preventive services (vaccines, screening tests, monitoring & exams) can help personalize your care plan, which helps you manage your own care. Screening tests can find health problems at the earliest stages, when they are easiest to treat. [facility] follows the current, evidence-based guidelines published by the [facility] Vaughan ([facility]) when recommending preventive services for our patients. Because we follow these guidelines, sometimes recommendations change over time as research supports it. (For example, a prostate screening blood test is no longer routinely recommended for men with no symptoms). Of course, you and your doctor may decide to screen more often for some diseases, based on your risk and co-morbidities (chronic disease you are already diagnosed with). Preventive services for you include:  - Medicare offers their members a free annual wellness visit, which is time for you and your primary care provider to discuss and plan for your preventive service needs. Take advantage of this benefit every year!  -All adults over age 72 should receive the recommended pneumonia vaccines.  Current USPSTF guidelines recommend a series of two vaccines for the best pneumonia protection.   -All adults should have a flu vaccine yearly and tetanus vaccine every 10 years.  -All adults age 48 and older should receive the shingles vaccines (series of two vaccines). -All adults age 38-68 who are overweight should have a diabetes screening test once every three years.   -Other screening tests & preventive services for persons with diabetes include: an eye exam to screen for diabetic retinopathy, a kidney function test, a foot exam, and stricter control over your cholesterol.   -Cardiovascular screening for adults with routine risk involves an electrocardiogram (ECG) at intervals determined by the provider.   -Colorectal cancer screening should be done for adults age 54-65 with no increased risk factors for colorectal cancer. There are a number of acceptable methods of screening for this type of cancer. Each test has its own benefits and drawbacks. Discuss with your provider what is most appropriate for you during your annual wellness visit. The different tests include: colonoscopy (considered the best screening method), a fecal occult blood test, a fecal DNA test, and sigmoidoscopy.  -All adults born between [facility] should be screened once for Hepatitis C.  -An Abdominal Aortic Aneurysm (AAA) Screening is recommended for men age 73-68 who has ever smoked in their lifetime.      Here is a list of your current Health Maintenance items (your personalized list of preventive services) with a due date:  Health Maintenance Due   Topic Date Due    Eye Exam  Never done    Shingles Vaccine (1 of 2) Never done    DTaP/Tdap/Td  (1 - Tdap) 12/02/2014    Pneumococcal Vaccine (3 - PPSV23 or PCV20) 01/14/2022    Albumin Urine Test  01/15/2022    Diabetic Foot Care  01/17/2022

## 2022-05-03 NOTE — PROGRESS NOTES
Chief Complaint   Patient presents with    Establish Care     Visit Vitals  BP (!) 94/56 (BP 1 Location: Right arm, BP Patient Position: Sitting, BP Cuff Size: Large adult)   Pulse (!) 59   Temp 97.9 °F (36.6 °C) (Oral)   Resp 16   Ht 5' 11\" (1.803 m)   Wt (!) 369 lb 6.4 oz (167.6 kg)   SpO2 93%   BMI 51.52 kg/m²         1. \"Have you been to the ER, urgent care clinic since your last visit? Hospitalized since your last visit? \" No    2. \"Have you seen or consulted any other health care providers outside of the [address] [place] [address] since your last visit? \" No     3. For patients aged 39-70: Has the patient had a colonoscopy / FIT/ Cologuard? No      If the patient is female:    4. For patients aged 41-77: Has the patient had a mammogram within the past 2 years? No      5. For patients aged 21-65: Has the patient had a pap smear?  No

## 2022-05-04 LAB
ALBUMIN SERPL-MCNC: 3.8 G/DL (ref 3.5–5)
ALBUMIN/GLOB SERPL: 1.1 {RATIO} (ref 1.1–2.2)
ALP SERPL-CCNC: 67 U/L (ref 45–117)
ALT SERPL-CCNC: 35 U/L (ref 12–78)
ANION GAP SERPL CALC-SCNC: 7 MMOL/L (ref 5–15)
AST SERPL-CCNC: 29 U/L (ref 15–37)
BASOPHILS # BLD: 0 K/UL (ref 0–0.1)
BASOPHILS NFR BLD: 1 % (ref 0–1)
BILIRUB SERPL-MCNC: 0.4 MG/DL (ref 0.2–1)
BUN SERPL-MCNC: 30 MG/DL (ref 6–20)
BUN/CREAT SERPL: 19 (ref 12–20)
CALCIUM SERPL-MCNC: 9.6 MG/DL (ref 8.5–10.1)
CHLORIDE SERPL-SCNC: 105 MMOL/L (ref 97–108)
CHOLEST SERPL-MCNC: 160 MG/DL
CO2 SERPL-SCNC: 29 MMOL/L (ref 21–32)
CREAT SERPL-MCNC: 1.56 MG/DL (ref 0.7–1.3)
CREAT UR-MCNC: 79.6 MG/DL
DIFFERENTIAL METHOD BLD: ABNORMAL
EOSINOPHIL # BLD: 0.1 K/UL (ref 0–0.4)
EOSINOPHIL NFR BLD: 2 % (ref 0–7)
ERYTHROCYTE [DISTWIDTH] IN BLOOD BY AUTOMATED COUNT: 16 % (ref 11.5–14.5)
EST. AVERAGE GLUCOSE BLD GHB EST-MCNC: 111 MG/DL
GLOBULIN SER CALC-MCNC: 3.6 G/DL (ref 2–4)
GLUCOSE SERPL-MCNC: 94 MG/DL (ref 65–100)
HBA1C MFR BLD: 5.5 % (ref 4–5.6)
HCT VFR BLD AUTO: 42.2 % (ref 36.6–50.3)
HDLC SERPL-MCNC: 43 MG/DL
HDLC SERPL: 3.7 {RATIO} (ref 0–5)
HGB BLD-MCNC: 13.3 G/DL (ref 12.1–17)
HIV 1+2 AB+HIV1 P24 AG SERPL QL IA: NONREACTIVE
HIV12 RESULT COMMENT, HHIVC: NORMAL
IMM GRANULOCYTES # BLD AUTO: 0 K/UL (ref 0–0.04)
IMM GRANULOCYTES NFR BLD AUTO: 0 % (ref 0–0.5)
LDLC SERPL CALC-MCNC: 92.8 MG/DL (ref 0–100)
LYMPHOCYTES # BLD: 3 K/UL (ref 0.8–3.5)
LYMPHOCYTES NFR BLD: 39 % (ref 12–49)
MCH RBC QN AUTO: 30.1 PG (ref 26–34)
MCHC RBC AUTO-ENTMCNC: 31.5 G/DL (ref 30–36.5)
MCV RBC AUTO: 95.5 FL (ref 80–99)
MICROALBUMIN UR-MCNC: <0.5 MG/DL
MICROALBUMIN/CREAT UR-RTO: <6 MG/G (ref 0–30)
MONOCYTES # BLD: 0.8 K/UL (ref 0–1)
MONOCYTES NFR BLD: 10 % (ref 5–13)
NEUTS SEG # BLD: 3.6 K/UL (ref 1.8–8)
NEUTS SEG NFR BLD: 48 % (ref 32–75)
NRBC # BLD: 0 K/UL (ref 0–0.01)
NRBC BLD-RTO: 0 PER 100 WBC
PLATELET # BLD AUTO: 206 K/UL (ref 150–400)
PMV BLD AUTO: 12.1 FL (ref 8.9–12.9)
POTASSIUM SERPL-SCNC: 4 MMOL/L (ref 3.5–5.1)
PROT SERPL-MCNC: 7.4 G/DL (ref 6.4–8.2)
RBC # BLD AUTO: 4.42 M/UL (ref 4.1–5.7)
SODIUM SERPL-SCNC: 141 MMOL/L (ref 136–145)
TRIGL SERPL-MCNC: 121 MG/DL (ref ?–150)
URATE SERPL-MCNC: 4.4 MG/DL (ref 3.5–7.2)
VLDLC SERPL CALC-MCNC: 24.2 MG/DL
WBC # BLD AUTO: 7.5 K/UL (ref 4.1–11.1)

## 2022-05-05 PROCEDURE — 90677 PCV20 VACCINE IM: CPT | Performed by: INTERNAL MEDICINE

## 2022-05-05 NOTE — PROGRESS NOTES
Please call the patient regarding his diagnostic evaluation. His labs were notable for mild progression of his kidney disease. His serum creatinine is now 1.56 mg/dL, which is the highest it has been. His GFR was noted to be decreased to 54. At his previous appointment, he was noted to be somewhat hypotensive. We should probably decrease the amount of Bumex that he is currently taking. He is currently taking 1 mg tablet, 2 tablets daily. He should decrease this to 1 tablet daily, with an extra tablet on a daily basis as needed for worsening edema. He should continue to monitor his blood pressure closely. We should repeat a lab study after about 2 weeks of this updated regimen.

## 2022-05-09 NOTE — PROGRESS NOTES
Called and spoke to patient  Two pt identifiers confirmed  Informed patient per Dr. Rakesh Morgan that kidney disease has progressed and to decrease Bumex to 1 tablet daily and to take an additional tablet as needed for swelling. Also advised patient per Dr. Rakesh Morgan to monitor blood pressure at home and to follow up with a lab appointment in 2 weeks to recheck his kidney function  Lab appt was made for 5/25/22  Patient verbalized understanding of information discussed  with no further questions at this time.

## 2022-06-17 DIAGNOSIS — E78.5 HYPERLIPIDEMIA ASSOCIATED WITH TYPE 2 DIABETES MELLITUS (HCC): ICD-10-CM

## 2022-06-17 DIAGNOSIS — E11.69 HYPERLIPIDEMIA ASSOCIATED WITH TYPE 2 DIABETES MELLITUS (HCC): ICD-10-CM

## 2022-06-17 DIAGNOSIS — E78.1 HYPERTRIGLYCERIDEMIA: ICD-10-CM

## 2022-06-17 RX ORDER — FENOFIBRATE 145 MG/1
TABLET, COATED ORAL
Qty: 90 TABLET | Refills: 3 | Status: SHIPPED | OUTPATIENT
Start: 2022-06-17

## 2022-06-17 RX ORDER — FENOFIBRATE 145 MG/1
TABLET, COATED ORAL
Qty: 90 TABLET | Refills: 0 | Status: CANCELLED | OUTPATIENT
Start: 2022-06-17

## 2022-06-17 RX ORDER — PRAVASTATIN SODIUM 10 MG/1
10 TABLET ORAL
Qty: 90 TABLET | Refills: 3 | Status: SHIPPED | OUTPATIENT
Start: 2022-06-17

## 2022-06-17 NOTE — TELEPHONE ENCOUNTER
PCP: Lesia Fitzpatrick MD    Last appt: 5/25/2022  Future Appointments   Date Time Provider Binh Cardenas   11/3/2022  1:45 PM Lesia Fitzpatrick MD [facility] BS AMB   2/9/2023 10:30 AM Killian Forrest NP [facility] BS AMB       Requested Prescriptions     Pending Prescriptions Disp Refills    fenofibrate nanocrystallized (TRICOR) 145 mg tablet 90 Tablet 0     Sig: TAKE 1 TABLET BY MOUTH EVERY DAY    pravastatin (PRAVACHOL) 10 mg tablet 90 Tablet 0     Sig: Take 1 Tablet by mouth nightly.        Prior labs and Blood pressures:  BP Readings from Last 3 Encounters:   05/03/22 (!) 94/56   02/08/22 137/81   12/28/21 138/79     Lab Results   Component Value Date/Time    Sodium 141 05/03/2022 02:40 PM    Potassium 4.0 05/03/2022 02:40 PM    Chloride 105 05/03/2022 02:40 PM    CO2 29 05/03/2022 02:40 PM    Anion gap 7 05/03/2022 02:40 PM    Glucose 94 05/03/2022 02:40 PM    BUN 30 (H) 05/03/2022 02:40 PM    Creatinine 1.56 (H) 05/03/2022 02:40 PM    BUN/Creatinine ratio 19 05/03/2022 02:40 PM    GFR est AA 54 (L) 05/03/2022 02:40 PM    GFR est non-AA 44 (L) 05/03/2022 02:40 PM    Calcium 9.6 05/03/2022 02:40 PM

## 2022-06-17 NOTE — TELEPHONE ENCOUNTER
PCP: Renetta Ledezma MD    Last appt: 5/25/2022  Future Appointments   Date Time Provider Binh Cardenas   11/3/2022  1:45 PM Renetta Ledezma MD [facility] BS AMB   2/9/2023 10:30 AM Nato Vicente NP [facility] BS AMB       Requested Prescriptions     Pending Prescriptions Disp Refills    pravastatin (PRAVACHOL) 10 mg tablet 90 Tablet 0     Sig: Take 1 Tablet by mouth nightly.          Other Comments:

## 2022-07-17 DIAGNOSIS — I10 ESSENTIAL HYPERTENSION, BENIGN: ICD-10-CM

## 2022-07-18 RX ORDER — HYDRALAZINE HYDROCHLORIDE 50 MG/1
TABLET, FILM COATED ORAL
Qty: 180 TABLET | Refills: 0 | Status: SHIPPED | OUTPATIENT
Start: 2022-07-18 | End: 2022-10-17 | Stop reason: SDUPTHER

## 2022-07-18 RX ORDER — BUMETANIDE 1 MG/1
2 TABLET ORAL DAILY
Qty: 180 TABLET | Refills: 0 | Status: SHIPPED | OUTPATIENT
Start: 2022-07-18 | End: 2022-10-17 | Stop reason: SDUPTHER

## 2022-10-15 DIAGNOSIS — I10 ESSENTIAL HYPERTENSION, BENIGN: ICD-10-CM

## 2022-10-17 RX ORDER — BUMETANIDE 1 MG/1
2 TABLET ORAL DAILY
Qty: 180 TABLET | Refills: 3 | Status: SHIPPED | OUTPATIENT
Start: 2022-10-17

## 2022-10-17 RX ORDER — ALLOPURINOL 300 MG/1
TABLET ORAL
Qty: 90 TABLET | Refills: 3 | Status: SHIPPED | OUTPATIENT
Start: 2022-10-17

## 2022-10-17 RX ORDER — BUMETANIDE 1 MG/1
2 TABLET ORAL DAILY
Qty: 180 TABLET | Refills: 0 | OUTPATIENT
Start: 2022-10-17

## 2022-10-17 RX ORDER — HYDRALAZINE HYDROCHLORIDE 50 MG/1
TABLET, FILM COATED ORAL
Qty: 180 TABLET | Refills: 0 | OUTPATIENT
Start: 2022-10-17

## 2022-10-17 RX ORDER — HYDRALAZINE HYDROCHLORIDE 50 MG/1
TABLET, FILM COATED ORAL
Qty: 180 TABLET | Refills: 3 | Status: SHIPPED | OUTPATIENT
Start: 2022-10-17

## 2022-10-17 NOTE — TELEPHONE ENCOUNTER
PCP: León Perez MD    Last appt: 5/25/2022  Future Appointments   Date Time Provider Binh Cardenas   11/3/2022  1:45 PM León Perez MD [facility] BS AMB   2/9/2023 10:30 AM Nikolai Bennett NP [facility] BS AMB       Requested Prescriptions     Pending Prescriptions Disp Refills    allopurinoL (ZYLOPRIM) 300 mg tablet 90 Tablet 2     Sig: TAKE 1 TABLET BY MOUTH DAILY       Prior labs and Blood pressures:  BP Readings from Last 3 Encounters:   05/03/22 (!) 94/56   02/08/22 137/81   12/28/21 138/79     Lab Results   Component Value Date/Time    Sodium 141 05/03/2022 02:40 PM    Potassium 4.0 05/03/2022 02:40 PM    Chloride 105 05/03/2022 02:40 PM    CO2 29 05/03/2022 02:40 PM    Anion gap 7 05/03/2022 02:40 PM    Glucose 94 05/03/2022 02:40 PM    BUN 30 (H) 05/03/2022 02:40 PM    Creatinine 1.56 (H) 05/03/2022 02:40 PM    BUN/Creatinine ratio 19 05/03/2022 02:40 PM    GFR est AA 54 (L) 05/03/2022 02:40 PM    GFR est non-AA 44 (L) 05/03/2022 02:40 PM    Calcium 9.6 05/03/2022 02:40 PM     Lab Results   Component Value Date/Time    Hemoglobin A1c 5.5 05/03/2022 02:40 PM    Hemoglobin A1c (POC) 5.6 01/22/2018 08:32 AM     Lab Results   Component Value Date/Time    Cholesterol, total 160 05/03/2022 02:40 PM    Cholesterol (POC) 172 01/22/2018 08:32 AM    HDL Cholesterol 43 05/03/2022 02:40 PM    HDL Cholesterol (POC) 29 (A) 01/22/2018 08:32 AM    LDL Cholesterol (POC) 108.2 01/22/2018 08:32 AM    LDL, calculated 92.8 05/03/2022 02:40 PM    VLDL, calculated 24.2 05/03/2022 02:40 PM    Triglyceride 121 05/03/2022 02:40 PM    Triglycerides (POC) 174 01/22/2018 08:32 AM    CHOL/HDL Ratio 3.7 05/03/2022 02:40 PM     Lab Results   Component Value Date/Time    VITAMIN D, 25-HYDROXY 31 01/15/2021 08:04 AM       Lab Results   Component Value Date/Time    TSH, 3rd generation 1.22 01/15/2021 08:04 AM

## 2022-10-27 NOTE — PROGRESS NOTES
ICD-10-CM ICD-9-CM    1. Routine adult health maintenance  Z00.00 V70.0       2. Stage 3a chronic kidney disease (HCC)  V09.94 788.3 METABOLIC PANEL, COMPREHENSIVE      3. Steatosis (HCC)  E88.89 272.8 CBC WITH AUTOMATED DIFF      4. Class 3 severe obesity due to excess calories with serious comorbidity and body mass index (BMI) of 50.0 to 59.9 in adult (HCC)  E66.01 278.01     Z68.43 V85.43       5. Essential hypertension  I10 401.9       6. Hypercholesterolemia  E78.00 272.0       7. Encounter for immunization  Z23 V03.89 INFLUENZA, FLUAD, (AGE 72 Y+), IM, PF, 0.5 ML               Subjective:     Chief Complaint   Patient presents with    Follow-up       Ana Luisa Diallo Day is a 74 y.o. M. He has a past medical history of hypercholesterolemia, hypertension, and severe morbid obesity. I reviewed and updated the medical record. I saw this patient most recently in early May for establishment. A history of peripheral vascular disease was also noted. He had reported at the time feeling about the same as usual without any acute symptoms. He was noted to be on a complex antihypertensive medication regimen to include bumetanide 2 mg daily, hydralazine 50 mg twice daily, and atenolol 50 mg daily. He was felt to be tolerating this well. He was also using pravastatin 10 mg daily and fenofibrate 145 mg daily for his history of hypercholesterolemia. Physical examination was notable for severe morbid obesity with a BMI of 51.52 kg/m². Fasting labs were ordered. Referrals were placed for a foot examination. He was otherwise continued on his usual medication regimen. Subsequent laboratory studies demonstrated elevation of his serum creatinine to 1.56 mg/dL with decrease in his EGFR to 54. I had advised him to decrease his use of Bumex. Today, the patient comes in for routine follow-up on his chronic medical concerns.   He reports feeling fine overall today, except for some mild upper respiratory symptoms that have been bothering him for about the past 2 days consisting mainly of postnasal drip and rhinorrhea. He denies chest pain, shortness of breath, fevers, chills, or other systemic, constitutional, or cardiopulmonary symptoms. He wonders about whether or not he needs to continue with the pravastatin, saying that he has had occasional diarrhea over the past several months, and does not know if the medication is causing this, but is not sure why he is taking the medication. He denies having had any personal history of cardiovascular or cerebrovascular disease. He had previously been on fenofibrate alone before having this medication restarted by previous provider, with a repeat LDL in May of this year reviewed below showing excellent control with an LDL less than 100 mg/dL. He otherwise denies having had any muscle aches with the use of pravastatin or fenofibrate. He otherwise continues on his usual antihypertensive medication regimen. He denies any lightheadedness or dizziness or any orthopnea or paroxysmal nocturnal dyspnea. He denies any weight gain or change in exercise tolerance. He continues to follow with a nutritionist, as he has for the past several months, but despite this has not had any weight loss. He is interested potentially in pursuing pharmacotherapy but does not want to have surgery. His review of systems is otherwise negative. He is due for flu shot. Routine Healthcare Maintenance issues are reviewed and discussed with the patient as noted below. Orders to update gaps in healthcare maintenance were placed as noted below in the Assessment and Plan, where applicable.      Past Medical History:  Past Medical History:   Diagnosis Date    Environmental allergies     GERD (gastroesophageal reflux disease)     Gout 11/22/2017    Hepatic steatosis 11/22/2017    Comments: non-alcoholic    History of chest pain 11/22/2017    Comments: abnl stress cardiolite 2006, subsequent to that, he had a normal cardiac cath    History of diarrhea 11/22/2017    History of dyspnea 11/22/2017    History of pneumonia     Hypertension     Insomnia 11/22/2017    Iron deficiency 11/22/2017    Left chronic serous otitis media 11/22/2017    Obesity 11/22/2017    Comments: morbid    Obstructive sleep apnea     wears CPAP       Past Surgical Histor:  Past Surgical History:   Procedure Laterality Date    COLONOSCOPY N/A 4/30/2021    COLONOSCOPY performed by Jenna Madedn MD at [address]; HI RISK IND  4/30/2021         HX ORTHOPAEDIC      right knee       Allergies: Allergies   Allergen Reactions    Ace Inhibitors Cough     Other reaction(s): Cough       Medications:  Current Outpatient Medications   Medication Sig Dispense Refill    allopurinoL (ZYLOPRIM) 300 mg tablet TAKE 1 TABLET BY MOUTH DAILY 90 Tablet 3    bumetanide (BUMEX) 1 mg tablet Take 2 Tablets by mouth daily. 180 Tablet 3    hydrALAZINE (APRESOLINE) 50 mg tablet TAKE 1 TABLET BY MOUTH TWICE DAILY FOR HIGH BLOOD PRESSURE 180 Tablet 3    pravastatin (PRAVACHOL) 10 mg tablet Take 1 Tablet by mouth nightly. 90 Tablet 3    fenofibrate nanocrystallized (TRICOR) 145 mg tablet TAKE 1 TABLET BY MOUTH EVERY DAY 90 Tablet 3    atenoloL (TENORMIN) 50 mg tablet TAKE 1 TABLET BY MOUTH EVERY DAY 90 Tablet 3    ascorbic acid, vitamin C, (VITAMIN C) 1,000 mg tablet Take 1,000 mg by mouth daily. gummies      omeprazole (PRILOSEC OTC) 20 mg tablet Take 20 mg by mouth daily. FC-UL-WK-Fe-Min-Lycopen-Lutein 0.4-162-18 mg tab Take  by mouth. aspirin 81 mg chewable tablet Take 81 mg by mouth daily.            Social History:  Social History     Socioeconomic History    Marital status:    Tobacco Use    Smoking status: Never    Smokeless tobacco: Never   Vaping Use    Vaping Use: Never used   Substance and Sexual Activity    Alcohol use: Yes     Comment: rarely    Drug use: Never    Sexual activity: Yes     Partners: Female Family History:  Family History   Problem Relation Age of Onset    Hypertension Other     Heart Disease Other     Diabetes Other     Diabetes Mother     Diabetes Father        Immunizations:  Immunization History   Administered Date(s) Administered    COVID-19, PFIZER PURPLE top, DILUTE for use, (age 15 y+), IM, 30mcg/0.3mL 01/30/2021, 02/27/2021, 10/21/2021    Influenza Vaccine 10/01/2012    Influenza Vaccine (Tri) Adjuvanted (>65 Yrs FLUAD TRI 07989) 01/09/2020    Influenza, FLUAD, (age 72 y+), Adjuvanted 10/06/2020, 11/08/2021    Influenza, FLUARIX, FLULAVAL, FLUZONE (age 10 mo+) AND AFLURIA, (age 1 y+), PF, 0.5mL 11/07/2017, 10/22/2018    Pneumococcal Conjugate (PCV-13) 01/14/2021    Pneumococcal Conjugate PCV20, PF (Prevnar 20) 05/05/2022    Pneumococcal Polysaccharide (PPSV-23) 10/01/2012    TB Skin Test (PPD) 11/01/2005    Td 12/01/2014    Zoster Vaccine, Live 12/01/2014        Healthcare Maintenance:  Health Maintenance   Topic Date Due    Eye Exam Retinal or Dilated  Never done    Shingrix Vaccine Age 50> (1 of 2) Never done    DTaP/Tdap/Td series (1 - Tdap) 12/02/2014    COVID-19 Vaccine (4 - Booster for Pfizer series) 12/16/2021    Foot Exam Q1  01/17/2022    Flu Vaccine (1) 08/01/2022    Depression Screen  12/10/2022    A1C test (Diabetic or Prediabetic)  05/03/2023    MICROALBUMIN Q1  05/03/2023    Lipid Screen  05/03/2023    Medicare Yearly Exam  05/04/2023    Colorectal Cancer Screening Combo  04/30/2031    Hepatitis C Screening  Completed    Pneumococcal 65+ years  Completed        Review of Systems:  ROS:  Review of Systems   Constitutional: Negative. HENT:  Positive for congestion. Eyes: Negative. Respiratory: Negative. Cardiovascular: Negative. Gastrointestinal: Negative. Genitourinary: Negative. Musculoskeletal: Negative. Skin: Negative. Neurological: Negative. Endo/Heme/Allergies: Negative. Psychiatric/Behavioral: Negative.       ROS otherwise negative      Objective:     Vital Signs:  Visit Vitals  /86 (BP 1 Location: Right arm, BP Patient Position: Sitting, BP Cuff Size: Large adult)   Pulse 74   Resp 16   Ht 5' 11\" (1.803 m)   Wt (!) 377 lb 3.2 oz (171.1 kg)   SpO2 96%   BMI 52.61 kg/m²       BMI:  Body mass index is 52.61 kg/m². Physical Examination:  Physical Exam  Vitals reviewed. Constitutional:       Appearance: Normal appearance. He is obese. HENT:      Head: Normocephalic and atraumatic. Nose: Congestion and rhinorrhea present. Mouth/Throat:      Mouth: Mucous membranes are moist.   Eyes:      Extraocular Movements: Extraocular movements intact. Conjunctiva/sclera: Conjunctivae normal.      Pupils: Pupils are equal, round, and reactive to light. Cardiovascular:      Rate and Rhythm: Normal rate and regular rhythm. Pulses: Normal pulses. Heart sounds: Normal heart sounds. No murmur heard. No friction rub. No gallop. Pulmonary:      Effort: Pulmonary effort is normal. No respiratory distress. Breath sounds: Normal breath sounds. No wheezing, rhonchi or rales. Abdominal:      General: Bowel sounds are normal. There is no distension. Palpations: Abdomen is soft. There is no mass. Tenderness: There is no abdominal tenderness. There is no guarding or rebound. Musculoskeletal:         General: No tenderness, deformity or signs of injury. Normal range of motion. Cervical back: Normal range of motion and neck supple. Right lower leg: Edema (chronic, baseline) present. Left lower leg: Edema (chronic, baseline) present. Skin:     General: Skin is warm and dry. Findings: No bruising, lesion or rash. Neurological:      General: No focal deficit present. Mental Status: He is alert and oriented to person, place, and time. Mental status is at baseline. Cranial Nerves: No cranial nerve deficit. Sensory: No sensory deficit. Motor: No weakness. Coordination: Coordination normal.      Gait: Gait normal.      Deep Tendon Reflexes: Reflexes normal.   Psychiatric:         Mood and Affect: Mood normal.         Behavior: Behavior normal.        Physical exam otherwise negative    Diagnostic Testing:    Laboratory Studies:  No visits with results within 6 Month(s) from this visit. Latest known visit with results is:   Office Visit on 05/03/2022   Component Date Value Ref Range Status    Microalbumin,urine random 05/03/2022 <0.50  MG/DL Final    No reference range has been established. Creatinine, urine random 05/03/2022 79.60  mg/dL Final    No reference range has been established. Microalbumin/Creat ratio (mg/g cre* 05/03/2022 <6  0 - 30 mg/g Final    Hemoglobin A1c 05/03/2022 5.5  4.0 - 5.6 % Final    Comment: NEW METHOD PLEASE NOTE NEW REFERENCE RANGE  (NOTE)  HbA1C Interpretive Ranges  <5.7              Normal  5.7 - 6.4         Consider Prediabetes  >6.5              Consider Diabetes      Est. average glucose 05/03/2022 111  mg/dL Final    WBC 05/03/2022 7.5  4.1 - 11.1 K/uL Final    RBC 05/03/2022 4.42  4.10 - 5.70 M/uL Final    HGB 05/03/2022 13.3  12.1 - 17.0 g/dL Final    HCT 05/03/2022 42.2  36.6 - 50.3 % Final    MCV 05/03/2022 95.5  80.0 - 99.0 FL Final    MCH 05/03/2022 30.1  26.0 - 34.0 PG Final    MCHC 05/03/2022 31.5  30.0 - 36.5 g/dL Final    RDW 05/03/2022 16.0 (A)  11.5 - 14.5 % Final    PLATELET 86/18/0729 783  150 - 400 K/uL Final    MPV 05/03/2022 12.1  8.9 - 12.9 FL Final    NRBC 05/03/2022 0.0  0  WBC Final    ABSOLUTE NRBC 05/03/2022 0.00  0.00 - 0.01 K/uL Final    NEUTROPHILS 05/03/2022 48  32 - 75 % Final    LYMPHOCYTES 05/03/2022 39  12 - 49 % Final    MONOCYTES 05/03/2022 10  5 - 13 % Final    EOSINOPHILS 05/03/2022 2  0 - 7 % Final    BASOPHILS 05/03/2022 1  0 - 1 % Final    IMMATURE GRANULOCYTES 05/03/2022 0  0.0 - 0.5 % Final    ABS. NEUTROPHILS 05/03/2022 3.6  1.8 - 8.0 K/UL Final    ABS.  LYMPHOCYTES 05/03/2022 3.0 0.8 - 3.5 K/UL Final    ABS. MONOCYTES 05/03/2022 0.8  0.0 - 1.0 K/UL Final    ABS. EOSINOPHILS 05/03/2022 0.1  0.0 - 0.4 K/UL Final    ABS. BASOPHILS 05/03/2022 0.0  0.0 - 0.1 K/UL Final    ABS. IMM. GRANS. 05/03/2022 0.0  0.00 - 0.04 K/UL Final    DF 05/03/2022 AUTOMATED    Final    Sodium 05/03/2022 141  136 - 145 mmol/L Final    Potassium 05/03/2022 4.0  3.5 - 5.1 mmol/L Final    Chloride 05/03/2022 105  97 - 108 mmol/L Final    CO2 05/03/2022 29  21 - 32 mmol/L Final    Anion gap 05/03/2022 7  5 - 15 mmol/L Final    Glucose 05/03/2022 94  65 - 100 mg/dL Final    BUN 05/03/2022 30 (A)  6 - 20 MG/DL Final    Creatinine 05/03/2022 1.56 (A)  0.70 - 1.30 MG/DL Final    BUN/Creatinine ratio 05/03/2022 19  12 - 20   Final    GFR est AA 05/03/2022 54 (A)  >60 ml/min/1.73m2 Final    GFR est non-AA 05/03/2022 44 (A)  >60 ml/min/1.73m2 Final    Comment: Estimated GFR is calculated using the IDMS-traceable Modification of Diet in  Renal Disease (MDRD) Study equation, reported for both  Americans  (GFRAA) and non- Americans (GFRNA), and normalized to 1.73m2 body  surface area. The physician must decide which value applies to the patient. Calcium 05/03/2022 9.6  8.5 - 10.1 MG/DL Final    Bilirubin, total 05/03/2022 0.4  0.2 - 1.0 MG/DL Final    ALT (SGPT) 05/03/2022 35  12 - 78 U/L Final    AST (SGOT) 05/03/2022 29  15 - 37 U/L Final    Alk. phosphatase 05/03/2022 67  45 - 117 U/L Final    Protein, total 05/03/2022 7.4  6.4 - 8.2 g/dL Final    Albumin 05/03/2022 3.8  3.5 - 5.0 g/dL Final    Globulin 05/03/2022 3.6  2.0 - 4.0 g/dL Final    A-G Ratio 05/03/2022 1.1  1.1 - 2.2   Final    Cholesterol, total 05/03/2022 160  <200 MG/DL Final    Triglyceride 05/03/2022 121  <150 MG/DL Final    Comment: Based on NCEP-ATP III:  Triglycerides <150 mg/dL  is considered normal, 150-199  mg/dL  borderline high,  200-499 mg/dL high and  greater than or equal to 500  mg/dL very high.       HDL Cholesterol 05/03/2022 43 MG/DL Final    Comment: Based on NCEP ATP III, HDL Cholesterol <40 mg/dL is considered low and >60  mg/dL is elevated. LDL, calculated 05/03/2022 92.8  0 - 100 MG/DL Final    Comment: Based on the NCEP-ATP: LDL-C concentrations are considered  optimal <100 mg/dL,  near optimal/above Normal 100-129 mg/dL Borderline High: 130-159, High: 160-189  mg/dL Very High: Greater than or equal to 190 mg/dL      VLDL, calculated 05/03/2022 24.2  MG/DL Final    CHOL/HDL Ratio 05/03/2022 3.7  0.0 - 5.0   Final    HIV 1/2 Interpretation 05/03/2022 NONREACTIVE  NONREACTIVE   Final    HIV 1/2 result comment 05/03/2022 SEE NOTE    Final    Comment: While this assay is highly sensitive, a non-reactive/negative result for HIV  antibodies HIV-1 and HIV-2 and p24 antigen, does not exclude the possibility of  exposure to or infection with HIV. HIV antibodies and/or p24 antigen may be  undetectable in some stages of the infection and in some clinical conditions. Test performed by the [employer] HIV Ag/Ab Combo (CHIV), 4th generation  assay. Recommend consulting relevant CDC guidelines for informing test subject  of the result and its interpretation. Uric acid 05/03/2022 4.4  3.5 - 7.2 MG/DL Final         Radiographic Studies:  XR Results (most recent):  Results from Hospital Encounter encounter on 11/14/21    XR SPINE LUMB 2 OR 3 V    Narrative  EXAM: XR SPINE LUMB 2 OR 3 V    INDICATION: Left lower back pain    COMPARISON: Lumbosacral spine radiographs 12/1/2006, abdominal radiographs  2/8/2021, CT abdomen 5/21/2014. TECHNIQUE: Lumbosacral spine radiographs, 3 views    FINDINGS:  Alignment is anatomic. Chronic mild loss of vertebral body heights at T12 and  L5. Multilevel degenerative endplate osteophytes and facet arthritis. Impression  No acute compression fracture nor subluxation.  Chronic mild loss of vertebral  body heights at T12 and L5     ALLEN Results (most recent):  No results found for this or any previous visit.     CT Results (most recent):  Results from Hospital Encounter encounter on 01/10/15    CTA CHEST W WO CONT    Narrative  **Final Report**      ICD Codes / Adm. Diagnosis: 584637  486 / Shortness of Breath  Examination:  CT CHEST ANGIOGRAPHY  - [id number] - Che[place] 10 2015 11:17PM  Accession No:  [id number]  Reason:  Chest pain R/O PE      REPORT:  INDICATION: Chest pain. Dyspnea. TECHNIQUE:  Routine noncontrast imaging the chest was performed for localization  purposes. Then, following the uneventful intravenous administration of 100  cc Optiray 162 , thin helical axial images were obtained through the chest.  3D image postprocessing was performed. FINDINGS:  No filling defect is seen within the pulmonary arterial system to suggest  pulmonary embolus. The main pulmonary artery is normal in caliber. The  thoracic aorta is normal in caliber and demonstrates no evidence of  dissection or aneurysm. The heart size is normal.    There is no hilar or mediastinal lymphadenopathy. The trachea and esophagus  are unremarkable. The thyroid gland demonstrates no abnormality. There is left lower lobe consolidation. There is bilateral dependent  atelectasis No pulmonary nodule or mass is seen. There are no pleural  effusions. Limited evaluation of the upper abdomen demonstrates no abnormality. The  adrenal glands are normal.  Review of the bone windows demonstrates no  evidence of destructive bone lesion. Impression  :  Left lower lobe consolidation likely represents pneumonia; radiographic  followup is recommended to assure resolution. No evidence of pulmonary  embolus. Signing/Reading Doctor: Netta Disla ([id number])  Approved: Netta Disla ([id number])  [place] 10 2015 11:31PM     DEXA Results (most recent):  No results found for this or any previous visit. MRI Results (most recent):  No results found for this or any previous visit. Assessment/Plan:       ICD-10-CM ICD-9-CM    1.  Routine adult health maintenance  Z00.00 V70.0       2. Stage 3a chronic kidney disease (HCC)  N44.62 826.5 METABOLIC PANEL, COMPREHENSIVE      3. Steatosis ([facility])  E88.89 272.8 CBC WITH AUTOMATED DIFF      4. Class 3 severe obesity due to excess calories with serious comorbidity and body mass index (BMI) of 50.0 to 59.9 in adult ([facility])  E66.01 278.01     Z68.43 V85.43       5. Essential hypertension  I10 401.9       6. Hypercholesterolemia  E78.00 272.0       7. Encounter for immunization  Z23 V03.89 INFLUENZA, FLUAD, (AGE 72 Y+), IM, PF, 0.5 ML               Follow-up and Dispositions    Return in about 6 months (around 5/3/2023). Healthcare Maintenance:  - Preventive measures are reviewed as per above  - Up to date on routine interventions except as noted above  - Orders placed to update gaps as noted  - Notes: Flu shot today, repeating labs, continue with current care    Essential Hypertension/Blood Pressure Management:   - Home BP Readings: not doing   - Current Control: optimal   - Target BP: <130/80 mmHg   - Relevant BP Meds:  Key CAD CHF Meds               bumetanide (BUMEX) 1 mg tablet (Taking) Take 2 Tablets by mouth daily. hydrALAZINE (APRESOLINE) 50 mg tablet (Taking) TAKE 1 TABLET BY MOUTH TWICE DAILY FOR HIGH BLOOD PRESSURE    pravastatin (PRAVACHOL) 10 mg tablet (Taking) Take 1 Tablet by mouth nightly.     fenofibrate nanocrystallized (TRICOR) 145 mg tablet (Taking) TAKE 1 TABLET BY MOUTH EVERY DAY    atenoloL (TENORMIN) 50 mg tablet (Taking) TAKE 1 TABLET BY MOUTH EVERY DAY    aspirin 81 mg chewable tablet (Taking) Take 81 mg by mouth daily.                 - Plan: continue current treatment regimen, continue current meds, dietary sodium restriction, regular aerobic exercise, weight loss   - Notes: CCM, labs pending    Hyperlipidemia/Dyslipidemia:   - Summary of Cardiovascular Risks and Goals:     LDL goal is under 100  hypertension  hyperlipidemia  obese   -   Lab Results   Component Value Date/Time    LDL, calculated 92.8 05/03/2022 02:40 PM    HDL Cholesterol 43 05/03/2022 02:40 PM       - Relevant Cholesterol Meds:  Key Antihyperlipidemia Meds               pravastatin (PRAVACHOL) 10 mg tablet (Taking) Take 1 Tablet by mouth nightly. fenofibrate nanocrystallized (TRICOR) 145 mg tablet (Taking) TAKE 1 TABLET BY MOUTH EVERY DAY              - Cholesterol at target: yes   - Does patient meet USPSTF and ACC/AHA indications for pharmacotherapy (e.g., statin): yes   - GI symptoms with meds: NO   - Muscle aches with meds: NO   - Other Adverse effects with meds: NO   - Medication Plan: continue   - Notes: ---    Chronic Kidney Disease:   - Last Serum Creatinine:   Lab Results   Component Value Date/Time    Creatinine (POC) 1.0 01/22/2018 08:32 AM    Creatinine 1.56 (H) 05/03/2022 02:40 PM       - Last GFR:   Lab Results   Component Value Date/Time    GFR est AA 54 (L) 05/03/2022 02:40 PM    GFR est non-AA 44 (L) 05/03/2022 02:40 PM      - Current Stage by eGFR: G3A   - Proteinuria: a1   -   Key CKD Meds               bumetanide (BUMEX) 1 mg tablet (Taking) Take 2 Tablets by mouth daily.           - Blood Pressure Target: See Hypertension/Blood Pressure Management Section   - Advised avoidance of NSAIDs and other nephrotoxins wherever possible   - Advised renal dosing of medications where necessary   - Acid/Base Management: ---   - Phosphorus Management: ---   - Nephrology Consultation: defer   - Notes: CCM, repeat labs        SORIA/Fatty Liver Disease:   - Biopsy (date): n/a   - Symptoms (if any): none   - Medications (if any):  Key Chronic GI Meds               omeprazole (PRILOSEC OTC) 20 mg tablet (Taking) Take 20 mg by mouth daily.             - FIB4 Score: 1.62 (fibrosis excluded)   - Weight Loss indicated?  YES   - Last Fibroscan (repeat annually): n/a   LFTs: reviewed as above in labs   - Follows with GI: NO   - Vaccinations:   HAV: There is no immunization history for the selected administration types on file for this patient. HBV: There is no immunization history for the selected administration types on file for this patient. There is currently no FDA approved medical treatment for fatty liver, NALFD or SORIA. The only medical treatments for SORIA are though clinical trials. Obesity:   - Body mass index is 52.61 kg/m². - Current Obesity Medications (if any):   Key Obesity Meds               bumetanide (BUMEX) 1 mg tablet (Taking) Take 2 Tablets by mouth daily.           - Discussed therapeutic lifestyle changes: dietary modifications, caloric restriction, increased aerobic exercise, resistance training   - Patient adherent to dietary modifications x 6 months (e.g., Mediterranean Diet): YES   - Previous Nutrition Referral or formal weight loss plan x 6 m: YES   - Comorbidities:   - Obstructive sleep apnea: YES   - Obesity-related hypoventilation:NO    - Diabetes Mellitus:NO   - Hypertension:YES   - Other (GERD, OA, back pain):NO   - Qualifies for medication or bariatric surgery referral? YES   - Notes: Patient qualifies for Klaus Rapp as he has been seeing a nutritionist now for the better part of a year over 6 months, without significant weight loss, he is interested potentially pursuing pharmacotherapy, information regarding Adra Rapp is provided to the patient today, and he will consider this medication and do additional research before deciding upon a course of action, continuing otherwise with therapy lifestyle changes as he is trying to do now          I have reviewed the patient's medical history in detail and updated the computerized patient record. We had a prolonged discussion about these complex clinical issues and went over the various important aspects to consider. All questions were answered.       Advised the patient to call back or return to office if symptoms do not improve, change in nature, or persist.     The patient was given an after visit summary or informed of [employer] Access which includes patient instructions, diagnoses, current medications, & vitals. he expressed understanding with the diagnosis and plan. Ashutosh Clement MD    Please note that this dictation was completed with [employer]back, the computer voice recognition software. Quite often unanticipated grammatical, syntax, homophones, and other interpretive errors are inadvertently transcribed by the computer software. Please disregard these errors. Please excuse any errors that have escaped final proofreading.

## 2022-11-03 ENCOUNTER — OFFICE VISIT (OUTPATIENT)
Dept: INTERNAL MEDICINE CLINIC | Age: 67
End: 2022-11-03
Payer: MEDICARE

## 2022-11-03 VITALS
HEIGHT: 71 IN | WEIGHT: 315 LBS | RESPIRATION RATE: 16 BRPM | OXYGEN SATURATION: 96 % | BODY MASS INDEX: 44.1 KG/M2 | SYSTOLIC BLOOD PRESSURE: 130 MMHG | DIASTOLIC BLOOD PRESSURE: 86 MMHG | HEART RATE: 74 BPM

## 2022-11-03 DIAGNOSIS — Z23 ENCOUNTER FOR IMMUNIZATION: ICD-10-CM

## 2022-11-03 DIAGNOSIS — Z00.00 ROUTINE ADULT HEALTH MAINTENANCE: Primary | ICD-10-CM

## 2022-11-03 DIAGNOSIS — I10 ESSENTIAL HYPERTENSION: ICD-10-CM

## 2022-11-03 DIAGNOSIS — E66.01 CLASS 3 SEVERE OBESITY DUE TO EXCESS CALORIES WITH SERIOUS COMORBIDITY AND BODY MASS INDEX (BMI) OF 50.0 TO 59.9 IN ADULT (HCC): ICD-10-CM

## 2022-11-03 DIAGNOSIS — N18.31 STAGE 3A CHRONIC KIDNEY DISEASE (HCC): ICD-10-CM

## 2022-11-03 DIAGNOSIS — E88.89 STEATOSIS (HCC): ICD-10-CM

## 2022-11-03 DIAGNOSIS — E78.00 HYPERCHOLESTEROLEMIA: ICD-10-CM

## 2022-11-03 PROBLEM — N18.30 CHRONIC RENAL DISEASE, STAGE III (HCC): Status: ACTIVE | Noted: 2022-11-03

## 2022-11-03 PROCEDURE — 99214 OFFICE O/P EST MOD 30 MIN: CPT | Performed by: INTERNAL MEDICINE

## 2022-11-03 PROCEDURE — 1123F ACP DISCUSS/DSCN MKR DOCD: CPT | Performed by: INTERNAL MEDICINE

## 2022-11-03 PROCEDURE — 90694 VACC AIIV4 NO PRSRV 0.5ML IM: CPT | Performed by: INTERNAL MEDICINE

## 2022-11-03 PROCEDURE — G0008 ADMIN INFLUENZA VIRUS VAC: HCPCS | Performed by: INTERNAL MEDICINE

## 2022-11-03 PROCEDURE — 3078F DIAST BP <80 MM HG: CPT | Performed by: INTERNAL MEDICINE

## 2022-11-03 PROCEDURE — 3074F SYST BP LT 130 MM HG: CPT | Performed by: INTERNAL MEDICINE

## 2022-11-03 NOTE — PROGRESS NOTES
Chief Complaint   Patient presents with    Follow-up       1. \"Have you been to the ER, urgent care clinic since your last visit? Hospitalized since your last visit? \" No    2. \"Have you seen or consulted any other health care providers outside of the [address] [place] [address] since your last visit? \" No     3. For patients aged 39-70: Has the patient had a colonoscopy / FIT/ Cologuard? No      If the patient is female:    4. For patients aged 41-77: Has the patient had a mammogram within the past 2 years? No      5. For patients aged 21-65: Has the patient had a pap smear?  No

## 2022-11-03 NOTE — PATIENT INSTRUCTIONS
Chronic Kidney Disease: Care Instructions  Overview     Chronic kidney disease happens when your kidneys don't work as well as they should. Your kidneys have a few important jobs. They remove waste from your blood. This waste leaves your body in your urine. They also balance your body's fluids and chemicals. When your kidneys don't work well, extra waste and fluid can build up. This can poison the body and sometimes cause death. The most common causes of this disease are diabetes and high blood pressure. In some cases, the disease develops in 2 to 3 months. But it usually develops over many years. If you take medicine and make healthy changes to your lifestyle, you may be able to prevent the disease from getting worse. But if your kidney damage gets worse, you may need dialysis or a kidney transplant. Dialysis uses a machine to filter waste from the blood. A transplant is surgery to give you a healthy kidney from another person. Follow-up care is a key part of your treatment and safety. Be sure to make and go to all appointments, and call your doctor if you are having problems. It's also a good idea to know your test results and keep a list of the medicines you take. How can you care for yourself at home? Treatments and appointments    Be safe with medicines. Take your medicines exactly as prescribed. Call your doctor if you have any problems with your medicine. You also may take medicine to control your blood pressure or to treat diabetes. Many people who have diabetes take blood pressure medicine. If you have diabetes, do your best to keep your blood sugar in your target range. You may do this by eating healthy food and exercising. You may also take medicines. Go to your dialysis appointments if you have this treatment. Do not take ibuprofen, naproxen, or similar medicines, unless your doctor tells you to. These may make the disease worse.      Do not take any vitamins, over-the-counter medicines, or herbal products without talking to your doctor first.     January Wesley not smoke or use other tobacco products. Smoking can reduce blood flow to the kidneys. If you need help quitting, talk to your doctor about stop-smoking programs and medicines. These can increase your chances of quitting for good. Limit your use of alcohol and avoid illegal drugs. Talk to your doctor about an exercise plan. Exercise helps lower your blood pressure. It also makes you feel better. If you have an advance directive, let your doctor know. It may include a living will and a durable power of  for health care. If you don't have one, you may want to prepare one. It lets your doctor and loved ones know your health care wishes if you become unable to speak for yourself. Diet    Talk to a registered dietitian. They can help you make a meal plan that is right for you. Most people with kidney disease need to limit salt (sodium), fluids, and protein. Some also have to limit potassium and phosphorus. You may have to give up many foods you like. But try to focus on the fact that this will help you stay healthy for as long as possible. If you have a hard time eating enough, talk to your doctor or dietitian about ways to add calories to your diet. Your diet may change as your disease changes. See your doctor for regular testing. And work with a dietitian to change your diet as needed. When should you call for help? Call 911 anytime you think you may need emergency care. For example, call if:    You passed out (lost consciousness). Call your doctor now or seek immediate medical care if:    You have less urine than normal or no urine. You have trouble urinating or can urinate only very small amounts. You are confused or have trouble thinking clearly. You feel weaker or more tired than usual.     You are very thirsty, lightheaded, or dizzy. You have nausea and vomiting.      You have new swelling of your arms or feet, or your swelling is worse. You have blood in your urine. You have new or worse trouble breathing. Watch closely for changes in your health, and be sure to contact your doctor if:    You have any problems with your medicine or other treatment. Where can you learn more? Go to http://mike-rivas.info/  Enter N276 in the search box to learn more about \"Chronic Kidney Disease: Care Instructions. \"  Current as of: May 4, 2022               Content Version: 13.4  © 2006-2022 [employer]. Care instructions adapted under license by [employer] (which disclaims liability or warranty for this information). If you have questions about a medical condition or this instruction, always ask your healthcare professional. [address] any warranty or liability for your use of this information. Medicines to Avoid With Kidney Disease: Care Instructions  Overview     Kidney disease means that your kidneys are not able to get rid of waste from the blood. So they can't keep your body's fluids and chemicals in balance. Usually, the kidneys get rid of waste from the blood through the urine. And they balance the fluids in the body. When your kidneys don't work as they should, you have to be careful about some medicines. They may harm your kidneys. Your doctor may tell you not to take them or may change the dose. Medicines for pain and swelling, such as ibuprofen (Advil or Motrin) or naproxen (Aleve), can cause harm. So can some antibiotics and antacids. And you need to be careful about some drugs that treat cancer, lower blood pressure, or get rid of water from the body. Some herbal products could cause harm too. Follow-up care is a key part of your treatment and safety. Be sure to make and go to all appointments, and call your doctor if you are having problems.  It's also a good idea to know your test results and keep a list of the medicines you take. How can you care for yourself at home? Tell your doctor all the prescription, herbal, or over-the-counter medicines you take. Do not take any new ones unless you talk to your doctor first.  Trish Bernstein not take anti-inflammatory medicines. These include ibuprofen (Advil, Motrin) and naproxen (Aleve). You can use acetaminophen (Tylenol) for pain. Do not take two or more pain medicines at the same time unless the doctor told you to. Many pain medicines have acetaminophen, which is Tylenol. Too much acetaminophen (Tylenol) can be harmful. Tell all doctors and others who work with your health care that you have kidney disease. Wear medical alert jewelry that lists your health problem. You can buy this at most drugstores. Where can you learn more? Go to http://mike-rivas.info/  Enter O166 in the search box to learn more about \"Medicines to Avoid With Kidney Disease: Care Instructions. \"  Current as of: May 4, 2022               Content Version: 13.4  © 2572-2401 [employer]. Care instructions adapted under license by [employer] (which disclaims liability or warranty for this information). If you have questions about a medical condition or this instruction, always ask your healthcare professional. [address] any warranty or liability for your use of this information. Saline Nasal Washes: Care Instructions  Overview     Saline nasal washes help keep the nasal passages open by washing out thick or dried mucus. This simple remedy can help relieve symptoms of allergies, sinusitis, and colds. It also can make the nose feel more comfortable by keeping the mucous membranes moist. You may notice a little burning sensation in your nose the first few times you use the solution, but this usually gets better in a few days. Follow-up care is a key part of your treatment and safety.  Be sure to make and go to all appointments, and call your doctor if you are having problems. It's also a good idea to know your test results and keep a list of the medicines you take. How can you care for yourself at home? You can buy premixed saline solution in a squeeze bottle or other sinus rinse products at a drugstore. Read and follow the instructions on the label. You also can make your own saline solution by adding 1 teaspoon of non-iodized salt and 1 teaspoon of baking soda to 2 cups of distilled or boiled and cooled water. If you use a homemade solution, use a squeeze bottle or neti pot to get the solution into your nose. Room temperature or slightly warmed water may be more comfortable. Make sure it isn't hot. Stand over the sink with your head tilted forward and slightly to one side. Put only the tip of the syringe or squeeze bottle into the nostril that is farther away from the sink. (The nostril closest to the sink will drain the fluid.) Gently squirt the solution into the nostril and toward the back of your head with your mouth open. The solution should flow out the other nostril. Repeat on the other side. Some sneezing and gagging are normal at first.  Gently blow your nose. Clean the syringe or bottle after each use. Repeat this 2 or 3 times a day. Use nasal washes gently if you have nosebleeds often. When should you call for help? Watch closely for changes in your health, and be sure to contact your doctor if:    Your symptoms do not get better. You have problems doing the nasal washes. Where can you learn more? Go to http://www.gray.com/  Enter B784 in the search box to learn more about \"Saline Nasal Washes: Care Instructions. \"  Current as of: May 4, 2022               Content Version: 13.4  © 9070-0132 [employer], [employer]. Care instructions adapted under license by [employer] (which disclaims liability or warranty for this information).  If you have questions about a medical condition or this instruction, always ask your healthcare professional. [place] [address] any warranty or liability for your use of this information. Fluticasone (Into the nose)   Fluticasone ([id number]-l-sone)  Treats allergy symptoms, such as runny or stuffy nose. This medicine is a corticosteroid. Brand Name(s): Children's Flonase, ClariSpray, DermacinRx [employer], DermacinRx [place], [address] [place] [address], Flonase Sensimist, Fluticasone Propionate Novaplus, Ticaspray, Veramyst   There may be other brand names for this medicine. When This Medicine Should Not Be Used: This medicine is not right for everyone. Do not use if you had an allergic reaction to fluticasone. How to Use This Medicine:   Spray  Your doctor will tell you how much medicine to use. Do not use more than directed. This medicine is for use only in the nose. Do not get any of it in your eyes or on your skin. If it does get on these areas, rinse it off right away. Prime the spray: Release 6 test sprays into the air away from the face, or pump the bottle until some of the medicine sprays out. Now it is ready to use. Prime the spray if it has not been used for more than 7 days (or 30 days for Veramyst®) or if the cap has been left off the bottle for 5 days or longer. Shake the medicine well just before each use. Before using the medicine, gently blow your nose to clear the nostrils. After using the nasal spray, wipe the tip of the bottle with a clean tissue and put the cap back on. You may need to use this medicine for a few days before you start to feel better. Read and follow the patient instructions that come with this medicine. Talk to your doctor or pharmacist if you have any questions. Follow the instructions on the medicine label if you are using this medicine without a prescription. Missed dose: Take a dose as soon as you remember. If it is almost time for your next dose, wait until then and take a regular dose.  Do not take extra medicine to make up for a missed dose. Keep the bottle tightly closed when not using it. Store at room temperature, away from heat and direct light. Do not freeze or refrigerate. Throw this medicine away after you use 120 sprays. Drugs and Foods to Avoid:   Ask your doctor or pharmacist before using any other medicine, including over-the-counter medicines, vitamins, and herbal products. Do not use this medicine together with ritonavir. Some foods and medicines can affect how fluticasone works. Tell your doctor if you are using ketoconazole. Warnings While Using This Medicine:   Tell your doctor if you are pregnant or breastfeeding, or if you have liver disease, asthma, an infection, or a history of cataracts or glaucoma. Make sure your doctor knows if you have had nose surgery, a nose injury, or a recent infection in your nose. This medicine may cause the following problems:  Holes or ulcers inside the nose  Slow wound healing  Cataracts or glaucoma  Problems with the adrenal glands  Slow growth in children  Avoid people who are sick or have infections. Tell your doctor right away if you think you have been exposed to measles or chickenpox. Your doctor will check your progress and the effects of this medicine at regular visits. Keep all appointments. Call your doctor if your symptoms do not improve or if they get worse. Keep all medicine out of the reach of children. Never share your medicine with anyone. Possible Side Effects While Using This Medicine:   Call your doctor right away if you notice any of these side effects:   Allergic reaction: Itching or hives, swelling in your face or hands, swelling or tingling in your mouth or throat, chest tightness, trouble breathing  Burning, redness, swelling, or irritation around or inside your nose  Eye pain or vision changes  Fever, chills, cough, sore throat, and body aches  Heavy nosebleeds  Sores or white patches inside the nose or mouth  Tiredness, weakness, dizziness  If you notice other side effects that you think are caused by this medicine, tell your doctor. Call your doctor for medical advice about side effects. You may report side effects to FDA at 7-517-BWC-4604  © 2017 [facility] Information is for End User's use only and may not be sold, redistributed or otherwise used for commercial purposes. The above information is an  only. It is not intended as medical advice for individual conditions or treatments. Talk to your doctor, nurse or pharmacist before following any medical regimen to see if it is safe and effective for you. Semaglutide: Patient drug information      Copyright 3289-8174 [facility] [address] rights reserved. Contributor Disclosures  (For additional information see \"Semaglutide: Drug information\")    You must carefully read the \"Consumer Information Use and Disclaimer\" below in order to understand and correctly use this information. Brand Names: [place]  Ozempic (0.25 or 0.5 MG/DOSE); Ozempic (1 MG/DOSE); Ozempic (2 MG/DOSE); Rybelsus; GMFALH    Brand Names: [place] ([facility])  Ozempic (0.25 or 0.5 MG/DOSE); Ozempic (1 MG/DOSE); Rybelsus    Warning   This drug has been shown to cause thyroid cancer in some animals. It is not known if this happens in humans. If thyroid cancer happens, it may be deadly if not found and treated early. Call your doctor right away if you have a neck mass, trouble breathing, trouble swallowing, or have hoarseness that will not go away. Do not use this drug if you have a health problem called Multiple Endocrine Neoplasia syndrome type 2 (MEN 2), or if you or a family member have had thyroid cancer. What is this drug used for? Ozempic prefilled pens and Rybelsus tablets: It is used to lower blood sugar in patients with high blood sugar (diabetes). Ozempic prefilled pens: It is used to lower the chance of heart attack, stroke, and death in some people. Wegovy prefilled pens: It is used to help with weight loss in certain people. What do I need to tell my doctor BEFORE I take this drug? For all uses of this drug:   If you are allergic to this drug; any part of this drug; or any other drugs, foods, or substances. Tell your doctor about the allergy and what signs you had. If you have ever had pancreatitis. If you have or have ever had depression or thoughts of suicide. If you are using another drug that has the same drug in it. If you are using another drug like this one. If you are not sure, ask your doctor or pharmacist.   If using for high blood sugar: If you have type 1 diabetes. Do not use this drug to treat type 1 diabetes. This is not a list of all drugs or health problems that interact with this drug. Tell your doctor and pharmacist about all of your drugs (prescription or OTC, natural products, vitamins) and health problems. You must check to make sure that it is safe for you to take this drug with all of your drugs and health problems. Do not start, stop, or change the dose of any drug without checking with your doctor. What are some things I need to know or do while I take this drug? For all uses of this drug:   Tell all of your health care providers that you take this drug. This includes your doctors, nurses, pharmacists, and dentists. Follow the diet and workout plan that your doctor told you about. Have blood work checked as you have been told by the doctor. Talk with the doctor. Talk with your doctor before you drink alcohol. Kidney problems have happened. Sometimes, these may need to be treated in the hospital or with dialysis. If you cannot drink liquids by mouth or if you have upset stomach, throwing up, or diarrhea that does not go away; you need to avoid getting dehydrated. Contact your doctor to find out what to do. Dehydration may lead to new or worse kidney problems.    Do not share pen or cartridge devices with another person even if the needle has been changed. Sharing these devices may pass infections from one person to another. This includes infections you may not know you have. If you are planning on getting pregnant, talk with your doctor. You may need to stop taking this drug at least 2 months before getting pregnant. If using for high blood sugar:   Wear disease medical alert ID (identification). Check your blood sugar as you have been told by your doctor. Do not drive if your blood sugar has been low. There is a greater chance of you having a crash. It may be harder to control blood sugar during times of stress such as fever, infection, injury, or surgery. A change in physical activity, exercise, or diet may also affect blood sugar. Tell your doctor if you are pregnant, plan on getting pregnant, or are breast-feeding. You will need to talk about the benefits and risks to you and the baby. If using for weight loss: If you have high blood sugar (diabetes), you will need to watch your blood sugar closely. Weight loss during pregnancy may cause harm to the unborn baby. If you get pregnant while taking this drug or if you want to get pregnant, call your doctor right away. Tell your doctor if you are breast-feeding. You will need to talk about any risks to your baby. What are some side effects that I need to call my doctor about right away? WARNING/CAUTION: Even though it may be rare, some people may have very bad and sometimes deadly side effects when taking a drug.  Tell your doctor or get medical help right away if you have any of the following signs or symptoms that may be related to a very bad side effect:   For all uses of this drug:   Signs of an allergic reaction, like rash; hives; itching; red, swollen, blistered, or peeling skin with or without fever; wheezing; tightness in the chest or throat; trouble breathing, swallowing, or talking; unusual hoarseness; or swelling of the mouth, face, lips, tongue, or throat. Signs of kidney problems like unable to pass urine, change in how much urine is passed, blood in the urine, or a big weight gain. Signs of gallbladder problems like pain in the upper right belly area, right shoulder area, or between the shoulder blades; change in stools; dark urine or yellow skin or eyes; or fever with chills. Very bad dizziness or passing out. A fast heartbeat. Change in eyesight. Low blood sugar can happen. The chance may be raised when this drug is used with other drugs for diabetes. Signs may be dizziness, headache, feeling sleepy or weak, shaking, fast heartbeat, confusion, hunger, or sweating. Call your doctor right away if you have any of these signs. Follow what you have been told to do for low blood sugar. This may include taking glucose tablets, liquid glucose, or some fruit juices. Severe and sometimes deadly pancreas problems (pancreatitis) have happened with this drug. Call your doctor right away if you have severe stomach pain, severe back pain, or severe upset stomach or throwing up. If using for weight loss:   New or worse behavior or mood changes like depression or thoughts of suicide. What are some other side effects of this drug? All drugs may cause side effects. However, many people have no side effects or only have minor side effects. Call your doctor or get medical help if any of these side effects or any other side effects bother you or do not go away: If using for high blood sugar:   Constipation, diarrhea, stomach pain, upset stomach, or throwing up. Tablets:   Not hungry. If using for weight loss:   Constipation, diarrhea, stomach pain, upset stomach, or throwing up. Headache. Feeling dizzy, tired, or weak. Burping. Gas. These are not all of the side effects that may occur. If you have questions about side effects, call your doctor. Call your doctor for medical advice about side effects.    You may report side effects to your national health agency. How is this drug best taken? Use this drug as ordered by your doctor. Read all information given to you. Follow all instructions closely. Tablets: Take at least 30 minutes before the first food, drink, or drugs of the day. Take with plain water only. Do not take with more than 4 ounces (120 mL) of water. Swallow whole. Do not chew, break, or crush. Keep taking this drug as you have been told by your doctor or other health care provider, even if you feel well. Prefilled syringes or pen: It is given as a shot into the fatty part of the skin on the top of the thigh, belly area, or upper arm. If you will be giving yourself the shot, your doctor or nurse will teach you how to give the shot. Take with or without food. Take the same day each week. Move the site where you give the shot with each shot. Do not use if the solution is cloudy, leaking, or has particles. Do not use if solution changes color. Wash your hands before and after use. Keep taking this drug as you have been told by your doctor or other health care provider, even if you feel well. Throw away needles in a needle/sharp disposal box. Do not reuse needles or other items. When the box is full, follow all local rules for getting rid of it. Talk with a doctor or pharmacist if you have any questions. If using for high blood sugar:   Attach new needle before each dose. Take off the needle after each shot. Do not store this device with the needle on it. Put the cap back on after you are done using your dose. If you are also using insulin, you may inject this drug and the insulin in the same area of the body but not right next to each other. Do not mix this drug in the same syringe with insulin. If using for weight loss:   Each container is for one use only. Use right after opening. Throw away any part of the opened container after the dose is given.     What do I do if I miss a dose? Tablets:   Skip the missed dose and go back to your normal time. Do not take 2 doses at the same time or extra doses. Prefilled syringes or pen: Take a missed dose as soon as you think about it and go back to your normal time. If it is less than 48 hours until your next dose, skip the missed and go back to your normal time. Do not take 2 doses within 48 hours of each other. If you miss 2 doses, call your doctor. How do I store and/or throw out this drug? Tablets:   Store in the original container at room temperature. Store in a dry place. Do not store in a bathroom. Prefilled syringes or pen:   Store unopened pens in a refrigerator. Do not freeze. Do not use if it has been frozen. Ozempic prefilled pens:   After opening, store in the refrigerator or at room temperature. Throw away any part not used after 56 days. Protect from heat and light. Wegovy prefilled pens: You may store unopened containers at room temperature. If you store at room temperature, throw away any part not used after 28 days. Store in the original container to protect from light. All products:   Keep all drugs in a safe place. Keep all drugs out of the reach of children and pets. Throw away unused or  drugs. Do not flush down a toilet or pour down a drain unless you are told to do so. Check with your pharmacist if you have questions about the best way to throw out drugs. There may be drug take-back programs in your area. General drug facts   If your symptoms or health problems do not get better or if they become worse, call your doctor. Do not share your drugs with others and do not take anyone else's drugs. Some drugs may have another patient information leaflet. If you have any questions about this drug, please talk with your doctor, nurse, pharmacist, or other health care provider.    If you think there has been an overdose, call your poison control center or get medical care right away. Be ready to tell or show what was taken, how much, and when it happened. Last Reviewed [id number]  Consumer Information Use and Disclaimer   This generalized information is a limited summary of diagnosis, treatment, and/or medication information. It is not meant to be comprehensive and should be used as a tool to help the user understand and/or assess potential diagnostic and treatment options. It does NOT include all information about conditions, treatments, medications, side effects, or risks that may apply to a specific patient. It is not intended to be medical advice or a substitute for the medical advice, diagnosis, or treatment of a health care provider based on the health care provider's examination and assessment of a patient's specific and unique circumstances. Patients must speak with a health care provider for complete information about their health, medical questions, and treatment options, including any risks or benefits regarding use of medications. This information does not endorse any treatments or medications as safe, effective, or approved for treating a specific patient. [employer] and its affiliates disclaim any warranty or liability relating to this information or the use thereof. The use of this information is governed by the Terms of Use, available at [employer]. com/en/know/clinical-effectiveness-terms. © 2022 [employer], Inc. and its affiliates and/or [facility]. All rights reserved.

## 2022-11-03 NOTE — PROGRESS NOTES
After obtaining written consent and per orders of Dr. Delfina Perez, injection of Fluad given by Yordy Chisholm CMA. Order and injection/medication verified by Caleb Bird. Patient tolerated procedure well. VIS was given to them. No reactions noted.

## 2022-11-04 LAB
ALBUMIN SERPL-MCNC: 3.9 G/DL (ref 3.5–5)
ALBUMIN/GLOB SERPL: 1.1 {RATIO} (ref 1.1–2.2)
ALP SERPL-CCNC: 66 U/L (ref 45–117)
ALT SERPL-CCNC: 28 U/L (ref 12–78)
ANION GAP SERPL CALC-SCNC: 4 MMOL/L (ref 5–15)
AST SERPL-CCNC: 15 U/L (ref 15–37)
BASOPHILS # BLD: 0 K/UL (ref 0–0.1)
BASOPHILS NFR BLD: 0 % (ref 0–1)
BILIRUB SERPL-MCNC: 0.4 MG/DL (ref 0.2–1)
BUN SERPL-MCNC: 32 MG/DL (ref 6–20)
BUN/CREAT SERPL: 18 (ref 12–20)
CALCIUM SERPL-MCNC: 9.4 MG/DL (ref 8.5–10.1)
CHLORIDE SERPL-SCNC: 104 MMOL/L (ref 97–108)
CO2 SERPL-SCNC: 30 MMOL/L (ref 21–32)
CREAT SERPL-MCNC: 1.81 MG/DL (ref 0.7–1.3)
DIFFERENTIAL METHOD BLD: ABNORMAL
EOSINOPHIL # BLD: 0.1 K/UL (ref 0–0.4)
EOSINOPHIL NFR BLD: 1 % (ref 0–7)
ERYTHROCYTE [DISTWIDTH] IN BLOOD BY AUTOMATED COUNT: 16.5 % (ref 11.5–14.5)
GLOBULIN SER CALC-MCNC: 3.7 G/DL (ref 2–4)
GLUCOSE SERPL-MCNC: 101 MG/DL (ref 65–100)
HCT VFR BLD AUTO: 40.5 % (ref 36.6–50.3)
HGB BLD-MCNC: 12.9 G/DL (ref 12.1–17)
IMM GRANULOCYTES # BLD AUTO: 0 K/UL (ref 0–0.04)
IMM GRANULOCYTES NFR BLD AUTO: 1 % (ref 0–0.5)
LYMPHOCYTES # BLD: 2.3 K/UL (ref 0.8–3.5)
LYMPHOCYTES NFR BLD: 27 % (ref 12–49)
MCH RBC QN AUTO: 29.5 PG (ref 26–34)
MCHC RBC AUTO-ENTMCNC: 31.9 G/DL (ref 30–36.5)
MCV RBC AUTO: 92.5 FL (ref 80–99)
MONOCYTES # BLD: 1.4 K/UL (ref 0–1)
MONOCYTES NFR BLD: 17 % (ref 5–13)
NEUTS SEG # BLD: 4.4 K/UL (ref 1.8–8)
NEUTS SEG NFR BLD: 54 % (ref 32–75)
NRBC # BLD: 0 K/UL (ref 0–0.01)
NRBC BLD-RTO: 0 PER 100 WBC
PLATELET # BLD AUTO: 196 K/UL (ref 150–400)
PMV BLD AUTO: 12 FL (ref 8.9–12.9)
POTASSIUM SERPL-SCNC: 4.1 MMOL/L (ref 3.5–5.1)
PROT SERPL-MCNC: 7.6 G/DL (ref 6.4–8.2)
RBC # BLD AUTO: 4.38 M/UL (ref 4.1–5.7)
SODIUM SERPL-SCNC: 138 MMOL/L (ref 136–145)
WBC # BLD AUTO: 8.3 K/UL (ref 4.1–11.1)

## 2022-11-09 DIAGNOSIS — N18.32 CKD STAGE G3B/A1, GFR 30-44 AND ALBUMIN CREATININE RATIO <30 MG/G (HCC): Primary | ICD-10-CM

## 2022-11-09 DIAGNOSIS — E78.00 HYPERCHOLESTEROLEMIA: Primary | ICD-10-CM

## 2022-11-09 RX ORDER — PRAVASTATIN SODIUM 20 MG/1
20 TABLET ORAL
Qty: 90 TABLET | Refills: 3 | Status: SHIPPED | OUTPATIENT
Start: 2022-11-09 | End: 2023-11-04

## 2022-11-09 NOTE — PROGRESS NOTES
Please call the patient regarding his diagnostic evaluation. Worsening renal function. Please have him discontinue fenofibrate. Increase pravastatin to 20 mg/d to continue control of lipids. Obtaining urinalysis. Will refer to nephrology. Please ask him how often he is using bumetanide.

## 2022-11-17 NOTE — PROGRESS NOTES
Patients wife returned call regarding lab results  Advised patient per Dr. Carly Donato that kidney function is worse and to stop fenofibrate and increase pravastatin to 20 mg daily  Informed patient that Dr. Carly Donato sent rx to pharmacy on file  Also advised patient per Dr. Carly Donato that he was referred to nephrology  Patients wife stated she feels like its a lot going on and she would like to come in for an office visit to discuss kidney function  Appointment was scheduled to see Dr. Carly Donato  Patient verbalized understanding of information discussed  with no further questions at this time.

## 2022-11-18 ENCOUNTER — OFFICE VISIT (OUTPATIENT)
Dept: INTERNAL MEDICINE CLINIC | Age: 67
End: 2022-11-18
Payer: MEDICARE

## 2022-11-18 VITALS
SYSTOLIC BLOOD PRESSURE: 140 MMHG | WEIGHT: 315 LBS | HEIGHT: 71 IN | RESPIRATION RATE: 16 BRPM | OXYGEN SATURATION: 95 % | DIASTOLIC BLOOD PRESSURE: 88 MMHG | HEART RATE: 78 BPM | BODY MASS INDEX: 44.1 KG/M2

## 2022-11-18 DIAGNOSIS — I10 ESSENTIAL HYPERTENSION: ICD-10-CM

## 2022-11-18 DIAGNOSIS — E11.22 TYPE 2 DIABETES MELLITUS WITH CHRONIC KIDNEY DISEASE (HCC): ICD-10-CM

## 2022-11-18 DIAGNOSIS — N18.31 STAGE 3A CHRONIC KIDNEY DISEASE (HCC): Primary | ICD-10-CM

## 2022-11-18 DIAGNOSIS — Z23 ENCOUNTER FOR IMMUNIZATION: ICD-10-CM

## 2022-11-18 DIAGNOSIS — E78.00 HYPERCHOLESTEROLEMIA: ICD-10-CM

## 2022-11-18 PROCEDURE — 3078F DIAST BP <80 MM HG: CPT | Performed by: INTERNAL MEDICINE

## 2022-11-18 PROCEDURE — 3044F HG A1C LEVEL LT 7.0%: CPT | Performed by: INTERNAL MEDICINE

## 2022-11-18 PROCEDURE — 3074F SYST BP LT 130 MM HG: CPT | Performed by: INTERNAL MEDICINE

## 2022-11-18 PROCEDURE — 1123F ACP DISCUSS/DSCN MKR DOCD: CPT | Performed by: INTERNAL MEDICINE

## 2022-11-18 PROCEDURE — 99214 OFFICE O/P EST MOD 30 MIN: CPT | Performed by: INTERNAL MEDICINE

## 2022-11-18 RX ORDER — BUMETANIDE 1 MG/1
1 TABLET ORAL DAILY
Qty: 180 TABLET | Refills: 3 | Status: SHIPPED | OUTPATIENT
Start: 2022-11-18

## 2022-11-18 NOTE — PROGRESS NOTES
Chief Complaint   Patient presents with    Abnormal Lab Results       1. \"Have you been to the ER, urgent care clinic since your last visit? Hospitalized since your last visit? \" No    2. \"Have you seen or consulted any other health care providers outside of the [address] [place] [address] since your last visit? \" No     3. For patients aged 39-70: Has the patient had a colonoscopy / FIT/ Cologuard? No      If the patient is female:    4. For patients aged 41-77: Has the patient had a mammogram within the past 2 years? No      5. For patients aged 21-65: Has the patient had a pap smear?  No

## 2022-11-18 NOTE — PATIENT INSTRUCTIONS
Chronic Kidney Disease: Care Instructions  Overview     Chronic kidney disease happens when your kidneys don't work as well as they should. Your kidneys have a few important jobs. They remove waste from your blood. This waste leaves your body in your urine. They also balance your body's fluids and chemicals. When your kidneys don't work well, extra waste and fluid can build up. This can poison the body and sometimes cause death. The most common causes of this disease are diabetes and high blood pressure. In some cases, the disease develops in 2 to 3 months. But it usually develops over many years. If you take medicine and make healthy changes to your lifestyle, you may be able to prevent the disease from getting worse. But if your kidney damage gets worse, you may need dialysis or a kidney transplant. Dialysis uses a machine to filter waste from the blood. A transplant is surgery to give you a healthy kidney from another person. Follow-up care is a key part of your treatment and safety. Be sure to make and go to all appointments, and call your doctor if you are having problems. It's also a good idea to know your test results and keep a list of the medicines you take. How can you care for yourself at home? Treatments and appointments    Be safe with medicines. Take your medicines exactly as prescribed. Call your doctor if you have any problems with your medicine. You also may take medicine to control your blood pressure or to treat diabetes. Many people who have diabetes take blood pressure medicine. If you have diabetes, do your best to keep your blood sugar in your target range. You may do this by eating healthy food and exercising. You may also take medicines. Go to your dialysis appointments if you have this treatment. Do not take ibuprofen, naproxen, or similar medicines, unless your doctor tells you to. These may make the disease worse.      Do not take any vitamins, over-the-counter medicines, or herbal products without talking to your doctor first.     Francine Verduzco not smoke or use other tobacco products. Smoking can reduce blood flow to the kidneys. If you need help quitting, talk to your doctor about stop-smoking programs and medicines. These can increase your chances of quitting for good. Limit your use of alcohol and avoid illegal drugs. Talk to your doctor about an exercise plan. Exercise helps lower your blood pressure. It also makes you feel better. If you have an advance directive, let your doctor know. It may include a living will and a durable power of  for health care. If you don't have one, you may want to prepare one. It lets your doctor and loved ones know your health care wishes if you become unable to speak for yourself. Diet    Talk to a registered dietitian. They can help you make a meal plan that is right for you. Most people with kidney disease need to limit salt (sodium), fluids, and protein. Some also have to limit potassium and phosphorus. You may have to give up many foods you like. But try to focus on the fact that this will help you stay healthy for as long as possible. If you have a hard time eating enough, talk to your doctor or dietitian about ways to add calories to your diet. Your diet may change as your disease changes. See your doctor for regular testing. And work with a dietitian to change your diet as needed. When should you call for help? Call 911 anytime you think you may need emergency care. For example, call if:    You passed out (lost consciousness). Call your doctor now or seek immediate medical care if:    You have less urine than normal or no urine. You have trouble urinating or can urinate only very small amounts. You are confused or have trouble thinking clearly. You feel weaker or more tired than usual.     You are very thirsty, lightheaded, or dizzy. You have nausea and vomiting.      You have new swelling of your arms or feet, or your swelling is worse. You have blood in your urine. You have new or worse trouble breathing. Watch closely for changes in your health, and be sure to contact your doctor if:    You have any problems with your medicine or other treatment. Where can you learn more? Go to http://www.gray.com/  Enter N276 in the search box to learn more about \"Chronic Kidney Disease: Care Instructions. \"  Current as of: May 4, 2022               Content Version: 13.4  © 2006-2022 [employer]. Care instructions adapted under license by [employer] (which disclaims liability or warranty for this information). If you have questions about a medical condition or this instruction, always ask your healthcare professional. [address] any warranty or liability for your use of this information. Medicines to Avoid With Kidney Disease: Care Instructions  Overview     Kidney disease means that your kidneys are not able to get rid of waste from the blood. So they can't keep your body's fluids and chemicals in balance. Usually, the kidneys get rid of waste from the blood through the urine. And they balance the fluids in the body. When your kidneys don't work as they should, you have to be careful about some medicines. They may harm your kidneys. Your doctor may tell you not to take them or may change the dose. Medicines for pain and swelling, such as ibuprofen (Advil or Motrin) or naproxen (Aleve), can cause harm. So can some antibiotics and antacids. And you need to be careful about some drugs that treat cancer, lower blood pressure, or get rid of water from the body. Some herbal products could cause harm too. Follow-up care is a key part of your treatment and safety. Be sure to make and go to all appointments, and call your doctor if you are having problems.  It's also a good idea to know your test results and keep a list of the medicines you take. How can you care for yourself at home? Tell your doctor all the prescription, herbal, or over-the-counter medicines you take. Do not take any new ones unless you talk to your doctor first.  Trish Bernstein not take anti-inflammatory medicines. These include ibuprofen (Advil, Motrin) and naproxen (Aleve). You can use acetaminophen (Tylenol) for pain. Do not take two or more pain medicines at the same time unless the doctor told you to. Many pain medicines have acetaminophen, which is Tylenol. Too much acetaminophen (Tylenol) can be harmful. Tell all doctors and others who work with your health care that you have kidney disease. Wear medical alert jewelry that lists your health problem. You can buy this at most drugstores. Where can you learn more? Go to http://www.gray.com/  Enter C353 in the search box to learn more about \"Medicines to Avoid With Kidney Disease: Care Instructions. \"  Current as of: May 4, 2022               Content Version: 13.4  © 2006-2022 [employer]. Care instructions adapted under license by [employer] (which disclaims liability or warranty for this information). If you have questions about a medical condition or this instruction, always ask your healthcare professional. [address] any warranty or liability for your use of this information.

## 2022-11-18 NOTE — PROGRESS NOTES
ICD-10-CM ICD-9-CM    1. Stage 3a chronic kidney disease (HCC)  N18.31 585.3 MICROALBUMIN, UR, RAND W/ MICROALB/CREAT RATIO      METABOLIC PANEL, COMPREHENSIVE      2. Hypercholesterolemia  E78.00 272.0       3. Essential hypertension  I10 401.9       4. Type 2 diabetes mellitus with chronic kidney disease (HCC)  E11.22 250.40      585.9       5. Encounter for immunization  Z23 V03.89                Subjective:     Chief Complaint   Patient presents with    Abnormal Lab Results         Robert Mathis is a 74 y.o. M. Past medical history of hepatic steatosis, hypercholesterolemia, and hypertension. I reviewed and updated the medical record. I saw this patient most recently in May for a Medicare wellness visit. He was without significant symptoms at the time, using a complex antihypertensive medication regimen to include bumetanide, hydralazine, and atenolol. He was felt to be tolerating pravastatin and fenofibrate without issues. Physical examination was notable for severe morbid obesity with a BMI of 52 kg/m². Hypotension was noted with a blood pressure of 94/56 mmHg. He was noted to have a history of peripheral vascular disease, and continued on medical management for this. Subsequent laboratory testing had demonstrated elevated serum creatinine 1.56 mg/dL. He was advised to decrease his bumetanide to 1 tablet daily. More recently, the patient had repeated his renal function, and this had demonstrated worsening renal function with a serum creatinine of 1.86 mg/dL, with a GFR estimated in the 40 range. Today, the patient comes in accompanied by his spouse to discuss his recent laboratory studies, which demonstrated renal insufficiency as noted above. He reports feeling the same as usual today.   He notes that he had decreased to bumetanide down as we discussed previously, and his blood pressure readings at home are reportedly somewhat similar to where they are in clinic today though is usually better in the 130/80 mmHg range overall. No lightheadedness or dizziness or any chest pain, shortness of breath, or any further cardiopulmonary symptoms. His weight overall has been stable. He notes that he continues on Prilosec on a regular basis, but denies having had any personal history of gastrointestinal bleeding, Salomon's esophagus, or other gastrointestinal problems. He also does not use NSAIDs regularly. They say that they had not been told about his laboratory studies otherwise, and wonder why he should need to change off of the fenofibrate that was previously ordered for his cholesterol. He notes that he had taken a dose of this this morning. No hematuria, foamy urine, or changes in urine output. His review of systems is otherwise negative. Routine Healthcare Maintenance issues are reviewed and discussed with the patient as noted below. Orders to update gaps in healthcare maintenance were placed as noted below in the Assessment and Plan, where applicable.        Past Medical History:  Past Medical History:   Diagnosis Date    CKD stage G3b/A1, GFR 30-44 and albumin creatinine ratio <30 mg/g (HCC)     Environmental allergies     GERD (gastroesophageal reflux disease)     Gout 11/22/2017    Hepatic steatosis 11/22/2017    Comments: non-alcoholic    History of chest pain 11/22/2017    Comments: abnl stress cardiolite 2006, subsequent to that, he had a normal cardiac cath    History of diarrhea 11/22/2017    History of dyspnea 11/22/2017    History of pneumonia     Hypercholesterolemia     Hypertension     Insomnia 11/22/2017    Iron deficiency 11/22/2017    Left chronic serous otitis media 11/22/2017    Obesity 11/22/2017    Comments: morbid    Obstructive sleep apnea     wears CPAP       Past Surgical Histor:  Past Surgical History:   Procedure Laterality Date    COLONOSCOPY N/A 4/30/2021    COLONOSCOPY performed by No Wilcox MD at [address]; HI RISK IND 4/30/2021         HX ORTHOPAEDIC      right knee       Allergies: Allergies   Allergen Reactions    Ace Inhibitors Cough     Other reaction(s): Cough       Medications:  Current Outpatient Medications   Medication Sig Dispense Refill    bumetanide (BUMEX) 1 mg tablet Take 1 Tablet by mouth daily. May repeat 1 tablet by mouth daily (total daily dose: 2 mg) if needed for edema or weight gain > 5 lbs in 3 days. 180 Tablet 3    pravastatin (PRAVACHOL) 20 mg tablet Take 1 Tablet by mouth nightly for 360 days. 90 Tablet 3    allopurinoL (ZYLOPRIM) 300 mg tablet TAKE 1 TABLET BY MOUTH DAILY 90 Tablet 3    hydrALAZINE (APRESOLINE) 50 mg tablet TAKE 1 TABLET BY MOUTH TWICE DAILY FOR HIGH BLOOD PRESSURE 180 Tablet 3    atenoloL (TENORMIN) 50 mg tablet TAKE 1 TABLET BY MOUTH EVERY DAY 90 Tablet 3    ascorbic acid, vitamin C, (VITAMIN C) 1,000 mg tablet Take 1,000 mg by mouth daily. gummies      omeprazole (PRILOSEC OTC) 20 mg tablet Take 20 mg by mouth daily. JS-EX-ZH-Fe-Min-Lycopen-Lutein 0.4-162-18 mg tab Take  by mouth. aspirin 81 mg chewable tablet Take 81 mg by mouth daily.            Social History:  Social History     Socioeconomic History    Marital status:    Tobacco Use    Smoking status: Never    Smokeless tobacco: Never   Vaping Use    Vaping Use: Never used   Substance and Sexual Activity    Alcohol use: Yes     Comment: rarely    Drug use: Never    Sexual activity: Yes     Partners: Female       Family History:  Family History   Problem Relation Age of Onset    Hypertension Other     Heart Disease Other     Diabetes Other     Diabetes Mother     Diabetes Father        Immunizations:  Immunization History   Administered Date(s) Administered    COVID-19, PFIZER PURPLE top, DILUTE for use, (age 15 y+), IM, 30mcg/0.3mL 01/30/2021, 02/27/2021, 10/21/2021    Influenza Vaccine 10/01/2012    Influenza Vaccine (Tri) Adjuvanted (>65 Yrs FLUAD TRI 97558) 01/09/2020    Influenza, FLUAD, (age 72 y+), Adjuvanted 10/06/2020, 11/08/2021, 11/03/2022    Influenza, FLUARIX, FLULAVAL, FLUZONE (age 10 mo+) AND AFLURIA, (age 1 y+), PF, 0.5mL 11/07/2017, 10/22/2018    Pneumococcal Conjugate (PCV-13) 01/14/2021    Pneumococcal Conjugate PCV20, PF (Prevnar 20) 05/05/2022    Pneumococcal Polysaccharide (PPSV-23) 10/01/2012    TB Skin Test (PPD) 11/01/2005    Td 12/01/2014    Zoster Vaccine, Live 12/01/2014        Healthcare Maintenance:  Health Maintenance   Topic Date Due    Eye Exam Retinal or Dilated  Never done    Shingrix Vaccine Age 50> (1 of 2) Never done    DTaP/Tdap/Td series (1 - Tdap) 12/02/2014    COVID-19 Vaccine (4 - Booster for Pfizer series) 12/16/2021    Foot Exam Q1  01/17/2022    A1C test (Diabetic or Prediabetic)  05/03/2023    MICROALBUMIN Q1  05/03/2023    Lipid Screen  05/03/2023    Medicare Yearly Exam  05/04/2023    Depression Screen  11/03/2023    Colorectal Cancer Screening Combo  04/30/2031    Hepatitis C Screening  Completed    Flu Vaccine  Completed    Pneumococcal 65+ years  Completed        Review of Systems:  ROS:  Review of Systems   Constitutional: Negative. HENT: Negative. Eyes: Negative. Respiratory: Negative. Cardiovascular:  Positive for leg swelling (trace, chronic). Gastrointestinal: Negative. Genitourinary: Negative. Musculoskeletal: Negative. Skin: Negative. Neurological: Negative. Endo/Heme/Allergies: Negative. Psychiatric/Behavioral: Negative. ROS otherwise negative      Objective:     Vital Signs:  Visit Vitals  BP (!) 140/88 (BP 1 Location: Left upper arm, BP Patient Position: Sitting, BP Cuff Size: Large adult)   Pulse 78   Resp 16   Ht 5' 11\" (1.803 m)   Wt (!) 377 lb 12.8 oz (171.4 kg)   SpO2 95%   BMI 52.69 kg/m²       BMI:  Body mass index is 52.69 kg/m². Physical Examination:  Physical Exam  Vitals reviewed. Constitutional:       Appearance: Normal appearance. He is obese. HENT:      Head: Normocephalic and atraumatic. Nose: Nose normal.      Mouth/Throat:      Mouth: Mucous membranes are moist.   Eyes:      Extraocular Movements: Extraocular movements intact. Conjunctiva/sclera: Conjunctivae normal.      Pupils: Pupils are equal, round, and reactive to light. Cardiovascular:      Rate and Rhythm: Normal rate and regular rhythm. Pulses: Normal pulses. Heart sounds: Normal heart sounds. No murmur heard. No friction rub. No gallop. Pulmonary:      Effort: Pulmonary effort is normal. No respiratory distress. Breath sounds: Normal breath sounds. No wheezing, rhonchi or rales. Abdominal:      General: Bowel sounds are normal. There is no distension. Palpations: Abdomen is soft. There is no mass. Tenderness: There is no abdominal tenderness. There is no guarding or rebound. Musculoskeletal:         General: No tenderness, deformity or signs of injury. Normal range of motion. Cervical back: Normal range of motion and neck supple. Right lower leg: Edema (trace, chronic) present. Left lower leg: Edema (trace, chronic) present. Skin:     General: Skin is warm and dry. Findings: No bruising, lesion or rash. Neurological:      General: No focal deficit present. Mental Status: He is alert and oriented to person, place, and time. Mental status is at baseline. Cranial Nerves: No cranial nerve deficit. Sensory: No sensory deficit. Motor: No weakness.       Coordination: Coordination normal.      Gait: Gait normal.      Deep Tendon Reflexes: Reflexes normal.   Psychiatric:         Mood and Affect: Mood normal.         Behavior: Behavior normal.        Physical exam otherwise negative    Diagnostic Testing:    Laboratory Studies:  Office Visit on 11/03/2022   Component Date Value Ref Range Status    WBC 11/03/2022 8.3  4.1 - 11.1 K/uL Final    RBC 11/03/2022 4.38  4.10 - 5.70 M/uL Final    HGB 11/03/2022 12.9  12.1 - 17.0 g/dL Final    HCT 11/03/2022 40.5  36.6 - 50.3 % Final    MCV 11/03/2022 92.5  80.0 - 99.0 FL Final    MCH 11/03/2022 29.5  26.0 - 34.0 PG Final    MCHC 11/03/2022 31.9  30.0 - 36.5 g/dL Final    RDW 11/03/2022 16.5 (A)  11.5 - 14.5 % Final    PLATELET 93/41/3025 139  150 - 400 K/uL Final    MPV 11/03/2022 12.0  8.9 - 12.9 FL Final    NRBC 11/03/2022 0.0  0  WBC Final    ABSOLUTE NRBC 11/03/2022 0.00  0.00 - 0.01 K/uL Final    NEUTROPHILS 11/03/2022 54  32 - 75 % Final    LYMPHOCYTES 11/03/2022 27  12 - 49 % Final    MONOCYTES 11/03/2022 17 (A)  5 - 13 % Final    EOSINOPHILS 11/03/2022 1  0 - 7 % Final    BASOPHILS 11/03/2022 0  0 - 1 % Final    IMMATURE GRANULOCYTES 11/03/2022 1 (A)  0.0 - 0.5 % Final    ABS. NEUTROPHILS 11/03/2022 4.4  1.8 - 8.0 K/UL Final    ABS. LYMPHOCYTES 11/03/2022 2.3  0.8 - 3.5 K/UL Final    ABS. MONOCYTES 11/03/2022 1.4 (A)  0.0 - 1.0 K/UL Final    ABS. EOSINOPHILS 11/03/2022 0.1  0.0 - 0.4 K/UL Final    ABS. BASOPHILS 11/03/2022 0.0  0.0 - 0.1 K/UL Final    ABS. IMM. GRANS. 11/03/2022 0.0  0.00 - 0.04 K/UL Final    DF 11/03/2022 AUTOMATED    Final    Sodium 11/03/2022 138  136 - 145 mmol/L Final    Potassium 11/03/2022 4.1  3.5 - 5.1 mmol/L Final    Chloride 11/03/2022 104  97 - 108 mmol/L Final    CO2 11/03/2022 30  21 - 32 mmol/L Final    Anion gap 11/03/2022 4 (A)  5 - 15 mmol/L Final    Glucose 11/03/2022 101 (A)  65 - 100 mg/dL Final    BUN 11/03/2022 32 (A)  6 - 20 MG/DL Final    Creatinine 11/03/2022 1.81 (A)  0.70 - 1.30 MG/DL Final    BUN/Creatinine ratio 11/03/2022 18  12 - 20   Final    eGFR 11/03/2022 40 (A)  >60 ml/min/1.73m2 Final    Comment:   Pediatric calculator link: [email or web address] org/professionals/kdoqi/gfr_[email or web address]    Effective Oct 3, 2022    These results are not intended for use in patients <25years of age. eGFR results are calculated without a race factor using  the 2021 CKD-EPI equation.  Careful clinical correlation is recommended, particularly when comparing to results calculated using previous equations. The CKD-EPI equation is less accurate in patients with extremes of muscle mass, extra-renal metabolism of creatinine, excessive creatine ingestion, or following therapy that affects renal tubular secretion. Calcium 11/03/2022 9.4  8.5 - 10.1 MG/DL Final    Bilirubin, total 11/03/2022 0.4  0.2 - 1.0 MG/DL Final    ALT (SGPT) 11/03/2022 28  12 - 78 U/L Final    AST (SGOT) 11/03/2022 15  15 - 37 U/L Final    Alk. phosphatase 11/03/2022 66  45 - 117 U/L Final    Protein, total 11/03/2022 7.6  6.4 - 8.2 g/dL Final    Albumin 11/03/2022 3.9  3.5 - 5.0 g/dL Final    Globulin 11/03/2022 3.7  2.0 - 4.0 g/dL Final    A-G Ratio 11/03/2022 1.1  1.1 - 2.2   Final       Lab Results   Component Value Date/Time    Cholesterol, total 160 05/03/2022 02:40 PM    Cholesterol (POC) 172 01/22/2018 08:32 AM    HDL Cholesterol 43 05/03/2022 02:40 PM    HDL Cholesterol (POC) 29 (A) 01/22/2018 08:32 AM    LDL Cholesterol (POC) 108.2 01/22/2018 08:32 AM    LDL, calculated 92.8 05/03/2022 02:40 PM    VLDL, calculated 24.2 05/03/2022 02:40 PM    Triglyceride 121 05/03/2022 02:40 PM    Triglycerides (POC) 174 01/22/2018 08:32 AM    CHOL/HDL Ratio 3.7 05/03/2022 02:40 PM     Lab Results   Component Value Date/Time    Hemoglobin A1c 5.5 05/03/2022 02:40 PM    Hemoglobin A1c 5.4 12/10/2021 11:03 AM    Hemoglobin A1c 5.5 01/15/2021 08:04 AM         Radiographic Studies:  XR Results (most recent):  Results from Hospital Encounter encounter on 11/14/21    XR SPINE LUMB 2 OR 3 V    Narrative  EXAM: XR SPINE LUMB 2 OR 3 V    INDICATION: Left lower back pain    COMPARISON: Lumbosacral spine radiographs 12/1/2006, abdominal radiographs  2/8/2021, CT abdomen 5/21/2014. TECHNIQUE: Lumbosacral spine radiographs, 3 views    FINDINGS:  Alignment is anatomic. Chronic mild loss of vertebral body heights at T12 and  L5. Multilevel degenerative endplate osteophytes and facet arthritis.     Impression  No acute compression fracture nor subluxation. Chronic mild loss of vertebral  body heights at T12 and L5     ALLEN Results (most recent):  No results found for this or any previous visit. CT Results (most recent):  Results from [place] encounter on 01/10/15    CTA CHEST W WO CONT    Narrative  **Final Report**      ICD Codes / Adm. Diagnosis: 161261  486 / Shortness of Breath  Examination:  CT CHEST ANGIOGRAPHY  - [id number] - [place] 10 2015 11:17PM  Accession No:  [id number]  Reason:  Chest pain R/O PE      REPORT:  INDICATION: Chest pain. Dyspnea. TECHNIQUE:  Routine noncontrast imaging the chest was performed for localization  purposes. Then, following the uneventful intravenous administration of 100  cc Optiray 072 , thin helical axial images were obtained through the chest.  3D image postprocessing was performed. FINDINGS:  No filling defect is seen within the pulmonary arterial system to suggest  pulmonary embolus. The main pulmonary artery is normal in caliber. The  thoracic aorta is normal in caliber and demonstrates no evidence of  dissection or aneurysm. The heart size is normal.    There is no hilar or mediastinal lymphadenopathy. The trachea and esophagus  are unremarkable. The thyroid gland demonstrates no abnormality. There is left lower lobe consolidation. There is bilateral dependent  atelectasis No pulmonary nodule or mass is seen. There are no pleural  effusions. Limited evaluation of the upper abdomen demonstrates no abnormality. The  adrenal glands are normal.  Review of the bone windows demonstrates no  evidence of destructive bone lesion. Impression  :  Left lower lobe consolidation likely represents pneumonia; radiographic  followup is recommended to assure resolution. No evidence of pulmonary  embolus. Signing/Reading Doctor: Kenji Barcenas ([id number])  Approved: Kenji Barcenas ([id number])  [place] 10 2015 11:31PM     DEXA Results (most recent):  No results found for this or any previous visit.      MRI Results (most recent):  No results found for this or any previous visit. Assessment/Plan:       ICD-10-CM ICD-9-CM    1. Stage 3a chronic kidney disease (HCC)  N18.31 585.3 MICROALBUMIN, UR, RAND W/ MICROALB/CREAT RATIO      METABOLIC PANEL, COMPREHENSIVE      2. Hypercholesterolemia  E78.00 272.0       3. Essential hypertension  I10 401.9       4. Type 2 diabetes mellitus with chronic kidney disease (HCC)  E11.22 250.40      585.9       5. Encounter for immunization  Z23 V03.89              Chronic Kidney Disease:   - Last Serum Creatinine:   Lab Results   Component Value Date/Time    Creatinine (POC) 1.0 01/22/2018 08:32 AM    Creatinine 1.81 (H) 11/03/2022 02:59 PM       - Last GFR:   Lab Results   Component Value Date/Time    GFR est AA 54 (L) 05/03/2022 02:40 PM    GFR est non-AA 44 (L) 05/03/2022 02:40 PM      - Current Stage by eGFR: G3B   - Proteinuria: checking MALB   -   Key CKD Meds               bumetanide (BUMEX) 1 mg tablet (Taking) Take 1 Tablet by mouth daily. May repeat 1 tablet by mouth daily (total daily dose: 2 mg) if needed for edema or weight gain > 5 lbs in 3 days.           - Blood Pressure Target: See Hypertension/Blood Pressure Management Section   - Advised avoidance of NSAIDs and other nephrotoxins wherever possible   - Advised renal dosing of medications where necessary   - Acid/Base Management: ---   - Phosphorus Management: ---   - Nephrology Consultation: referral placed   - Notes: possibly related to fenofibrate use; discontinued this med, increased pravachol, checking urinary microalbumin, ? Secondary to other process; UA pending, consider given obesity possibility of FSGS. Renally dose other meds. Appears euvolemic on exam today. Avoiding NSAIDs. Counseled reduction to PRN use for PPIs.      ASCVD:   - Type: PVD   - Current Symptoms: No Symptoms, no angina   - History and Contributing Factors: elevated cholesterol, hypertension, and history of PVD  Key CAD CHF Meds bumetanide (BUMEX) 1 mg tablet (Taking) Take 1 Tablet by mouth daily. May repeat 1 tablet by mouth daily (total daily dose: 2 mg) if needed for edema or weight gain > 5 lbs in 3 days. pravastatin (PRAVACHOL) 20 mg tablet (Taking) Take 1 Tablet by mouth nightly for 360 days. hydrALAZINE (APRESOLINE) 50 mg tablet (Taking) TAKE 1 TABLET BY MOUTH TWICE DAILY FOR HIGH BLOOD PRESSURE    atenoloL (TENORMIN) 50 mg tablet (Taking) TAKE 1 TABLET BY MOUTH EVERY DAY    aspirin 81 mg chewable tablet (Taking) Take 81 mg by mouth daily. - Target BP: < 130/80 mmHg; see Blood Pressure section   - Statin? YES   - Antiplatelet and/or Anticoagulant? YES   - ACEI/ARB? NO   - Beta Blocker? YES   - Notes: hold on ACEI/ARB for now, await repeat renal function, continue statin, BB    Essential Hypertension/Blood Pressure Management:   - Home BP Readings: not doing   - Current Control: stage 1   - Target BP: <130/80 mmHg (PVD, CKD)   - Relevant BP Meds:  Key CAD CHF Meds               bumetanide (BUMEX) 1 mg tablet (Taking) Take 1 Tablet by mouth daily. May repeat 1 tablet by mouth daily (total daily dose: 2 mg) if needed for edema or weight gain > 5 lbs in 3 days. pravastatin (PRAVACHOL) 20 mg tablet (Taking) Take 1 Tablet by mouth nightly for 360 days.     hydrALAZINE (APRESOLINE) 50 mg tablet (Taking) TAKE 1 TABLET BY MOUTH TWICE DAILY FOR HIGH BLOOD PRESSURE    atenoloL (TENORMIN) 50 mg tablet (Taking) TAKE 1 TABLET BY MOUTH EVERY DAY    aspirin 81 mg chewable tablet (Taking) Take 81 mg by mouth daily.                 - Plan: continue current treatment regimen, continue current meds, dietary sodium restriction, regular aerobic exercise, weight loss   - Notes: continue    Hyperlipidemia/Dyslipidemia:   - Summary of Cardiovascular Risks and Goals:     LDL goal is under 80  hypertension  hyperlipidemia  obese  [place] 10-year risk >20%   -   Lab Results   Component Value Date/Time    LDL, calculated 92.8 05/03/2022 02:40 PM    HDL Cholesterol 43 05/03/2022 02:40 PM       - Relevant Cholesterol Meds:  Key Antihyperlipidemia Meds               pravastatin (PRAVACHOL) 20 mg tablet (Taking) Take 1 Tablet by mouth nightly for 360 days. - Cholesterol at target: no   - Does patient meet USPSTF and ACC/AHA indications for pharmacotherapy (e.g., statin): yes   - GI symptoms with meds: NO   - Muscle aches with meds: NO   - Other Adverse effects with meds: NO   - Medication Plan: continue   - Notes: Discontinuing fenofibrate given CKD, increased pravachol to 20 mg/d, will consider increase to 80 mg/d in stepwise fashion if needed for LDL control; consider Zetia, [id number], holding off for now        I have reviewed the patient's medical history in detail and updated the computerized patient record. We had a prolonged discussion about these complex clinical issues and went over the various important aspects to consider. All questions were answered. Advised the patient to call back or return to office if symptoms do not improve, change in nature, or persist.     The patient was given an after visit summary or informed of MyChart Access which includes patient instructions, diagnoses, current medications, & vitals. he expressed understanding with the diagnosis and plan. Perico Norman MD    Please note that this dictation was completed with [employer], the computer voice recognition software. Quite often unanticipated grammatical, syntax, homophones, and other interpretive errors are inadvertently transcribed by the computer software. Please disregard these errors. Please excuse any errors that have escaped final proofreading.

## 2022-11-25 RX ORDER — ATENOLOL 50 MG/1
TABLET ORAL
Qty: 90 TABLET | Refills: 3 | OUTPATIENT
Start: 2022-11-25

## 2022-11-25 RX ORDER — ATENOLOL 50 MG/1
TABLET ORAL
Qty: 90 TABLET | Refills: 3 | Status: SHIPPED | OUTPATIENT
Start: 2022-11-25

## 2023-01-18 ENCOUNTER — TRANSCRIBE ORDER (OUTPATIENT)
Dept: SCHEDULING | Age: 68
End: 2023-01-18

## 2023-01-18 DIAGNOSIS — N18.30 CHRONIC KIDNEY DISEASE, STAGE III (MODERATE) (HCC): Primary | ICD-10-CM

## 2023-01-27 ENCOUNTER — HOSPITAL ENCOUNTER (OUTPATIENT)
Dept: ULTRASOUND IMAGING | Age: 68
Discharge: HOME | End: 2023-01-27
Attending: INTERNAL MEDICINE
Payer: MEDICARE

## 2023-01-27 DIAGNOSIS — N18.30 CHRONIC KIDNEY DISEASE, STAGE III (MODERATE) (HCC): ICD-10-CM

## 2023-01-27 PROCEDURE — 76770 US EXAM ABDO BACK WALL COMP: CPT

## 2023-02-08 NOTE — PROGRESS NOTES
[address] [facility]. [place], [address]   Tel.  [phone]   Fax. 100 [facility] 60   [place], [address] [facility]   Tel.  [phone]   Fax. [phone] [address]   Tel.  [phone]   Fax. [phone]     Jacinda Simmons (: 1955) is a 69 y.o. male, established patient, seen for positive airway pressure follow-up and evaluation. He was last seen by Dr. Beverly Carlos on 2022, prior notes reviewed in detail. In lab sleep test 2018 showed AHI of 87.9/hr with a lowest SaO2 of 52%, duration of SaO2 < 88% 92.7 min. BiLevel device set up 2018. He is seen today for follow up. ASSESSMENT/PLAN:    ICD-10-CM ICD-9-CM    1. TERESA (obstructive sleep apnea)  G47.33 327.23 SLEEP LAB (PAP TITRATION)      2. Essential hypertension  I10 401.9       3. BMI 50.0-59.9, adult (HCC)  Z68.43 V85.43         AHI = 87.9 (2018). On Bi - Level :  IPAP 18, EPAP 13 cmH2O. Sleep Apnea - He notes he stopped using his device a number of months ago. He felt the pressure was \"too much\" and he was unable to tolerate sleeping through the night with it on. He has been having some other health issues (blood pressure, constipation) he felt was related to his BiPAP usage. We have discussed the need for a titration study to determine appropriate pressure settings. Dr. Beverly Carlos had previously made adjustments to his pressure settings with no success. Titration study ordered. Orders Placed This Encounter    [address] POLYSOMNOGRAPHY CPAP TITRATION     Bilevel titration study per protocol - patient having trouble tolerating high pressures waking him up at night. Please ensure proper mask fit during study as well.      Standing Status:   Future     Standing Expiration Date:   2024     Scheduling Instructions:      Route to Dr. Beverly Carlos     Order Specific Question:   Reason for Exam     Answer:   Bilevel titration study     * Counseling was provided regarding the importance of regular PAP use with emphasis on ensuring sufficient total sleep time, proper sleep hygiene, and safe driving. * Re-enforced proper and regular cleaning for the device. * He was asked to contact our office for any problems regarding PAP therapy. 2. Hypertension -  continue on his current regimen, he will continue to monitor his BP and follow up with his PMD for reevaluation/adjustment of medications if warranted. I have reviewed the relationship between hypertension as it relates to sleep-disordered breathing. 3. Recommended a dedicated weight loss program through appropriate diet and exercise regimen as significant weight reduction has been shown to reduce severity of obstructive sleep apnea. SUBJECTIVE/OBJECTIVE:    He  is seen today for follow up on PAP device and reports problems using the device. The following concerns identified:    Drowsiness no Problems exhaling no   Snoring no Forget to put on no   Mask Comfortable No - having trouble tolerating mask at night. Can't fall asleep no   Dry Mouth no Mask falls off no   Air Leaking yes Frequent awakenings yes     He admits that his sleep has not changed on PAP therapy using full face mask and heated tubing. Review of device download indicated:  Bilevel pressure: IPAP 16 cmH2O; EPAP 11 cmH2O.   95th Percentile Leak: 99.6 L/Min     % Used Days >= 4 hours: 1. Avg hours used:  5 hours 44 minutes. Therapy Apnea Index averaged over PAP use: 2.6 /hr which reflects significantly improved sleep breathing condition. [place] Sleepiness Score: 10 and Modified F.O.S.Q. Score Total / 2: 17 which reflects improved sleep quality over therapy time. Sleep Review of Systems: notable for Negative difficulty falling asleep; Positive awakenings at night; Negative early morning headaches; Negative memory problems; Negative concentration issues;  Negative chest pain; Negative shortness of breath; Negative significant joint pain at night; Negative significant muscle pain at night; Negative rashes or itching; Negative heartburn; Negative significant mood issues    Visit Vitals  BP (!) 140/77 (BP 1 Location: Left upper arm, BP Patient Position: Sitting)   Pulse 64   Ht 5' 11\" (1.803 m)   Wt (!) 380 lb 12.8 oz (172.7 kg)   SpO2 95%   BMI 53.11 kg/m²        General:   Alert, oriented, not in acute distress   Eyes:  Anicteric Sclerae; no obvious strabismus   Nose:  No obvious nasal septum deviation    Neck:   Midline trachea   Chest/Lungs:  Symmetrical lung expansion, clear lung fields on auscultation    CVS:  Normal rate, regular rhythm,  no JVD   Extremities:  No obvious rashes, no edema    Neuro:  No focal deficits; No obvious tremor    Psych:  Normal affect,  normal countenance     Patient's phone number [phone] (home)  was reviewed and confirmed for accuracy. He gives permission for messages regarding results and appointments to be left at that number. On this date 02/09/2023 I have spent 20 minutes reviewing previous notes, test results and face to face with the patient discussing the diagnosis and importance of compliance with the treatment plan as well as documenting on the day of the visit. An electronic signature was used to authenticate this note.     -- Lobo Stuart NP, [facility]  02/09/23

## 2023-02-09 ENCOUNTER — OFFICE VISIT (OUTPATIENT)
Dept: SLEEP MEDICINE | Age: 68
End: 2023-02-09
Payer: MEDICARE

## 2023-02-09 VITALS
HEIGHT: 71 IN | HEART RATE: 64 BPM | SYSTOLIC BLOOD PRESSURE: 140 MMHG | OXYGEN SATURATION: 95 % | WEIGHT: 315 LBS | BODY MASS INDEX: 44.1 KG/M2 | DIASTOLIC BLOOD PRESSURE: 77 MMHG

## 2023-02-09 DIAGNOSIS — G47.33 OSA (OBSTRUCTIVE SLEEP APNEA): Primary | ICD-10-CM

## 2023-02-09 DIAGNOSIS — I10 ESSENTIAL HYPERTENSION: ICD-10-CM

## 2023-02-09 PROCEDURE — 99213 OFFICE O/P EST LOW 20 MIN: CPT | Performed by: NURSE PRACTITIONER

## 2023-02-09 PROCEDURE — G8417 CALC BMI ABV UP PARAM F/U: HCPCS | Performed by: NURSE PRACTITIONER

## 2023-02-09 PROCEDURE — 1101F PT FALLS ASSESS-DOCD LE1/YR: CPT | Performed by: NURSE PRACTITIONER

## 2023-02-09 PROCEDURE — G8427 DOCREV CUR MEDS BY ELIG CLIN: HCPCS | Performed by: NURSE PRACTITIONER

## 2023-02-09 PROCEDURE — 1123F ACP DISCUSS/DSCN MKR DOCD: CPT | Performed by: NURSE PRACTITIONER

## 2023-02-09 PROCEDURE — 3078F DIAST BP <80 MM HG: CPT | Performed by: NURSE PRACTITIONER

## 2023-02-09 PROCEDURE — 3077F SYST BP >= 140 MM HG: CPT | Performed by: NURSE PRACTITIONER

## 2023-02-09 PROCEDURE — 3017F COLORECTAL CA SCREEN DOC REV: CPT | Performed by: NURSE PRACTITIONER

## 2023-02-09 PROCEDURE — G8536 NO DOC ELDER MAL SCRN: HCPCS | Performed by: NURSE PRACTITIONER

## 2023-02-09 PROCEDURE — G8432 DEP SCR NOT DOC, RNG: HCPCS | Performed by: NURSE PRACTITIONER

## 2023-02-09 NOTE — PATIENT INSTRUCTIONS
[address] [place]. [place], [address]  Tel.  [phone]  Fax. 100 [facility] 60  [facility], [address] [employer]  Tel.  [phone]  Fax. [phone] [address]  Tel.  [phone]  Fax. [phone]     Learning About CPAP for Sleep Apnea  What is CPAP? CPAP is a small machine that you use at home every night while you sleep. It increases air pressure in your throat to keep your airway open. When you have sleep apnea, this can help you sleep better so you feel much better. CPAP stands for \"continuous positive airway pressure. \"  The CPAP machine will have one of the following:  A mask that covers your nose and mouth  Prongs that fit into your nose  A mask that covers your nose only, the most common type. This type is called NCPAP. The N stands for \"nasal.\"  Why is it done? CPAP is usually the best treatment for obstructive sleep apnea. It is the first treatment choice and the most widely used. Your doctor may suggest CPAP if you have: Moderate to severe sleep apnea. Sleep apnea and coronary artery disease (CAD) or heart failure. How does it help? CPAP can help you have more normal sleep, so you feel less sleepy and more alert during the daytime. CPAP may help keep heart failure or other heart problems from getting worse. NCPAP may help lower your blood pressure. If you use CPAP, your bed partner may also sleep better because you are not snoring or restless. What are the side effects? Some people who use CPAP have:  A dry or stuffy nose and a sore throat. Irritated skin on the face. Sore eyes. Bloating. If you have any of these problems, work with your doctor to fix them. Here are some things you can try:  Be sure the mask or nasal prongs fit well. See if your doctor can adjust the pressure of your CPAP. If your nose is dry, try a humidifier.   If your nose is runny or stuffy, try decongestant medicine or a steroid nasal spray. If these things do not help, you might try a different type of machine. Some machines have air pressure that adjusts on its own. Others have air pressures that are different when you breathe in than when you breathe out. This may reduce discomfort caused by too much pressure in your nose. Where can you learn more? Go to Cal[employer].be  Enter Bonnie Arauz in the search box to learn more about \"Learning About CPAP for Sleep Apnea. \"   © 4729-1703 Healthwise, Incorporated. Care instructions adapted under license by [employer] (which disclaims liability or warranty for this information). This care instruction is for use with your licensed healthcare professional. If you have questions about a medical condition or this instruction, always ask your healthcare professional. [address] any warranty or liability for your use of this information. Content Version: 0.0.46128; Last Revised: January 11, 2010  PROPER SLEEP HYGIENE    What to avoid  Do not have drinks with caffeine, such as coffee or black tea, for 8 hours before bed. Do not smoke or use other types of tobacco near bedtime. Nicotine is a stimulant and can keep you awake. Avoid drinking alcohol late in the evening, because it can cause you to wake in the middle of the night. Do not eat a big meal close to bedtime. If you are hungry, eat a light snack. Do not drink a lot of water close to bedtime, because the need to urinate may wake you up during the night. Do not read or watch TV in bed. Use the bed only for sleeping and sexual activity. What to try  Go to bed at the same time every night, and wake up at the same time every morning. Do not take naps during the day. Keep your bedroom quiet, dark, and cool. Get regular exercise, but not within 3 to 4 hours of your bedtime. .  Sleep on a comfortable pillow and mattress.   If watching the clock makes you anxious, turn it facing away from you so you cannot see the time. If you worry when you lie down, start a worry book. Well before bedtime, write down your worries, and then set the book and your concerns aside. Try meditation or other relaxation techniques before you go to bed. If you cannot fall asleep, get up and go to another room until you feel sleepy. Do something relaxing. Repeat your bedtime routine before you go to bed again. Make your house quiet and calm about an hour before bedtime. Turn down the lights, turn off the TV, log off the computer, and turn down the volume on music. This can help you relax after a busy day. Drowsy Driving: The [facility] cites drowsiness as a causing factor in more than 778,499 police reported crashes annually, resulting in 76,000 injuries and 1,500 deaths. Other surveys suggest 55% of people polled have driven while drowsy in the past year, 23% had fallen asleep but not crashed, 3% crashed, and 2% had and accident due to drowsy driving. Who is at risk? Young Drivers: One study of drowsy driving accidents states that 55% of the drivers were under 25 years. Of those, 75% were male. Shift Workers and Travelers: People who work overnight or travel across time zones frequently are at higher risk of experiencing Circadian Rhythm Disorders. They are trying to work and function when their body is programed to sleep. Sleep Deprived: Lack of sleep has a serious impact on your ability to pay attention or focus on a task. Consistently getting less than the average of 8 hours your body needs creates partial or cumulative sleep deprivation. Untreated Sleep Disorders: Sleep Apnea, Narcolepsy, R.L.S., and other sleep disorders (untreated) prevent a person from getting enough restful sleep. This leads to excessive daytime sleepiness and increases the risk for drowsy driving accidents by up to 7 times.   Medications / Alcohol: Even over the counter medications can cause drowsiness. Medications that impair a drivers attention should have a warning label. Alcohol naturally makes you sleepy and on its own can cause accidents. Combined with excessive drowsiness its effects are amplified. Signs of Drowsy Driving:   * You don't remember driving the last few miles   * You may drift out of your quinton   * You are unable to focus and your thoughts wander   * You may yawn more often than normal   * You have difficulty keeping your eyes open / nodding off   * Missing traffic signs, speeding, or tailgating  Prevention-   Good sleep hygiene, lifestyle and behavioral choices have the most impact on drowsy driving. There is no substitute for sleep and the average person requires 8 hours nightly. If you find yourself driving drowsy, stop and sleep. Consider the sleep hygiene tips provided during your visit as well. Medication Refill Policy: Refills for all medications require 1 week advance notice. Please have your pharmacy fax a refill request. We are unable to fax, or call in \"controled substance\" medications and you will need to pick these prescriptions up from our office. [employer] Activation    Thank you for requesting access to [employer]. Please follow the instructions below to securely access and download your online medical record. [employer] allows you to send messages to your doctor, view your test results, renew your prescriptions, schedule appointments, and more. How Do I Sign Up? In your internet browser, go to [email or web address]. Click on the First Time User? Click Here link in the Sign In box. You will see the New Member Sign Up page. Enter your [employer] Access Code exactly as it appears below. You will not need to use this code after youve completed the sign-up process. If you do not sign up before the expiration date, you must request a new code.     [employer] Access Code: [email or web address]  Expires: 3/10/2023 11:50 AM (This is the date your [employer] access code will )    Enter the last four digits of your Social Security Number (xxxx) and Date of Birth (mm/dd/yyyy) as indicated and click Submit. You will be taken to the next sign-up page. Create a [email or web address] ID. This will be your [email or web address] login ID and cannot be changed, so think of one that is secure and easy to remember. Create a [email or web address] password. You can change your password at any time. Enter your Password Reset Question and Answer. This can be used at a later time if you forget your password. Enter your e-mail address. You will receive e-mail notification when new information is available in [address] Click Sign Up. You can now view and download portions of your medical record. Click the [employer] link to download a portable copy of your medical information. Additional Information    If you have questions, please call [phone]. Remember, [email or web address] is NOT to be used for urgent needs. For medical emergencies, dial 911.

## 2023-02-15 ENCOUNTER — TELEPHONE (OUTPATIENT)
Dept: SLEEP MEDICINE | Age: 68
End: 2023-02-15

## 2023-03-14 ENCOUNTER — HOSPITAL ENCOUNTER (OUTPATIENT)
Dept: SLEEP MEDICINE | Age: 68
Discharge: HOME | End: 2023-03-14
Payer: MEDICARE

## 2023-03-14 DIAGNOSIS — G47.33 OSA (OBSTRUCTIVE SLEEP APNEA): ICD-10-CM

## 2023-03-14 PROCEDURE — 95810 POLYSOM 6/> YRS 4/> PARAM: CPT

## 2023-03-15 ENCOUNTER — TELEPHONE (OUTPATIENT)
Dept: SLEEP MEDICINE | Age: 68
End: 2023-03-15

## 2023-03-15 VITALS
OXYGEN SATURATION: 94 % | BODY MASS INDEX: 44.1 KG/M2 | SYSTOLIC BLOOD PRESSURE: 172 MMHG | TEMPERATURE: 98.7 F | HEIGHT: 71 IN | HEART RATE: 80 BPM | WEIGHT: 315 LBS | DIASTOLIC BLOOD PRESSURE: 86 MMHG

## 2023-03-15 NOTE — PROGRESS NOTES
[address] [place]. [place], [address]  Tel.  [phone]  Fax. 100 [facility] 60  [facility], [address] [place]  Tel.  [phone]  Fax. [phone] [address] [facility] Lydia Barrera  Tel.  [phone]  Fax. [phone]     Sleep Study Technical Notes        PRE-Test:  Irma Simmons (: 1955) arrived in the lobby. Patient was greeted and screening questions asked. The patient was taken to the Sleep Center and taken directly to his room. Vitals:  Temperature (98.7)   BP (172/86)   SaO2 (94)   Weight per patient (380lbs)  Procedure explained to the patient and questions were answered. The patient expressed understanding of the procedure. Electrodes were applied without incident. The patient was placed in bed and the study was started. PAP mask acclimation for 2 min. Patient did tolerate mask. Sleep aid taken:  No      Acquisition Notes:  Lights off:     Respiratory events:   A__YES    H__YES  C__NO  M__NO  ECG:    Arrhythmias   NO  Snoring:  mild/moderate  Sleep Stages:  REM   YES  PAP titration:   Eliminated events: YES  Reduced events:    YES  Events at  final pressure NO  Leak:  0  Desensitization Mask(s) Used: AIRFIT F30i SW   Positional therapy with:   Patient slept with positional therapy:  NO  Patient slept with head of bed elevated:  YES  Supine sleep assessed per physician order:  NO   If not, why?? NOT STATED  Patient wore an oral appliance:  NO  Other comments:    Patient had caregiver in attendance:  NO  Patient watched TV or on phone after lights out for 0 hours  Patient to bathroom 2 times       POST Test:  Patient was awakened. Electrodes were removed. The patient was discharged after answering the Post Questionnaire. Patient/caregiver stated that he was alert and ok to drive. Equipment and room cleaned per infection control policy.

## 2023-03-15 NOTE — TELEPHONE ENCOUNTER
Patient LVM on 3/14/2023, returned patient call on 3/15/23 and patient stated that he was able to locate the office and have his sleep study on last night.

## 2023-03-17 ENCOUNTER — TELEPHONE (OUTPATIENT)
Dept: SLEEP MEDICINE | Age: 68
End: 2023-03-17

## 2023-03-31 NOTE — TELEPHONE ENCOUNTER
BiPAP titration performed. In lab sleep test 7/2018 showed AHI of 87.9/hr with a lowest SaO2 of 52%, duration of SaO2 < 88% 92.7 min. 428 minutes recorded of which 408.5 minutes spent asleep with a sleep efficiency of 95.4%. Sleep onset at 3 minutes; REM onset of 59 minutes with total REM representing 19.2% of sleep time. All sleep stages noted. 182 respiratory events occurred in which 134 hypopnea and 48 apnea. Overall AHI 26.7/h, events more prominent supine. REM related AHI 35.2/h. Minimal SaO2 56%. BiPAP initiated at 10/6 cm and increased to 23/16 cm. Events prominent when patient supine; obstructive apnea primarily REM related; improves with higher BiPAP pressure settings. Events seen in rem at final pressure settings when right lateral.  When right lateral.    At BiPAP 23/16 cm: 94.4 minutes recorded which 80.9 minutes spent asleep and 14.4 minutes REM. AHI 2.7/h. Minimal SaO2 82%. Impression: Severe sleep disordered breathing, marked desaturations when in rem sleep; improving with BiPAP increased to 23/16 cm. Fewer events when right lateral.    Recommendation: BiPAP 23/16 cm; limit supine sleep. Office visit to review study and recommendations.

## 2023-04-06 ENCOUNTER — OFFICE VISIT (OUTPATIENT)
Dept: SLEEP MEDICINE | Age: 68
End: 2023-04-06
Payer: COMMERCIAL

## 2023-04-06 PROCEDURE — 3078F DIAST BP <80 MM HG: CPT | Performed by: SPECIALIST

## 2023-04-06 PROCEDURE — G8428 CUR MEDS NOT DOCUMENT: HCPCS | Performed by: SPECIALIST

## 2023-04-06 PROCEDURE — 3074F SYST BP LT 130 MM HG: CPT | Performed by: SPECIALIST

## 2023-04-06 PROCEDURE — G8536 NO DOC ELDER MAL SCRN: HCPCS | Performed by: SPECIALIST

## 2023-04-06 PROCEDURE — G8432 DEP SCR NOT DOC, RNG: HCPCS | Performed by: SPECIALIST

## 2023-04-06 PROCEDURE — 1123F ACP DISCUSS/DSCN MKR DOCD: CPT | Performed by: SPECIALIST

## 2023-04-06 PROCEDURE — 3017F COLORECTAL CA SCREEN DOC REV: CPT | Performed by: SPECIALIST

## 2023-04-06 PROCEDURE — 1101F PT FALLS ASSESS-DOCD LE1/YR: CPT | Performed by: SPECIALIST

## 2023-04-06 PROCEDURE — 99213 OFFICE O/P EST LOW 20 MIN: CPT | Performed by: SPECIALIST

## 2023-04-06 PROCEDURE — G8417 CALC BMI ABV UP PARAM F/U: HCPCS | Performed by: SPECIALIST

## 2023-04-06 NOTE — PROGRESS NOTES
[address] [facility] [place]. [place], [address]  Tel.  [phone]  Fax. 100 [facility] 60  [place], [address] [facility]  Tel.  [phone]  Fax. [phone] [address] [place]   Tel.  [phone]  Fax. [phone]         Chief Complaint       Chief Complaint   Patient presents with    Sleep Problem     Results review          HPI      Compliance data downloaded and reviewed in detail with the patient today. Sheyla Benton He was evaluated with a sleep study which demonstrated  AHI of 87.9/hr with a lowest SaO2 of 52%, duration of SaO2 < 88% 92.7 min. BIPAP 18/13 cm set up 8/1/2018. When seen 11/4/2021 CMS compliance criteria 37%. AHI 2.8/h but variable. CPAP clinic: Provided AirFit (L) fullface mask. At visit 12/28/21 noted difficulty with current pressure of 18/13 cm. Reduced to I 16./E 11 cm. When seen 2/8/22 BIPAP used during 30/ 30 days with the average daily use of 4.4 hours. CMS compliance criteria 87%. AHI 2 per hour. At visit of 2/9/23 AHI 2.6/h. Titration study obtained. 182 respiratory events occurred in which 134 hypopnea and 48 apnea. Overall AHI 26.7/h, events more prominent supine. REM related AHI 35.2/h. Minimal SaO2 56%. BiPAP initiated at 10/6 cm and increased to 23/16 cm. Events prominent when patient supine; obstructive apnea primarily REM related; improves with higher BiPAP pressure settings. Events seen in rem at final pressure settings when right lateral.  Desaturations at that time. At BiPAP 23/16 cm: 94.4 minutes recorded which 80.9 minutes spent asleep and 14.4 minutes REM. AHI 2.7/h. Minimal SaO2 82%. During the past 30 days, BIPAP used during 16 days with the average daily use of 4.75 hours. CMS compliance criteria 50%. AHI 2.8 per hour. Patient has ongoing PAP/mask related related issues.      Allergies   Allergen Reactions    Ace Inhibitors Cough     Other reaction(s): Cough       Current Outpatient Medications   Medication Sig Dispense Refill    atenoloL (TENORMIN) 50 mg tablet TAKE 1 TABLET BY MOUTH EVERY DAY 90 Tablet 3    bumetanide (BUMEX) 1 mg tablet Take 1 Tablet by mouth daily. May repeat 1 tablet by mouth daily (total daily dose: 2 mg) if needed for edema or weight gain > 5 lbs in 3 days. 180 Tablet 3    pravastatin (PRAVACHOL) 20 mg tablet Take 1 Tablet by mouth nightly for 360 days. 90 Tablet 3    allopurinoL (ZYLOPRIM) 300 mg tablet TAKE 1 TABLET BY MOUTH DAILY 90 Tablet 3    hydrALAZINE (APRESOLINE) 50 mg tablet TAKE 1 TABLET BY MOUTH TWICE DAILY FOR HIGH BLOOD PRESSURE 180 Tablet 3    ascorbic acid, vitamin C, (VITAMIN C) 1,000 mg tablet Take 1,000 mg by mouth daily. gummies      omeprazole (PRILOSEC OTC) 20 mg tablet Take 20 mg by mouth daily. MD-ZF-YN-Fe-Min-Lycopen-Lutein 0.4-162-18 mg tab Take  by mouth. aspirin 81 mg chewable tablet Take 81 mg by mouth daily. He  has a past medical history of CKD stage G3b/A1, GFR 30-44 and albumin creatinine ratio <30 mg/g ([facility] Environmental allergies, GERD (gastroesophageal reflux disease), Gout (11/22/2017), Hepatic steatosis (11/22/2017), History of chest pain (11/22/2017), History of diarrhea (11/22/2017), History of dyspnea (11/22/2017), History of pneumonia, Hypercholesterolemia, Hypertension, Insomnia (11/22/2017), Iron deficiency (11/22/2017), Left chronic serous otitis media (11/22/2017), Obesity (11/22/2017), and Obstructive sleep apnea. He  has a past surgical history that includes hx orthopaedic; colorectal scrn; hi risk ind (4/30/2021); and colonoscopy (N/A, 4/30/2021). He family history includes Diabetes in his father, mother, and another family member; Heart Disease in an other family member; Hypertension in an other family member. He  reports that he has never smoked. He has never used smokeless tobacco. He reports current alcohol use. He reports that he does not use drugs.      Review of Systems:  Unchanged per patient      Objective:   Visit Vitals  /68 (BP 1 Location: Left upper arm, BP Patient Position: Sitting, BP Cuff Size: Large adult)   Pulse 65   Ht 5' 11\" (1.803 m)   Wt (!) 386 lb 12.8 oz (175.5 kg)   SpO2 95%   BMI 53.95 kg/m²     Body mass index is 53.95 kg/m². General:   Conversant, cooperative   Eyes:  Pupils equal and reactive, no nystagmus   Oropharynx:   Mallampati score IV, tongue large, narrow posterior oral airway              Chest/Lungs:  Clear on auscultation    CVS:  Normal rate, regular rhythm        Neuro:  Speech fluent, face symmetrical             Assessment:       ICD-10-CM ICD-9-CM    1. TERESA (obstructive sleep apnea)  G47.33 327.23          PAP continues to benefit patient and remains necessary for control of his sleep apnea. Severe sleep disordered breathing responding  to BiPAP at current pressure settings. Patient would benefit from sleeping with head of bed elevated. Patient advised that PAP treats sleep disordered breathing but does not \"cure\" the problem. Other treatment options discussed including Inspire. Patient would not consider other options. Believes therapy should be \"cured\". Plan:   No orders of the defined types were placed in this encounter. *A copy of compliance data was provided to the patient and reviewed in detail. *BiPAP will be continued at the above pressure settings. The patient is to contact the office if there are problems with either mask or pressure settings. Follow-up will be scheduled at which time compliance data will be reviewed. * Patient has a history and examination consistent with the diagnosis of sleep apnea. * He was provided information on sleep apnea including corresponding risk factors and the importance of proper treatment. * Treatment options if indicated were reviewed today. Potential benefit of weight reduction was discussed.         Patti Dia MD, FAASM  Electronically signed 04/06/23        This note was created using voice recognition software. Despite editing, there may be syntax errors. This note will not be viewable in [address]

## 2023-05-03 ENCOUNTER — OFFICE VISIT (OUTPATIENT)
Dept: INTERNAL MEDICINE CLINIC | Age: 68
End: 2023-05-03
Payer: MEDICARE

## 2023-05-03 VITALS
DIASTOLIC BLOOD PRESSURE: 82 MMHG | OXYGEN SATURATION: 96 % | HEART RATE: 62 BPM | HEIGHT: 71 IN | SYSTOLIC BLOOD PRESSURE: 122 MMHG | WEIGHT: 315 LBS | BODY MASS INDEX: 44.1 KG/M2 | RESPIRATION RATE: 16 BRPM

## 2023-05-03 DIAGNOSIS — G62.9 PERIPHERAL POLYNEUROPATHY: ICD-10-CM

## 2023-05-03 DIAGNOSIS — N18.31 STAGE 3A CHRONIC KIDNEY DISEASE (HCC): Primary | ICD-10-CM

## 2023-05-03 DIAGNOSIS — I10 ESSENTIAL HYPERTENSION: ICD-10-CM

## 2023-05-03 DIAGNOSIS — E78.00 HYPERCHOLESTEROLEMIA: ICD-10-CM

## 2023-05-03 DIAGNOSIS — E88.89 STEATOSIS (HCC): ICD-10-CM

## 2023-05-03 DIAGNOSIS — N18.32 TYPE 2 DIABETES MELLITUS WITH STAGE 3B CHRONIC KIDNEY DISEASE, WITHOUT LONG-TERM CURRENT USE OF INSULIN (HCC): ICD-10-CM

## 2023-05-03 DIAGNOSIS — E11.22 TYPE 2 DIABETES MELLITUS WITH STAGE 3B CHRONIC KIDNEY DISEASE, WITHOUT LONG-TERM CURRENT USE OF INSULIN (HCC): ICD-10-CM

## 2023-05-03 DIAGNOSIS — I73.9 PERIPHERAL VASCULAR DISEASE (HCC): ICD-10-CM

## 2023-05-03 PROCEDURE — G8417 CALC BMI ABV UP PARAM F/U: HCPCS | Performed by: INTERNAL MEDICINE

## 2023-05-03 PROCEDURE — 1101F PT FALLS ASSESS-DOCD LE1/YR: CPT | Performed by: INTERNAL MEDICINE

## 2023-05-03 PROCEDURE — 3017F COLORECTAL CA SCREEN DOC REV: CPT | Performed by: INTERNAL MEDICINE

## 2023-05-03 PROCEDURE — 1123F ACP DISCUSS/DSCN MKR DOCD: CPT | Performed by: INTERNAL MEDICINE

## 2023-05-03 PROCEDURE — 3074F SYST BP LT 130 MM HG: CPT | Performed by: INTERNAL MEDICINE

## 2023-05-03 PROCEDURE — 99214 OFFICE O/P EST MOD 30 MIN: CPT | Performed by: INTERNAL MEDICINE

## 2023-05-03 PROCEDURE — 3046F HEMOGLOBIN A1C LEVEL >9.0%: CPT | Performed by: INTERNAL MEDICINE

## 2023-05-03 PROCEDURE — G8536 NO DOC ELDER MAL SCRN: HCPCS | Performed by: INTERNAL MEDICINE

## 2023-05-03 PROCEDURE — G8428 CUR MEDS NOT DOCUMENT: HCPCS | Performed by: INTERNAL MEDICINE

## 2023-05-03 PROCEDURE — 2022F DILAT RTA XM EVC RTNOPTHY: CPT | Performed by: INTERNAL MEDICINE

## 2023-05-03 PROCEDURE — G8510 SCR DEP NEG, NO PLAN REQD: HCPCS | Performed by: INTERNAL MEDICINE

## 2023-05-03 PROCEDURE — 3079F DIAST BP 80-89 MM HG: CPT | Performed by: INTERNAL MEDICINE

## 2023-05-09 ENCOUNTER — NURSE ONLY (OUTPATIENT)
Facility: CLINIC | Age: 68
End: 2023-05-09

## 2023-05-09 DIAGNOSIS — N18.30 STAGE 3 CHRONIC KIDNEY DISEASE, UNSPECIFIED WHETHER STAGE 3A OR 3B CKD (HCC): Primary | ICD-10-CM

## 2023-05-09 DIAGNOSIS — E78.5 HYPERLIPIDEMIA, UNSPECIFIED HYPERLIPIDEMIA TYPE: ICD-10-CM

## 2023-05-09 DIAGNOSIS — E11.22 TYPE 2 DIABETES MELLITUS WITH STAGE 3 CHRONIC KIDNEY DISEASE, WITH LONG-TERM CURRENT USE OF INSULIN, UNSPECIFIED WHETHER STAGE 3A OR 3B CKD (HCC): ICD-10-CM

## 2023-05-09 DIAGNOSIS — Z79.4 TYPE 2 DIABETES MELLITUS WITH STAGE 3 CHRONIC KIDNEY DISEASE, WITH LONG-TERM CURRENT USE OF INSULIN, UNSPECIFIED WHETHER STAGE 3A OR 3B CKD (HCC): ICD-10-CM

## 2023-05-09 DIAGNOSIS — N18.30 TYPE 2 DIABETES MELLITUS WITH STAGE 3 CHRONIC KIDNEY DISEASE, WITH LONG-TERM CURRENT USE OF INSULIN, UNSPECIFIED WHETHER STAGE 3A OR 3B CKD (HCC): ICD-10-CM

## 2023-05-09 LAB
ALBUMIN SERPL-MCNC: 3.7 G/DL (ref 3.5–5)
ALBUMIN/GLOB SERPL: 1.1 (ref 1.1–2.2)
ALP SERPL-CCNC: 75 U/L (ref 45–117)
ALT SERPL-CCNC: 30 U/L (ref 12–78)
ANION GAP SERPL CALC-SCNC: 2 MMOL/L (ref 5–15)
AST SERPL-CCNC: 22 U/L (ref 15–37)
BASOPHILS # BLD: 0 K/UL (ref 0–0.1)
BASOPHILS NFR BLD: 1 % (ref 0–1)
BILIRUB SERPL-MCNC: 0.6 MG/DL (ref 0.2–1)
BUN SERPL-MCNC: 23 MG/DL (ref 6–20)
BUN/CREAT SERPL: 18 (ref 12–20)
CALCIUM SERPL-MCNC: 9 MG/DL (ref 8.5–10.1)
CHLORIDE SERPL-SCNC: 106 MMOL/L (ref 97–108)
CHOLEST SERPL-MCNC: 177 MG/DL
CO2 SERPL-SCNC: 30 MMOL/L (ref 21–32)
CREAT SERPL-MCNC: 1.25 MG/DL (ref 0.7–1.3)
CREAT UR-MCNC: 202 MG/DL
DIFFERENTIAL METHOD BLD: ABNORMAL
EOSINOPHIL # BLD: 0.1 K/UL (ref 0–0.4)
EOSINOPHIL NFR BLD: 2 % (ref 0–7)
ERYTHROCYTE [DISTWIDTH] IN BLOOD BY AUTOMATED COUNT: 17 % (ref 11.5–14.5)
EST. AVERAGE GLUCOSE BLD GHB EST-MCNC: 114 MG/DL
GLOBULIN SER CALC-MCNC: 3.4 G/DL (ref 2–4)
GLUCOSE SERPL-MCNC: 96 MG/DL (ref 65–100)
HBA1C MFR BLD: 5.6 % (ref 4–5.6)
HCT VFR BLD AUTO: 42.8 % (ref 36.6–50.3)
HDLC SERPL-MCNC: 37 MG/DL
HDLC SERPL: 4.8 (ref 0–5)
HGB BLD-MCNC: 13 G/DL (ref 12.1–17)
IMM GRANULOCYTES # BLD AUTO: 0 K/UL (ref 0–0.04)
IMM GRANULOCYTES NFR BLD AUTO: 0 % (ref 0–0.5)
LDLC SERPL CALC-MCNC: 94.4 MG/DL (ref 0–100)
LYMPHOCYTES # BLD: 2.3 K/UL (ref 0.8–3.5)
LYMPHOCYTES NFR BLD: 33 % (ref 12–49)
MCH RBC QN AUTO: 28.9 PG (ref 26–34)
MCHC RBC AUTO-ENTMCNC: 30.4 G/DL (ref 30–36.5)
MCV RBC AUTO: 95.1 FL (ref 80–99)
MICROALBUMIN UR-MCNC: 1.02 MG/DL
MICROALBUMIN/CREAT UR-RTO: 5 MG/G (ref 0–30)
MONOCYTES # BLD: 0.8 K/UL (ref 0–1)
MONOCYTES NFR BLD: 11 % (ref 5–13)
NEUTS SEG # BLD: 3.7 K/UL (ref 1.8–8)
NEUTS SEG NFR BLD: 53 % (ref 32–75)
NRBC # BLD: 0 K/UL (ref 0–0.01)
NRBC BLD-RTO: 0 PER 100 WBC
PLATELET # BLD AUTO: 184 K/UL (ref 150–400)
PMV BLD AUTO: 11.8 FL (ref 8.9–12.9)
POTASSIUM SERPL-SCNC: 3.8 MMOL/L (ref 3.5–5.1)
PROT SERPL-MCNC: 7.1 G/DL (ref 6.4–8.2)
RBC # BLD AUTO: 4.5 M/UL (ref 4.1–5.7)
SODIUM SERPL-SCNC: 138 MMOL/L (ref 136–145)
TRIGL SERPL-MCNC: 228 MG/DL
VLDLC SERPL CALC-MCNC: 45.6 MG/DL
WBC # BLD AUTO: 7 K/UL (ref 4.1–11.1)

## 2023-05-10 NOTE — RESULT ENCOUNTER NOTE
The patient's labs were reviewed.   High Triglycerides, stable chemistries, blood counts unremarkable; A1c normal.

## 2023-05-11 RX ORDER — PRAVASTATIN SODIUM 20 MG
20 TABLET ORAL DAILY
Qty: 90 TABLET | Refills: 3 | Status: SHIPPED | OUTPATIENT
Start: 2023-05-11 | End: 2024-05-05

## 2023-05-11 NOTE — TELEPHONE ENCOUNTER
Requested Prescriptions     Pending Prescriptions Disp Refills    pravastatin (PRAVACHOL) 20 MG tablet 90 tablet 3     Sig: Take 1 tablet by mouth daily       RX refill request from the patient/pharmacy. Patient last seen 5/3/23 with labs, and next appt. scheduled for no future appointments.

## 2023-06-19 ENCOUNTER — TELEPHONE (OUTPATIENT)
Facility: CLINIC | Age: 68
End: 2023-06-19

## 2023-06-19 NOTE — TELEPHONE ENCOUNTER
----- Message from Inocencia Wilkinson sent at 5/18/2023  9:52 AM EDT -----  Subject: Message to Provider    QUESTIONS  Information for Provider? Patient would like a call back from the office,   please advise.   ---------------------------------------------------------------------------  --------------  [address] [facility]  [phone]; OK to leave message on voicemail  ---------------------------------------------------------------------------  --------------  SCRIPT ANSWERS  Relationship to Patient?  Self

## 2023-07-24 ENCOUNTER — OFFICE VISIT (OUTPATIENT)
Facility: CLINIC | Age: 68
End: 2023-07-24
Payer: MEDICARE

## 2023-07-24 VITALS
OXYGEN SATURATION: 93 % | RESPIRATION RATE: 18 BRPM | SYSTOLIC BLOOD PRESSURE: 162 MMHG | HEART RATE: 61 BPM | DIASTOLIC BLOOD PRESSURE: 98 MMHG | TEMPERATURE: 98.4 F | WEIGHT: 315 LBS | BODY MASS INDEX: 44.1 KG/M2 | HEIGHT: 71 IN

## 2023-07-24 DIAGNOSIS — E66.01 MORBID OBESITY (HCC): ICD-10-CM

## 2023-07-24 DIAGNOSIS — E11.9 DIABETES MELLITUS TYPE 2, DIET-CONTROLLED (HCC): ICD-10-CM

## 2023-07-24 DIAGNOSIS — I10 PRIMARY HYPERTENSION: Primary | ICD-10-CM

## 2023-07-24 PROBLEM — H65.22 LEFT CHRONIC SEROUS OTITIS MEDIA: Status: RESOLVED | Noted: 2017-11-22 | Resolved: 2023-07-24

## 2023-07-24 PROBLEM — E78.1 HYPERTRIGLYCERIDEMIA: Status: RESOLVED | Noted: 2020-10-05 | Resolved: 2023-07-24

## 2023-07-24 PROBLEM — N18.30 CHRONIC RENAL DISEASE, STAGE III (HCC): Status: ACTIVE | Noted: 2022-11-03

## 2023-07-24 PROBLEM — Z87.898 HISTORY OF DIARRHEA: Status: RESOLVED | Noted: 2017-11-22 | Resolved: 2023-07-24

## 2023-07-24 PROBLEM — E78.5 HYPERLIPIDEMIA ASSOCIATED WITH TYPE 2 DIABETES MELLITUS (HCC): Status: RESOLVED | Noted: 2017-09-11 | Resolved: 2023-07-24

## 2023-07-24 PROBLEM — E11.22 TYPE 2 DIABETES MELLITUS WITH CHRONIC KIDNEY DISEASE (HCC): Status: RESOLVED | Noted: 2021-12-10 | Resolved: 2023-07-24

## 2023-07-24 PROBLEM — E66.9 OBESITY: Status: RESOLVED | Noted: 2017-11-22 | Resolved: 2023-07-24

## 2023-07-24 PROBLEM — R07.9 CHEST PAIN SYNDROME: Status: RESOLVED | Noted: 2017-11-22 | Resolved: 2023-07-24

## 2023-07-24 PROBLEM — E78.2 MIXED HYPERLIPIDEMIA: Status: ACTIVE | Noted: 2017-09-11

## 2023-07-24 PROBLEM — E11.69 HYPERLIPIDEMIA ASSOCIATED WITH TYPE 2 DIABETES MELLITUS (HCC): Status: RESOLVED | Noted: 2017-09-11 | Resolved: 2023-07-24

## 2023-07-24 PROCEDURE — 99214 OFFICE O/P EST MOD 30 MIN: CPT | Performed by: INTERNAL MEDICINE

## 2023-07-24 PROCEDURE — 1036F TOBACCO NON-USER: CPT | Performed by: INTERNAL MEDICINE

## 2023-07-24 PROCEDURE — 3075F SYST BP GE 130 - 139MM HG: CPT | Performed by: INTERNAL MEDICINE

## 2023-07-24 PROCEDURE — 1123F ACP DISCUSS/DSCN MKR DOCD: CPT | Performed by: INTERNAL MEDICINE

## 2023-07-24 PROCEDURE — G8417 CALC BMI ABV UP PARAM F/U: HCPCS | Performed by: INTERNAL MEDICINE

## 2023-07-24 PROCEDURE — 2022F DILAT RTA XM EVC RTNOPTHY: CPT | Performed by: INTERNAL MEDICINE

## 2023-07-24 PROCEDURE — 3017F COLORECTAL CA SCREEN DOC REV: CPT | Performed by: INTERNAL MEDICINE

## 2023-07-24 PROCEDURE — 3078F DIAST BP <80 MM HG: CPT | Performed by: INTERNAL MEDICINE

## 2023-07-24 PROCEDURE — G8427 DOCREV CUR MEDS BY ELIG CLIN: HCPCS | Performed by: INTERNAL MEDICINE

## 2023-07-24 PROCEDURE — 3044F HG A1C LEVEL LT 7.0%: CPT | Performed by: INTERNAL MEDICINE

## 2023-07-24 SDOH — ECONOMIC STABILITY: HOUSING INSECURITY
IN THE LAST 12 MONTHS, WAS THERE A TIME WHEN YOU DID NOT HAVE A STEADY PLACE TO SLEEP OR SLEPT IN A SHELTER (INCLUDING NOW)?: NO

## 2023-07-24 SDOH — ECONOMIC STABILITY: INCOME INSECURITY: HOW HARD IS IT FOR YOU TO PAY FOR THE VERY BASICS LIKE FOOD, HOUSING, MEDICAL CARE, AND HEATING?: NOT HARD AT ALL

## 2023-07-24 SDOH — ECONOMIC STABILITY: FOOD INSECURITY: WITHIN THE PAST 12 MONTHS, THE FOOD YOU BOUGHT JUST DIDN'T LAST AND YOU DIDN'T HAVE MONEY TO GET MORE.: NEVER TRUE

## 2023-07-24 SDOH — ECONOMIC STABILITY: FOOD INSECURITY: WITHIN THE PAST 12 MONTHS, YOU WORRIED THAT YOUR FOOD WOULD RUN OUT BEFORE YOU GOT MONEY TO BUY MORE.: NEVER TRUE

## 2023-07-24 NOTE — PROGRESS NOTES
Chief Complaint   Patient presents with    Hypertension       SUBJECTIVE:    Debbi Brown is a 69 y.o. male who has never been seen by me before previously followed by another physician who has left the practice. He comes in today for evaluation of just not feeling well and when he went to his rehab he was noted to have accelerated hypertension with a blood pressure according to digital cuff of 170/70. He thus comes here to be evaluated. He does claim compliance with his atenolol, hydralazine 50 twice daily, and Bumex 1 mg daily. He notes no headaches or nosebleeds. He notes no chest pain, shortness of breath, palpitations, PND, orthopnea or other cardiac respiratory complaints. He notes no nausea, vomiting or GI complaints. He notes no other complaints other than just not feeling well although nothing in particular. Current Outpatient Medications   Medication Sig Dispense Refill    pravastatin (PRAVACHOL) 20 MG tablet Take 1 tablet by mouth daily 90 tablet 3    allopurinol (ZYLOPRIM) 300 MG tablet TAKE 1 TABLET BY MOUTH DAILY      ascorbic acid (VITAMIN C) 1000 MG tablet Take 1 tablet by mouth daily      aspirin 81 MG chewable tablet Take 1 tablet by mouth daily      atenolol (TENORMIN) 50 MG tablet TAKE 1 TABLET BY MOUTH EVERY DAY      bumetanide (BUMEX) 1 MG tablet Take 1 tablet by mouth daily      hydrALAZINE (APRESOLINE) 50 MG tablet TAKE 1 TABLET BY MOUTH TWICE DAILY FOR HIGH BLOOD PRESSURE       No current facility-administered medications for this visit.      Past Medical History:   Diagnosis Date    CKD stage G3b/A1, GFR 30-44 and albumin creatinine ratio <30 mg/g (HCC)     Environmental allergies     GERD (gastroesophageal reflux disease)     Gout 11/22/2017    Hepatic steatosis 11/22/2017    Comments: non-alcoholic    History of chest pain 11/22/2017    Comments: abnl stress cardiolite 2006, subsequent to that, he had a normal cardiac cath    History of diarrhea 11/22/2017    History of

## 2023-07-25 RX ORDER — VALSARTAN 160 MG/1
160 TABLET ORAL DAILY
Qty: 30 TABLET | Refills: 5 | Status: SHIPPED | OUTPATIENT
Start: 2023-07-25

## 2023-07-25 NOTE — TELEPHONE ENCOUNTER
RX refill request from the patient/pharmacy.  Patient last seen 07- with labs, and next appt. scheduled for 08-  Requested Prescriptions     Pending Prescriptions Disp Refills    valsartan (DIOVAN) 160 MG tablet 30 tablet 5     Sig: Take 1 tablet by mouth daily

## 2023-07-26 ENCOUNTER — APPOINTMENT (OUTPATIENT)
Facility: HOSPITAL | Age: 68
End: 2023-07-26
Payer: MEDICARE

## 2023-07-26 ENCOUNTER — HOSPITAL ENCOUNTER (EMERGENCY)
Facility: HOSPITAL | Age: 68
Discharge: HOME | End: 2023-07-26
Attending: EMERGENCY MEDICINE
Payer: MEDICARE

## 2023-07-26 VITALS
HEART RATE: 80 BPM | HEIGHT: 71 IN | WEIGHT: 315 LBS | DIASTOLIC BLOOD PRESSURE: 94 MMHG | TEMPERATURE: 97.1 F | SYSTOLIC BLOOD PRESSURE: 185 MMHG | OXYGEN SATURATION: 92 % | BODY MASS INDEX: 44.1 KG/M2 | RESPIRATION RATE: 18 BRPM

## 2023-07-26 DIAGNOSIS — I15.9 SECONDARY HYPERTENSION: Primary | ICD-10-CM

## 2023-07-26 DIAGNOSIS — E66.01 CLASS 3 SEVERE OBESITY WITH BODY MASS INDEX (BMI) OF 50.0 TO 59.9 IN ADULT, UNSPECIFIED OBESITY TYPE, UNSPECIFIED WHETHER SERIOUS COMORBIDITY PRESENT (HCC): ICD-10-CM

## 2023-07-26 DIAGNOSIS — E11.69 TYPE 2 DIABETES MELLITUS WITH OTHER SPECIFIED COMPLICATION, WITHOUT LONG-TERM CURRENT USE OF INSULIN (HCC): ICD-10-CM

## 2023-07-26 LAB
ALBUMIN SERPL-MCNC: 3.7 G/DL (ref 3.5–5)
ALBUMIN/GLOB SERPL: 0.9 (ref 1.1–2.2)
ALP SERPL-CCNC: 95 U/L (ref 45–117)
ALT SERPL-CCNC: 39 U/L (ref 12–78)
ANION GAP SERPL CALC-SCNC: 2 MMOL/L (ref 5–15)
APPEARANCE UR: CLEAR
AST SERPL-CCNC: 28 U/L (ref 15–37)
BACTERIA URNS QL MICRO: NEGATIVE /HPF
BASOPHILS # BLD: 0 K/UL (ref 0–0.1)
BASOPHILS NFR BLD: 1 % (ref 0–1)
BILIRUB SERPL-MCNC: 0.6 MG/DL (ref 0.2–1)
BILIRUB UR QL: NEGATIVE
BUN SERPL-MCNC: 24 MG/DL (ref 6–20)
BUN/CREAT SERPL: 19 (ref 12–20)
CALCIUM SERPL-MCNC: 9.3 MG/DL (ref 8.5–10.1)
CHLORIDE SERPL-SCNC: 105 MMOL/L (ref 97–108)
CO2 SERPL-SCNC: 31 MMOL/L (ref 21–32)
COLOR UR: NORMAL
COMMENT:: NORMAL
CREAT SERPL-MCNC: 1.27 MG/DL (ref 0.7–1.3)
DIFFERENTIAL METHOD BLD: ABNORMAL
EKG ATRIAL RATE: 74 BPM
EKG DIAGNOSIS: NORMAL
EKG P AXIS: 54 DEGREES
EKG P-R INTERVAL: 204 MS
EKG Q-T INTERVAL: 396 MS
EKG QRS DURATION: 118 MS
EKG QTC CALCULATION (BAZETT): 439 MS
EKG R AXIS: -61 DEGREES
EKG T AXIS: 23 DEGREES
EKG VENTRICULAR RATE: 74 BPM
EOSINOPHIL # BLD: 0.1 K/UL (ref 0–0.4)
EOSINOPHIL NFR BLD: 1 % (ref 0–7)
EPITH CASTS URNS QL MICRO: NORMAL /LPF
ERYTHROCYTE [DISTWIDTH] IN BLOOD BY AUTOMATED COUNT: 16.5 % (ref 11.5–14.5)
GLOBULIN SER CALC-MCNC: 4.2 G/DL (ref 2–4)
GLUCOSE SERPL-MCNC: 110 MG/DL (ref 65–100)
GLUCOSE UR STRIP.AUTO-MCNC: NEGATIVE MG/DL
HCT VFR BLD AUTO: 43.9 % (ref 36.6–50.3)
HGB BLD-MCNC: 14 G/DL (ref 12.1–17)
HGB UR QL STRIP: NEGATIVE
HYALINE CASTS URNS QL MICRO: NORMAL /LPF (ref 0–5)
IMM GRANULOCYTES # BLD AUTO: 0 K/UL (ref 0–0.04)
IMM GRANULOCYTES NFR BLD AUTO: 0 % (ref 0–0.5)
KETONES UR QL STRIP.AUTO: NEGATIVE MG/DL
LEUKOCYTE ESTERASE UR QL STRIP.AUTO: NEGATIVE
LIPASE SERPL-CCNC: 86 U/L (ref 73–393)
LYMPHOCYTES # BLD: 1.9 K/UL (ref 0.8–3.5)
LYMPHOCYTES NFR BLD: 25 % (ref 12–49)
MAGNESIUM SERPL-MCNC: 2.5 MG/DL (ref 1.6–2.4)
MCH RBC QN AUTO: 29.9 PG (ref 26–34)
MCHC RBC AUTO-ENTMCNC: 31.9 G/DL (ref 30–36.5)
MCV RBC AUTO: 93.6 FL (ref 80–99)
MONOCYTES # BLD: 0.7 K/UL (ref 0–1)
MONOCYTES NFR BLD: 9 % (ref 5–13)
NEUTS SEG # BLD: 4.9 K/UL (ref 1.8–8)
NEUTS SEG NFR BLD: 64 % (ref 32–75)
NITRITE UR QL STRIP.AUTO: NEGATIVE
NRBC # BLD: 0 K/UL (ref 0–0.01)
NRBC BLD-RTO: 0 PER 100 WBC
PH UR STRIP: 7 (ref 5–8)
PLATELET # BLD AUTO: 196 K/UL (ref 150–400)
PMV BLD AUTO: 12.3 FL (ref 8.9–12.9)
POTASSIUM SERPL-SCNC: 3.8 MMOL/L (ref 3.5–5.1)
PROT SERPL-MCNC: 7.9 G/DL (ref 6.4–8.2)
PROT UR STRIP-MCNC: NEGATIVE MG/DL
RBC # BLD AUTO: 4.69 M/UL (ref 4.1–5.7)
RBC #/AREA URNS HPF: NORMAL /HPF (ref 0–5)
SODIUM SERPL-SCNC: 138 MMOL/L (ref 136–145)
SP GR UR REFRACTOMETRY: 1.01 (ref 1–1.03)
SPECIMEN HOLD: NORMAL
SPECIMEN HOLD: NORMAL
TROPONIN I SERPL HS-MCNC: 8 NG/L (ref 0–57)
UROBILINOGEN UR QL STRIP.AUTO: 1 EU/DL (ref 0.2–1)
WBC # BLD AUTO: 7.7 K/UL (ref 4.1–11.1)
WBC URNS QL MICRO: NORMAL /HPF (ref 0–4)

## 2023-07-26 PROCEDURE — 85025 COMPLETE CBC W/AUTO DIFF WBC: CPT

## 2023-07-26 PROCEDURE — 84484 ASSAY OF TROPONIN QUANT: CPT

## 2023-07-26 PROCEDURE — 81001 URINALYSIS AUTO W/SCOPE: CPT

## 2023-07-26 PROCEDURE — 71046 X-RAY EXAM CHEST 2 VIEWS: CPT

## 2023-07-26 PROCEDURE — 36415 COLL VENOUS BLD VENIPUNCTURE: CPT

## 2023-07-26 PROCEDURE — 80053 COMPREHEN METABOLIC PANEL: CPT

## 2023-07-26 PROCEDURE — 83690 ASSAY OF LIPASE: CPT

## 2023-07-26 PROCEDURE — 83735 ASSAY OF MAGNESIUM: CPT

## 2023-07-26 PROCEDURE — 99285 EMERGENCY DEPT VISIT HI MDM: CPT

## 2023-07-26 ASSESSMENT — ENCOUNTER SYMPTOMS
SHORTNESS OF BREATH: 0
CONSTIPATION: 0
BACK PAIN: 0
ABDOMINAL PAIN: 0
COUGH: 0
CHEST TIGHTNESS: 0
TROUBLE SWALLOWING: 0
NAUSEA: 0
DIARRHEA: 0
VOMITING: 0

## 2023-07-26 ASSESSMENT — LIFESTYLE VARIABLES
HOW OFTEN DO YOU HAVE A DRINK CONTAINING ALCOHOL: NEVER
HOW MANY STANDARD DRINKS CONTAINING ALCOHOL DO YOU HAVE ON A TYPICAL DAY: PATIENT DOES NOT DRINK

## 2023-07-26 NOTE — ED TRIAGE NOTES
TRIAGE: Pt arrives with c/o high BP that started increasing Monday with a BP of 170/70. Pt states he was recently started on a new medication for hypertension in addition to 3 other BP medications.

## 2023-07-31 LAB
EKG ATRIAL RATE: 74 BPM
EKG DIAGNOSIS: NORMAL
EKG P AXIS: 54 DEGREES
EKG P-R INTERVAL: 204 MS
EKG Q-T INTERVAL: 396 MS
EKG QRS DURATION: 118 MS
EKG QTC CALCULATION (BAZETT): 439 MS
EKG R AXIS: -61 DEGREES
EKG T AXIS: 23 DEGREES
EKG VENTRICULAR RATE: 74 BPM

## 2023-08-07 ENCOUNTER — OFFICE VISIT (OUTPATIENT)
Facility: CLINIC | Age: 68
End: 2023-08-07
Payer: MEDICARE

## 2023-08-07 VITALS
DIASTOLIC BLOOD PRESSURE: 102 MMHG | TEMPERATURE: 98.4 F | WEIGHT: 315 LBS | BODY MASS INDEX: 44.1 KG/M2 | HEART RATE: 61 BPM | OXYGEN SATURATION: 93 % | RESPIRATION RATE: 16 BRPM | SYSTOLIC BLOOD PRESSURE: 148 MMHG | HEIGHT: 71 IN

## 2023-08-07 DIAGNOSIS — N18.30 STAGE 3 CHRONIC KIDNEY DISEASE, UNSPECIFIED WHETHER STAGE 3A OR 3B CKD (HCC): ICD-10-CM

## 2023-08-07 DIAGNOSIS — E66.01 MORBID OBESITY (HCC): ICD-10-CM

## 2023-08-07 DIAGNOSIS — I10 PRIMARY HYPERTENSION: Primary | ICD-10-CM

## 2023-08-07 PROBLEM — E11.40 TYPE 2 DIABETES MELLITUS WITH DIABETIC NEUROPATHY (HCC): Status: ACTIVE | Noted: 2020-10-12

## 2023-08-07 PROBLEM — G47.33 OSA (OBSTRUCTIVE SLEEP APNEA): Status: ACTIVE | Noted: 2017-09-11

## 2023-08-07 PROBLEM — E88.89 STEATOSIS (HCC): Status: ACTIVE | Noted: 2017-11-22

## 2023-08-07 PROBLEM — G47.00 INSOMNIA: Status: ACTIVE | Noted: 2017-11-22

## 2023-08-07 PROBLEM — J30.9 ALLERGIC RHINITIS: Status: ACTIVE | Noted: 2017-11-22

## 2023-08-07 PROBLEM — M10.9 GOUT: Status: ACTIVE | Noted: 2017-11-22

## 2023-08-07 PROBLEM — I73.9 PERIPHERAL VASCULAR DISEASE (HCC): Status: ACTIVE | Noted: 2020-10-12

## 2023-08-07 PROCEDURE — 3077F SYST BP >= 140 MM HG: CPT | Performed by: INTERNAL MEDICINE

## 2023-08-07 PROCEDURE — 99214 OFFICE O/P EST MOD 30 MIN: CPT | Performed by: INTERNAL MEDICINE

## 2023-08-07 PROCEDURE — 1036F TOBACCO NON-USER: CPT | Performed by: INTERNAL MEDICINE

## 2023-08-07 PROCEDURE — G8428 CUR MEDS NOT DOCUMENT: HCPCS | Performed by: INTERNAL MEDICINE

## 2023-08-07 PROCEDURE — 1123F ACP DISCUSS/DSCN MKR DOCD: CPT | Performed by: INTERNAL MEDICINE

## 2023-08-07 PROCEDURE — 3017F COLORECTAL CA SCREEN DOC REV: CPT | Performed by: INTERNAL MEDICINE

## 2023-08-07 PROCEDURE — G8417 CALC BMI ABV UP PARAM F/U: HCPCS | Performed by: INTERNAL MEDICINE

## 2023-08-07 PROCEDURE — 3079F DIAST BP 80-89 MM HG: CPT | Performed by: INTERNAL MEDICINE

## 2023-08-07 RX ORDER — VALSARTAN 320 MG/1
320 TABLET ORAL DAILY
Qty: 30 TABLET | Status: SHIPPED | OUTPATIENT
Start: 2023-08-07

## 2023-08-07 RX ORDER — HYDRALAZINE HYDROCHLORIDE 100 MG/1
100 TABLET, FILM COATED ORAL 2 TIMES DAILY WITH MEALS
Qty: 60 TABLET | Refills: 5 | Status: SHIPPED | OUTPATIENT
Start: 2023-08-07

## 2023-08-07 NOTE — TELEPHONE ENCOUNTER
PCP: Flako Dan MD    Last appt: Visit date not found  Future Appointments   Date Time Provider [address]   9/6/2023  2:00 PM Nicolas Vaughan MD [facility] AMB       Requested Prescriptions     Pending Prescriptions Disp Refills    allopurinol (ZYLOPRIM) 300 MG tablet 30 tablet 1     Sig: Take 1 tablet by mouth daily       Prior labs and Blood pressures:  BP Readings from Last 3 Encounters:   08/07/23 (!) 148/102   07/26/23 (!) 185/94   07/24/23 (!) 162/98     Lab Results   Component Value Date/Time     07/26/2023 08:51 AM    K 3.8 07/26/2023 08:51 AM     07/26/2023 08:51 AM    CO2 31 07/26/2023 08:51 AM    BUN 24 07/26/2023 08:51 AM    GFRAA 54 05/03/2022 02:40 PM     No results found for: HBA1C, [id number]  Lab Results   Component Value Date/Time    CHOL 177 05/09/2023 08:53 AM    HDL 37 05/09/2023 08:53 AM    VLDL 31 01/15/2021 08:04 AM     No results found for: VITD3, VD3RIA        No results found for: TSH, TSH2, TSH3

## 2023-08-08 RX ORDER — ALLOPURINOL 300 MG/1
300 TABLET ORAL DAILY
Qty: 30 TABLET | Refills: 1 | Status: SHIPPED | OUTPATIENT
Start: 2023-08-08

## 2023-08-08 NOTE — TELEPHONE ENCOUNTER
PCP: Kita Stover MD    Last appt: Visit date not found  Future Appointments   Date Time Provider [address]   8/9/2023  2:30 PM [facility]   9/6/2023  2:00 PM Barbara Corea MD [facility] AMB       Requested Prescriptions     Pending Prescriptions Disp Refills    allopurinol (ZYLOPRIM) 300 MG tablet [Pharmacy Med Name: ALLOPURINOL 300MG TABLETS] 90 tablet      Sig: TAKE 1 TABLET BY MOUTH DAILY       Prior labs and Blood pressures:  BP Readings from Last 3 Encounters:   08/07/23 (!) 148/102   07/26/23 (!) 185/94   07/24/23 (!) 162/98     Lab Results   Component Value Date/Time     07/26/2023 08:51 AM    K 3.8 07/26/2023 08:51 AM     07/26/2023 08:51 AM    CO2 31 07/26/2023 08:51 AM    BUN 24 07/26/2023 08:51 AM    GFRAA 54 05/03/2022 02:40 PM     No results found for: HBA1C, [id number]  Lab Results   Component Value Date/Time    CHOL 177 05/09/2023 08:53 AM    HDL 37 05/09/2023 08:53 AM    VLDL 31 01/15/2021 08:04 AM     No results found for: VITD3, VD3RIA        No results found for: TSH, TSH2, TSH3

## 2023-08-09 ENCOUNTER — HOSPITAL ENCOUNTER (OUTPATIENT)
Facility: HOSPITAL | Age: 68
Discharge: HOME | End: 2023-08-11
Attending: STUDENT IN AN ORGANIZED HEALTH CARE EDUCATION/TRAINING PROGRAM
Payer: MEDICARE

## 2023-08-09 VITALS
SYSTOLIC BLOOD PRESSURE: 148 MMHG | WEIGHT: 315 LBS | BODY MASS INDEX: 44.1 KG/M2 | DIASTOLIC BLOOD PRESSURE: 92 MMHG | HEIGHT: 71 IN

## 2023-08-09 DIAGNOSIS — J81.0 ACUTE PULMONARY EDEMA (HCC): ICD-10-CM

## 2023-08-09 LAB
ECHO AO ROOT DIAM: 4 CM
ECHO AO ROOT INDEX: 1.45 CM/M2
ECHO AV PEAK GRADIENT: 6 MMHG
ECHO AV PEAK VELOCITY: 1.2 M/S
ECHO BSA: 2.92 M2
ECHO EST RA PRESSURE: 3 MMHG
ECHO LA DIAMETER INDEX: 1.56 CM/M2
ECHO LA DIAMETER: 4.3 CM
ECHO LA TO AORTIC ROOT RATIO: 1.08
ECHO LV E' SEPTAL VELOCITY: 8 CM/S
ECHO LV EDV A2C: 140 ML
ECHO LV EDV A4C: 210 ML
ECHO LV EDV BP: 173 ML (ref 67–155)
ECHO LV EDV INDEX A4C: 76 ML/M2
ECHO LV EDV INDEX BP: 63 ML/M2
ECHO LV EDV NDEX A2C: 51 ML/M2
ECHO LV EJECTION FRACTION A2C: 75 %
ECHO LV EJECTION FRACTION A4C: 70 %
ECHO LV EJECTION FRACTION BIPLANE: 72 % (ref 55–100)
ECHO LV ESV A2C: 35 ML
ECHO LV ESV A4C: 64 ML
ECHO LV ESV BP: 48 ML (ref 22–58)
ECHO LV ESV INDEX A2C: 13 ML/M2
ECHO LV ESV INDEX A4C: 23 ML/M2
ECHO LV ESV INDEX BP: 17 ML/M2
ECHO LV FRACTIONAL SHORTENING: 28 % (ref 28–44)
ECHO LV INTERNAL DIMENSION DIASTOLE INDEX: 1.7 CM/M2
ECHO LV INTERNAL DIMENSION DIASTOLIC: 4.7 CM (ref 4.2–5.9)
ECHO LV INTERNAL DIMENSION SYSTOLIC INDEX: 1.23 CM/M2
ECHO LV INTERNAL DIMENSION SYSTOLIC: 3.4 CM
ECHO LV IVSD: 1 CM (ref 0.6–1)
ECHO LV MASS 2D: 199.6 G (ref 88–224)
ECHO LV MASS INDEX 2D: 72.3 G/M2 (ref 49–115)
ECHO LV POSTERIOR WALL DIASTOLIC: 1.3 CM (ref 0.6–1)
ECHO LV RELATIVE WALL THICKNESS RATIO: 0.55
ECHO MV A VELOCITY: 0.85 M/S
ECHO MV AREA PHT: 2.6 CM2
ECHO MV E DECELERATION TIME (DT): 288.6 MS
ECHO MV E VELOCITY: 0.67 M/S
ECHO MV E/A RATIO: 0.79
ECHO MV E/E' SEPTAL: 8.38
ECHO MV PRESSURE HALF TIME (PHT): 83.7 MS
ECHO PV MAX VELOCITY: 1.1 M/S
ECHO PV PEAK GRADIENT: 5 MMHG
ECHO RIGHT VENTRICULAR SYSTOLIC PRESSURE (RVSP): 21 MMHG
ECHO RV INTERNAL DIMENSION: 4.1 CM
ECHO RV TAPSE: 1.8 CM (ref 1.7–?)
ECHO TV REGURGITANT MAX VELOCITY: 2.13 M/S
ECHO TV REGURGITANT PEAK GRADIENT: 18 MMHG

## 2023-08-09 PROCEDURE — 6360000004 HC RX CONTRAST MEDICATION: Performed by: STUDENT IN AN ORGANIZED HEALTH CARE EDUCATION/TRAINING PROGRAM

## 2023-08-09 PROCEDURE — C8929 TTE W OR WO FOL WCON,DOPPLER: HCPCS

## 2023-08-09 RX ORDER — ALLOPURINOL 300 MG/1
300 TABLET ORAL DAILY
Qty: 90 TABLET | OUTPATIENT
Start: 2023-08-09

## 2023-08-09 RX ADMIN — PERFLUTREN 1.5 ML: 6.52 INJECTION, SUSPENSION INTRAVENOUS at 15:21

## 2023-08-17 ENCOUNTER — TELEPHONE (OUTPATIENT)
Facility: CLINIC | Age: 68
End: 2023-08-17

## 2023-08-17 NOTE — TELEPHONE ENCOUNTER
Patient's spouse called in regarding her 's BP. States it is 116/45 today and is c/o light headness. Dr. Austin Khan saw patient last week for accerlerated hypertension and adjusted his medication accordingly. Wife went out and bought a BP cuff and states he is now hypotensive. Referred patient to the ER for [facility] ER for further evaluation since patient does not have a PCP. Dr. Austin Khan aware of the situation.

## 2023-09-05 RX ORDER — ALLOPURINOL 300 MG/1
300 TABLET ORAL DAILY
Qty: 90 TABLET | Refills: 0 | Status: SHIPPED | OUTPATIENT
Start: 2023-09-05

## 2023-09-05 NOTE — TELEPHONE ENCOUNTER
PCP: Krysta Moore MD    Last appt: Visit date not found  Future Appointments   Date Time Provider [address]   9/6/2023  2:00 PM Yamilet Arita MD [facility] AM[facility]   11/17/2023  1:15 PM DO TIMOTEO Newman BS AMB       Requested Prescriptions     Pending Prescriptions Disp Refills    allopurinol (ZYLOPRIM) 300 MG tablet [Pharmacy Med Name: ALLOPURINOL 300MG TABLETS] 90 tablet      Sig: TAKE 1 TABLET BY MOUTH DAILY       Prior labs and Blood pressures:  BP Readings from Last 3 Encounters:   08/09/23 (!) 148/92   08/07/23 (!) 148/102   07/26/23 (!) 185/94     Lab Results   Component Value Date/Time     07/26/2023 08:51 AM    K 3.8 07/26/2023 08:51 AM     07/26/2023 08:51 AM    CO2 31 07/26/2023 08:51 AM    BUN 24 07/26/2023 08:51 AM    GFRAA 54 05/03/2022 02:40 PM     No results found for: HBA1C, HFN1BDDV  Lab Results   Component Value Date/Time    CHOL 177 05/09/2023 08:53 AM    HDL 37 05/09/2023 08:53 AM    VLDL 31 01/15/2021 08:04 AM     No results found for: VITD3, VD3RIA        No results found for: TSH, TSH2, TSH3

## 2023-09-05 NOTE — PROGRESS NOTES
Chief Complaint   Patient presents with    Follow-up     1 month f/u recheck bp       SUBJECTIVE:    Marily Brown is a 69 y.o. male who returns in follow-up regarding his accelerated hypertension, morbid obesity, CKD stage III, and his underlying medical problems of diabetes and hyperlipidemia. He was last seen by me on 7/24 at which time his blood pressure was significantly up and we increased his hydralazine from 50 twice daily to 100 mg twice daily. He has continued to lose weight since his last visit and comes in today noting that his weight is down he seems to be getting a little dizzy and lightheaded when he gets up and vision sometimes seems a little blurred. He denies any chest pain, shortness of breath or cardiac or respiratory complaints. He notes no GI or  complaints. He notes no headaches and no other neurologic complaints. He has no change of his chronic arthritic complaints. There are no other complaints on complete review of systems. Current Outpatient Medications   Medication Sig Dispense Refill    allopurinol (ZYLOPRIM) 300 MG tablet TAKE 1 TABLET BY MOUTH DAILY 90 tablet 0    valsartan (DIOVAN) 320 MG tablet Take 1 tablet by mouth daily 30 tablet PRN    hydrALAZINE (APRESOLINE) 100 MG tablet Take 1 tablet by mouth with breakfast and with evening meal 60 tablet 5    pravastatin (PRAVACHOL) 20 MG tablet Take 1 tablet by mouth daily 90 tablet 3    ascorbic acid (VITAMIN C) 1000 MG tablet Take 1 tablet by mouth daily      aspirin 81 MG chewable tablet Take 1 tablet by mouth daily      atenolol (TENORMIN) 50 MG tablet TAKE 1 TABLET BY MOUTH EVERY DAY      bumetanide (BUMEX) 1 MG tablet Take 1 tablet by mouth daily       No current facility-administered medications for this visit.      Past Medical History:   Diagnosis Date    CKD stage G3b/A1, GFR 30-44 and albumin creatinine ratio <30 mg/g (HCC)     Environmental allergies     GERD (gastroesophageal reflux disease)     Gout 11/22/2017

## 2023-09-06 ENCOUNTER — OFFICE VISIT (OUTPATIENT)
Facility: CLINIC | Age: 68
End: 2023-09-06
Payer: MEDICARE

## 2023-09-06 VITALS
SYSTOLIC BLOOD PRESSURE: 126 MMHG | OXYGEN SATURATION: 95 % | HEIGHT: 70 IN | WEIGHT: 315 LBS | RESPIRATION RATE: 16 BRPM | HEART RATE: 65 BPM | TEMPERATURE: 97.7 F | BODY MASS INDEX: 45.1 KG/M2 | DIASTOLIC BLOOD PRESSURE: 80 MMHG

## 2023-09-06 DIAGNOSIS — N18.30 STAGE 3 CHRONIC KIDNEY DISEASE, UNSPECIFIED WHETHER STAGE 3A OR 3B CKD (HCC): ICD-10-CM

## 2023-09-06 DIAGNOSIS — I10 PRIMARY HYPERTENSION: Primary | ICD-10-CM

## 2023-09-06 DIAGNOSIS — E66.01 MORBID OBESITY (HCC): ICD-10-CM

## 2023-09-06 PROCEDURE — 3017F COLORECTAL CA SCREEN DOC REV: CPT | Performed by: INTERNAL MEDICINE

## 2023-09-06 PROCEDURE — G8417 CALC BMI ABV UP PARAM F/U: HCPCS | Performed by: INTERNAL MEDICINE

## 2023-09-06 PROCEDURE — 3074F SYST BP LT 130 MM HG: CPT | Performed by: INTERNAL MEDICINE

## 2023-09-06 PROCEDURE — G8427 DOCREV CUR MEDS BY ELIG CLIN: HCPCS | Performed by: INTERNAL MEDICINE

## 2023-09-06 PROCEDURE — 99214 OFFICE O/P EST MOD 30 MIN: CPT | Performed by: INTERNAL MEDICINE

## 2023-09-06 PROCEDURE — 3078F DIAST BP <80 MM HG: CPT | Performed by: INTERNAL MEDICINE

## 2023-09-06 PROCEDURE — 1123F ACP DISCUSS/DSCN MKR DOCD: CPT | Performed by: INTERNAL MEDICINE

## 2023-09-06 PROCEDURE — 1036F TOBACCO NON-USER: CPT | Performed by: INTERNAL MEDICINE

## 2023-09-07 LAB
ANION GAP SERPL CALC-SCNC: 8 MMOL/L (ref 5–15)
BUN SERPL-MCNC: 47 MG/DL (ref 6–20)
BUN/CREAT SERPL: 29 (ref 12–20)
CALCIUM SERPL-MCNC: 9.2 MG/DL (ref 8.5–10.1)
CHLORIDE SERPL-SCNC: 104 MMOL/L (ref 97–108)
CO2 SERPL-SCNC: 28 MMOL/L (ref 21–32)
CREAT SERPL-MCNC: 1.62 MG/DL (ref 0.7–1.3)
GLUCOSE SERPL-MCNC: 87 MG/DL (ref 65–100)
POTASSIUM SERPL-SCNC: 4.3 MMOL/L (ref 3.5–5.1)
SODIUM SERPL-SCNC: 140 MMOL/L (ref 136–145)

## 2023-09-14 ENCOUNTER — HOSPITAL ENCOUNTER (EMERGENCY)
Facility: HOSPITAL | Age: 68
Discharge: HOME | End: 2023-09-14
Attending: EMERGENCY MEDICINE
Payer: MEDICARE

## 2023-09-14 ENCOUNTER — APPOINTMENT (OUTPATIENT)
Facility: HOSPITAL | Age: 68
End: 2023-09-14
Payer: MEDICARE

## 2023-09-14 VITALS
SYSTOLIC BLOOD PRESSURE: 154 MMHG | RESPIRATION RATE: 18 BRPM | TEMPERATURE: 97.5 F | OXYGEN SATURATION: 100 % | HEART RATE: 61 BPM | DIASTOLIC BLOOD PRESSURE: 105 MMHG

## 2023-09-14 DIAGNOSIS — N28.89 RENAL MASS, RIGHT: Primary | ICD-10-CM

## 2023-09-14 DIAGNOSIS — R10.84 GENERALIZED ABDOMINAL PAIN: ICD-10-CM

## 2023-09-14 LAB
ALBUMIN SERPL-MCNC: 3.5 G/DL (ref 3.5–5)
ALBUMIN/GLOB SERPL: 0.8 (ref 1.1–2.2)
ALP SERPL-CCNC: 89 U/L (ref 45–117)
ALT SERPL-CCNC: 39 U/L (ref 12–78)
ANION GAP SERPL CALC-SCNC: 2 MMOL/L (ref 5–15)
APPEARANCE UR: CLEAR
AST SERPL-CCNC: 19 U/L (ref 15–37)
BACTERIA URNS QL MICRO: NEGATIVE /HPF
BASOPHILS # BLD: 0 K/UL (ref 0–0.1)
BASOPHILS NFR BLD: 0 % (ref 0–1)
BILIRUB SERPL-MCNC: 0.4 MG/DL (ref 0.2–1)
BILIRUB UR QL: NEGATIVE
BUN SERPL-MCNC: 25 MG/DL (ref 6–20)
BUN/CREAT SERPL: 19 (ref 12–20)
CALCIUM SERPL-MCNC: 10 MG/DL (ref 8.5–10.1)
CHLORIDE SERPL-SCNC: 107 MMOL/L (ref 97–108)
CO2 SERPL-SCNC: 31 MMOL/L (ref 21–32)
COLOR UR: NORMAL
COMMENT:: NORMAL
COMMENT:: NORMAL
CREAT SERPL-MCNC: 1.35 MG/DL (ref 0.7–1.3)
DIFFERENTIAL METHOD BLD: ABNORMAL
EOSINOPHIL # BLD: 0.1 K/UL (ref 0–0.4)
EOSINOPHIL NFR BLD: 2 % (ref 0–7)
EPITH CASTS URNS QL MICRO: NORMAL /LPF
ERYTHROCYTE [DISTWIDTH] IN BLOOD BY AUTOMATED COUNT: 15.8 % (ref 11.5–14.5)
GLOBULIN SER CALC-MCNC: 4.2 G/DL (ref 2–4)
GLUCOSE SERPL-MCNC: 92 MG/DL (ref 65–100)
GLUCOSE UR STRIP.AUTO-MCNC: NEGATIVE MG/DL
HCT VFR BLD AUTO: 41.2 % (ref 36.6–50.3)
HGB BLD-MCNC: 13.2 G/DL (ref 12.1–17)
HGB UR QL STRIP: NEGATIVE
HYALINE CASTS URNS QL MICRO: NORMAL /LPF (ref 0–5)
IMM GRANULOCYTES # BLD AUTO: 0 K/UL (ref 0–0.04)
IMM GRANULOCYTES NFR BLD AUTO: 0 % (ref 0–0.5)
KETONES UR QL STRIP.AUTO: NEGATIVE MG/DL
LEUKOCYTE ESTERASE UR QL STRIP.AUTO: NEGATIVE
LYMPHOCYTES # BLD: 1.8 K/UL (ref 0.8–3.5)
LYMPHOCYTES NFR BLD: 25 % (ref 12–49)
MCH RBC QN AUTO: 29.7 PG (ref 26–34)
MCHC RBC AUTO-ENTMCNC: 32 G/DL (ref 30–36.5)
MCV RBC AUTO: 92.8 FL (ref 80–99)
MONOCYTES # BLD: 0.6 K/UL (ref 0–1)
MONOCYTES NFR BLD: 8 % (ref 5–13)
NEUTS SEG # BLD: 4.6 K/UL (ref 1.8–8)
NEUTS SEG NFR BLD: 65 % (ref 32–75)
NITRITE UR QL STRIP.AUTO: NEGATIVE
NRBC # BLD: 0 K/UL (ref 0–0.01)
NRBC BLD-RTO: 0 PER 100 WBC
PH UR STRIP: 6.5 (ref 5–8)
PLATELET # BLD AUTO: 215 K/UL (ref 150–400)
PMV BLD AUTO: 10.8 FL (ref 8.9–12.9)
POTASSIUM SERPL-SCNC: 4.4 MMOL/L (ref 3.5–5.1)
PROT SERPL-MCNC: 7.7 G/DL (ref 6.4–8.2)
PROT UR STRIP-MCNC: NEGATIVE MG/DL
RBC # BLD AUTO: 4.44 M/UL (ref 4.1–5.7)
RBC #/AREA URNS HPF: NORMAL /HPF (ref 0–5)
SODIUM SERPL-SCNC: 140 MMOL/L (ref 136–145)
SP GR UR REFRACTOMETRY: 1.01 (ref 1–1.03)
SPECIMEN HOLD: NORMAL
UROBILINOGEN UR QL STRIP.AUTO: 0.2 EU/DL (ref 0.2–1)
WBC # BLD AUTO: 7.2 K/UL (ref 4.1–11.1)
WBC URNS QL MICRO: NORMAL /HPF (ref 0–4)

## 2023-09-14 PROCEDURE — 99285 EMERGENCY DEPT VISIT HI MDM: CPT

## 2023-09-14 PROCEDURE — 85025 COMPLETE CBC W/AUTO DIFF WBC: CPT

## 2023-09-14 PROCEDURE — 6360000004 HC RX CONTRAST MEDICATION: Performed by: RADIOLOGY

## 2023-09-14 PROCEDURE — 81001 URINALYSIS AUTO W/SCOPE: CPT

## 2023-09-14 PROCEDURE — 80053 COMPREHEN METABOLIC PANEL: CPT

## 2023-09-14 PROCEDURE — 74177 CT ABD & PELVIS W/CONTRAST: CPT

## 2023-09-14 PROCEDURE — 36415 COLL VENOUS BLD VENIPUNCTURE: CPT

## 2023-09-14 PROCEDURE — 2580000003 HC RX 258

## 2023-09-14 RX ORDER — 0.9 % SODIUM CHLORIDE 0.9 %
1000 INTRAVENOUS SOLUTION INTRAVENOUS ONCE
Status: COMPLETED | OUTPATIENT
Start: 2023-09-14 | End: 2023-09-14

## 2023-09-14 RX ADMIN — IOPAMIDOL 100 ML: 755 INJECTION, SOLUTION INTRAVENOUS at 10:01

## 2023-09-14 RX ADMIN — SODIUM CHLORIDE 1000 ML: 9 INJECTION, SOLUTION INTRAVENOUS at 08:58

## 2023-09-14 ASSESSMENT — PAIN - FUNCTIONAL ASSESSMENT: PAIN_FUNCTIONAL_ASSESSMENT: NONE - DENIES PAIN

## 2023-09-14 ASSESSMENT — ENCOUNTER SYMPTOMS: CONSTIPATION: 1

## 2023-09-14 NOTE — ED PROVIDER NOTES
hypertension in triage. Of note patient recently reduced his hydralazine dose to half his previous dose. Patient's CMP showed normal electrolytes with a creatinine of 1.35 which is improved from his previous measurements. CBC showed no anemia or elevation of white blood cells. Patient's urine shows no evidence of UTI. No hematuria. CT scan does not comment on stool burden. There was an incidental finding of a 1.2 cm right renal mass which is suspicious for neoplasm. I had an at length discussion with the patient about this incidental finding and the follow-up needed with his primary care provider and nephrologist for MRI imaging. Patient verbalized understanding of this incidental finding and is agreeable to follow-up. I also discussed management of constipation with a capful of MiraLAX daily in the morning if he is having hard stools. I discussed that he can increase his MiraLAX intake if he is still unable to have a bowel movement after taking MiraLAX daily and should stop the MiraLAX if he has multiple soft bowel movements. Patient verbalized understanding of this. Patient to follow-up with his primary care provider and nephrologist.    Discussed my clinical impression(s), any labs and/or radiology results with the patient. I answered any questions and addressed any concerns. Discussed the importance of following up with their primary care physician and/or specialist(s). Discussed signs or symptoms that would warrant return back to the ER for further evaluation. The patient is agreeable with discharge. Amount and/or Complexity of Data Reviewed  Labs: ordered. Decision-making details documented in ED Course. Radiology: ordered. Decision-making details documented in ED Course. Risk  Prescription drug management.             REASSESSMENT     ED Course as of 09/14/23 1203   Thu Sep 14, 2023   0938 CBC with Auto Differential(!):    WBC 7.2   RBC 4.44   Hemoglobin Quant 13.2   Hematocrit 41.2

## 2023-09-14 NOTE — ED NOTES
Took call to Spouse. Advised pt is being d/c. Spouse voiced understanding.      Sharyle Blanc, LPN  94/04/09 6705

## 2023-09-14 NOTE — ED TRIAGE NOTES
Pt stated he is here for a few things, his BP is high, placed on medications then his kidneys starting having problems , pt c/o feeling lightheaded, pt here for constipation, last BM on Tuesday

## 2023-09-14 NOTE — DISCHARGE INSTRUCTIONS
Thank you for allowing us to provide you with medical care today. We realize that you have many choices for your emergency care needs. We thank you for choosing [facility]. Please choose us in the future for any continued health care needs. The exam and treatment you received in the Emergency Department were for an emergent problem and are not intended as complete care. It is important that you follow up with a doctor, nurse practitioner, or physician's assistant for ongoing care. If your symptoms worsen or you do not improve as expected and you are unable to reach your usual health care provider, you should return to the Emergency Department. We are available 24 hours a day. Please make an appointment with your health care provider(s) for follow up of your Emergency Department visit. Take this sheet with you when you go to your follow-up visit.

## 2023-09-14 NOTE — ED NOTES
Took call from [address] [place] [address] Day/Spouse/[phone]. Pt verbalized permission to update spouse. Spouse updated on pt current status, pending provider to look at final result of labs and imaging. Spouse voiced understanding. Advised spouse nurse will return call for a status update.      Malou Blanchard LPN  05/68/78 5538

## 2023-09-28 ENCOUNTER — OFFICE VISIT (OUTPATIENT)
Age: 68
End: 2023-09-28
Payer: MEDICARE

## 2023-09-28 VITALS
WEIGHT: 315 LBS | BODY MASS INDEX: 45.1 KG/M2 | TEMPERATURE: 98.1 F | DIASTOLIC BLOOD PRESSURE: 73 MMHG | OXYGEN SATURATION: 95 % | RESPIRATION RATE: 18 BRPM | HEART RATE: 64 BPM | SYSTOLIC BLOOD PRESSURE: 128 MMHG | HEIGHT: 70 IN

## 2023-09-28 DIAGNOSIS — Z23 ENCOUNTER FOR IMMUNIZATION: Primary | ICD-10-CM

## 2023-09-28 DIAGNOSIS — I10 PRIMARY HYPERTENSION: ICD-10-CM

## 2023-09-28 DIAGNOSIS — K59.04 CHRONIC IDIOPATHIC CONSTIPATION: ICD-10-CM

## 2023-09-28 PROCEDURE — G8427 DOCREV CUR MEDS BY ELIG CLIN: HCPCS | Performed by: FAMILY MEDICINE

## 2023-09-28 PROCEDURE — 3017F COLORECTAL CA SCREEN DOC REV: CPT | Performed by: FAMILY MEDICINE

## 2023-09-28 PROCEDURE — PBSHW INFLUENZA, FLUAD, (AGE 65 Y+), IM, PF, 0.5 ML: Performed by: FAMILY MEDICINE

## 2023-09-28 PROCEDURE — 3078F DIAST BP <80 MM HG: CPT | Performed by: FAMILY MEDICINE

## 2023-09-28 PROCEDURE — 90694 VACC AIIV4 NO PRSRV 0.5ML IM: CPT | Performed by: FAMILY MEDICINE

## 2023-09-28 PROCEDURE — 3074F SYST BP LT 130 MM HG: CPT | Performed by: FAMILY MEDICINE

## 2023-09-28 PROCEDURE — 1123F ACP DISCUSS/DSCN MKR DOCD: CPT | Performed by: FAMILY MEDICINE

## 2023-09-28 PROCEDURE — 99203 OFFICE O/P NEW LOW 30 MIN: CPT | Performed by: FAMILY MEDICINE

## 2023-09-28 PROCEDURE — 1036F TOBACCO NON-USER: CPT | Performed by: FAMILY MEDICINE

## 2023-09-28 PROCEDURE — G8417 CALC BMI ABV UP PARAM F/U: HCPCS | Performed by: FAMILY MEDICINE

## 2023-09-28 RX ORDER — HYDRALAZINE HYDROCHLORIDE 100 MG/1
100 TABLET, FILM COATED ORAL 2 TIMES DAILY WITH MEALS
Qty: 60 TABLET | Refills: 5
Start: 2023-09-28

## 2023-09-28 ASSESSMENT — PATIENT HEALTH QUESTIONNAIRE - PHQ9
1. LITTLE INTEREST OR PLEASURE IN DOING THINGS: 0
SUM OF ALL RESPONSES TO PHQ QUESTIONS 1-9: 0
SUM OF ALL RESPONSES TO PHQ9 QUESTIONS 1 & 2: 0
2. FEELING DOWN, DEPRESSED OR HOPELESS: 0

## 2023-09-28 NOTE — PROGRESS NOTES
Chief Complaint   Patient presents with    New Patient     Here to establish care, no previous pcp. 1. \"Have you been to the ER, urgent care clinic since your last visit? Hospitalized since your last visit? \" Yes - [facility] - 23-constipation    2. \"Have you seen or consulted any other health care providers outside of the [address] [place] [address] since your last visit? \" Yes- cardiology-      3. For patients aged 43-73: Has the patient had a colonoscopy / FIT/ Cologuard? Yes    Health Maintenance Due   Topic Date Due    Diabetic retinal exam  Never done    Hepatitis B vaccine (1 of 3 - Risk 3-dose series) Never done    DTaP/Tdap/Td vaccine (1 - Tdap) 2014    Shingles vaccine (2 of 3) 2015    COVID-19 Vaccine (4 - Pfizer series) 2021    Diabetic foot exam  2022    Annual Wellness Visit (AWV)  2023    Flu vaccine (1) 2023      Identified pt with two pt identifiers(name and ) , Patient is accompanied by self , verbal consent received  to discuss any/all medical information    Per orders of Dr. Blaise Ortiz, injection of flu vaccine was given in the Left deltoid . Patient tolerated it well. Patient instructed to report any adverse reaction to me immediately.

## 2023-09-28 NOTE — PATIENT INSTRUCTIONS

## 2023-09-29 ENCOUNTER — TELEPHONE (OUTPATIENT)
Age: 68
End: 2023-09-29

## 2023-09-29 NOTE — TELEPHONE ENCOUNTER
A prior authorization is required for Linzess    Cover My Meds key: [id number] Only    Program: Medication Refill  CPA in place:    Recommendation Provided To:    Intervention Detail: New Rx: 1, reason: Cost/Formulary Change  Intervention Accepted By:   Rupesh Woodruff Closed?:    Time Spent (min): 5

## 2023-09-30 ENCOUNTER — HOSPITAL ENCOUNTER (EMERGENCY)
Facility: HOSPITAL | Age: 68
Discharge: HOME | End: 2023-09-30
Attending: STUDENT IN AN ORGANIZED HEALTH CARE EDUCATION/TRAINING PROGRAM | Admitting: STUDENT IN AN ORGANIZED HEALTH CARE EDUCATION/TRAINING PROGRAM
Payer: MEDICARE

## 2023-09-30 ENCOUNTER — APPOINTMENT (OUTPATIENT)
Facility: HOSPITAL | Age: 68
End: 2023-09-30
Payer: MEDICARE

## 2023-09-30 VITALS
RESPIRATION RATE: 18 BRPM | DIASTOLIC BLOOD PRESSURE: 80 MMHG | WEIGHT: 315 LBS | HEIGHT: 70 IN | BODY MASS INDEX: 45.1 KG/M2 | SYSTOLIC BLOOD PRESSURE: 178 MMHG | TEMPERATURE: 97.7 F | HEART RATE: 75 BPM | OXYGEN SATURATION: 95 %

## 2023-09-30 DIAGNOSIS — R19.4 CHANGE IN BOWEL HABIT: Primary | ICD-10-CM

## 2023-09-30 PROCEDURE — 99283 EMERGENCY DEPT VISIT LOW MDM: CPT

## 2023-09-30 PROCEDURE — 74018 RADEX ABDOMEN 1 VIEW: CPT

## 2023-09-30 RX ORDER — POLYETHYLENE GLYCOL 3350 17 G/17G
17 POWDER, FOR SOLUTION ORAL DAILY PRN
Qty: 510 G | Refills: 5 | Status: SHIPPED | OUTPATIENT
Start: 2023-09-30 | End: 2023-10-30

## 2023-09-30 ASSESSMENT — PAIN - FUNCTIONAL ASSESSMENT: PAIN_FUNCTIONAL_ASSESSMENT: NONE - DENIES PAIN

## 2023-09-30 ASSESSMENT — ENCOUNTER SYMPTOMS
SHORTNESS OF BREATH: 0
DIARRHEA: 0
ABDOMINAL PAIN: 1
VOMITING: 0
NAUSEA: 0
CONSTIPATION: 1

## 2023-09-30 NOTE — ED PROVIDER NOTES
[facility] EMERGENCY DEP  EMERGENCY DEPARTMENT ENCOUNTER      Pt Name: Edie Gomez  MRN: [id number]  [address] [facility] [address] [facility] [address] [place] 1955  Date of evaluation: 9/30/2023  Provider: Kimberli Hdez PA-C    CHIEF COMPLAINT       Chief Complaint   Patient presents with    Constipation         HISTORY OF PRESENT ILLNESS   (Location/Symptom, Timing/Onset, Context/Setting, Quality, Duration, Modifying Factors, Severity)  Note limiting factors. ZOE Brown is a 69 y.o. male with Hx of chronic constipation who presents ambulatory to [facility] ED with cc of constipation and lower abdominal cramping. Last bowel movement on Wednesday. Drink half a bottle of magnesium citrate on Friday. PCP gave him a prescription for Linzess but he is unable to get the prescription. PCP told him to discontinue MiraLAX. Able to pass gas. Denies fever, chills, nausea, vomiting, urinary symptoms, any other concerns at this time. PCP: Alexis New MD    There are no other complaints, changes or physical findings at this time. Review of External Medical Records:     Nursing Notes were reviewed. REVIEW OF SYSTEMS    (2-9 systems for level 4, 10 or more for level 5)     Review of Systems   Constitutional:  Negative for chills and fever. HENT:  Negative for congestion. Respiratory:  Negative for shortness of breath. Cardiovascular:  Negative for chest pain. Gastrointestinal:  Positive for abdominal pain and constipation. Negative for diarrhea, nausea and vomiting. Genitourinary:  Negative for dysuria and hematuria. Skin:  Negative for rash. Neurological:  Negative for dizziness, light-headedness and headaches. All other systems reviewed and are negative. Except as noted above the remainder of the review of systems was reviewed and negative.        PAST MEDICAL HISTORY     Past Medical History:   Diagnosis Date    CKD stage G3b/A1, GFR 30-44 and albumin creatinine ratio <30 mg/g ([facility])     Environmental allergies (until he can get linzess from PCP), GI referral, PCP follow up, strict return precautions. Amount and/or Complexity of Data Reviewed  Radiology: ordered. Decision-making details documented in ED Course. REASSESSMENT            CONSULTS:  None    PROCEDURES:  Unless otherwise noted below, none     Procedures      FINAL IMPRESSION      1. Change in bowel habit          DISPOSITION/PLAN   DISPOSITION Decision To Discharge 09/30/2023 12:46:33 PM      PATIENT REFERRED TO:  [address] [facility] [address] [facility]  Go to   As needed, If symptoms worsen    MD Hansel Rahman [facility]  [phone]    Schedule an appointment as soon as possible for a visit       [address]  Schedule an appointment as soon as possible for a visit         DISCHARGE MEDICATIONS:  New Prescriptions    POLYETHYLENE GLYCOL (GLYCOLAX) 17 GM/SCOOP POWDER    Take 17 g by mouth daily as needed (constipation)         (Please note that portions of this note were completed with a voice recognition program.  Efforts were made to edit the dictations but occasionally words are mis-transcribed. )    Sourav Aguirre PA-C (electronically signed)  Emergency Attending Physician / Physician Assistant / Nurse Practitioner    Presentation, management, and disposition were discussed with the attending physician, Dr. Irma Hart, who is in agreement with plan of care.                Sourav Aguirre PA-C  09/30/23 0400

## 2023-09-30 NOTE — DISCHARGE INSTRUCTIONS
As discussed, take the miralax until you are able to get the linzess from your pharmacy. Follow up with your PCP and GI for further management. Return to the ER if you experience severe or worsening symptoms.

## 2023-09-30 NOTE — ED TRIAGE NOTES
Patient arrives to ER with c/o constipation. Saw PCP and was told he has chronic constipation and was given Linzess. Was unable to get it filled. Last BM was Wednesday. Took half bottle of mag citrate on Friday. Reports passing flatus but still no BM.

## 2023-10-02 ENCOUNTER — HOSPITAL ENCOUNTER (EMERGENCY)
Facility: HOSPITAL | Age: 68
Discharge: HOME | End: 2023-10-02
Attending: EMERGENCY MEDICINE
Payer: MEDICARE

## 2023-10-02 ENCOUNTER — TELEPHONE (OUTPATIENT)
Age: 68
End: 2023-10-02

## 2023-10-02 VITALS
DIASTOLIC BLOOD PRESSURE: 91 MMHG | TEMPERATURE: 98 F | OXYGEN SATURATION: 94 % | RESPIRATION RATE: 18 BRPM | HEART RATE: 63 BPM | SYSTOLIC BLOOD PRESSURE: 158 MMHG

## 2023-10-02 DIAGNOSIS — K59.00 CONSTIPATION, UNSPECIFIED CONSTIPATION TYPE: Primary | ICD-10-CM

## 2023-10-02 PROCEDURE — 99283 EMERGENCY DEPT VISIT LOW MDM: CPT

## 2023-10-02 RX ORDER — DOCUSATE SODIUM 100 MG/1
100 CAPSULE, LIQUID FILLED ORAL 2 TIMES DAILY
Qty: 60 CAPSULE | Refills: 0 | Status: SHIPPED | OUTPATIENT
Start: 2023-10-02 | End: 2023-11-01

## 2023-10-02 ASSESSMENT — ENCOUNTER SYMPTOMS: CONSTIPATION: 1

## 2023-10-02 ASSESSMENT — PAIN - FUNCTIONAL ASSESSMENT: PAIN_FUNCTIONAL_ASSESSMENT: NONE - DENIES PAIN

## 2023-10-02 NOTE — ED NOTES
12:31 PM  Pt is a 70 yo M who presents with constipation for two days. Pt seen here and received an enema with relief two days ago. Was told to follow up with GI which he cannot see until December. Pt c/o of unable to move bowels since Saturday. Currently taking Miralax without relief. Was prescribed Glycolax but has not taken it. I have evaluated the patient as the Provider in Rapid Medical Evaluation (RME). I have reviewed his vital signs and the triage nurse assessment. I have talked with the patient and any available family and advised that I am the provider in triage and have ordered the appropriate study to initiate their work up based on the clinical presentation during my assessment. I have advised that the patient will be accommodated in the Main ED as soon as possible. I have also requested to contact the triage nurse or myself immediately if the patient experiences any changes in their condition during this brief waiting period.   BRAD Granados PA-C  10/02/23 3070

## 2023-10-02 NOTE — ED TRIAGE NOTES
Pt reports constipation x5 days. Pt went to PCP and prescribed a medication that they have not been able to  yet due to insurance issue. Pt reports he was seen here on Saturday and received an enema. Pt denies abdominal pain.

## 2023-10-02 NOTE — TELEPHONE ENCOUNTER
Patient would like a return call regarding the medication linaclotide (LINZESS) 145 MCG capsule and his BP runny high 170/80 and having hard time going to the bath room. Went to the ER on sat.    Please give him a call @ [phone]

## 2023-10-03 NOTE — TELEPHONE ENCOUNTER
Returned call to pt, spoke with care giver and pt,  requesting status of linzess prior auth,  advised per Paulo Husbands, prior auth completed, awaiting decision.  Pt stated understanding

## 2023-10-04 ENCOUNTER — OFFICE VISIT (OUTPATIENT)
Facility: CLINIC | Age: 68
End: 2023-10-04

## 2023-10-04 VITALS
BODY MASS INDEX: 45.1 KG/M2 | SYSTOLIC BLOOD PRESSURE: 133 MMHG | RESPIRATION RATE: 18 BRPM | OXYGEN SATURATION: 95 % | DIASTOLIC BLOOD PRESSURE: 78 MMHG | WEIGHT: 315 LBS | HEIGHT: 70 IN | HEART RATE: 63 BPM | TEMPERATURE: 98.1 F

## 2023-10-04 DIAGNOSIS — K21.9 GASTROESOPHAGEAL REFLUX DISEASE WITHOUT ESOPHAGITIS: ICD-10-CM

## 2023-10-04 DIAGNOSIS — Z13.39 ALCOHOL SCREENING: ICD-10-CM

## 2023-10-04 DIAGNOSIS — Z00.00 MEDICARE ANNUAL WELLNESS VISIT, SUBSEQUENT: ICD-10-CM

## 2023-10-04 DIAGNOSIS — E66.01 MORBID OBESITY (HCC): ICD-10-CM

## 2023-10-04 DIAGNOSIS — N18.30 STAGE 3 CHRONIC KIDNEY DISEASE, UNSPECIFIED WHETHER STAGE 3A OR 3B CKD (HCC): ICD-10-CM

## 2023-10-04 DIAGNOSIS — I10 PRIMARY HYPERTENSION: Primary | ICD-10-CM

## 2023-10-04 DIAGNOSIS — E88.89 STEATOSIS (HCC): ICD-10-CM

## 2023-10-04 DIAGNOSIS — J30.9 ALLERGIC RHINITIS, UNSPECIFIED SEASONALITY, UNSPECIFIED TRIGGER: ICD-10-CM

## 2023-10-04 DIAGNOSIS — F51.01 PRIMARY INSOMNIA: ICD-10-CM

## 2023-10-04 DIAGNOSIS — E11.9 DIABETES MELLITUS TYPE 2, DIET-CONTROLLED (HCC): ICD-10-CM

## 2023-10-04 DIAGNOSIS — E55.9 VITAMIN D DEFICIENCY: ICD-10-CM

## 2023-10-04 DIAGNOSIS — G47.33 OSA (OBSTRUCTIVE SLEEP APNEA): ICD-10-CM

## 2023-10-04 DIAGNOSIS — E78.2 MIXED HYPERLIPIDEMIA: ICD-10-CM

## 2023-10-04 DIAGNOSIS — M10.00 IDIOPATHIC GOUT, UNSPECIFIED CHRONICITY, UNSPECIFIED SITE: ICD-10-CM

## 2023-10-04 DIAGNOSIS — Z12.5 PROSTATE CANCER SCREENING: ICD-10-CM

## 2023-10-04 DIAGNOSIS — I73.9 PERIPHERAL VASCULAR DISEASE (HCC): ICD-10-CM

## 2023-10-04 DIAGNOSIS — E11.40 TYPE 2 DIABETES MELLITUS WITH DIABETIC NEUROPATHY, WITHOUT LONG-TERM CURRENT USE OF INSULIN (HCC): ICD-10-CM

## 2023-10-04 LAB
APPEARANCE UR: CLEAR
BACTERIA URNS QL MICRO: NEGATIVE /HPF
BILIRUB UR QL: NEGATIVE
COLOR UR: NORMAL
CREAT UR-MCNC: 41.1 MG/DL
EPITH CASTS URNS QL MICRO: NORMAL /LPF
GLUCOSE UR STRIP.AUTO-MCNC: NEGATIVE MG/DL
HGB UR QL STRIP: NEGATIVE
HYALINE CASTS URNS QL MICRO: NORMAL /LPF (ref 0–5)
KETONES UR QL STRIP.AUTO: NEGATIVE MG/DL
LEUKOCYTE ESTERASE UR QL STRIP.AUTO: NEGATIVE
MICROALBUMIN UR-MCNC: <0.5 MG/DL
MICROALBUMIN/CREAT UR-RTO: <12 MG/G (ref 0–30)
NITRITE UR QL STRIP.AUTO: NEGATIVE
PH UR STRIP: 5 (ref 5–8)
PROT UR STRIP-MCNC: NEGATIVE MG/DL
RBC #/AREA URNS HPF: NORMAL /HPF (ref 0–5)
SP GR UR REFRACTOMETRY: 1.01 (ref 1–1.03)
SPECIMEN HOLD: NORMAL
UROBILINOGEN UR QL STRIP.AUTO: 0.2 EU/DL (ref 0.2–1)
WBC URNS QL MICRO: NORMAL /HPF (ref 0–4)

## 2023-10-04 ASSESSMENT — LIFESTYLE VARIABLES
HOW OFTEN DO YOU HAVE A DRINK CONTAINING ALCOHOL: MONTHLY OR LESS
HOW MANY STANDARD DRINKS CONTAINING ALCOHOL DO YOU HAVE ON A TYPICAL DAY: 1 OR 2

## 2023-10-04 ASSESSMENT — PATIENT HEALTH QUESTIONNAIRE - PHQ9
SUM OF ALL RESPONSES TO PHQ QUESTIONS 1-9: 0
SUM OF ALL RESPONSES TO PHQ QUESTIONS 1-9: 0
1. LITTLE INTEREST OR PLEASURE IN DOING THINGS: 0
SUM OF ALL RESPONSES TO PHQ QUESTIONS 1-9: 0
SUM OF ALL RESPONSES TO PHQ9 QUESTIONS 1 & 2: 0
2. FEELING DOWN, DEPRESSED OR HOPELESS: 0
SUM OF ALL RESPONSES TO PHQ QUESTIONS 1-9: 0

## 2023-10-04 NOTE — PROGRESS NOTES
Medicare Annual Wellness Visit    Heron Brown is here for Follow-up (1 month follow up.) and Medicare AWV    Assessment & Plan   Primary hypertension  -     Urinalysis with Microscopic; Future  -     TSH; Future  -     T4, Free; Future  -     Comprehensive Metabolic Panel; Future  -     CBC with Auto Differential; Future  Diabetes mellitus type 2, diet-controlled (HCC)  -     Hemoglobin A1C; Future  -     Microalbumin / Creatinine Urine Ratio; Future  Mixed hyperlipidemia  -     Lipid Panel; Future  -     CK; Future  Gastroesophageal reflux disease without esophagitis  Morbid obesity (HCC)  Stage 3 chronic kidney disease, unspecified whether stage 3a or 3b CKD (HCC)  Peripheral vascular disease (HCC)  Allergic rhinitis, unspecified seasonality, unspecified trigger  Idiopathic gout, unspecified chronicity, unspecified site  Primary insomnia  Steatosis ([address])  Type 2 diabetes mellitus with diabetic neuropathy, without long-term current use of insulin (HCC)  JONO (obstructive sleep apnea)  Vitamin D deficiency  -     Vitamin D 25 Hydroxy; Future  Prostate cancer screening  -     PSA Screening; Future  Alcohol screening  -     [place] ANNUAL ALCOHOL SCREEN 15 MIN; Future  Medicare annual wellness visit, subsequent    ASSESSMENT:   1. Primary hypertension    2. Diabetes mellitus type 2, diet-controlled ([address])    3. Mixed hyperlipidemia    4. Gastroesophageal reflux disease without esophagitis    5. Morbid obesity ([address])    6. Stage 3 chronic kidney disease, unspecified whether stage 3a or 3b CKD ([address])    7. Peripheral vascular disease ([address])    8. Allergic rhinitis, unspecified seasonality, unspecified trigger    9. Idiopathic gout, unspecified chronicity, unspecified site    10. Primary insomnia    11. Steatosis ([address])    12. Type 2 diabetes mellitus with diabetic neuropathy, without long-term current use of insulin ([address])    13. JONO (obstructive sleep apnea)    14. Vitamin D deficiency    15. Prostate cancer screening    16.

## 2023-10-05 LAB
25(OH)D3 SERPL-MCNC: 33.3 NG/ML (ref 30–100)
ALBUMIN SERPL-MCNC: 4 G/DL (ref 3.5–5)
ALBUMIN/GLOB SERPL: 1.1 (ref 1.1–2.2)
ALP SERPL-CCNC: 97 U/L (ref 45–117)
ALT SERPL-CCNC: 47 U/L (ref 12–78)
ANION GAP SERPL CALC-SCNC: 4 MMOL/L (ref 5–15)
AST SERPL-CCNC: 27 U/L (ref 15–37)
BASOPHILS # BLD: 0 K/UL (ref 0–0.1)
BASOPHILS NFR BLD: 0 % (ref 0–1)
BILIRUB SERPL-MCNC: 0.6 MG/DL (ref 0.2–1)
BUN SERPL-MCNC: 22 MG/DL (ref 6–20)
BUN/CREAT SERPL: 17 (ref 12–20)
CALCIUM SERPL-MCNC: 9.7 MG/DL (ref 8.5–10.1)
CHLORIDE SERPL-SCNC: 106 MMOL/L (ref 97–108)
CHOLEST SERPL-MCNC: 160 MG/DL
CK SERPL-CCNC: 162 U/L (ref 39–308)
CO2 SERPL-SCNC: 30 MMOL/L (ref 21–32)
CREAT SERPL-MCNC: 1.26 MG/DL (ref 0.7–1.3)
DIFFERENTIAL METHOD BLD: ABNORMAL
EOSINOPHIL # BLD: 0.1 K/UL (ref 0–0.4)
EOSINOPHIL NFR BLD: 2 % (ref 0–7)
ERYTHROCYTE [DISTWIDTH] IN BLOOD BY AUTOMATED COUNT: 17.2 % (ref 11.5–14.5)
EST. AVERAGE GLUCOSE BLD GHB EST-MCNC: 114 MG/DL
GLOBULIN SER CALC-MCNC: 3.5 G/DL (ref 2–4)
GLUCOSE SERPL-MCNC: 80 MG/DL (ref 65–100)
HBA1C MFR BLD: 5.6 % (ref 4–5.6)
HCT VFR BLD AUTO: 42.8 % (ref 36.6–50.3)
HDLC SERPL-MCNC: 35 MG/DL
HDLC SERPL: 4.6 (ref 0–5)
HGB BLD-MCNC: 13.6 G/DL (ref 12.1–17)
IMM GRANULOCYTES # BLD AUTO: 0 K/UL (ref 0–0.04)
IMM GRANULOCYTES NFR BLD AUTO: 0 % (ref 0–0.5)
LDLC SERPL CALC-MCNC: 97.8 MG/DL (ref 0–100)
LYMPHOCYTES # BLD: 2.3 K/UL (ref 0.8–3.5)
LYMPHOCYTES NFR BLD: 33 % (ref 12–49)
MCH RBC QN AUTO: 29.2 PG (ref 26–34)
MCHC RBC AUTO-ENTMCNC: 31.8 G/DL (ref 30–36.5)
MCV RBC AUTO: 92 FL (ref 80–99)
MONOCYTES # BLD: 0.7 K/UL (ref 0–1)
MONOCYTES NFR BLD: 11 % (ref 5–13)
NEUTS SEG # BLD: 3.6 K/UL (ref 1.8–8)
NEUTS SEG NFR BLD: 54 % (ref 32–75)
NRBC # BLD: 0 K/UL (ref 0–0.01)
NRBC BLD-RTO: 0 PER 100 WBC
PLATELET # BLD AUTO: 195 K/UL (ref 150–400)
PMV BLD AUTO: 11.5 FL (ref 8.9–12.9)
POTASSIUM SERPL-SCNC: 3.9 MMOL/L (ref 3.5–5.1)
PROT SERPL-MCNC: 7.5 G/DL (ref 6.4–8.2)
PSA SERPL-MCNC: 4.9 NG/ML (ref 0.01–4)
RBC # BLD AUTO: 4.65 M/UL (ref 4.1–5.7)
SODIUM SERPL-SCNC: 140 MMOL/L (ref 136–145)
T4 FREE SERPL-MCNC: 1 NG/DL (ref 0.8–1.5)
TRIGL SERPL-MCNC: 136 MG/DL
TSH SERPL DL<=0.05 MIU/L-ACNC: 0.95 UIU/ML (ref 0.36–3.74)
VLDLC SERPL CALC-MCNC: 27.2 MG/DL
WBC # BLD AUTO: 6.8 K/UL (ref 4.1–11.1)

## 2023-10-06 DIAGNOSIS — R97.20 ELEVATED PSA: Primary | ICD-10-CM

## 2023-10-06 RX ORDER — CIPROFLOXACIN 500 MG/1
500 TABLET, FILM COATED ORAL 2 TIMES DAILY
Qty: 28 TABLET | Refills: 0 | OUTPATIENT
Start: 2023-10-06 | End: 2023-10-20

## 2023-10-06 RX ORDER — CIPROFLOXACIN 500 MG/1
500 TABLET, FILM COATED ORAL 2 TIMES DAILY
Qty: 28 TABLET | Refills: 0 | Status: SHIPPED | OUTPATIENT
Start: 2023-10-06 | End: 2023-10-20

## 2023-10-06 NOTE — TELEPHONE ENCOUNTER
Requested Prescriptions     Pending Prescriptions Disp Refills    ciprofloxacin (CIPRO) 500 MG tablet 28 tablet 0     Sig: Take 1 tablet by mouth 2 times daily for 14 days       RX refill request from the patient/pharmacy. Patient last seen 10/4/23 with labs, and next appt. scheduled for 1/5/24.

## 2023-10-23 ENCOUNTER — NURSE ONLY (OUTPATIENT)
Facility: CLINIC | Age: 68
End: 2023-10-23

## 2023-10-23 DIAGNOSIS — Z12.5 PROSTATE CANCER SCREENING: Primary | ICD-10-CM

## 2023-10-24 LAB — PSA SERPL-MCNC: 5.1 NG/ML (ref 0.01–4)

## 2023-10-25 DIAGNOSIS — I10 PRIMARY HYPERTENSION: ICD-10-CM

## 2023-10-26 RX ORDER — HYDRALAZINE HYDROCHLORIDE 100 MG/1
100 TABLET, FILM COATED ORAL 2 TIMES DAILY WITH MEALS
Qty: 60 TABLET | Refills: 5 | Status: SHIPPED | OUTPATIENT
Start: 2023-10-26

## 2023-10-26 NOTE — TELEPHONE ENCOUNTER
PCP: Lorin Boggs MD    Last appt: 10/4/2023  Future Appointments   Date Time Provider [address]   11/16/2023  5:00 PM Teto Gao [facility]   1/5/2024  1:10 PM Lorin Boggs MD [facility] AMB       Requested Prescriptions     Pending Prescriptions Disp Refills    hydrALAZINE (APRESOLINE) 100 MG tablet 60 tablet 5     Sig: Take 1 tablet by mouth with breakfast and with evening meal       Prior labs and Blood pressures:  BP Readings from Last 3 Encounters:   10/04/23 133/78   09/30/23 (!) 178/80   09/28/23 128/73     Lab Results   Component Value Date/Time     10/04/2023 02:21 PM    K 3.9 10/04/2023 02:21 PM     10/04/2023 02:21 PM    CO2 30 10/04/2023 02:21 PM    BUN 22 10/04/2023 02:21 PM    GFRAA 54 05/03/2022 02:40 PM     No results found for: \"HBA1C\", \"[id number]\"  Lab Results   Component Value Date/Time    CHOL 160 10/04/2023 02:21 PM    HDL 35 10/04/2023 02:21 PM    VLDL 31 01/15/2021 08:04 AM     No results found for: \"VITD3\", \"VD3RIA\"        No results found for: \"TSH\", \"TSH2\", \"TSH3\"

## 2023-10-30 RX ORDER — HYDRALAZINE HYDROCHLORIDE 50 MG/1
TABLET, FILM COATED ORAL
Qty: 180 TABLET | Refills: 1 | Status: SHIPPED | OUTPATIENT
Start: 2023-10-30

## 2023-10-30 NOTE — TELEPHONE ENCOUNTER
PCP: Gabby David MD    Last appt: 12/10/2021    Future Appointments   Date Time Provider [address]   11/16/2023  5:00 PM [facility]   1/5/2024  1:10 PM Gabby David MD [facility] AMB       Requested Prescriptions     Pending Prescriptions Disp Refills    hydrALAZINE (APRESOLINE) 50 MG tablet [Pharmacy Med Name: HYDRALAZINE  50MG TABLETS] 180 tablet 1     Sig: TAKE 1 TABLET BY MOUTH TWICE DAILY FOR HIGH BLOOD PRESSURE

## 2023-11-02 ENCOUNTER — TELEPHONE (OUTPATIENT)
Facility: CLINIC | Age: 68
End: 2023-11-02

## 2023-11-02 NOTE — TELEPHONE ENCOUNTER
Patient wants to know which dosage he is supposed to be taking the hydralazine 50 mg, 1 tablet twice a day or the 100 mg. Patient also is having some constipation, he had seen a GI for the problem and they had gave him some laxatives that barley had work.

## 2023-11-03 PROBLEM — Z00.00 MEDICARE ANNUAL WELLNESS VISIT, SUBSEQUENT: Status: RESOLVED | Noted: 2023-10-04 | Resolved: 2023-11-03

## 2023-11-03 PROBLEM — Z12.5 PROSTATE CANCER SCREENING: Status: RESOLVED | Noted: 2023-10-04 | Resolved: 2023-11-03

## 2023-11-08 RX ORDER — ATENOLOL 50 MG/1
50 TABLET ORAL DAILY
Qty: 90 TABLET | Refills: 3 | Status: SHIPPED | OUTPATIENT
Start: 2023-11-08

## 2023-11-08 NOTE — TELEPHONE ENCOUNTER
Requested Prescriptions     Pending Prescriptions Disp Refills    atenolol (TENORMIN) 50 MG tablet 90 tablet 3     Sig: Take 1 tablet by mouth daily       RX refill request from the patient/pharmacy. Patient last seen 10/4/23 with labs, and next appt. scheduled for 1/5/24.

## 2023-11-16 ENCOUNTER — HOSPITAL ENCOUNTER (OUTPATIENT)
Facility: HOSPITAL | Age: 68
Setting detail: RECURRING SERIES
Discharge: HOME | End: 2023-11-19
Payer: MEDICARE

## 2023-11-16 ENCOUNTER — TELEPHONE (OUTPATIENT)
Facility: CLINIC | Age: 68
End: 2023-11-16

## 2023-11-16 PROCEDURE — 97803 MED NUTRITION INDIV SUBSEQ: CPT | Performed by: DIETITIAN, REGISTERED

## 2023-11-16 NOTE — PROGRESS NOTES
NUTRITION - FOLLOW-UP TREATMENT NOTE  Patient Name: Gayatri Jackson Day         Date: 2023  : 1955    YES Patient  Verified  Diagnosis: CKD, Obesity   In time:   5:25pm         Out time:   6:08pm   Total Treatment Time (min):   43     SUBJECTIVE/ASSESSMENT  Current Wt: 358.8 Previous Wt: 360.8 Wt Change: -2     Initial Wt: 365.2 Total Wt change: -6.4 Height: 71     Changes in medication or medical history? Any new allergies, surgeries or procedures? Yes    If yes, update Summary List   Current Outpatient Medications   Medication Instructions    allopurinol (ZYLOPRIM) 300 mg, Oral, DAILY    ascorbic acid (VITAMIN C) 1,000 mg, Oral, DAILY    aspirin 81 mg, Oral, DAILY    atenolol (TENORMIN) 50 mg, Oral, DAILY    bumetanide (BUMEX) 1 mg, Oral, DAILY    hydrALAZINE (APRESOLINE) 50 MG tablet TAKE 1 TABLET BY MOUTH TWICE DAILY FOR HIGH BLOOD PRESSURE    hydrALAZINE (APRESOLINE) 100 mg, Oral, 2 times daily with meals    pravastatin (PRAVACHOL) 20 mg, Oral, DAILY    valsartan (DIOVAN) 320 mg, Oral, DAILY     Colace (100mg 2 times per day)   Miralax  Syrup laxative (provided by GI doctor)     Past Medical History:   Diagnosis Date    CKD stage G3b/A1, GFR 30-44 and albumin creatinine ratio <30 mg/g (HCC)     Environmental allergies     GERD (gastroesophageal reflux disease)     Gout 2017    Hepatic steatosis 2017    Comments: non-alcoholic    History of chest pain 2017    Comments: abnl stress cardiolite , subsequent to that, he had a normal cardiac cath    History of diarrhea 2017    History of dyspnea 2017    History of pneumonia     Hypercholesterolemia     Hypertension     Insomnia 2017    Iron deficiency 2017    Left chronic serous otitis media 2017    Obesity 2017    Comments: morbid    Obstructive sleep apnea     wears CPAP     Recently found hernia. MRI soon. Concern for bladder not emptying as much as it should be.      Having issues with

## 2023-11-16 NOTE — TELEPHONE ENCOUNTER
Patient requests a medication to relax him during his MRI scheduled 11/26/23 @ 2:20 pm. Please send to [employer] on file.

## 2023-12-04 RX ORDER — ALLOPURINOL 300 MG/1
300 TABLET ORAL DAILY
Qty: 90 TABLET | Refills: 0 | Status: SHIPPED | OUTPATIENT
Start: 2023-12-04

## 2023-12-04 NOTE — TELEPHONE ENCOUNTER
Requested Prescriptions     Pending Prescriptions Disp Refills    allopurinol (ZYLOPRIM) 300 MG tablet [Pharmacy Med Name: ALLOPURINOL 300MG TABLETS] 90 tablet 0     Sig: TAKE 1 TABLET BY MOUTH DAILY

## 2023-12-20 DIAGNOSIS — E11.9 DIABETES MELLITUS TYPE 2, DIET-CONTROLLED (HCC): Primary | ICD-10-CM

## 2023-12-20 DIAGNOSIS — E66.01 MORBID OBESITY (HCC): ICD-10-CM

## 2023-12-20 DIAGNOSIS — E11.40 TYPE 2 DIABETES MELLITUS WITH DIABETIC NEUROPATHY, WITHOUT LONG-TERM CURRENT USE OF INSULIN (HCC): ICD-10-CM

## 2024-01-05 ENCOUNTER — OFFICE VISIT (OUTPATIENT)
Facility: CLINIC | Age: 69
End: 2024-01-05

## 2024-01-05 VITALS
HEART RATE: 63 BPM | OXYGEN SATURATION: 96 % | RESPIRATION RATE: 18 BRPM | BODY MASS INDEX: 45.1 KG/M2 | WEIGHT: 315 LBS | HEIGHT: 70 IN | DIASTOLIC BLOOD PRESSURE: 73 MMHG | SYSTOLIC BLOOD PRESSURE: 123 MMHG

## 2024-01-05 DIAGNOSIS — E88.89 STEATOSIS (HCC): ICD-10-CM

## 2024-01-05 DIAGNOSIS — K21.9 GASTROESOPHAGEAL REFLUX DISEASE WITHOUT ESOPHAGITIS: ICD-10-CM

## 2024-01-05 DIAGNOSIS — I10 PRIMARY HYPERTENSION: Primary | ICD-10-CM

## 2024-01-05 DIAGNOSIS — E66.01 MORBID OBESITY (HCC): ICD-10-CM

## 2024-01-05 DIAGNOSIS — N18.30 STAGE 3 CHRONIC KIDNEY DISEASE, UNSPECIFIED WHETHER STAGE 3A OR 3B CKD (HCC): ICD-10-CM

## 2024-01-05 DIAGNOSIS — R25.2 LEG CRAMPS: ICD-10-CM

## 2024-01-05 DIAGNOSIS — I73.9 PERIPHERAL VASCULAR DISEASE (HCC): ICD-10-CM

## 2024-01-05 DIAGNOSIS — E11.9 DIABETES MELLITUS TYPE 2, DIET-CONTROLLED (HCC): ICD-10-CM

## 2024-01-05 DIAGNOSIS — E78.2 MIXED HYPERLIPIDEMIA: ICD-10-CM

## 2024-01-05 DIAGNOSIS — E11.40 NEUROPATHY DUE TO TYPE 2 DIABETES MELLITUS (HCC): ICD-10-CM

## 2024-01-05 RX ORDER — FINASTERIDE 5 MG/1
5 TABLET, FILM COATED ORAL DAILY
COMMUNITY
Start: 2023-12-01

## 2024-01-05 RX ORDER — AMLODIPINE BESYLATE 5 MG/1
5 TABLET ORAL DAILY
Qty: 30 TABLET | Refills: 3 | Status: SHIPPED | OUTPATIENT
Start: 2024-01-05

## 2024-01-05 RX ORDER — LACTULOSE 10 G/15ML
30 SOLUTION ORAL 2 TIMES DAILY
COMMUNITY
Start: 2023-12-23

## 2024-01-05 NOTE — PROGRESS NOTES
Chief Complaint   Patient presents with    Follow-up     3 month f/u       /73 (Site: Left Upper Arm, Position: Sitting, Cuff Size: Large Adult)   Pulse 63   Resp 18   Ht 1.778 m (5' 10\")   Wt (!) 165.1 kg (364 lb)   SpO2 96%   BMI 52.23 kg/m²      \"Have you been to the ER, urgent care clinic since your last visit?  Hospitalized since your last visit?\"    [facility] on 10/2/23 for constipation    “Have you seen or consulted any other health care providers outside of [facility] since your last visit?”    NO             
contain electronic and spelling errors.  Other human spelling and other errors may be present.  Corrections may be executed at a later time.  Please feel free to contact us for any clarifications as needed.      No follow-up provider specified.    No results found for any visits on 01/05/24.    Philipp Mullins MD    The patient verbalized understanding of the problems and plans as explained.

## 2024-01-06 LAB
ALBUMIN SERPL-MCNC: 3.8 G/DL (ref 3.5–5)
ALBUMIN/GLOB SERPL: 1 (ref 1.1–2.2)
ALP SERPL-CCNC: 91 U/L (ref 45–117)
ALT SERPL-CCNC: 39 U/L (ref 12–78)
ANION GAP SERPL CALC-SCNC: 7 MMOL/L (ref 5–15)
AST SERPL-CCNC: 17 U/L (ref 15–37)
BILIRUB SERPL-MCNC: 0.6 MG/DL (ref 0.2–1)
BUN SERPL-MCNC: 26 MG/DL (ref 6–20)
BUN/CREAT SERPL: 22 (ref 12–20)
CALCIUM SERPL-MCNC: 9.6 MG/DL (ref 8.5–10.1)
CHLORIDE SERPL-SCNC: 106 MMOL/L (ref 97–108)
CHOLEST SERPL-MCNC: 167 MG/DL
CK SERPL-CCNC: 220 U/L (ref 39–308)
CO2 SERPL-SCNC: 28 MMOL/L (ref 21–32)
CREAT SERPL-MCNC: 1.19 MG/DL (ref 0.7–1.3)
EST. AVERAGE GLUCOSE BLD GHB EST-MCNC: 108 MG/DL
GLOBULIN SER CALC-MCNC: 3.7 G/DL (ref 2–4)
GLUCOSE SERPL-MCNC: 88 MG/DL (ref 65–100)
HBA1C MFR BLD: 5.4 % (ref 4–5.6)
HDLC SERPL-MCNC: 45 MG/DL
HDLC SERPL: 3.7 (ref 0–5)
LDLC SERPL CALC-MCNC: 99.4 MG/DL (ref 0–100)
MAGNESIUM SERPL-MCNC: 2.4 MG/DL (ref 1.6–2.4)
POTASSIUM SERPL-SCNC: 4 MMOL/L (ref 3.5–5.1)
PROT SERPL-MCNC: 7.5 G/DL (ref 6.4–8.2)
SODIUM SERPL-SCNC: 141 MMOL/L (ref 136–145)
TRIGL SERPL-MCNC: 113 MG/DL
VLDLC SERPL CALC-MCNC: 22.6 MG/DL

## 2024-01-23 ENCOUNTER — HOSPITAL ENCOUNTER (OUTPATIENT)
Facility: HOSPITAL | Age: 69
Setting detail: RECURRING SERIES
Discharge: HOME | End: 2024-01-26
Payer: MEDICARE

## 2024-01-23 PROCEDURE — 97803 MED NUTRITION INDIV SUBSEQ: CPT | Performed by: DIETITIAN, REGISTERED

## 2024-01-23 NOTE — PROGRESS NOTES
constipation.     Lab Results   Component Value Date    CHOL 167 01/05/2024    CHOL 160 10/04/2023    CHOL 177 05/09/2023     Lab Results   Component Value Date    TRIG 113 01/05/2024    TRIG 136 10/04/2023    TRIG 228 (H) 05/09/2023     Lab Results   Component Value Date    HDL 45 01/05/2024    HDL 35 10/04/2023    HDL 37 05/09/2023     Lab Results   Component Value Date    LDLCALC 99.4 01/05/2024    LDLCALC 97.8 10/04/2023    LDLCALC 94.4 05/09/2023     Lab Results   Component Value Date    LABVLDL 22.6 01/05/2024    LABVLDL 27.2 10/04/2023    LABVLDL 45.6 05/09/2023    VLDL 31 01/15/2021    VLDL 37 10/06/2020    VLDL 70 06/11/2020     Lab Results   Component Value Date    CHOLHDLRATIO 3.7 01/05/2024    CHOLHDLRATIO 4.6 10/04/2023    CHOLHDLRATIO 4.8 05/09/2023     Lab Results   Component Value Date     01/05/2024    K 4.0 01/05/2024     01/05/2024    CO2 28 01/05/2024    BUN 26 (H) 01/05/2024    CREATININE 1.19 01/05/2024    GLUCOSE 88 01/05/2024    CALCIUM 9.6 01/05/2024    PROT 7.5 01/05/2024    LABALBU 3.8 01/05/2024    BILITOT 0.6 01/05/2024    ALKPHOS 91 01/05/2024    AST 17 01/05/2024    ALT 39 01/05/2024    LABGLOM >60 01/05/2024    GFRAA 54 (L) 05/03/2022    AGRATIO 1.0 (L) 01/05/2024    GLOB 3.7 01/05/2024       BP Readings from Last 3 Encounters:   01/05/24 123/73   10/04/23 133/78   09/30/23 (!) 178/80     Hemoglobin A1C   Date Value Ref Range Status   01/05/2024 5.4 4.0 - 5.6 % Final     Comment:     (NOTE)  HbA1C Interpretive Ranges  <5.7              Normal  5.7 - 6.4         Consider Prediabetes  >6.5              Consider Diabetes       Wt Readings from Last 10 Encounters:   01/05/24 (!) 165.1 kg (364 lb)   10/04/23 (!) 164.8 kg (363 lb 4.8 oz)   09/30/23 (!) 169.8 kg (374 lb 5.5 oz)   09/28/23 (!) 168.6 kg (371 lb 12.8 oz)   09/06/23 (!) 167.7 kg (369 lb 12.8 oz)   08/09/23 (!) 171 kg (376 lb 15.8 oz)   08/07/23 (!) 171.9 kg (379 lb)   07/26/23 (!) 178.2 kg (392 lb 13.8 oz)   07/24/23

## 2024-01-26 RX ORDER — BUMETANIDE 1 MG/1
1 TABLET ORAL DAILY
Qty: 90 TABLET | Refills: 1 | Status: SHIPPED | OUTPATIENT
Start: 2024-01-26

## 2024-01-26 RX ORDER — PRAVASTATIN SODIUM 20 MG
20 TABLET ORAL DAILY
Qty: 90 TABLET | Refills: 1 | Status: SHIPPED | OUTPATIENT
Start: 2024-01-26 | End: 2025-01-20

## 2024-01-26 NOTE — TELEPHONE ENCOUNTER
Last Refill: Bumex 11-18-22, Pravastatin 5-11-23  Last Visit: 1/5/2024   Next Visit: 2/2/2024     Requested Prescriptions     Pending Prescriptions Disp Refills    bumetanide (BUMEX) 1 MG tablet 90 tablet 1     Sig: Take 1 tablet by mouth daily    pravastatin (PRAVACHOL) 20 MG tablet 90 tablet 1     Sig: Take 1 tablet by mouth daily

## 2024-02-01 NOTE — PROGRESS NOTES
Chief Complaint   Patient presents with    Follow-up     4 wk follow up for blood pressure check       SUBJECTIVE:    Benjie Brown is a 68 y.o. male who returns in follow-up for his hypertension as on his last visit his blood pressure was not totally controlled and he was having some side effects with hydralazine so at that point changes at Tolazine to Norvasc 5 mg daily which he has been tolerating quite well.  He is also in follow-up regarding his leg cramps which we tried him off the Pravachol and that seems to have helped and his morbid obesity which unfortunately his weight is up a little today.  He is due to his splinted diabetic treatment class here at the office.  He currently notes no chest pain, shortness of breath or cardiorespiratory complaints.  There are no GI or  complaints.  He notes no headaches, dizziness or neurologic complaints and no other complaints noted.      Current Outpatient Medications   Medication Sig Dispense Refill    bumetanide (BUMEX) 1 MG tablet Take 1 tablet by mouth daily 90 tablet 1    finasteride (PROSCAR) 5 MG tablet Take 1 tablet by mouth daily      amLODIPine (NORVASC) 5 MG tablet Take 1 tablet by mouth daily 30 tablet 3    allopurinol (ZYLOPRIM) 300 MG tablet TAKE 1 TABLET BY MOUTH DAILY 90 tablet 0    atenolol (TENORMIN) 50 MG tablet Take 1 tablet by mouth daily 90 tablet 3    valsartan (DIOVAN) 320 MG tablet Take 1 tablet by mouth daily 30 tablet PRN    ascorbic acid (VITAMIN C) 1000 MG tablet Take 1 tablet by mouth daily      aspirin 81 MG chewable tablet Take 1 tablet by mouth daily      lactulose (CHRONULAC) 10 GM/15ML solution 45 mLs 2 times daily (Patient not taking: Reported on 2/2/2024)       No current facility-administered medications for this visit.     Past Medical History:   Diagnosis Date    CKD stage G3b/A1, GFR 30-44 and albumin creatinine ratio <30 mg/g ([facility])     Environmental allergies     GERD (gastroesophageal reflux disease)     Gout 11/22/2017

## 2024-02-02 ENCOUNTER — OFFICE VISIT (OUTPATIENT)
Facility: CLINIC | Age: 69
End: 2024-02-02

## 2024-02-02 VITALS
TEMPERATURE: 97.5 F | HEIGHT: 70 IN | RESPIRATION RATE: 18 BRPM | WEIGHT: 315 LBS | DIASTOLIC BLOOD PRESSURE: 86 MMHG | SYSTOLIC BLOOD PRESSURE: 134 MMHG | HEART RATE: 61 BPM | BODY MASS INDEX: 45.1 KG/M2

## 2024-02-02 DIAGNOSIS — R25.2 LEG CRAMPS: ICD-10-CM

## 2024-02-02 DIAGNOSIS — I10 PRIMARY HYPERTENSION: Primary | ICD-10-CM

## 2024-02-02 DIAGNOSIS — E66.01 MORBID OBESITY (HCC): ICD-10-CM

## 2024-02-02 ASSESSMENT — PATIENT HEALTH QUESTIONNAIRE - PHQ9
SUM OF ALL RESPONSES TO PHQ QUESTIONS 1-9: 0
SUM OF ALL RESPONSES TO PHQ QUESTIONS 1-9: 0
2. FEELING DOWN, DEPRESSED OR HOPELESS: 0
SUM OF ALL RESPONSES TO PHQ QUESTIONS 1-9: 0
1. LITTLE INTEREST OR PLEASURE IN DOING THINGS: 0
SUM OF ALL RESPONSES TO PHQ QUESTIONS 1-9: 0
SUM OF ALL RESPONSES TO PHQ9 QUESTIONS 1 & 2: 0

## 2024-02-02 NOTE — PROGRESS NOTES
Benjie Brown is a 68 y.o. male     Chief Complaint   Patient presents with    Follow-up     4 wk follow up for blood pressure check       BP (!) 142/70 (Site: Left Upper Arm, Position: Sitting, Cuff Size: Medium Adult)   Pulse 61   Temp 97.5 °F (36.4 °C) (Oral)   Resp 18   Ht 1.778 m (5' 10\")   Wt (!) 167.6 kg (369 lb 6.4 oz)   BMI 53.00 kg/m²     Health Maintenance Due   Topic Date Due    Diabetic retinal exam  Never done    Respiratory Syncytial Virus (RSV) Pregnant or age 60 yrs+ (1 - 1-dose 60+ series) Never done    DTaP/Tdap/Td vaccine (1 - Tdap) 12/02/2014    Shingles vaccine (2 of 3) 01/26/2015    COVID-19 Vaccine (4 - 2023-24 season) 09/01/2023         \"Have you been to the ER, urgent care clinic since your last visit?  Hospitalized since your last visit?\"    NO    “Have you seen or consulted any other health care providers outside of [facility] since your last visit?”    NO

## 2024-02-19 ENCOUNTER — OFFICE VISIT (OUTPATIENT)
Age: 69
End: 2024-02-19
Payer: MEDICARE

## 2024-02-19 DIAGNOSIS — E11.40 TYPE 2 DIABETES MELLITUS WITH DIABETIC NEUROPATHY, WITHOUT LONG-TERM CURRENT USE OF INSULIN (HCC): Primary | ICD-10-CM

## 2024-02-19 PROCEDURE — G0108 DIAB MANAGE TRN  PER INDIV: HCPCS

## 2024-02-19 NOTE — PROGRESS NOTES
[facility]  Diabetes Self-Management Education & Support Program    Reason for Referral: DM 2  Referral Source: Philipp Mullins MD  Services requested: DSMES       ASSESSMENT    From my perspective, the participant would benefit from DSMES specifically related to reducing risks, healthy eating, monitoring, physical activity, and healthy coping. Will adapt DSMES program to build on participant's skills score, confidence score, and preparedness score as noted in the Diabetes Skills, Confidence, and Preparedness Index.    During the program, we will focus on providing DSMES that specifically addresses participant's interest in reducing risks, healthy eating, physical activity, healthy coping, and problem solving, as shown by their reported readiness to change.    The participant would be best served by attending weekly group class series.    Diabetes Self-Management Education Follow-up Visit: 3/5/2024       Clinical Presentation  Benjie Brown is a 68 y.o.  male referred for diabetes self-management education. Participant has Type 2 DM not on insulin for 1-10 years. Family history positive for diabetes. Patient reports not receiving DSMES services in the past. Most recent A1c value:   Lab Results   Component Value Date/Time    LABA1C 5.4 01/05/2024 02:31 PM       Diabetes-related medical history:    Neurological complications  peripheral neuropathy      Diabetes-related medications:  Current dosing:     This patient does not have an active medication from one of the medication groupers.    Blood Pressure Management  valsartan - 320 MG      Lipid Management  This patient does not have an active medication from one of the medication groupers.      Clot Prevention  This patient does not have an active medication from one of the medication groupers.    Learning Assessment  Learning objectives Educator assessment (2/19/2024)   Diabetes Disease Process  The participant can   A)

## 2024-02-26 RX ORDER — ALLOPURINOL 300 MG/1
300 TABLET ORAL DAILY
Qty: 90 TABLET | Refills: 1 | Status: SHIPPED | OUTPATIENT
Start: 2024-02-26

## 2024-02-26 NOTE — TELEPHONE ENCOUNTER
PCP: Philipp Mullins MD    Last appt: 12/10/2021    Future Appointments   Date Time Provider Department Center   3/5/2024  2:30 PM DIABETES GROUP CLASS [facility]   3/12/2024  2:30 PM DIABETES GROUP CLASS [facility] AM[facility]   3/12/2024  5:00 PM Patricia Garcia [facility]   3/19/2024  2:30 PM DIABETES GROUP CLASS [facility] AM[facility]   3/26/2024  2:30 PM DIABETES GROUP CLASS [facility] AM[facility]   5/6/2024  9:20 AM Philipp Mullins MD [facility] AMB       Requested Prescriptions     Pending Prescriptions Disp Refills    allopurinol (ZYLOPRIM) 300 MG tablet [Pharmacy Med Name: ALLOPURINOL 300MG TABLETS] 90 tablet 1     Sig: TAKE 1 TABLET BY MOUTH DAILY

## 2024-03-05 ENCOUNTER — NURSE ONLY (OUTPATIENT)
Age: 69
End: 2024-03-05
Payer: MEDICARE

## 2024-03-05 DIAGNOSIS — E11.40 TYPE 2 DIABETES MELLITUS WITH DIABETIC NEUROPATHY, WITHOUT LONG-TERM CURRENT USE OF INSULIN (HCC): Primary | ICD-10-CM

## 2024-03-05 PROCEDURE — G0109 DIAB MANAGE TRN IND/GROUP: HCPCS

## 2024-03-06 NOTE — PROGRESS NOTES
[facility]  Diabetes Self-Management Education & Support Program  Encounter Note      SUMMARY  Diabetes self-care management training was completed related to What is diabetes? And reducing risks. The participant will return on March 12 to continue DSMES related to healthy eating and monitoring. The participant did identify SMART Goal(s) and will practice knowledge and skills related to reducing risks to improve diabetes self-management.        EVALUATION:  Mr Brown expressed understanding of T 2 DM disease process & the ADA targets for BG & A1c. He expressed understanding of the role of vaccinations, eye, dental & foot exams in preventing DM complications & the relationship between DM & heart, kidney, eye, nerve & sleep health.    RECOMMENDATIONS:  Continue DSMES     TOPICS DISCUSSED TODAY:  WHAT IS DIABETES? Minutes: 60  HOW DO I STAY HEALTHY? 60      Next provider visit is scheduled for 5/6/24       SMART GOAL(S)   Record one breakfast, lunch & dinner meal and bring to next session  ( goal set 3/5/24)  ACHIEVEMENT OF GOAL(S) : 0-24%       DATE DSMES TOPIC EVALUATION     3/6/2024 WHAT IS DIABETES?   Role of the normal pancreas in energy balance and blood glucose control   The defect seen in diabetes   Signs & symptoms of diabetes   Diagnosis of diabetes   Types of diabetes   Blood glucose targets in non-pregnant & non-pregnant adults       The participant knows  Their type of diabetes: Yes   The basic physiologic defect: Yes  Blood glucose targets: Yes     DATE DSMES TOPIC EVALUATION     3/6/2024 HOW DO I STAY HEALTHY?   Prevention   Vaccinations   Preconception care (if applicable)  Examinations   Eye    Foot   Diabetic complications' prevention   Dental health   Heart health   Kidney Health   Nerve health   Sleep health      The participant has a personal diabetes care record to keep abreast of diabetes health Yes                DEEPA BIRD RN on 3/6/2024 at 3:19 PM    I have

## 2024-03-12 ENCOUNTER — NURSE ONLY (OUTPATIENT)
Age: 69
End: 2024-03-12
Payer: MEDICARE

## 2024-03-12 DIAGNOSIS — E11.40 TYPE 2 DIABETES MELLITUS WITH DIABETIC NEUROPATHY, WITHOUT LONG-TERM CURRENT USE OF INSULIN (HCC): Primary | ICD-10-CM

## 2024-03-12 PROCEDURE — G0109 DIAB MANAGE TRN IND/GROUP: HCPCS

## 2024-03-14 ENCOUNTER — HOSPITAL ENCOUNTER (OUTPATIENT)
Facility: HOSPITAL | Age: 69
Setting detail: RECURRING SERIES
Discharge: HOME | End: 2024-03-17
Payer: MEDICARE

## 2024-03-14 PROCEDURE — 97803 MED NUTRITION INDIV SUBSEQ: CPT | Performed by: DIETITIAN, REGISTERED

## 2024-03-14 NOTE — PROGRESS NOTES
lb 9.6 oz)   (       Nutrition Diagnosis        Diagnosis Status:     Excessive sodium intake R/t food preferences, food and nutrition related knowledge deficit   AEB recall showing high sodium food choices, elevated blood pressure.   [x]  Improved []  No Change    []  Declined   []  Discontinued         morbid obesity R/T excessive energy intake AEB BMI>30, client described large portions, eating all of restaurant meals regardless of hunger/fullness.   [x]  Improved []  No Change    []  Declined   []  Discontinued        Nutrition Monitoring and Evaluation: Weight continues to fluctuate.   Now attending a diabetes class.   Notes surprised by the fact that eating too much protein could cause weight gain. We reviewed today.   A1C continues to be stable (5.4%), renal function improved > 60GFR.     Seeing GI doctor next week for continued issues with constipation. Currently only using 1 Metamucil packet per day. Does note more consistent bowel patterns. Generally 1 per day but has experienced some blood when wiping. Notes when having more vegetables may have 2 bowel movements per day.     Changes since last visit: egg whites, only clear diet sodas if any, water, less often getting a steak. Reduced to 1 packet of oatmeal. Less eating out. Reducing amount of bread consumed. Changed to sugar free jelly for on toast.   Still paying attention to sodium amount. Dinner always has a vegetable.   Sugar free jello and skinny girl popcorn for an evening snack. Using mini bags.   Pulling skin off of chicken.     Bigger portion of meat especially when eating out. Is bringing home some leftovers.   Est meal calories based on last time to [facility] = 1200 kcal, 1500 mg sodium. (This was for half portion of entree and bread)  Still experiencing constipation.       Previous goals:  Not met. -start gym tomorrow. Work towards 3 days per week as was for previous exercise routine. Bike and then light weights. Ease in slowly .  Met. -limit

## 2024-03-14 NOTE — PROGRESS NOTES
expectations   Blood glucose monitoring targets   Target adjustments   Setting a1c & blood glucose targets with provider   Meter selection    Technique for obtaining blood droplet   Blood glucose testing sites   Determining best times to test   Pregnancy recommendations   Data sharing with provider        The participant   Can demonstrate their glucometer procedure: Not checking BG         DEEPA BIRD RN on 3/14/2024 at 2:53 PM    I have personally reviewed the health record, including provider notes, laboratory data and current medications before making these care and education recommendations. The time spent in this effort is included in the total time.  Total minutes: 120

## 2024-03-19 ENCOUNTER — NURSE ONLY (OUTPATIENT)
Age: 69
End: 2024-03-19
Payer: MEDICARE

## 2024-03-19 DIAGNOSIS — E11.40 TYPE 2 DIABETES MELLITUS WITH DIABETIC NEUROPATHY, WITHOUT LONG-TERM CURRENT USE OF INSULIN (HCC): Primary | ICD-10-CM

## 2024-03-19 PROCEDURE — G0109 DIAB MANAGE TRN IND/GROUP: HCPCS

## 2024-03-20 NOTE — PROGRESS NOTES
[facility]  Diabetes Self-Management Education & Support Program  Encounter Note      SUMMARY  Diabetes self-care management training was completed related to taking medications and physical activity. The participant will return on March 26 to continue DSMES related to healthy coping and problem solving. The participant did identify SMART Goal(s) and will practice knowledge and skills related to reducing risks, healthy eating and monitoring, and being active and medications to improve diabetes self-management.        EVALUATION:  Mr Brown was an active participant in today's group class. He expressed understanding of the role medications play in diabetes management. He is not taking diabetes medications.  Torsten expressed understanding of the benefits of increasing physical activity including lowering BG & LDL cholesterol, reducing stress & maintaining weight loss. He recently purchased a stationary bike & plans to start exercising more.    RECOMMENDATIONS:  Increase physical activity.  Continue DSMES.     TOPICS DISCUSSED TODAY:  HOW DO MY DIABETES MEDICATIONS WORK? 60  HOW DOES PHYSICAL ACTIVITY AFFECT MY DIABETES? 60         SMART GOAL(S)   Use healthy plate for 1-2 meals a week  ACHIEVEMENT OF GOAL(S) : %    2.    Exercise 5-10 minutes every other day  ( goal set 3/19/24)  ACHIEVEMENT OF GOAL(S) :  0-24%       DATE DSMES TOPIC EVALUATION     3/20/2024 HOW DO MY DIABETES MEDICATIONS WORK?   Type 1 medications & methods   Insulin injections   Injection sites   Type 2 medications   Oral agents   GLP-1 agonists   Hypoglycemia symptoms & treatment   Glucagon emergency kits   General guidance regarding insulin use whether Type 1, 2 or gestational diabetes   Storage of insulin   Disposal    Traveling with medications   Barriers to medication adherence      The participant   Can describe the expected action & side effects of prescribed diabetes medications: Not taking diabetes medications

## 2024-03-26 ENCOUNTER — NURSE ONLY (OUTPATIENT)
Age: 69
End: 2024-03-26
Payer: MEDICARE

## 2024-03-26 DIAGNOSIS — E11.40 TYPE 2 DIABETES MELLITUS WITH DIABETIC NEUROPATHY, WITHOUT LONG-TERM CURRENT USE OF INSULIN (HCC): Primary | ICD-10-CM

## 2024-03-26 PROCEDURE — G0109 DIAB MANAGE TRN IND/GROUP: HCPCS

## 2024-03-28 NOTE — PROGRESS NOTES
[facility]  Diabetes Self-Management Education & Support Program  Encounter Note      SUMMARY  Diabetes self-care management training was completed related to healthy coping and problem solving. The participant will return on May 21 for post program evaluation. The participant did not identify SMART Goal(s) and will practice knowledge and skills related to reducing risks, healthy eating and monitoring, being active and medications, and healthy coping and problem solving to improve diabetes self-management.        EVALUATION:  Mr Brown shared he feels supported in his diabetes management by his family & providers. He sleeps well, spends time in nature & enjoys photography to reduce stress.  Torsten plans to use [address], [employer] & [employer] as part of his diabetes support plan going forward. He expressed understanding of using problem solving strategies to overcome barriers to diabetes self management.     RECOMMENDATIONS:  Decrease sedentary time.     TOPICS DISCUSSED TODAY:  HOW DO I FIND SUPPORT TO TACKLE THIS CONDITION? 60  HOW DO I FIGURE OUT WHAT'S INFLUENCING MY BLOOD GLUCOSES? 60      Next provider visit is scheduled for 5/2024       SMART GOAL(S)   Use healthy plate 1-2 meals a week  ACHIEVEMENT OF GOAL(S) : %    2.  Exercise 5-10 minutes every other day  ACHIEVEMENT OF GOAL(S) :  50-74%       DATE DSMES TOPIC EVALUATION     3/28/2024 HOW DO I FIND SUPPORT TO TACKLE THIS CONDITION?   Normal responses to diabetes diagnosis or complication   Shock   Anger & resentment   Guilt/self-blame   Sadness & worry   Depression    Anxiety   Pregnancy   Constructive strategies to normal responses    Exploring feelings & attitudes   Motivation: Cost versus benefits analysis   Problem-solving: Chain analysis   Obtaining support: Communicating   Family & friends   Health team   iii. Community   Stress   Symptoms   Managing stress   Fill your tool kit        The participant can

## 2024-04-25 RX ORDER — HYDRALAZINE HYDROCHLORIDE 50 MG/1
TABLET, FILM COATED ORAL
Qty: 180 TABLET | Refills: 1 | Status: SHIPPED | OUTPATIENT
Start: 2024-04-25

## 2024-04-25 RX ORDER — HYDRALAZINE HYDROCHLORIDE 50 MG/1
TABLET, FILM COATED ORAL
Qty: 1 TABLET | Refills: 0 | Status: SHIPPED | OUTPATIENT
Start: 2024-04-25 | End: 2024-04-25

## 2024-04-25 RX ORDER — AMLODIPINE BESYLATE 5 MG/1
5 TABLET ORAL DAILY
Qty: 90 TABLET | Refills: 1 | Status: SHIPPED | OUTPATIENT
Start: 2024-04-25

## 2024-04-25 NOTE — TELEPHONE ENCOUNTER
RX refill request from the patient/pharmacy. Patient last seen 02- with labs, and next appt. scheduled for 05-  Requested Prescriptions     Pending Prescriptions Disp Refills    hydrALAZINE (APRESOLINE) 50 MG tablet [Pharmacy Med Name: HYDRALAZINE  50MG TABLETS] 1 tablet 0     Sig: TAKE 1 TABLET BY MOUTH TWICE DAILY FOR HIGH BLOOD PRESSURE    amLODIPine (NORVASC) 5 MG tablet [Pharmacy Med Name: AMLODIPINE BESYLATE 5MG TABLETS] 90 tablet 1     Sig: TAKE 1 TABLET BY MOUTH DAILY    .

## 2024-04-25 NOTE — TELEPHONE ENCOUNTER
PCP: Philipp Mullins MD    Last appt: 2/2/2024    Future Appointments   Date Time Provider Department Center   5/6/2024  9:20 AM Philipp Mullins MD [facility] AMB   5/15/2024  5:00 PM [facility]   5/21/2024  9:30 AM Michelle Shabazz RN [facility] AMB       Requested Prescriptions     Pending Prescriptions Disp Refills    hydrALAZINE (APRESOLINE) 50 MG tablet [Pharmacy Med Name: HYDRALAZINE  50MG TABLETS] 180 tablet 1     Sig: TAKE 1 TABLET BY MOUTH TWICE DAILY FOR HIGH BLOOD PRESSURE

## 2024-05-03 NOTE — PROGRESS NOTES
Chief Complaint   Patient presents with    3 Month Follow-Up       SUBJECTIVE:    Benjie Brown is a 68 y.o. male who returns in follow-up for his medical problems include hypertension, hyperlipidemia, diabetes, morbid obesity, sleep apnea, GERD and other medical problems.  He is taking his medication trying to follow his diet remains physically active.  Currently he notes no chest pain, shortness of breath or cardiac respiratory complaints.  There are no GI or  complaints.  There are no headaches, dizziness or neurologic complaints.  He has no change of his chronic arthritic complaints and there are no other complaints on complete review of systems.      Current Outpatient Medications   Medication Sig Dispense Refill    amLODIPine (NORVASC) 5 MG tablet TAKE 1 TABLET BY MOUTH DAILY 90 tablet 1    allopurinol (ZYLOPRIM) 300 MG tablet TAKE 1 TABLET BY MOUTH DAILY 90 tablet 1    bumetanide (BUMEX) 1 MG tablet Take 1 tablet by mouth daily 90 tablet 1    lactulose (CHRONULAC) 10 GM/15ML solution 45 mLs 2 times daily      finasteride (PROSCAR) 5 MG tablet Take 1 tablet by mouth daily      atenolol (TENORMIN) 50 MG tablet Take 1 tablet by mouth daily 90 tablet 3    valsartan (DIOVAN) 320 MG tablet Take 1 tablet by mouth daily 30 tablet PRN    ascorbic acid (VITAMIN C) 1000 MG tablet Take 1 tablet by mouth daily      aspirin 81 MG chewable tablet Take 1 tablet by mouth daily       No current facility-administered medications for this visit.     Past Medical History:   Diagnosis Date    CKD stage G3b/A1, GFR 30-44 and albumin creatinine ratio <30 mg/g (HCC)     Environmental allergies     GERD (gastroesophageal reflux disease)     Gout 11/22/2017    Hepatic steatosis 11/22/2017    Comments: non-alcoholic    History of chest pain 11/22/2017    Comments[facility], subsequent to that, he had a normal cardiac cath    History of diarrhea 11/22/2017    History of dyspnea 11/22/2017    History of pneumonia

## 2024-05-06 ENCOUNTER — OFFICE VISIT (OUTPATIENT)
Facility: CLINIC | Age: 69
End: 2024-05-06
Payer: MEDICARE

## 2024-05-06 VITALS
WEIGHT: 315 LBS | HEIGHT: 70 IN | DIASTOLIC BLOOD PRESSURE: 80 MMHG | BODY MASS INDEX: 45.1 KG/M2 | SYSTOLIC BLOOD PRESSURE: 120 MMHG | HEART RATE: 59 BPM | OXYGEN SATURATION: 94 % | RESPIRATION RATE: 16 BRPM | TEMPERATURE: 98 F

## 2024-05-06 DIAGNOSIS — I10 PRIMARY HYPERTENSION: Primary | ICD-10-CM

## 2024-05-06 DIAGNOSIS — N18.30 STAGE 3 CHRONIC KIDNEY DISEASE, UNSPECIFIED WHETHER STAGE 3A OR 3B CKD (HCC): ICD-10-CM

## 2024-05-06 DIAGNOSIS — E11.9 DIABETES MELLITUS TYPE 2, DIET-CONTROLLED (HCC): ICD-10-CM

## 2024-05-06 DIAGNOSIS — I73.9 PERIPHERAL VASCULAR DISEASE (HCC): ICD-10-CM

## 2024-05-06 DIAGNOSIS — K21.9 GASTROESOPHAGEAL REFLUX DISEASE WITHOUT ESOPHAGITIS: ICD-10-CM

## 2024-05-06 DIAGNOSIS — E66.01 MORBID OBESITY (HCC): ICD-10-CM

## 2024-05-06 DIAGNOSIS — E78.2 MIXED HYPERLIPIDEMIA: ICD-10-CM

## 2024-05-06 LAB
ALBUMIN SERPL-MCNC: 3.7 G/DL (ref 3.5–5)
ALBUMIN/GLOB SERPL: 0.9 (ref 1.1–2.2)
ALP SERPL-CCNC: 98 U/L (ref 45–117)
ALT SERPL-CCNC: 37 U/L (ref 12–78)
ANION GAP SERPL CALC-SCNC: 4 MMOL/L (ref 5–15)
AST SERPL-CCNC: 24 U/L (ref 15–37)
BILIRUB SERPL-MCNC: 0.4 MG/DL (ref 0.2–1)
BUN SERPL-MCNC: 25 MG/DL (ref 6–20)
BUN/CREAT SERPL: 20 (ref 12–20)
CALCIUM SERPL-MCNC: 9.7 MG/DL (ref 8.5–10.1)
CHLORIDE SERPL-SCNC: 104 MMOL/L (ref 97–108)
CHOLEST SERPL-MCNC: 218 MG/DL
CK SERPL-CCNC: 260 U/L (ref 39–308)
CO2 SERPL-SCNC: 30 MMOL/L (ref 21–32)
CREAT SERPL-MCNC: 1.24 MG/DL (ref 0.7–1.3)
EST. AVERAGE GLUCOSE BLD GHB EST-MCNC: 108 MG/DL
GLOBULIN SER CALC-MCNC: 4.2 G/DL (ref 2–4)
GLUCOSE SERPL-MCNC: 98 MG/DL (ref 65–100)
HBA1C MFR BLD: 5.4 % (ref 4–5.6)
HDLC SERPL-MCNC: 42 MG/DL
HDLC SERPL: 5.2 (ref 0–5)
LDLC SERPL CALC-MCNC: 142 MG/DL (ref 0–100)
POTASSIUM SERPL-SCNC: 4.3 MMOL/L (ref 3.5–5.1)
PROT SERPL-MCNC: 7.9 G/DL (ref 6.4–8.2)
SODIUM SERPL-SCNC: 138 MMOL/L (ref 136–145)
TRIGL SERPL-MCNC: 170 MG/DL
VLDLC SERPL CALC-MCNC: 34 MG/DL

## 2024-05-06 PROCEDURE — 2022F DILAT RTA XM EVC RTNOPTHY: CPT | Performed by: INTERNAL MEDICINE

## 2024-05-06 PROCEDURE — 1036F TOBACCO NON-USER: CPT | Performed by: INTERNAL MEDICINE

## 2024-05-06 PROCEDURE — 3074F SYST BP LT 130 MM HG: CPT | Performed by: INTERNAL MEDICINE

## 2024-05-06 PROCEDURE — G8427 DOCREV CUR MEDS BY ELIG CLIN: HCPCS | Performed by: INTERNAL MEDICINE

## 2024-05-06 PROCEDURE — 3044F HG A1C LEVEL LT 7.0%: CPT | Performed by: INTERNAL MEDICINE

## 2024-05-06 PROCEDURE — 3079F DIAST BP 80-89 MM HG: CPT | Performed by: INTERNAL MEDICINE

## 2024-05-06 PROCEDURE — G8417 CALC BMI ABV UP PARAM F/U: HCPCS | Performed by: INTERNAL MEDICINE

## 2024-05-06 PROCEDURE — 99214 OFFICE O/P EST MOD 30 MIN: CPT | Performed by: INTERNAL MEDICINE

## 2024-05-06 PROCEDURE — 3017F COLORECTAL CA SCREEN DOC REV: CPT | Performed by: INTERNAL MEDICINE

## 2024-05-06 PROCEDURE — 1123F ACP DISCUSS/DSCN MKR DOCD: CPT | Performed by: INTERNAL MEDICINE

## 2024-05-06 NOTE — PROGRESS NOTES
Benjie Brown is a 68 y.o. male     Chief Complaint   Patient presents with    3 Month Follow-Up       /80 (Site: Left Upper Arm, Position: Sitting, Cuff Size: Large Adult)   Pulse 59   Temp 98 °F (36.7 °C) (Oral)   Resp 16   Ht 1.778 m (5' 10\")   Wt (!) 171.7 kg (378 lb 9.6 oz)   SpO2 94%   BMI 54.32 kg/m²     Health Maintenance Due   Topic Date Due    Respiratory Syncytial Virus (RSV) Pregnant or age 60 yrs+ (1 - 1-dose 60+ series) Never done    DTaP/Tdap/Td vaccine (1 - Tdap) 12/02/2014    Shingles vaccine (2 of 3) 01/26/2015    COVID-19 Vaccine (4 - 2023-24 season) 09/01/2023         \"Have you been to the ER, urgent care clinic since your last visit?  Hospitalized since your last visit?\"    NO    “Have you seen or consulted any other health care providers outside of [facility] since your last visit?”    NO

## 2024-05-15 ENCOUNTER — HOSPITAL ENCOUNTER (OUTPATIENT)
Facility: HOSPITAL | Age: 69
Setting detail: RECURRING SERIES
Discharge: HOME | End: 2024-05-18
Payer: MEDICARE

## 2024-05-15 PROCEDURE — 97803 MED NUTRITION INDIV SUBSEQ: CPT

## 2024-05-15 NOTE — PROGRESS NOTES
Cholesterol  []  Sodium  []  SBGM  []  Food Journals  []  Others:      Patient Goals - Look into gym membership with silver sneakers through insurance  - PT exercises 3x/week  - Include fruit and vegetable to each meal  - Drink at least 64 ounces of fluid per day     PLAN  [x]  Continue on current plan []  Follow-up PRN   []  Discharge due to :    [x]  Next appt:  2 months      Dietitian: Abimbola Licea MS, RDN    Date: 5/15/2024 Time: 5:00 PM

## 2024-08-19 NOTE — TELEPHONE ENCOUNTER
RX refill request from the patient/pharmacy. Patient last seen 05- with labs, and next appt. scheduled for 10-  Requested Prescriptions     Pending Prescriptions Disp Refills    allopurinol (ZYLOPRIM) 300 MG tablet [Pharmacy Med Name: ALLOPURINOL 300MG TABLETS] 90 tablet 1     Sig: TAKE 1 TABLET BY MOUTH DAILY    .

## 2024-08-20 RX ORDER — ALLOPURINOL 300 MG/1
300 TABLET ORAL DAILY
Qty: 90 TABLET | Refills: 1 | Status: SHIPPED | OUTPATIENT
Start: 2024-08-20

## 2024-10-03 PROBLEM — N18.32 CHRONIC KIDNEY DISEASE, STAGE 3B (HCC): Status: ACTIVE | Noted: 2024-10-03

## 2024-10-04 ENCOUNTER — OFFICE VISIT (OUTPATIENT)
Facility: CLINIC | Age: 69
End: 2024-10-04

## 2024-10-04 VITALS
WEIGHT: 315 LBS | BODY MASS INDEX: 45.1 KG/M2 | OXYGEN SATURATION: 98 % | HEIGHT: 70 IN | SYSTOLIC BLOOD PRESSURE: 136 MMHG | TEMPERATURE: 98.1 F | HEART RATE: 62 BPM | DIASTOLIC BLOOD PRESSURE: 102 MMHG

## 2024-10-04 DIAGNOSIS — Z13.39 ALCOHOL SCREENING: ICD-10-CM

## 2024-10-04 DIAGNOSIS — I10 PRIMARY HYPERTENSION: Primary | ICD-10-CM

## 2024-10-04 DIAGNOSIS — Z12.5 PROSTATE CANCER SCREENING: ICD-10-CM

## 2024-10-04 DIAGNOSIS — E11.40 NEUROPATHY DUE TO TYPE 2 DIABETES MELLITUS (HCC): ICD-10-CM

## 2024-10-04 DIAGNOSIS — K21.9 GASTROESOPHAGEAL REFLUX DISEASE WITHOUT ESOPHAGITIS: ICD-10-CM

## 2024-10-04 DIAGNOSIS — I73.9 PERIPHERAL VASCULAR DISEASE (HCC): ICD-10-CM

## 2024-10-04 DIAGNOSIS — M10.00 IDIOPATHIC GOUT, UNSPECIFIED CHRONICITY, UNSPECIFIED SITE: ICD-10-CM

## 2024-10-04 DIAGNOSIS — N18.32 CHRONIC KIDNEY DISEASE, STAGE 3B (HCC): ICD-10-CM

## 2024-10-04 DIAGNOSIS — N18.30 STAGE 3 CHRONIC KIDNEY DISEASE, UNSPECIFIED WHETHER STAGE 3A OR 3B CKD (HCC): ICD-10-CM

## 2024-10-04 DIAGNOSIS — E66.01 MORBID OBESITY: ICD-10-CM

## 2024-10-04 DIAGNOSIS — E78.2 MIXED HYPERLIPIDEMIA: ICD-10-CM

## 2024-10-04 DIAGNOSIS — E11.9 DIABETES MELLITUS TYPE 2, DIET-CONTROLLED (HCC): ICD-10-CM

## 2024-10-04 DIAGNOSIS — E88.89 STEATOSIS (HCC): ICD-10-CM

## 2024-10-04 DIAGNOSIS — Z00.00 MEDICARE ANNUAL WELLNESS VISIT, SUBSEQUENT: ICD-10-CM

## 2024-10-04 DIAGNOSIS — J30.9 ALLERGIC RHINITIS, UNSPECIFIED SEASONALITY, UNSPECIFIED TRIGGER: ICD-10-CM

## 2024-10-04 DIAGNOSIS — G47.33 OSA (OBSTRUCTIVE SLEEP APNEA): ICD-10-CM

## 2024-10-04 DIAGNOSIS — F51.01 PRIMARY INSOMNIA: ICD-10-CM

## 2024-10-04 DIAGNOSIS — Z23 NEEDS FLU SHOT: ICD-10-CM

## 2024-10-04 DIAGNOSIS — E55.9 VITAMIN D DEFICIENCY: ICD-10-CM

## 2024-10-04 RX ORDER — BUMETANIDE 2 MG/1
2 TABLET ORAL 2 TIMES DAILY
Qty: 180 TABLET | Refills: 3 | Status: SHIPPED | OUTPATIENT
Start: 2024-10-04

## 2024-10-04 ASSESSMENT — LIFESTYLE VARIABLES
HOW MANY STANDARD DRINKS CONTAINING ALCOHOL DO YOU HAVE ON A TYPICAL DAY: 1 OR 2
HOW OFTEN DO YOU HAVE A DRINK CONTAINING ALCOHOL: MONTHLY OR LESS

## 2024-10-04 ASSESSMENT — PATIENT HEALTH QUESTIONNAIRE - PHQ9
2. FEELING DOWN, DEPRESSED OR HOPELESS: NOT AT ALL
SUM OF ALL RESPONSES TO PHQ9 QUESTIONS 1 & 2: 0
SUM OF ALL RESPONSES TO PHQ QUESTIONS 1-9: 0
SUM OF ALL RESPONSES TO PHQ9 QUESTIONS 1 & 2: 0
SUM OF ALL RESPONSES TO PHQ QUESTIONS 1-9: 0
1. LITTLE INTEREST OR PLEASURE IN DOING THINGS: NOT AT ALL
1. LITTLE INTEREST OR PLEASURE IN DOING THINGS: NOT AT ALL
SUM OF ALL RESPONSES TO PHQ QUESTIONS 1-9: 0
2. FEELING DOWN, DEPRESSED OR HOPELESS: NOT AT ALL

## 2024-10-04 NOTE — PROGRESS NOTES
Medicare Annual Wellness Visit    Benjie Brown is here for Medicare AWV    Assessment & Plan   Primary hypertension  -     Urinalysis with Microscopic; Future  -     TSH; Future  -     T4, Free; Future  -     Comprehensive Metabolic Panel; Future  -     CBC with Auto Differential; Future  Chronic kidney disease, stage 3b (HCC)  Diabetes mellitus type 2, diet-controlled (HCC)  -     Hemoglobin A1C; Future  Mixed hyperlipidemia  -     Lipid Panel; Future  -     CK; Future  Gastroesophageal reflux disease without esophagitis  Stage 3 chronic kidney disease, unspecified whether stage 3a or 3b CKD (HCC)  Morbid obesity  Peripheral vascular disease (HCC)  Steatosis (HCC)  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 Primary hypertension    2. Chronic kidney disease, stage 3b (HCC)    3. Diabetes mellitus type 2, diet-controlled (HCC)    4. Mixed hyperlipidemia    5. Gastroesophageal reflux disease without esophagitis    6. Stage 3 chronic kidney disease, unspecified whether stage 3a or 3b CKD (HCC)    7. Morbid obesity    8. Peripheral vascular disease (HCC)    9. Steatosis (HCC)    10. 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 Allergic rhinitis, unspecified seasonality, unspecified trigger    12. Primary insomnia    13. Idiopathic gout, unspecified chronicity, unspecified site    14. Vitamin D deficiency    15. JONO (obstructive sleep apnea)    16. Prostate cancer screening    17. Alcohol

## 2024-10-04 NOTE — PATIENT INSTRUCTIONS
Quitting is one of the most important things you can do to protect your heart. It is never too late to quit. Try to avoid secondhand smoke too.     Stay at a weight that's healthy for you. Talk to your doctor if you need help losing weight.     Try to get 7 to 9 hours of sleep each night.     Limit alcohol to 2 drinks a day for men and 1 drink a day for women. Too much alcohol can cause health problems.     Manage other health problems such as diabetes, high blood pressure, and high cholesterol. If you think you may have a problem with alcohol or drug use, talk to your doctor.   Medicines    Take your medicines exactly as prescribed. Call your doctor if you think you are having a problem with your medicine.     If your doctor recommends aspirin, take the amount directed each day. Make sure you take aspirin and not another kind of pain reliever, such as acetaminophen (Tylenol).   When should you call for help?   Call 911 if you have symptoms of a heart attack. These may include:    Chest pain or pressure, or a strange feeling in the chest.     Sweating.     Shortness of breath.     Pain, pressure, or a strange feeling in the back, neck, jaw, or upper belly or in one or both shoulders or arms.     Lightheadedness or sudden weakness.     A fast or irregular heartbeat.   After you call 911, the  may tell you to chew 1 adult-strength or 2 to 4 low-dose aspirin. Wait for an ambulance. Do not try to drive yourself.  Watch closely for changes in your health, and be sure to contact your doctor if you have any problems.  Where can you learn more?  Go to https://www.[employer].net/patientEd and enter F075 to learn more about \"A Healthy Heart: Care Instructions.\"  Current as of: June 24, 2023  Content Version: 14.2  © 2024 [employer].   Care instructions adapted under license by [employer]. If you have questions about a medical condition or this instruction, always ask your healthcare professional.

## 2024-10-04 NOTE — PROGRESS NOTES
Benjie Brown is a 68 y.o. male     Chief Complaint   Patient presents with    Medicare AWV       \"Have you been to the ER, urgent care clinic since your last visit?  Hospitalized since your last visit?\"    no    “Have you seen or consulted any other health care providers outside of [facility] since your last visit?”    Saw Dr. Herman his urologist.

## 2024-10-05 LAB
25(OH)D3 SERPL-MCNC: 31.6 NG/ML (ref 30–100)
ALBUMIN SERPL-MCNC: 3.8 G/DL (ref 3.5–5)
ALBUMIN/GLOB SERPL: 1 (ref 1.1–2.2)
ALP SERPL-CCNC: 108 U/L (ref 45–117)
ALT SERPL-CCNC: 67 U/L (ref 12–78)
ANION GAP SERPL CALC-SCNC: 5 MMOL/L (ref 2–12)
APPEARANCE UR: CLEAR
AST SERPL-CCNC: 33 U/L (ref 15–37)
BACTERIA URNS QL MICRO: NEGATIVE /HPF
BASOPHILS # BLD: 0 K/UL (ref 0–0.1)
BASOPHILS NFR BLD: 0 % (ref 0–1)
BILIRUB SERPL-MCNC: 0.6 MG/DL (ref 0.2–1)
BILIRUB UR QL: NEGATIVE
BUN SERPL-MCNC: 29 MG/DL (ref 6–20)
BUN/CREAT SERPL: 23 (ref 12–20)
CALCIUM SERPL-MCNC: 9.5 MG/DL (ref 8.5–10.1)
CHLORIDE SERPL-SCNC: 104 MMOL/L (ref 97–108)
CHOLEST SERPL-MCNC: 229 MG/DL
CK SERPL-CCNC: 247 U/L (ref 39–308)
CO2 SERPL-SCNC: 33 MMOL/L (ref 21–32)
COLOR UR: NORMAL
COMMENT:: NORMAL
CREAT SERPL-MCNC: 1.25 MG/DL (ref 0.7–1.3)
DIFFERENTIAL METHOD BLD: ABNORMAL
EOSINOPHIL # BLD: 0.2 K/UL (ref 0–0.4)
EOSINOPHIL NFR BLD: 2 % (ref 0–7)
EPITH CASTS URNS QL MICRO: NORMAL /LPF
ERYTHROCYTE [DISTWIDTH] IN BLOOD BY AUTOMATED COUNT: 15.1 % (ref 11.5–14.5)
EST. AVERAGE GLUCOSE BLD GHB EST-MCNC: 123 MG/DL
GLOBULIN SER CALC-MCNC: 3.7 G/DL (ref 2–4)
GLUCOSE SERPL-MCNC: 96 MG/DL (ref 65–100)
GLUCOSE UR STRIP.AUTO-MCNC: NEGATIVE MG/DL
HBA1C MFR BLD: 5.9 % (ref 4–5.6)
HCT VFR BLD AUTO: 43.7 % (ref 36.6–50.3)
HDLC SERPL-MCNC: 46 MG/DL
HDLC SERPL: 5 (ref 0–5)
HGB BLD-MCNC: 13.6 G/DL (ref 12.1–17)
HGB UR QL STRIP: NEGATIVE
HYALINE CASTS URNS QL MICRO: NORMAL /LPF (ref 0–5)
IMM GRANULOCYTES # BLD AUTO: 0 K/UL (ref 0–0.04)
IMM GRANULOCYTES NFR BLD AUTO: 1 % (ref 0–0.5)
KETONES UR QL STRIP.AUTO: NEGATIVE MG/DL
LDLC SERPL CALC-MCNC: 146.8 MG/DL (ref 0–100)
LEUKOCYTE ESTERASE UR QL STRIP.AUTO: NEGATIVE
LYMPHOCYTES # BLD: 2.6 K/UL (ref 0.8–3.5)
LYMPHOCYTES NFR BLD: 33 % (ref 12–49)
MCH RBC QN AUTO: 30.8 PG (ref 26–34)
MCHC RBC AUTO-ENTMCNC: 31.1 G/DL (ref 30–36.5)
MCV RBC AUTO: 98.9 FL (ref 80–99)
MONOCYTES # BLD: 0.7 K/UL (ref 0–1)
MONOCYTES NFR BLD: 9 % (ref 5–13)
NEUTS SEG # BLD: 4.5 K/UL (ref 1.8–8)
NEUTS SEG NFR BLD: 55 % (ref 32–75)
NITRITE UR QL STRIP.AUTO: NEGATIVE
NRBC # BLD: 0 K/UL (ref 0–0.01)
NRBC BLD-RTO: 0 PER 100 WBC
PH UR STRIP: 5.5 (ref 5–8)
PLATELET # BLD AUTO: 188 K/UL (ref 150–400)
PMV BLD AUTO: 11.3 FL (ref 8.9–12.9)
POTASSIUM SERPL-SCNC: 4.4 MMOL/L (ref 3.5–5.1)
PROT SERPL-MCNC: 7.5 G/DL (ref 6.4–8.2)
PROT UR STRIP-MCNC: NEGATIVE MG/DL
PSA SERPL-MCNC: 2.5 NG/ML (ref 0.01–4)
RBC # BLD AUTO: 4.42 M/UL (ref 4.1–5.7)
RBC #/AREA URNS HPF: NORMAL /HPF (ref 0–5)
SODIUM SERPL-SCNC: 142 MMOL/L (ref 136–145)
SP GR UR REFRACTOMETRY: 1.02 (ref 1–1.03)
SPECIMEN HOLD: NORMAL
T4 FREE SERPL-MCNC: 0.8 NG/DL (ref 0.8–1.5)
TRIGL SERPL-MCNC: 181 MG/DL
TSH SERPL DL<=0.05 MIU/L-ACNC: 0.6 UIU/ML (ref 0.36–3.74)
UROBILINOGEN UR QL STRIP.AUTO: 0.2 EU/DL (ref 0.2–1)
VLDLC SERPL CALC-MCNC: 36.2 MG/DL
WBC # BLD AUTO: 8 K/UL (ref 4.1–11.1)
WBC URNS QL MICRO: NORMAL /HPF (ref 0–4)

## 2024-10-08 RX ORDER — ROSUVASTATIN CALCIUM 10 MG/1
10 TABLET, COATED ORAL DAILY
Qty: 90 TABLET | Refills: 1 | Status: SHIPPED | OUTPATIENT
Start: 2024-10-08

## 2024-10-08 NOTE — TELEPHONE ENCOUNTER
PCP: Philipp Mullins MD    Last appt: 10/4/2024    Future Appointments   Date Time Provider Department Center   10/16/2024  3:00 PM Philipp Mullins MD [facility] DEP   1/7/2025  9:10 AM Philipp Mullins MD [facility] DEP       Requested Prescriptions     Pending Prescriptions Disp Refills    rosuvastatin (CRESTOR) 10 MG tablet 90 tablet 1     Sig: Take 1 tablet by mouth daily

## 2024-10-15 PROBLEM — R60.9 EDEMA: Status: ACTIVE | Noted: 2024-10-15

## 2024-10-16 ENCOUNTER — OFFICE VISIT (OUTPATIENT)
Facility: CLINIC | Age: 69
End: 2024-10-16
Payer: MEDICARE

## 2024-10-16 VITALS
BODY MASS INDEX: 57.05 KG/M2 | TEMPERATURE: 97.5 F | OXYGEN SATURATION: 99 % | SYSTOLIC BLOOD PRESSURE: 132 MMHG | DIASTOLIC BLOOD PRESSURE: 86 MMHG | HEART RATE: 60 BPM | WEIGHT: 315 LBS

## 2024-10-16 DIAGNOSIS — K59.00 CONSTIPATION, UNSPECIFIED CONSTIPATION TYPE: ICD-10-CM

## 2024-10-16 DIAGNOSIS — N18.32 CHRONIC KIDNEY DISEASE, STAGE 3B (HCC): ICD-10-CM

## 2024-10-16 DIAGNOSIS — I10 PRIMARY HYPERTENSION: Primary | ICD-10-CM

## 2024-10-16 DIAGNOSIS — E66.01 MORBID OBESITY: ICD-10-CM

## 2024-10-16 DIAGNOSIS — R60.9 EDEMA, UNSPECIFIED TYPE: ICD-10-CM

## 2024-10-16 PROCEDURE — 1036F TOBACCO NON-USER: CPT | Performed by: INTERNAL MEDICINE

## 2024-10-16 PROCEDURE — 3079F DIAST BP 80-89 MM HG: CPT | Performed by: INTERNAL MEDICINE

## 2024-10-16 PROCEDURE — 99214 OFFICE O/P EST MOD 30 MIN: CPT | Performed by: INTERNAL MEDICINE

## 2024-10-16 PROCEDURE — G8417 CALC BMI ABV UP PARAM F/U: HCPCS | Performed by: INTERNAL MEDICINE

## 2024-10-16 PROCEDURE — 3017F COLORECTAL CA SCREEN DOC REV: CPT | Performed by: INTERNAL MEDICINE

## 2024-10-16 PROCEDURE — 3075F SYST BP GE 130 - 139MM HG: CPT | Performed by: INTERNAL MEDICINE

## 2024-10-16 PROCEDURE — G8482 FLU IMMUNIZE ORDER/ADMIN: HCPCS | Performed by: INTERNAL MEDICINE

## 2024-10-16 PROCEDURE — G8427 DOCREV CUR MEDS BY ELIG CLIN: HCPCS | Performed by: INTERNAL MEDICINE

## 2024-10-16 PROCEDURE — 1123F ACP DISCUSS/DSCN MKR DOCD: CPT | Performed by: INTERNAL MEDICINE

## 2024-10-16 NOTE — PROGRESS NOTES
Chief Complaint   Patient presents with    Follow-up    Hypertension    Chronic Kidney Disease    Cholesterol Problem     1 week followup       SUBJECTIVE:    Benjie Brown is a 68 y.o. male who returns in follow-up regarding his hypertension, CKD, edema, obesity and other medical problems.  On his last visit here his weight was up 401 pounds in the peripheral edema.  BUN was 29 creatinine was 1.25 so at that point I increase his Bumex from 2 mg daily to 2 mg twice daily and he is returned today noting he does have some significant swelling but still it is better than what it was.  He also notes he is Some problems with constipation and is wondering what he can take for that which I recommended Metamucil which she can mix in some MiraLAX if needed twice a day.  He is noting some chest congestion little cough but this seems to be clear.  He took Mucinex in the past and is curious if he can do that again.  I told him that is fine.  He does note no cardiorespiratory complaints other than the congestion and edema.  He has no GI or  complaints other than the constipation.  He notes no neurologic complaints and there are no other complaints on complete review of systems.      Current Outpatient Medications   Medication Sig Dispense Refill    rosuvastatin (CRESTOR) 10 MG tablet Take 1 tablet by mouth daily 90 tablet 1    bumetanide (BUMEX) 2 MG tablet Take 1 tablet by mouth 2 times daily 180 tablet 3    allopurinol (ZYLOPRIM) 300 MG tablet TAKE 1 TABLET BY MOUTH DAILY 90 tablet 1    finasteride (PROSCAR) 5 MG tablet Take 1 tablet by mouth daily      atenolol (TENORMIN) 50 MG tablet Take 1 tablet by mouth daily 90 tablet 3    valsartan (DIOVAN) 320 MG tablet Take 1 tablet by mouth daily 30 tablet PRN    ascorbic acid (VITAMIN C) 1000 MG tablet Take 1 tablet by mouth daily      aspirin 81 MG chewable tablet Take 1 tablet by mouth daily       No current facility-administered medications for this visit.     Past Medical

## 2024-10-17 LAB
ANION GAP SERPL CALC-SCNC: 2 MMOL/L (ref 2–12)
BUN SERPL-MCNC: 28 MG/DL (ref 6–20)
BUN/CREAT SERPL: 20 (ref 12–20)
CALCIUM SERPL-MCNC: 9.3 MG/DL (ref 8.5–10.1)
CHLORIDE SERPL-SCNC: 104 MMOL/L (ref 97–108)
CO2 SERPL-SCNC: 33 MMOL/L (ref 21–32)
CREAT SERPL-MCNC: 1.38 MG/DL (ref 0.7–1.3)
GLUCOSE SERPL-MCNC: 100 MG/DL (ref 65–100)
POTASSIUM SERPL-SCNC: 3.9 MMOL/L (ref 3.5–5.1)
SODIUM SERPL-SCNC: 139 MMOL/L (ref 136–145)

## 2024-10-21 RX ORDER — HYDRALAZINE HYDROCHLORIDE 50 MG/1
TABLET, FILM COATED ORAL
Qty: 180 TABLET | Refills: 1 | Status: SHIPPED | OUTPATIENT
Start: 2024-10-21

## 2024-10-21 RX ORDER — VALSARTAN 320 MG/1
320 TABLET ORAL DAILY
Qty: 30 TABLET | Status: SHIPPED | OUTPATIENT
Start: 2024-10-21

## 2024-10-21 RX ORDER — PRAVASTATIN SODIUM 20 MG
20 TABLET ORAL DAILY
Qty: 90 TABLET | Refills: 1 | Status: SHIPPED | OUTPATIENT
Start: 2024-10-21 | End: 2025-10-16

## 2024-10-21 RX ORDER — ATENOLOL 50 MG/1
50 TABLET ORAL DAILY
Qty: 90 TABLET | Refills: 3 | Status: SHIPPED | OUTPATIENT
Start: 2024-10-21

## 2024-10-21 NOTE — TELEPHONE ENCOUNTER
PCP: Philipp Mullins MD    Last appt: 10/16/2024    Future Appointments   Date Time Provider Department Center   11/6/2024  1:20 PM Philipp Mullins MD [facility] DEP   1/7/2025  9:10 AM Philipp Mullins MD [facility] DEP       Requested Prescriptions     Pending Prescriptions Disp Refills    atenolol (TENORMIN) 50 MG tablet [Pharmacy Med Name: ATENOLOL 50MG TABLETS] 90 tablet 3     Sig: TAKE 1 TABLET BY MOUTH DAILY    valsartan (DIOVAN) 320 MG tablet [Pharmacy Med Name: VALSARTAN 320MG TABLETS] 30 tablet PRN     Sig: TAKE 1 TABLET BY MOUTH DAILY    pravastatin (PRAVACHOL) 20 MG tablet [Pharmacy Med Name: PRAVASTATIN 20MG TABLETS] 90 tablet 1     Sig: TAKE 1 TABLET BY MOUTH DAILY    hydrALAZINE (APRESOLINE) 50 MG tablet [Pharmacy Med Name: HYDRALAZINE  50MG TABLETS] 180 tablet 1     Sig: TAKE 1 TABLET BY MOUTH TWICE DAILY FOR HIGH BLOOD PRESSURE

## 2024-11-01 RX ORDER — VALSARTAN 320 MG/1
320 TABLET ORAL DAILY
Qty: 30 TABLET | Status: SHIPPED | OUTPATIENT
Start: 2024-11-01

## 2024-11-01 RX ORDER — VALSARTAN 160 MG/1
160 TABLET ORAL DAILY
Qty: 30 TABLET | Refills: 5 | Status: SHIPPED | OUTPATIENT
Start: 2024-11-01

## 2024-11-02 PROBLEM — Z00.00 MEDICARE ANNUAL WELLNESS VISIT, SUBSEQUENT: Status: RESOLVED | Noted: 2023-10-04 | Resolved: 2024-11-02

## 2024-11-02 PROBLEM — Z12.5 PROSTATE CANCER SCREENING: Status: RESOLVED | Noted: 2023-10-04 | Resolved: 2024-11-02

## 2024-11-06 ENCOUNTER — OFFICE VISIT (OUTPATIENT)
Facility: CLINIC | Age: 69
End: 2024-11-06

## 2024-11-06 VITALS
RESPIRATION RATE: 16 BRPM | SYSTOLIC BLOOD PRESSURE: 140 MMHG | BODY MASS INDEX: 45.1 KG/M2 | DIASTOLIC BLOOD PRESSURE: 80 MMHG | WEIGHT: 315 LBS | TEMPERATURE: 97.6 F | HEIGHT: 70 IN | OXYGEN SATURATION: 94 % | HEART RATE: 63 BPM

## 2024-11-06 DIAGNOSIS — N18.30 STAGE 3 CHRONIC KIDNEY DISEASE, UNSPECIFIED WHETHER STAGE 3A OR 3B CKD (HCC): ICD-10-CM

## 2024-11-06 DIAGNOSIS — I10 PRIMARY HYPERTENSION: Primary | ICD-10-CM

## 2024-11-06 DIAGNOSIS — R60.9 EDEMA, UNSPECIFIED TYPE: ICD-10-CM

## 2024-11-06 DIAGNOSIS — E66.01 MORBID OBESITY: ICD-10-CM

## 2024-11-06 RX ORDER — VALSARTAN 320 MG/1
320 TABLET ORAL DAILY
Qty: 30 TABLET | Status: SHIPPED | OUTPATIENT
Start: 2024-11-06

## 2024-11-06 NOTE — PROGRESS NOTES
Benjie Brown is a 68 y.o. male     Chief Complaint   Patient presents with    3 week f/u     HTN       BP (!) 140/80 (Site: Left Upper Arm, Position: Sitting, Cuff Size: Large Adult)   Pulse 63   Temp 97.6 °F (36.4 °C) (Oral)   Resp 16   Ht 1.778 m (5' 10\")   Wt (!) 179.5 kg (395 lb 12.8 oz)   SpO2 94%   BMI 56.79 kg/m²     Health Maintenance Due   Topic Date Due    Respiratory Syncytial Virus (RSV) Pregnant or age 60 yrs+ (1 - 1-dose 60+ series) Never done    DTaP/Tdap/Td vaccine (1 - Tdap) 12/02/2014    Shingles vaccine (2 of 3) 01/26/2015    COVID-19 Vaccine (4 - 2023-24 season) 09/01/2024    Diabetic Alb to Cr ratio (uACR) test  10/04/2024         \"Have you been to the ER, urgent care clinic since your last visit?  Hospitalized since your last visit?\"    NO    “Have you seen or consulted any other health care providers outside of [facility] since your last visit?”    NO                    
facility-administered medications for this visit.     Past Medical History:   Diagnosis Date    CKD stage G3b/A1, GFR 30-44 and albumin creatinine ratio <30 mg/g (HCC)     Environmental allergies     GERD (gastroesophageal reflux disease)     Gout 11/22/2017    Hepatic steatosis 11/22/2017    Comments: non-alcoholic    History of chest pain 11/22/2017    Comments: abnl stress cardiolite 2006, subsequent to that, he had a normal cardiac cath    History of diarrhea 11/22/2017    History of dyspnea 11/22/2017    History of pneumonia     Hypercholesterolemia     Hypertension     Insomnia 11/22/2017    Iron deficiency 11/22/2017    Left chronic serous otitis media 11/22/2017    Obesity 11/22/2017    Comments: morbid    Obstructive sleep apnea     wears CPAP     Past Surgical History:   Procedure Laterality Date    COLONOSCOPY N/A 4/30/2021    COLONOSCOPY performed by Patrick Ojeda Jr., MD at [facility] ENDOSCOPY    COLORECTAL SCRN; HI RISK IND  4/30/2021         ORTHOPEDIC SURGERY      right knee     Allergies   Allergen Reactions    Ace Inhibitors Cough     Other reaction(s): Cough       REVIEW OF SYSTEMS:  General: negative for - chills or fever, or weight loss or gain  ENT: negative for - headaches, nasal congestion or tinnitus  Eyes: no blurred or visual changes  Neck: No stiffness or swollen nodes  Respiratory: negative for - cough, hemoptysis, shortness of breath or wheezing  Cardiovascular : negative for - chest pain, palpitations or shortness of breath.  Positive for continued swelling in his ankles which he thinks is improving  Gastrointestinal: negative for - abdominal pain, blood in stools, heartburn or nausea/vomiting.  Positive for constipation which is chronic and unchanged  Genito-Urinary: no dysuria, trouble voiding, or hematuria  Musculoskeletal: negative for - gait disturbance, joint pain, joint stiffness or joint swelling  Neurological: no TIA or stroke symptoms  Hematologic: no bruises, no

## 2024-11-07 LAB
ANION GAP SERPL CALC-SCNC: 6 MMOL/L (ref 2–12)
BUN SERPL-MCNC: 23 MG/DL (ref 6–20)
BUN/CREAT SERPL: 18 (ref 12–20)
CALCIUM SERPL-MCNC: 9.4 MG/DL (ref 8.5–10.1)
CHLORIDE SERPL-SCNC: 103 MMOL/L (ref 97–108)
CO2 SERPL-SCNC: 31 MMOL/L (ref 21–32)
CREAT SERPL-MCNC: 1.26 MG/DL (ref 0.7–1.3)
GLUCOSE SERPL-MCNC: 90 MG/DL (ref 65–100)
POTASSIUM SERPL-SCNC: 3.7 MMOL/L (ref 3.5–5.1)
SODIUM SERPL-SCNC: 140 MMOL/L (ref 136–145)

## 2024-12-05 NOTE — PROGRESS NOTES
Chief Complaint   Patient presents with    1 Month Follow-Up       SUBJECTIVE:    Benjie Brown is a 68 y.o. male who returns in follow-up for his hypertension, hyperlipidemia, morbid obesity and other medical problems.  After long discussion at his last visit we were able to get his medications corrected and he has been taking them correctly and his blood pressure is actually down to normal.  He now has developed a yeast infection in the inguinal area.  He notes no chest pain, shortness of breath, palpitations, PND, orthopnea or other cardiac respiratory complaints.  He notes no current GI or  complaints.  He notes no headaches, dizziness or neurologic complaints.  He has no current active arthritic complaints and there are no other complaints of complete review of systems.      Current Outpatient Medications   Medication Sig Dispense Refill    nystatin (MYCOSTATIN) 348784 UNIT/GM powder Apply 3 times daily. 1 each 3    valsartan (DIOVAN) 320 MG tablet Take 1 tablet by mouth daily 30 tablet PRN    atenolol (TENORMIN) 50 MG tablet TAKE 1 TABLET BY MOUTH DAILY 90 tablet 3    rosuvastatin (CRESTOR) 10 MG tablet Take 1 tablet by mouth daily 90 tablet 1    bumetanide (BUMEX) 2 MG tablet Take 1 tablet by mouth 2 times daily 180 tablet 3    allopurinol (ZYLOPRIM) 300 MG tablet TAKE 1 TABLET BY MOUTH DAILY 90 tablet 1    finasteride (PROSCAR) 5 MG tablet Take 1 tablet by mouth daily      ascorbic acid (VITAMIN C) 1000 MG tablet Take 1 tablet by mouth daily      aspirin 81 MG chewable tablet Take 1 tablet by mouth daily       No current facility-administered medications for this visit.     Past Medical History:   Diagnosis Date    CKD stage G3b/A1, GFR 30-44 and albumin creatinine ratio <30 mg/g (HCC)     Environmental allergies     GERD (gastroesophageal reflux disease)     Gout 11/22/2017    Hepatic steatosis 11/22/2017    Comments: non-alcoholic    History of chest pain 11/22/2017    Comments: abnl stress cardiolite

## 2024-12-06 ENCOUNTER — OFFICE VISIT (OUTPATIENT)
Facility: CLINIC | Age: 69
End: 2024-12-06

## 2024-12-06 VITALS
HEART RATE: 68 BPM | TEMPERATURE: 97.8 F | RESPIRATION RATE: 16 BRPM | SYSTOLIC BLOOD PRESSURE: 130 MMHG | OXYGEN SATURATION: 92 % | WEIGHT: 315 LBS | DIASTOLIC BLOOD PRESSURE: 80 MMHG | HEIGHT: 70 IN | BODY MASS INDEX: 45.1 KG/M2

## 2024-12-06 DIAGNOSIS — E78.2 MIXED HYPERLIPIDEMIA: ICD-10-CM

## 2024-12-06 DIAGNOSIS — B36.9 FUNGAL DERMATITIS: ICD-10-CM

## 2024-12-06 DIAGNOSIS — I10 PRIMARY HYPERTENSION: Primary | ICD-10-CM

## 2024-12-06 DIAGNOSIS — N18.30 STAGE 3 CHRONIC KIDNEY DISEASE, UNSPECIFIED WHETHER STAGE 3A OR 3B CKD (HCC): ICD-10-CM

## 2024-12-06 DIAGNOSIS — E66.01 MORBID OBESITY: ICD-10-CM

## 2024-12-06 RX ORDER — NYSTATIN 100000 [USP'U]/G
POWDER TOPICAL
Qty: 1 EACH | Refills: 3 | Status: SHIPPED | OUTPATIENT
Start: 2024-12-06

## 2024-12-06 NOTE — PROGRESS NOTES
Benjie Brown is a 68 y.o. male     Chief Complaint   Patient presents with    1 Month Follow-Up       /80 (Site: Left Upper Arm, Position: Sitting, Cuff Size: Large Adult)   Pulse 68   Temp 97.8 °F (36.6 °C) (Oral)   Resp 16   Ht 1.778 m (5' 10\")   Wt (!) 181.6 kg (400 lb 6.4 oz)   SpO2 92%   BMI 57.45 kg/m²     Health Maintenance Due   Topic Date Due    Respiratory Syncytial Virus (RSV) Pregnant or age 60 yrs+ (1 - Risk 60-74 years 1-dose series) Never done    DTaP/Tdap/Td vaccine (1 - Tdap) 12/02/2014    Shingles vaccine (2 of 3) 01/26/2015    COVID-19 Vaccine (4 - 2023-24 season) 09/01/2024    Diabetic Alb to Cr ratio (uACR) test  10/04/2024    Diabetic foot exam  01/05/2025         \"Have you been to the ER, urgent care clinic since your last visit?  Hospitalized since your last visit?\"    NO    “Have you seen or consulted any other health care providers outside of [facility] since your last visit?”    NO

## 2024-12-07 LAB
ANION GAP SERPL CALC-SCNC: 4 MMOL/L (ref 2–12)
BUN SERPL-MCNC: 23 MG/DL (ref 6–20)
BUN/CREAT SERPL: 19 (ref 12–20)
CALCIUM SERPL-MCNC: 9.7 MG/DL (ref 8.5–10.1)
CHLORIDE SERPL-SCNC: 102 MMOL/L (ref 97–108)
CO2 SERPL-SCNC: 32 MMOL/L (ref 21–32)
CREAT SERPL-MCNC: 1.19 MG/DL (ref 0.7–1.3)
GLUCOSE SERPL-MCNC: 93 MG/DL (ref 65–100)
POTASSIUM SERPL-SCNC: 3.6 MMOL/L (ref 3.5–5.1)
SODIUM SERPL-SCNC: 138 MMOL/L (ref 136–145)

## 2025-01-09 ENCOUNTER — TELEPHONE (OUTPATIENT)
Age: 70
End: 2025-01-09

## 2025-01-09 NOTE — TELEPHONE ENCOUNTER
Spoke with Dr Greer office to get clarification of reason for referral. They are sending a  message to Dr Greer nurse to have her call me back  Spoke with patient and he said he is unsure of why he is coming but said that Dr Herman said he had a spot on his kidney and was asking questions about his stomach. He saw Dr Ojeda a while ago and was told everything was ok. I let the patient know that he needs to go to [place] where he had his imaging done and have them put it on a CD that he can bring to our office at the time of his appt. He said he was going to call and if he had any problems he would call me back.

## 2025-01-13 NOTE — TELEPHONE ENCOUNTER
Spoke with Mr Stephanie, he has not been able to get his imaging on a CD yet. I let him know that Dr Cash would like for him to have that with him so if he is not able to get it in time for his appt we would need to reschedule. He said he understood

## 2025-01-15 RX ORDER — ALLOPURINOL 300 MG/1
300 TABLET ORAL DAILY
Qty: 90 TABLET | Refills: 1 | Status: SHIPPED | OUTPATIENT
Start: 2025-01-15

## 2025-01-15 RX ORDER — ROSUVASTATIN CALCIUM 10 MG/1
10 TABLET, COATED ORAL DAILY
Qty: 90 TABLET | Refills: 1 | Status: SHIPPED | OUTPATIENT
Start: 2025-01-15

## 2025-01-15 NOTE — TELEPHONE ENCOUNTER
RX refill request from the patient/pharmacy. Patient last seen 12- with labs, and next appt. scheduled for 02-  Requested Prescriptions     Pending Prescriptions Disp Refills    allopurinol (ZYLOPRIM) 300 MG tablet [Pharmacy Med Name: ALLOPURINOL 300MG TABLETS] 90 tablet 1     Sig: TAKE 1 TABLET BY MOUTH DAILY    .

## 2025-01-15 NOTE — TELEPHONE ENCOUNTER
RX refill request from the patient/pharmacy. Patient last seen 12- with labs, and next appt. scheduled for 02-  Requested Prescriptions     Pending Prescriptions Disp Refills    rosuvastatin (CRESTOR) 10 MG tablet [Pharmacy Med Name: ROSUVASTATIN 10MG TABLETS] 90 tablet 1     Sig: TAKE 1 TABLET BY MOUTH DAILY    .

## 2025-01-22 ENCOUNTER — TELEPHONE (OUTPATIENT)
Age: 70
End: 2025-01-22

## 2025-01-22 NOTE — TELEPHONE ENCOUNTER
Call out to patient to see if he has been able to get the CD and report of his imaging yet, Mr Stephanie said he does have the CD and the report

## 2025-01-22 NOTE — TELEPHONE ENCOUNTER
Spoke with Mr Brown and he said he has his imaging on a CD and the report. He will bring it to his appt Monday

## 2025-01-27 ENCOUNTER — OFFICE VISIT (OUTPATIENT)
Age: 70
End: 2025-01-27
Payer: MEDICARE

## 2025-01-27 ENCOUNTER — PREP FOR PROCEDURE (OUTPATIENT)
Age: 70
End: 2025-01-27

## 2025-01-27 VITALS
OXYGEN SATURATION: 93 % | BODY MASS INDEX: 45.1 KG/M2 | DIASTOLIC BLOOD PRESSURE: 80 MMHG | TEMPERATURE: 98.1 F | RESPIRATION RATE: 18 BRPM | WEIGHT: 315 LBS | HEART RATE: 62 BPM | SYSTOLIC BLOOD PRESSURE: 121 MMHG | HEIGHT: 70 IN

## 2025-01-27 DIAGNOSIS — K63.89 MESENTERIC MASS: ICD-10-CM

## 2025-01-27 DIAGNOSIS — K63.89 MESENTERIC MASS: Primary | ICD-10-CM

## 2025-01-27 PROCEDURE — 1036F TOBACCO NON-USER: CPT | Performed by: SURGERY

## 2025-01-27 PROCEDURE — 3079F DIAST BP 80-89 MM HG: CPT | Performed by: SURGERY

## 2025-01-27 PROCEDURE — 1126F AMNT PAIN NOTED NONE PRSNT: CPT | Performed by: SURGERY

## 2025-01-27 PROCEDURE — 3017F COLORECTAL CA SCREEN DOC REV: CPT | Performed by: SURGERY

## 2025-01-27 PROCEDURE — 1160F RVW MEDS BY RX/DR IN RCRD: CPT | Performed by: SURGERY

## 2025-01-27 PROCEDURE — 1159F MED LIST DOCD IN RCRD: CPT | Performed by: SURGERY

## 2025-01-27 PROCEDURE — 3074F SYST BP LT 130 MM HG: CPT | Performed by: SURGERY

## 2025-01-27 PROCEDURE — 1123F ACP DISCUSS/DSCN MKR DOCD: CPT | Performed by: SURGERY

## 2025-01-27 PROCEDURE — 99205 OFFICE O/P NEW HI 60 MIN: CPT | Performed by: SURGERY

## 2025-01-27 PROCEDURE — G8417 CALC BMI ABV UP PARAM F/U: HCPCS | Performed by: SURGERY

## 2025-01-27 PROCEDURE — G8427 DOCREV CUR MEDS BY ELIG CLIN: HCPCS | Performed by: SURGERY

## 2025-01-27 ASSESSMENT — PATIENT HEALTH QUESTIONNAIRE - PHQ9
SUM OF ALL RESPONSES TO PHQ QUESTIONS 1-9: 0
2. FEELING DOWN, DEPRESSED OR HOPELESS: NOT AT ALL
SUM OF ALL RESPONSES TO PHQ QUESTIONS 1-9: 0
SUM OF ALL RESPONSES TO PHQ9 QUESTIONS 1 & 2: 0
1. LITTLE INTEREST OR PLEASURE IN DOING THINGS: NOT AT ALL
SUM OF ALL RESPONSES TO PHQ QUESTIONS 1-9: 0
SUM OF ALL RESPONSES TO PHQ QUESTIONS 1-9: 0

## 2025-01-27 NOTE — H&P (VIEW-ONLY)
Benjie Brown (:  1955) is a 69 y.o. male, here for evaluation of the following chief complaint(s):  Mass (Seen at the request of Dr. Homer Herman for evaluation of possible renal mass.)      Assessment & Plan   ASSESSMENT/PLAN:  1. 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  We discussed the differential.  Benign versus malignant.  Calcified lymph node.  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    I had an extensive discussion with him regarding the risks, benefits, and alternatives of proceeding with a(n) Laparoscopic excision, robot assisted.  This may require a segmental bowel resection. Risks,benefits, and alternatives were discussed including the risk of anesthesia, bleeding, infection, injury to bowel, anastomotic stricture and/or leak, incomplete resection, need for further surgery, and recurrence were discussed.    We discussed his increased risk factors including: obese. These related to perioperative morbidity including the increased risk of anesthesia complication and wound infection, and wound breakdown.  And more difficulty in locating the mass. He was counceled on wt loss.  He is motivated to lose weight.  We have provided him the information for the weight management center.      I reviewed with Benjie Brown his increased risk of bleeding secondary to Asprin use.  I have asked him to discontinue the Asprin for 2 days prior to surgery to reduce this risk.    They expressed understanding of our discussion and are agreeable with the plan.  Will schedule him at his earliest convenience.      Thank you for this consult.            Subjective   HPI:  HPI    Tolerating p.o.  Currently no pain.  No nausea or vomiting.  Reports normal bowel movements.      Wt not changed. 400 lbs BMI 57  Colonoscopy about 5 yrs ago, no polyps    No shortness of breath.    81 mg ASA  Echo in  EF 55%    Reviewed and independently interpreted the most recent MRI that was done in December

## 2025-01-27 NOTE — PROGRESS NOTES
Identified pt with two pt identifiers (name and ). Reviewed chart in preparation for visit and have obtained necessary documentation.    Benjie Brown is a 69 y.o. male Mass (Seen at the request of Dr. Homer Herman for evaluation of possible renal mass.)  .    Vitals:    25 1304   BP: 121/80   Site: Left Upper Arm   Position: Sitting   Cuff Size: Large Adult   Pulse: 62   Resp: 18   Temp: 98.1 °F (36.7 °C)   TempSrc: Oral   SpO2: 93%   Weight: (!) 181.7 kg (400 lb 9.6 oz)   Height: 1.778 m (5' 10\")          1. Have you been to the ER, urgent care clinic since your last visit?  Hospitalized since your last visit?  no     2. Have you seen or consulted any other health care providers outside of the [facility] since your last visit?  Include any pap smears or colon screening.  no

## 2025-01-28 ASSESSMENT — ENCOUNTER SYMPTOMS
SHORTNESS OF BREATH: 0
BLOOD IN STOOL: 0
EYE PAIN: 0

## 2025-01-31 NOTE — PROGRESS NOTES
Chief Complaint   Patient presents with    Hypertension    Diabetes    Hyperlipidemia       SUBJECTIVE:    Benjie Brown is a 69 y.o. male who returns in follow-up of his medical problems include hypertension, hyperlipidemia, diabetes, sleep apnea, GERD, morbid obesity, CKD stage III and other multiple medical problems.  Dr. Herman his urologist recently did an MRI for follow-up of a renal mass that he has been following and there is a 4 cm mass in the lower abdomen that seems to be in the omentum.  He is scheduled for surgery for that.  He currently notes no chest pain, shortness of breath or cardiac respiratory complaints.  There are no current GI or  complaints.  He has no headaches, dizziness or neurologic complaints.  He has no current active arthritic complaints and there are no other complaints on complete review of systems.      Current Outpatient Medications   Medication Sig Dispense Refill    allopurinol (ZYLOPRIM) 300 MG tablet TAKE 1 TABLET BY MOUTH DAILY 90 tablet 1    rosuvastatin (CRESTOR) 10 MG tablet TAKE 1 TABLET BY MOUTH DAILY 90 tablet 1    valsartan (DIOVAN) 320 MG tablet Take 1 tablet by mouth daily 30 tablet PRN    atenolol (TENORMIN) 50 MG tablet TAKE 1 TABLET BY MOUTH DAILY 90 tablet 3    bumetanide (BUMEX) 2 MG tablet Take 1 tablet by mouth 2 times daily 180 tablet 3    finasteride (PROSCAR) 5 MG tablet Take 1 tablet by mouth daily      ascorbic acid (VITAMIN C) 1000 MG tablet Take 1 tablet by mouth daily      aspirin 81 MG chewable tablet Take 1 tablet by mouth daily       No current facility-administered medications for this visit.     Past Medical History:   Diagnosis Date    BPH (benign prostatic hyperplasia)     without outflow obstruction    CKD stage G3b/A1, GFR 30-44 and albumin creatinine ratio <30 mg/g (HCC)     Constipation     Environmental allergies     GERD (gastroesophageal reflux disease)     Gout 11/22/2017    Hepatic steatosis 11/22/2017    Comments: non-alcoholic

## 2025-02-03 ENCOUNTER — OFFICE VISIT (OUTPATIENT)
Facility: CLINIC | Age: 70
End: 2025-02-03
Payer: MEDICARE

## 2025-02-03 VITALS
HEART RATE: 69 BPM | HEIGHT: 70 IN | WEIGHT: 315 LBS | SYSTOLIC BLOOD PRESSURE: 122 MMHG | TEMPERATURE: 97.4 F | DIASTOLIC BLOOD PRESSURE: 78 MMHG | BODY MASS INDEX: 45.1 KG/M2

## 2025-02-03 DIAGNOSIS — I10 PRIMARY HYPERTENSION: Primary | ICD-10-CM

## 2025-02-03 DIAGNOSIS — E78.2 MIXED HYPERLIPIDEMIA: ICD-10-CM

## 2025-02-03 DIAGNOSIS — E66.01 MORBID OBESITY: ICD-10-CM

## 2025-02-03 DIAGNOSIS — E11.9 DIABETES MELLITUS TYPE 2, DIET-CONTROLLED (HCC): ICD-10-CM

## 2025-02-03 DIAGNOSIS — K21.9 GASTROESOPHAGEAL REFLUX DISEASE WITHOUT ESOPHAGITIS: ICD-10-CM

## 2025-02-03 DIAGNOSIS — E11.40 NEUROPATHY DUE TO TYPE 2 DIABETES MELLITUS (HCC): ICD-10-CM

## 2025-02-03 DIAGNOSIS — I73.9 PERIPHERAL VASCULAR DISEASE (HCC): ICD-10-CM

## 2025-02-03 DIAGNOSIS — N18.30 STAGE 3 CHRONIC KIDNEY DISEASE, UNSPECIFIED WHETHER STAGE 3A OR 3B CKD (HCC): ICD-10-CM

## 2025-02-03 LAB
ALBUMIN SERPL-MCNC: 3.6 G/DL (ref 3.5–5)
ALBUMIN/GLOB SERPL: 1 (ref 1.1–2.2)
ALP SERPL-CCNC: 107 U/L (ref 45–117)
ALT SERPL-CCNC: 33 U/L (ref 12–78)
ANION GAP SERPL CALC-SCNC: 8 MMOL/L (ref 2–12)
AST SERPL-CCNC: 20 U/L (ref 15–37)
BILIRUB SERPL-MCNC: 0.5 MG/DL (ref 0.2–1)
BUN SERPL-MCNC: 31 MG/DL (ref 6–20)
BUN/CREAT SERPL: 21 (ref 12–20)
CALCIUM SERPL-MCNC: 9.5 MG/DL (ref 8.5–10.1)
CHLORIDE SERPL-SCNC: 103 MMOL/L (ref 97–108)
CHOLEST SERPL-MCNC: 167 MG/DL
CK SERPL-CCNC: 300 U/L (ref 39–308)
CO2 SERPL-SCNC: 29 MMOL/L (ref 21–32)
CREAT SERPL-MCNC: 1.5 MG/DL (ref 0.7–1.3)
EST. AVERAGE GLUCOSE BLD GHB EST-MCNC: 123 MG/DL
GLOBULIN SER CALC-MCNC: 3.6 G/DL (ref 2–4)
GLUCOSE SERPL-MCNC: 109 MG/DL (ref 65–100)
HBA1C MFR BLD: 5.9 % (ref 4–5.6)
HDLC SERPL-MCNC: 46 MG/DL
HDLC SERPL: 3.6 (ref 0–5)
LDLC SERPL CALC-MCNC: 87.4 MG/DL (ref 0–100)
POTASSIUM SERPL-SCNC: 3.7 MMOL/L (ref 3.5–5.1)
PROT SERPL-MCNC: 7.2 G/DL (ref 6.4–8.2)
SODIUM SERPL-SCNC: 140 MMOL/L (ref 136–145)
TRIGL SERPL-MCNC: 168 MG/DL
VLDLC SERPL CALC-MCNC: 33.6 MG/DL

## 2025-02-03 PROCEDURE — 3017F COLORECTAL CA SCREEN DOC REV: CPT | Performed by: INTERNAL MEDICINE

## 2025-02-03 PROCEDURE — 3074F SYST BP LT 130 MM HG: CPT | Performed by: INTERNAL MEDICINE

## 2025-02-03 PROCEDURE — G8417 CALC BMI ABV UP PARAM F/U: HCPCS | Performed by: INTERNAL MEDICINE

## 2025-02-03 PROCEDURE — 3078F DIAST BP <80 MM HG: CPT | Performed by: INTERNAL MEDICINE

## 2025-02-03 PROCEDURE — 1160F RVW MEDS BY RX/DR IN RCRD: CPT | Performed by: INTERNAL MEDICINE

## 2025-02-03 PROCEDURE — 1123F ACP DISCUSS/DSCN MKR DOCD: CPT | Performed by: INTERNAL MEDICINE

## 2025-02-03 PROCEDURE — 1126F AMNT PAIN NOTED NONE PRSNT: CPT | Performed by: INTERNAL MEDICINE

## 2025-02-03 PROCEDURE — 99214 OFFICE O/P EST MOD 30 MIN: CPT | Performed by: INTERNAL MEDICINE

## 2025-02-03 PROCEDURE — 3046F HEMOGLOBIN A1C LEVEL >9.0%: CPT | Performed by: INTERNAL MEDICINE

## 2025-02-03 PROCEDURE — 1036F TOBACCO NON-USER: CPT | Performed by: INTERNAL MEDICINE

## 2025-02-03 PROCEDURE — G8427 DOCREV CUR MEDS BY ELIG CLIN: HCPCS | Performed by: INTERNAL MEDICINE

## 2025-02-03 PROCEDURE — 1159F MED LIST DOCD IN RCRD: CPT | Performed by: INTERNAL MEDICINE

## 2025-02-03 PROCEDURE — 2022F DILAT RTA XM EVC RTNOPTHY: CPT | Performed by: INTERNAL MEDICINE

## 2025-02-03 NOTE — PROGRESS NOTES
\"Have you been to the ER, urgent care clinic since your last visit?  Hospitalized since your last visit?\"    NO    “Have you seen or consulted any other health care providers outside our system since your last visit?”    YES - When: approximately 2 months ago.  Where and Why: pt saw Dr Homer Herman at [facility] for a renal mass.

## 2025-02-04 ENCOUNTER — TELEPHONE (OUTPATIENT)
Age: 70
End: 2025-02-04

## 2025-02-04 ENCOUNTER — HOSPITAL ENCOUNTER (OUTPATIENT)
Facility: HOSPITAL | Age: 70
Discharge: HOME | End: 2025-02-07
Payer: MEDICARE

## 2025-02-04 VITALS
TEMPERATURE: 97.6 F | RESPIRATION RATE: 18 BRPM | DIASTOLIC BLOOD PRESSURE: 75 MMHG | SYSTOLIC BLOOD PRESSURE: 128 MMHG | HEART RATE: 58 BPM | WEIGHT: 315 LBS | OXYGEN SATURATION: 100 % | BODY MASS INDEX: 44.1 KG/M2 | HEIGHT: 71 IN

## 2025-02-04 LAB
ABO + RH BLD: NORMAL
BLOOD GROUP ANTIBODIES SERPL: NORMAL
ERYTHROCYTE [DISTWIDTH] IN BLOOD BY AUTOMATED COUNT: 15.1 % (ref 11.5–14.5)
HCT VFR BLD AUTO: 40.7 % (ref 36.6–50.3)
HGB BLD-MCNC: 13.1 G/DL (ref 12.1–17)
MCH RBC QN AUTO: 31 PG (ref 26–34)
MCHC RBC AUTO-ENTMCNC: 32.2 G/DL (ref 30–36.5)
MCV RBC AUTO: 96.2 FL (ref 80–99)
NRBC # BLD: 0 K/UL (ref 0–0.01)
NRBC BLD-RTO: 0 PER 100 WBC
PLATELET # BLD AUTO: 181 K/UL (ref 150–400)
PMV BLD AUTO: 10.7 FL (ref 8.9–12.9)
RBC # BLD AUTO: 4.23 M/UL (ref 4.1–5.7)
SPECIMEN EXP DATE BLD: NORMAL
WBC # BLD AUTO: 8.2 K/UL (ref 4.1–11.1)

## 2025-02-04 PROCEDURE — 36415 COLL VENOUS BLD VENIPUNCTURE: CPT

## 2025-02-04 PROCEDURE — 86901 BLOOD TYPING SEROLOGIC RH(D): CPT

## 2025-02-04 PROCEDURE — 86850 RBC ANTIBODY SCREEN: CPT

## 2025-02-04 PROCEDURE — 86900 BLOOD TYPING SEROLOGIC ABO: CPT

## 2025-02-04 PROCEDURE — 85027 COMPLETE CBC AUTOMATED: CPT

## 2025-02-04 RX ORDER — SODIUM CHLORIDE, SODIUM LACTATE, POTASSIUM CHLORIDE, CALCIUM CHLORIDE 600; 310; 30; 20 MG/100ML; MG/100ML; MG/100ML; MG/100ML
INJECTION, SOLUTION INTRAVENOUS CONTINUOUS
OUTPATIENT
Start: 2025-02-10

## 2025-02-04 ASSESSMENT — PAIN SCALES - GENERAL: PAINLEVEL_OUTOF10: 0

## 2025-02-04 NOTE — TELEPHONE ENCOUNTER
Spoke with Ronda from [facility] to let her know that Dr. Cash had updated his order to show ABT Flagyl 500mg IV AND Ancef 1gm IV. Informed her that I also sent a fax with updated order.Confirmation received.

## 2025-02-04 NOTE — PROGRESS NOTES
CMP done on 2/3/25 and results in Epic.  
 Wear your hair loose or down, no ponytails, buns, gagan pins or clips.  All body piercings must be removed. Please do not shave for 48 hours prior to surgery. Shaving of the face is acceptable. Please see the attached Soap/Hibiclens bathing instructions.    7. You should understand that if you do not follow these instructions your surgery may be cancelled.  If your physical condition changes (I.e. fever, cold or flu) please contact your surgeon as soon as possible.    8. It is important that you be on time.  If a situation occurs where you may be late, please call [phone] (OR Holding Area).    9. If you have any questions and or problems, please call [phone] (Pre-admission Testing).    10. Your surgery time may be subject to change.  You will receive a phone call the evening prior if your time changes.    11.  If having outpatient surgery, you must have someone to drive you here, stay with you during the duration of your stay, and to drive you home at time of discharge.       SPECIAL INSTRUCTIONS: per Dr. Cash-stop Aspirin 2 days prior to surgery      TAKE ALL MEDICATIONS DAY OF SURGERY EXCEPT:no exceptions      I understand a pre-operative phone call will be made to verify my surgery time.  In the event that I am not available, I give permission for a message to be left on my answering service and/or with another person?  yes         ___________________      __________   _________    (Signature of Patient)             (Witness)                (Date and Time)  
surgery:  Shower again thoroughly with an antibacterial soap, such as Dial or the soap provided at your preassessment appointment. If needed, ask someone for help to reach all areas of your body. Don’t forget to clean your belly button! Rinse.  With bottle of Hibiclens/Chlorhexidine in hand, turn water off.  Apply Hibiclens antiseptic skin cleanser with a clean, freshly washed washcloth.  Gently apply to your body from chin to toes (except the genital area) and especially the area(s) where your incision(s) will be.  Leave Hibiclens/Chlorhexidine on your skin for at least 20 seconds.     CAUTION: If needed, Hibiclens/chlorhexidine may be used to clean the folds of skin of the legs (such as in the area of the groin) and on your buttocks and hips. However, do not use Hibiclens/Chlorhexidine above the neck or in the genital area (your bottom) or put inside any area of your body.  Turn the water back on and rinse.  Dry gently with a clean, freshly washed towel.  After your shower, do not use any powder, deodorant, perfumes or lotion prior to surgery.  Put on clean, freshly washed clothing.    Tips to help prevent infections after your surgery:  Protect your surgical wound from germs:  Hand washing is the most important thing you and your caregivers can do to prevent infections.  Keep your bandage clean and dry!  Do not touch your surgical wound.  Use clean, freshly washed towels and washcloths every time you shower; do not share bath linens with others.  Until your surgical wound is healed, wear clothing and sleep on bed linens each day that are clean and freshly washed.  Do not allow pets to sleep in your bed with you or touch your surgical wound.  Do not smoke - smoking delays wound healing. This may be a good time to stop smoking.  If you have diabetes, it is important for you to manage your blood sugar levels properly before your surgery as well as after your surgery. Poorly managed blood sugar levels slow down wound

## 2025-02-04 NOTE — TELEPHONE ENCOUNTER
Ronda salas [facility] called pre op order does not have antibiotic checked please resend orders with antibiotic checked    Cb     Fax new order to

## 2025-02-04 NOTE — TELEPHONE ENCOUNTER
Patients wife called asking to speak to Dr Cash regarding her 's upcoming surgery. She has questions about the surgery,which is  next Monday, 02/10

## 2025-02-05 NOTE — PERIOP NOTE
Spoke to Rebekah in Dr. Cash's office asking if the patient needed a bowel prep.  She states no prep needed.

## 2025-02-06 ENCOUNTER — TELEPHONE (OUTPATIENT)
Age: 70
End: 2025-02-06

## 2025-02-06 NOTE — TELEPHONE ENCOUNTER
Kayla called to see what dose Dr Cash wanted the patient to receive of Ancef. I spoke with Dr Vasques since Dr Cash is off and she said to give 3 gm of Ancef along with the Flagyl. Called and left a message with PAT and refaxed the order

## 2025-02-06 NOTE — PERIOP NOTE
Spoke to Rebekah in Dr. Cash's office.  Patient's weights 184kg, per Dr. Layo hines to change Ancef to 3gms iv with Flagyl 500mg Iv in preop.

## 2025-02-06 NOTE — TELEPHONE ENCOUNTER
Called to speak with Gertrude Brown, wife, she had several questions related to patients surgery. Dr Vasques spoke with her and answered her questions.  
Eloy stated that patients wife had called and wanted a return call. Placed call and had to leave a voicemessage  
children

## 2025-02-07 ENCOUNTER — TELEPHONE (OUTPATIENT)
Age: 70
End: 2025-02-07

## 2025-02-07 NOTE — TELEPHONE ENCOUNTER
Spoke with patient and reminded him to hold his Asa tomorrow until after his surgery. He said he understood

## 2025-02-10 ENCOUNTER — HOSPITAL ENCOUNTER (OUTPATIENT)
Facility: HOSPITAL | Age: 70
Setting detail: OBSERVATION
Discharge: HOME | End: 2025-02-11
Attending: SURGERY | Admitting: SURGERY
Payer: MEDICARE

## 2025-02-10 ENCOUNTER — ANESTHESIA (OUTPATIENT)
Facility: HOSPITAL | Age: 70
End: 2025-02-10
Payer: MEDICARE

## 2025-02-10 ENCOUNTER — ANESTHESIA EVENT (OUTPATIENT)
Facility: HOSPITAL | Age: 70
End: 2025-02-10
Payer: MEDICARE

## 2025-02-10 DIAGNOSIS — R19.00 RETROPERITONEAL MASS: Primary | ICD-10-CM

## 2025-02-10 DIAGNOSIS — K63.89 MESENTERIC MASS: ICD-10-CM

## 2025-02-10 PROCEDURE — 2500000003 HC RX 250 WO HCPCS

## 2025-02-10 PROCEDURE — 6360000002 HC RX W HCPCS: Performed by: SURGERY

## 2025-02-10 PROCEDURE — 6370000000 HC RX 637 (ALT 250 FOR IP): Performed by: SURGERY

## 2025-02-10 PROCEDURE — 49321 LAPAROSCOPY BIOPSY: CPT | Performed by: SURGERY

## 2025-02-10 PROCEDURE — 2580000003 HC RX 258: Performed by: ANESTHESIOLOGY

## 2025-02-10 PROCEDURE — 2709999900 HC NON-CHARGEABLE SUPPLY: Performed by: SURGERY

## 2025-02-10 PROCEDURE — 2580000003 HC RX 258

## 2025-02-10 PROCEDURE — 3700000000 HC ANESTHESIA ATTENDED CARE: Performed by: SURGERY

## 2025-02-10 PROCEDURE — C1713 ANCHOR/SCREW BN/BN,TIS/BN: HCPCS | Performed by: SURGERY

## 2025-02-10 PROCEDURE — S2900 ROBOTIC SURGICAL SYSTEM: HCPCS | Performed by: SURGERY

## 2025-02-10 PROCEDURE — 3600000019 HC SURGERY ROBOT ADDTL 15MIN: Performed by: SURGERY

## 2025-02-10 PROCEDURE — 3700000001 HC ADD 15 MINUTES (ANESTHESIA): Performed by: SURGERY

## 2025-02-10 PROCEDURE — 3600000009 HC SURGERY ROBOT BASE: Performed by: SURGERY

## 2025-02-10 PROCEDURE — 88341 IMHCHEM/IMCYTCHM EA ADD ANTB: CPT

## 2025-02-10 PROCEDURE — 88342 IMHCHEM/IMCYTCHM 1ST ANTB: CPT

## 2025-02-10 PROCEDURE — 2500000003 HC RX 250 WO HCPCS: Performed by: SURGERY

## 2025-02-10 PROCEDURE — 6360000002 HC RX W HCPCS

## 2025-02-10 PROCEDURE — 7100000000 HC PACU RECOVERY - FIRST 15 MIN: Performed by: SURGERY

## 2025-02-10 PROCEDURE — G0378 HOSPITAL OBSERVATION PER HR: HCPCS

## 2025-02-10 PROCEDURE — 88305 TISSUE EXAM BY PATHOLOGIST: CPT

## 2025-02-10 PROCEDURE — 2720000010 HC SURG SUPPLY STERILE: Performed by: SURGERY

## 2025-02-10 PROCEDURE — 7100000001 HC PACU RECOVERY - ADDTL 15 MIN: Performed by: SURGERY

## 2025-02-10 RX ORDER — BUMETANIDE 1 MG/1
2 TABLET ORAL 2 TIMES DAILY
Status: DISCONTINUED | OUTPATIENT
Start: 2025-02-10 | End: 2025-02-11 | Stop reason: HOSPADM

## 2025-02-10 RX ORDER — FENTANYL CITRATE 50 UG/ML
50 INJECTION, SOLUTION INTRAMUSCULAR; INTRAVENOUS EVERY 5 MIN PRN
Status: DISCONTINUED | OUTPATIENT
Start: 2025-02-10 | End: 2025-02-10 | Stop reason: HOSPADM

## 2025-02-10 RX ORDER — DEXAMETHASONE SODIUM PHOSPHATE 4 MG/ML
INJECTION, SOLUTION INTRA-ARTICULAR; INTRALESIONAL; INTRAMUSCULAR; INTRAVENOUS; SOFT TISSUE
Status: DISCONTINUED | OUTPATIENT
Start: 2025-02-10 | End: 2025-02-10 | Stop reason: SDUPTHER

## 2025-02-10 RX ORDER — ONDANSETRON 2 MG/ML
INJECTION INTRAMUSCULAR; INTRAVENOUS
Status: DISCONTINUED | OUTPATIENT
Start: 2025-02-10 | End: 2025-02-10 | Stop reason: SDUPTHER

## 2025-02-10 RX ORDER — SODIUM CHLORIDE 0.9 % (FLUSH) 0.9 %
5-40 SYRINGE (ML) INJECTION EVERY 12 HOURS SCHEDULED
Status: DISCONTINUED | OUTPATIENT
Start: 2025-02-10 | End: 2025-02-10 | Stop reason: HOSPADM

## 2025-02-10 RX ORDER — ONDANSETRON 2 MG/ML
4 INJECTION INTRAMUSCULAR; INTRAVENOUS ONCE
Status: DISCONTINUED | OUTPATIENT
Start: 2025-02-10 | End: 2025-02-10 | Stop reason: HOSPADM

## 2025-02-10 RX ORDER — LIDOCAINE HYDROCHLORIDE 20 MG/ML
INJECTION, SOLUTION EPIDURAL; INFILTRATION; INTRACAUDAL; PERINEURAL
Status: DISCONTINUED | OUTPATIENT
Start: 2025-02-10 | End: 2025-02-10 | Stop reason: SDUPTHER

## 2025-02-10 RX ORDER — POTASSIUM CHLORIDE 1500 MG/1
40 TABLET, EXTENDED RELEASE ORAL PRN
Status: DISCONTINUED | OUTPATIENT
Start: 2025-02-10 | End: 2025-02-11 | Stop reason: HOSPADM

## 2025-02-10 RX ORDER — SODIUM CHLORIDE 9 MG/ML
INJECTION, SOLUTION INTRAVENOUS PRN
Status: DISCONTINUED | OUTPATIENT
Start: 2025-02-10 | End: 2025-02-10 | Stop reason: HOSPADM

## 2025-02-10 RX ORDER — FENTANYL CITRATE 50 UG/ML
INJECTION, SOLUTION INTRAMUSCULAR; INTRAVENOUS
Status: DISCONTINUED | OUTPATIENT
Start: 2025-02-10 | End: 2025-02-10 | Stop reason: SDUPTHER

## 2025-02-10 RX ORDER — NALOXONE HYDROCHLORIDE 0.4 MG/ML
INJECTION, SOLUTION INTRAMUSCULAR; INTRAVENOUS; SUBCUTANEOUS PRN
Status: DISCONTINUED | OUTPATIENT
Start: 2025-02-10 | End: 2025-02-10 | Stop reason: HOSPADM

## 2025-02-10 RX ORDER — ONDANSETRON 2 MG/ML
4 INJECTION INTRAMUSCULAR; INTRAVENOUS
Status: DISCONTINUED | OUTPATIENT
Start: 2025-02-10 | End: 2025-02-10 | Stop reason: HOSPADM

## 2025-02-10 RX ORDER — SODIUM CHLORIDE 9 MG/ML
INJECTION, SOLUTION INTRAVENOUS PRN
Status: DISCONTINUED | OUTPATIENT
Start: 2025-02-10 | End: 2025-02-11 | Stop reason: HOSPADM

## 2025-02-10 RX ORDER — SODIUM CHLORIDE 0.9 % (FLUSH) 0.9 %
5-40 SYRINGE (ML) INJECTION EVERY 12 HOURS SCHEDULED
Status: DISCONTINUED | OUTPATIENT
Start: 2025-02-10 | End: 2025-02-11 | Stop reason: HOSPADM

## 2025-02-10 RX ORDER — MIDAZOLAM HYDROCHLORIDE 5 MG/5ML
5 INJECTION, SOLUTION INTRAMUSCULAR; INTRAVENOUS
Status: DISCONTINUED | OUTPATIENT
Start: 2025-02-10 | End: 2025-02-10 | Stop reason: HOSPADM

## 2025-02-10 RX ORDER — SODIUM CHLORIDE 0.9 % (FLUSH) 0.9 %
5-40 SYRINGE (ML) INJECTION PRN
Status: DISCONTINUED | OUTPATIENT
Start: 2025-02-10 | End: 2025-02-10 | Stop reason: HOSPADM

## 2025-02-10 RX ORDER — HYDROMORPHONE HYDROCHLORIDE 1 MG/ML
0.5 INJECTION, SOLUTION INTRAMUSCULAR; INTRAVENOUS; SUBCUTANEOUS
Status: DISCONTINUED | OUTPATIENT
Start: 2025-02-10 | End: 2025-02-11 | Stop reason: HOSPADM

## 2025-02-10 RX ORDER — VALSARTAN 160 MG/1
320 TABLET ORAL DAILY
Status: DISCONTINUED | OUTPATIENT
Start: 2025-02-10 | End: 2025-02-11 | Stop reason: HOSPADM

## 2025-02-10 RX ORDER — BUPIVACAINE HYDROCHLORIDE 5 MG/ML
INJECTION, SOLUTION EPIDURAL; INTRACAUDAL PRN
Status: DISCONTINUED | OUTPATIENT
Start: 2025-02-10 | End: 2025-02-10 | Stop reason: ALTCHOICE

## 2025-02-10 RX ORDER — SODIUM CHLORIDE 9 MG/ML
INJECTION, SOLUTION INTRAVENOUS CONTINUOUS
Status: DISCONTINUED | OUTPATIENT
Start: 2025-02-10 | End: 2025-02-10 | Stop reason: HOSPADM

## 2025-02-10 RX ORDER — SODIUM CHLORIDE, SODIUM LACTATE, POTASSIUM CHLORIDE, CALCIUM CHLORIDE 600; 310; 30; 20 MG/100ML; MG/100ML; MG/100ML; MG/100ML
INJECTION, SOLUTION INTRAVENOUS
Status: DISCONTINUED | OUTPATIENT
Start: 2025-02-10 | End: 2025-02-10 | Stop reason: SDUPTHER

## 2025-02-10 RX ORDER — SODIUM CHLORIDE, SODIUM LACTATE, POTASSIUM CHLORIDE, CALCIUM CHLORIDE 600; 310; 30; 20 MG/100ML; MG/100ML; MG/100ML; MG/100ML
INJECTION, SOLUTION INTRAVENOUS CONTINUOUS
Status: DISCONTINUED | OUTPATIENT
Start: 2025-02-10 | End: 2025-02-10 | Stop reason: HOSPADM

## 2025-02-10 RX ORDER — FENTANYL CITRATE 50 UG/ML
100 INJECTION, SOLUTION INTRAMUSCULAR; INTRAVENOUS
Status: DISCONTINUED | OUTPATIENT
Start: 2025-02-10 | End: 2025-02-10 | Stop reason: HOSPADM

## 2025-02-10 RX ORDER — LIDOCAINE HYDROCHLORIDE AND EPINEPHRINE 10; 10 MG/ML; UG/ML
INJECTION, SOLUTION INFILTRATION; PERINEURAL PRN
Status: DISCONTINUED | OUTPATIENT
Start: 2025-02-10 | End: 2025-02-10 | Stop reason: ALTCHOICE

## 2025-02-10 RX ORDER — ESMOLOL HYDROCHLORIDE 10 MG/ML
INJECTION INTRAVENOUS
Status: DISCONTINUED | OUTPATIENT
Start: 2025-02-10 | End: 2025-02-10 | Stop reason: SDUPTHER

## 2025-02-10 RX ORDER — ENOXAPARIN SODIUM 100 MG/ML
60 INJECTION SUBCUTANEOUS 2 TIMES DAILY
Status: DISCONTINUED | OUTPATIENT
Start: 2025-02-11 | End: 2025-02-11 | Stop reason: HOSPADM

## 2025-02-10 RX ORDER — MIDAZOLAM HYDROCHLORIDE 5 MG/5ML
2 INJECTION, SOLUTION INTRAMUSCULAR; INTRAVENOUS
Status: DISCONTINUED | OUTPATIENT
Start: 2025-02-10 | End: 2025-02-10 | Stop reason: HOSPADM

## 2025-02-10 RX ORDER — POTASSIUM CHLORIDE 7.45 MG/ML
10 INJECTION INTRAVENOUS PRN
Status: DISCONTINUED | OUTPATIENT
Start: 2025-02-10 | End: 2025-02-11 | Stop reason: HOSPADM

## 2025-02-10 RX ORDER — ROCURONIUM BROMIDE 10 MG/ML
INJECTION, SOLUTION INTRAVENOUS
Status: DISCONTINUED | OUTPATIENT
Start: 2025-02-10 | End: 2025-02-10 | Stop reason: SDUPTHER

## 2025-02-10 RX ORDER — ACETAMINOPHEN 325 MG/1
650 TABLET ORAL EVERY 6 HOURS
Status: DISCONTINUED | OUTPATIENT
Start: 2025-02-10 | End: 2025-02-11 | Stop reason: HOSPADM

## 2025-02-10 RX ORDER — PHENYLEPHRINE HCL IN 0.9% NACL 0.4MG/10ML
SYRINGE (ML) INTRAVENOUS
Status: DISCONTINUED | OUTPATIENT
Start: 2025-02-10 | End: 2025-02-10 | Stop reason: SDUPTHER

## 2025-02-10 RX ORDER — DIPHENHYDRAMINE HYDROCHLORIDE 50 MG/ML
12.5 INJECTION INTRAMUSCULAR; INTRAVENOUS
Status: DISCONTINUED | OUTPATIENT
Start: 2025-02-10 | End: 2025-02-10 | Stop reason: HOSPADM

## 2025-02-10 RX ORDER — FINASTERIDE 5 MG/1
5 TABLET, FILM COATED ORAL DAILY
Status: DISCONTINUED | OUTPATIENT
Start: 2025-02-10 | End: 2025-02-11 | Stop reason: HOSPADM

## 2025-02-10 RX ORDER — ONDANSETRON 4 MG/1
4 TABLET, ORALLY DISINTEGRATING ORAL EVERY 8 HOURS PRN
Status: DISCONTINUED | OUTPATIENT
Start: 2025-02-10 | End: 2025-02-11 | Stop reason: HOSPADM

## 2025-02-10 RX ORDER — HYDROMORPHONE HYDROCHLORIDE 1 MG/ML
0.5 INJECTION, SOLUTION INTRAMUSCULAR; INTRAVENOUS; SUBCUTANEOUS EVERY 5 MIN PRN
Status: DISCONTINUED | OUTPATIENT
Start: 2025-02-10 | End: 2025-02-10 | Stop reason: HOSPADM

## 2025-02-10 RX ORDER — ONDANSETRON 2 MG/ML
4 INJECTION INTRAMUSCULAR; INTRAVENOUS EVERY 6 HOURS PRN
Status: DISCONTINUED | OUTPATIENT
Start: 2025-02-10 | End: 2025-02-11 | Stop reason: HOSPADM

## 2025-02-10 RX ORDER — SODIUM CHLORIDE 0.9 % (FLUSH) 0.9 %
5-40 SYRINGE (ML) INJECTION PRN
Status: DISCONTINUED | OUTPATIENT
Start: 2025-02-10 | End: 2025-02-11 | Stop reason: HOSPADM

## 2025-02-10 RX ORDER — OXYCODONE HYDROCHLORIDE 5 MG/1
10 TABLET ORAL EVERY 4 HOURS PRN
Status: DISCONTINUED | OUTPATIENT
Start: 2025-02-10 | End: 2025-02-11 | Stop reason: HOSPADM

## 2025-02-10 RX ORDER — METRONIDAZOLE 500 MG/100ML
500 INJECTION, SOLUTION INTRAVENOUS ONCE
Status: COMPLETED | OUTPATIENT
Start: 2025-02-10 | End: 2025-02-10

## 2025-02-10 RX ORDER — ALLOPURINOL 300 MG/1
300 TABLET ORAL DAILY
Status: DISCONTINUED | OUTPATIENT
Start: 2025-02-10 | End: 2025-02-11 | Stop reason: HOSPADM

## 2025-02-10 RX ORDER — ATENOLOL 50 MG/1
50 TABLET ORAL DAILY
Status: DISCONTINUED | OUTPATIENT
Start: 2025-02-10 | End: 2025-02-11 | Stop reason: HOSPADM

## 2025-02-10 RX ORDER — SUCCINYLCHOLINE CHLORIDE 20 MG/ML
INJECTION INTRAMUSCULAR; INTRAVENOUS
Status: DISCONTINUED | OUTPATIENT
Start: 2025-02-10 | End: 2025-02-10 | Stop reason: SDUPTHER

## 2025-02-10 RX ORDER — HYDROMORPHONE HYDROCHLORIDE 1 MG/ML
0.25 INJECTION, SOLUTION INTRAMUSCULAR; INTRAVENOUS; SUBCUTANEOUS
Status: DISCONTINUED | OUTPATIENT
Start: 2025-02-10 | End: 2025-02-11 | Stop reason: HOSPADM

## 2025-02-10 RX ORDER — DEXMEDETOMIDINE HYDROCHLORIDE 100 UG/ML
INJECTION, SOLUTION INTRAVENOUS
Status: DISCONTINUED | OUTPATIENT
Start: 2025-02-10 | End: 2025-02-10 | Stop reason: SDUPTHER

## 2025-02-10 RX ORDER — OXYCODONE HYDROCHLORIDE 5 MG/1
5 TABLET ORAL EVERY 4 HOURS PRN
Status: DISCONTINUED | OUTPATIENT
Start: 2025-02-10 | End: 2025-02-11 | Stop reason: HOSPADM

## 2025-02-10 RX ORDER — PROCHLORPERAZINE EDISYLATE 5 MG/ML
5 INJECTION INTRAMUSCULAR; INTRAVENOUS
Status: DISCONTINUED | OUTPATIENT
Start: 2025-02-10 | End: 2025-02-10 | Stop reason: HOSPADM

## 2025-02-10 RX ADMIN — SODIUM CHLORIDE, PRESERVATIVE FREE 10 ML: 5 INJECTION INTRAVENOUS at 17:17

## 2025-02-10 RX ADMIN — FINASTERIDE 5 MG: 5 TABLET, FILM COATED ORAL at 17:07

## 2025-02-10 RX ADMIN — ACETAMINOPHEN 650 MG: 325 TABLET ORAL at 23:49

## 2025-02-10 RX ADMIN — DEXMEDETOMIDINE 4 MCG: 100 INJECTION, SOLUTION INTRAVENOUS at 11:30

## 2025-02-10 RX ADMIN — VALSARTAN 320 MG: 160 TABLET ORAL at 17:07

## 2025-02-10 RX ADMIN — HYDROMORPHONE HYDROCHLORIDE 0.25 MG: 1 INJECTION, SOLUTION INTRAMUSCULAR; INTRAVENOUS; SUBCUTANEOUS at 10:15

## 2025-02-10 RX ADMIN — DEXAMETHASONE SODIUM PHOSPHATE 8 MG: 4 INJECTION, SOLUTION INTRAMUSCULAR; INTRAVENOUS at 09:56

## 2025-02-10 RX ADMIN — Medication 80 MCG: at 10:22

## 2025-02-10 RX ADMIN — Medication 80 MCG: at 10:04

## 2025-02-10 RX ADMIN — ATENOLOL 50 MG: 50 TABLET ORAL at 17:07

## 2025-02-10 RX ADMIN — SODIUM CHLORIDE, POTASSIUM CHLORIDE, SODIUM LACTATE AND CALCIUM CHLORIDE: 600; 310; 30; 20 INJECTION, SOLUTION INTRAVENOUS at 08:31

## 2025-02-10 RX ADMIN — LIDOCAINE HYDROCHLORIDE 100 MG: 20 INJECTION, SOLUTION EPIDURAL; INFILTRATION; INTRACAUDAL; PERINEURAL at 09:37

## 2025-02-10 RX ADMIN — ROCURONIUM BROMIDE 45 MG: 10 INJECTION INTRAVENOUS at 09:44

## 2025-02-10 RX ADMIN — PROPOFOL 170 MG: 10 INJECTION, EMULSION INTRAVENOUS at 09:37

## 2025-02-10 RX ADMIN — HYDROMORPHONE HYDROCHLORIDE 0.25 MG: 1 INJECTION, SOLUTION INTRAMUSCULAR; INTRAVENOUS; SUBCUTANEOUS at 10:30

## 2025-02-10 RX ADMIN — ALLOPURINOL 300 MG: 300 TABLET ORAL at 17:07

## 2025-02-10 RX ADMIN — SUGAMMADEX 400 MG: 100 INJECTION, SOLUTION INTRAVENOUS at 11:13

## 2025-02-10 RX ADMIN — HYDROMORPHONE HYDROCHLORIDE 0.25 MG: 1 INJECTION, SOLUTION INTRAMUSCULAR; INTRAVENOUS; SUBCUTANEOUS at 11:01

## 2025-02-10 RX ADMIN — Medication 80 MCG: at 10:26

## 2025-02-10 RX ADMIN — PROPOFOL 40 MG: 10 INJECTION, EMULSION INTRAVENOUS at 10:52

## 2025-02-10 RX ADMIN — ROCURONIUM BROMIDE 10 MG: 10 INJECTION INTRAVENOUS at 10:49

## 2025-02-10 RX ADMIN — PROPOFOL 40 MG: 10 INJECTION, EMULSION INTRAVENOUS at 10:34

## 2025-02-10 RX ADMIN — ROCURONIUM BROMIDE 5 MG: 10 INJECTION INTRAVENOUS at 09:37

## 2025-02-10 RX ADMIN — METRONIDAZOLE 500 MG: 500 INJECTION, SOLUTION INTRAVENOUS at 09:47

## 2025-02-10 RX ADMIN — FENTANYL CITRATE 50 MCG: 50 INJECTION, SOLUTION INTRAMUSCULAR; INTRAVENOUS at 09:37

## 2025-02-10 RX ADMIN — FENTANYL CITRATE 50 MCG: 50 INJECTION, SOLUTION INTRAMUSCULAR; INTRAVENOUS at 09:48

## 2025-02-10 RX ADMIN — SUCCINYLCHOLINE CHLORIDE 200 MG: 20 INJECTION, SOLUTION INTRAMUSCULAR; INTRAVENOUS at 09:38

## 2025-02-10 RX ADMIN — DEXMEDETOMIDINE 4 MCG: 100 INJECTION, SOLUTION INTRAVENOUS at 09:31

## 2025-02-10 RX ADMIN — ESMOLOL HYDROCHLORIDE 5 MG: 10 INJECTION, SOLUTION INTRAVENOUS at 09:45

## 2025-02-10 RX ADMIN — SODIUM CHLORIDE, POTASSIUM CHLORIDE, SODIUM LACTATE AND CALCIUM CHLORIDE: 600; 310; 30; 20 INJECTION, SOLUTION INTRAVENOUS at 09:32

## 2025-02-10 RX ADMIN — BUMETANIDE 2 MG: 1 TABLET ORAL at 17:07

## 2025-02-10 RX ADMIN — OXYCODONE 5 MG: 5 TABLET ORAL at 13:37

## 2025-02-10 RX ADMIN — BUMETANIDE 2 MG: 1 TABLET ORAL at 21:34

## 2025-02-10 RX ADMIN — SODIUM CHLORIDE, PRESERVATIVE FREE 10 ML: 5 INJECTION INTRAVENOUS at 21:35

## 2025-02-10 RX ADMIN — ROCURONIUM BROMIDE 25 MG: 10 INJECTION INTRAVENOUS at 10:02

## 2025-02-10 RX ADMIN — Medication 3000 MG: at 09:47

## 2025-02-10 RX ADMIN — ONDANSETRON HYDROCHLORIDE 4 MG: 2 INJECTION, SOLUTION INTRAMUSCULAR; INTRAVENOUS at 09:56

## 2025-02-10 ASSESSMENT — PAIN DESCRIPTION - LOCATION: LOCATION: ABDOMEN

## 2025-02-10 ASSESSMENT — PAIN SCALES - GENERAL: PAINLEVEL_OUTOF10: 3

## 2025-02-10 ASSESSMENT — PAIN - FUNCTIONAL ASSESSMENT: PAIN_FUNCTIONAL_ASSESSMENT: NONE - DENIES PAIN

## 2025-02-10 ASSESSMENT — ENCOUNTER SYMPTOMS: SHORTNESS OF BREATH: 1

## 2025-02-10 ASSESSMENT — PAIN DESCRIPTION - DESCRIPTORS: DESCRIPTORS: SORE

## 2025-02-10 NOTE — PERIOP NOTE
0752 - PT DENIES FEVER, COLD, COUGH, N/V, DIARRHEA....   PT DOES HAVE SOB ON EXERTION.  PRE-OP TCHING DONE - PT VERBALIZES UNDERSTANDING   STRETCHER IN LOWEST POSITION, CB IN PLACE AND SR UP X2.

## 2025-02-10 NOTE — INTERVAL H&P NOTE
Update History & Physical    The Patient's History and Physical attached was reviewed with the patient and I examined the patient.  There is no change.  The surgical site was confirmed by the patient and me.    Plan:  The risk, benefits, expected outcome, and alternative to the recommended procedure have been discussed with Benjie Brown.  We again reviewed my concern about this mass being closely associated with the SMA.  If the mass cannot be removed without injury to this main blood vessel, I most likely will do a biopsy today to make the diagnosis and then we can reassess from there.  He understands and wants to proceed with the procedure.

## 2025-02-10 NOTE — ANESTHESIA POSTPROCEDURE EVALUATION
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])    Providers: Pedro Cash MD Responsible Provider: Anderson Howe DO    Anesthesia Type: General ASA Status: 4            Anesthesia Type: General    Nanci Phase I: Nanci Score: 10    Nanci Phase II:      Anesthesia Post Evaluation    Patient location during evaluation: PACU  Patient participation: complete - patient participated  Level of consciousness: awake  Pain score: 0  Airway patency: patent  Nausea & Vomiting: no nausea  Cardiovascular status: hemodynamically stable  Respiratory status: acceptable  Hydration status: euvolemic  Comments: Seen, no complaints   Pain management: adequate    No notable events documented.

## 2025-02-10 NOTE — ANESTHESIA PRE PROCEDURE
Department of Anesthesiology  Preprocedure Note       Name:  Benjie Brown   Age:  69 y.o.  :  1955                                          MRN:  [id number]         Date:  2/10/2025      Surgeon: Surgeon(s):  Pedro Cash MD    Procedure: Procedure(s):  ROBOT ASSISTED LAPAROSCOPIC EXCISION OF RETROPERITONEAL MASS, POSSIBLE BOWEL RESECTION    Medications prior to admission:   Prior to Admission medications    Medication Sig Start Date End Date Taking? Authorizing Provider   allopurinol (ZYLOPRIM) 300 MG tablet TAKE 1 TABLET BY MOUTH DAILY 1/15/25  Yes Philipp Mullins MD   rosuvastatin (CRESTOR) 10 MG tablet TAKE 1 TABLET BY MOUTH DAILY 1/15/25  Yes Philipp Mullins MD   valsartan (DIOVAN) 320 MG tablet Take 1 tablet by mouth daily 24  Yes Philipp Mullins MD   atenolol (TENORMIN) 50 MG tablet TAKE 1 TABLET BY MOUTH DAILY 10/21/24  Yes Philipp Mullins MD   bumetanide (BUMEX) 2 MG tablet Take 1 tablet by mouth 2 times daily 10/4/24  Yes Philipp Mullins MD   finasteride (PROSCAR) 5 MG tablet Take 1 tablet by mouth daily 23  Yes ProviderJenn MD   ascorbic acid (VITAMIN C) 1000 MG tablet Take 1 tablet by mouth daily   Yes Automatic Reconciliation, Ar   aspirin 81 MG chewable tablet Take 1 tablet by mouth daily    Automatic Reconciliation, Ar       Current medications:    Current Facility-Administered Medications   Medication Dose Route Frequency Provider Last Rate Last Admin   • ceFAZolin (ANCEF) 3000 mg in sodium chloride 0.9% 100 mL IVPB  3,000 mg IntraVENous q8h Yuki Vasques MD       • metroNIDAZOLE (FLAGYL) 500 mg in 0.9% NaCl 100 mL IVPB premix  500 mg IntraVENous Once Yuki Vasques MD       • fentaNYL (SUBLIMAZE) injection 100 mcg  100 mcg IntraVENous Once PRN Anderson Howe, DO       • ondansetron (ZOFRAN) injection 4 mg  4 mg IntraVENous Once Anderson Howe, DO       • 0.9 % sodium chloride infusion   IntraVENous Continuous Anderson Howe, DO    41  SpO2:  [92 %-100 %] 96 %  FiO2 (%):  [50 %] 50 %      Active LDAs:  .       Active .       Name Placement date Placement time Site Days    PICC - Peds 08/16/24 Double lumen Left Basilic vein 08/16/24  1312  --  12    NG/OG/Feeding Tube (NICU) 8 Fr Right nostril 08/28/24  1820  Right nostril  less than 1    [place]  01/16/24  1300  Right frontal  225                  Nutrition:  Dietary Orders (From admission, onward)       Start     Ordered    08/29/24 0900  Infant formula  (Infant Feeding Orders)  8 times daily      Comments: (flat dosing, not based on weight and not weight adjusting)   Question Answer Comment   Formula: Alimentum    Feeding route: NG (nasogastric tube)    Infant Formula bolus volume (mL/feed) 60    Rate of (mL/hr): -    Over (minutes): 120 minutes    Bolus frequency: -        08/29/24 0614                  Lab Results   Component Value Date    WBC 10.0 08/24/2024    HGB 10.5 08/24/2024    HCT 32.1 (L) 08/24/2024    MCV 88 (H) 08/24/2024     08/24/2024     Lab Results   Component Value Date    CREATININE <0.20 08/26/2024    BUN 15 08/26/2024     08/26/2024    K 4.2 08/26/2024     08/26/2024    CO2 28 (H) 08/26/2024     Lab Results   Component Value Date    ALT 17 08/19/2024    AST 15 08/19/2024    ALKPHOS 237 08/19/2024    BILITOT 0.1 08/19/2024     Lab Results   Component Value Date    RETICCTPCT 1.7 08/19/2024     Lab Results   Component Value Date    PTH 38.5 06/27/2024    CALCIUM 9.4 08/26/2024    PHOS 7.2 08/26/2024     Medications:  Scheduled medications  albuterol, 2.5 mg, nebulization, q6h  budesonide, 0.25 mg, nebulization, BID  chlorothiazide, 20 mg/kg (Dosing Weight), nasogastric tube, q12h  [START ON  nasogastric tube, q8h ELIGIO  melatonin, 3 mg, nasogastric tube, Nightly  [START ON 2024] omeprazole, 1 mg/kg (Dosing Weight), nasogastric tube, Daily  potassium chloride, 0.75 mEq/kg (Dosing Weight), nasogastric tube, q3h  sodium chloride, 1 mEq/kg (Dosing Weight), nasogastric tube, q6h ELIGIO  [START ON 2024] sulfamethoxazole-trimethoprim, 2 mg/kg of trimethoprim (Dosing Weight), nasogastric tube, q24h      Continuous medications  ketamine, 1.5 mg/kg/hr (Dosing Weight), Last Rate: 1.5 mg/kg/hr (24 1017)  midazolam, 300 mcg/kg/hr (Dosing Weight), Last Rate: 300 mcg/kg/hr (24 1243)  morphine, 180 mcg/kg/hr (Dosing Weight), Last Rate: 180 mcg/kg/hr (24 1019)   Custom Fluids 250 mL, 50 mL/kg/day (Dosing Weight)      PRN medications  PRN medications: clonidine, ketamine, midazolam, morphine, oxygen, polyethylene glycol, sodium chloride-Aloe vera gel, white petrolatum, zinc oxide    Physical Exam  Constitutional:       General: He is active.      Comments: Calm and alert at rest, supine in crib with HOB elevated. Making eye contact and responsive, no distress   HENT:      Head: Anterior fontanelle is flat.      Comments: Baseline macrocephaly, brachycephaly,  shunt site healed.      Right Ear: External ear normal.      Left Ear: External ear normal.      Ears:      Comments: Right ear tag     Nose: Nose normal.      Mouth/Throat:      Mouth: Mucous membranes are moist.      Pharynx: Oropharynx is clear.      Comments: Clear bubbly oral secretions  Eyes:      Conjunctiva/sclera: Conjunctivae normal.      Pupils: Pupils are equal, round, and reactive to light.      Comments: Intermittent nystagmus, poor gaze control, makes eye contact   Neck:       Comments: Spontaneously moves head side to side to caregivers  Cardiovascular:      Rate and Rhythm: Normal rate.      Pulses: Normal pulses.      Heart sounds: Normal heart sounds.   Pulmonary:      Comments: Breathing above ventilator rate, fair to good air exchange, clear after suction. Mild subcostal retractions. Trach site: 3-9 o'clock granulation tissue extending ~4 o'clock, no bleeding, xeroform in placed (per wound NP). Generalized diffuse superficial erythema/sensitivity around entire site and extending along trach ties, especially on right. No bleeding or drainage  Abdominal:      General: Bowel sounds are normal.      Palpations: Abdomen is soft.      Comments: Abdomen full but less so than previous, non-tender. Umbilical hernia empty. Healed RLQ scar   Genitourinary:     Penis: Normal and uncircumcised.       Rectum: Normal.      Comments: Large bilateral inguinal hernias, soft/non-tender, pink (no changes in skin on scrotum). Not attempted to reduce hernias but are mobile and not firm. Testes not palpated.  Musculoskeletal:      Cervical back: Normal range of motion.      Comments: Right thumb polydactyly +nail. Moves arms spontaneously/equally, bringing hands to mouth. Left arm PICC C/D/I. No spontaneous movements of lower extremities. 6 beats clonus bilateral ankles   Skin:     General: Skin is warm.      Capillary Refill: Capillary refill takes 2 to 3 seconds.      Comments: Healed right indentation on chest. Pale/mottled in general   Neurological:      Mental Status: He is alert.      Comments: Alert and making eye contact, content and observant, responsive. Bringing hands to mouth and moving upper extremities but not lower (baseline for him). Ankle clonus bilaterally. Not sensitive or overstimulated today.     Events/Changes Over Past 24hrs:  Overnight having higher TCOMs and VBG obtained: 7.31/80/47/40.3/11.6 - vent settings changed to higher TV and I-time, and lower rate  Overnight having more  time 0.5  FIO2: 50%  TCOMs: 51-73   Masimo: 57-40-2-1-0    Events:  Apnea: N/A  Bradycardia: x 1 - 82 - oxygen, suction  Desaturation: 0    FEN/GI:  Med Calc Weight: 9.2kg (sedation meds on 8.5kg)  Intake: 1094mL  Output: 704mL  Enteral: Alimentum 60mL q3h NG (50mL/kg/day) over 2hrs  Parenteral: D10 1/4NS @50mL/kg/day, Drips ~25mL/kg/day  Total Fluid Goal (ordered): 125mL/kg/day  Intake: 119mL/kg/day  Urine output: 3.2mL/kg/hr  Stool: 3  Emesis: x 3 (small/med/large) reported as projectile  A.5-49.5cm    Family: Not present with rounds, NNP called mom for update in afternoon    Thuy MORENO NNP-BC

## 2025-02-10 NOTE — FLOWSHEET NOTE
02/10/25 1135   Handoff   Communication Given Periop Handoff/Relief   Handoff phase Phase I receiving   Handoff Given To Braeden RAM   Handoff Received From Estelita RAM & Henry CRNA   Handoff Communication Face to Face   Time Handoff Given 1135         1310 room assignment pending, Mrs. Brown at bedside .      1424      TRANSFER - OUT REPORT:    Verbal report given to Florentino RAM  on Joseph C Day  being transferred to  3121 (unit) for routine post-op       Report consisted of patient’s Situation, Background, Assessment and   Recommendations(SBAR).     Information from the following report(s) Surgery Report, Intake/Output, MAR, and Cardiac Rhythm SR  was reviewed with the receiving nurse.    Opportunity for questions and clarification was provided.      Patient transported with:   Tech    Lines:      This SmartLink retrieves the last documented value for LDA assessment data, retrieved by either LDA type or by LDA group ID.     This SmartLink should be used in a SmartText or SmartPhrase. If one is not available, please contact your .

## 2025-02-10 NOTE — OP NOTE
OPERATIVE REPORT      PREOPERATIVE DIAGNOSES:  1.  Retroperitoneal mass    POSTOPERATIVE DIAGNOSES:  1.  Retroperitoneal mass    PROCEDURES PERFORMED:  Laparoscopic biopsy of retroperitoneal mass,  robot-assisted.    ESTIMATED BLOOD LOSS: Minimal.    SPECIMENS REMOVED:    ID Type Source Tests Collected by Time Destination   1 : RETROPERITONEAL MASS Tissue Abdomen SURGICAL PATHOLOGY Pedro Cash MD 2/10/2025 1102        SURGEON: Pedro Cash MD    Circulator: Estelita Marie RN  First Assistant: Liddle, Gerald, RN  Scrub Person First: Hattie Butcher    ANESTHESIA: General plus local.    FINDINGS: Mass in the retroperitoneum wrapped around the superior mesenteric vessels not amenable to resection without injury to a significant portion of the small bowel blood supply.  Therefore the portion of the mass that was away from the vessels was excised and sent as a biopsy.     COMPLICATIONS: None.    INDICATION: The patient is a 69-year-old gentleman who on recent imaging is noted to have a retroperitoneal mass.  He is being brought the operating for procedure as above.  Risks, benefits and alternatives were discussed. Consent form was placed in chart prior to the procedure.    DESCRIPTION OF PROCEDURE:  After satisfactory induction of general  anesthesia, the patient was kept in supine position. The patient's abdomen  was prepped and draped sterilely. After infusing a local anesthetic, an 8 mm  Optical entry trocar was placed intra-abdominal under visualization in the lower abdomen to the right of the midline and pneumoperitoneum was achieved. The abdomen was explored  with findings noted above. 3 additional robotic ports were inserted across the lower abdomen.  The robot was docked and robotic instruments were inserted.  The abdomen was explored.  The bowel was unremarkable.  The mass correlating with the CT findings was identified.  The overlying peritoneum was divided and the mass was carefully freed from the

## 2025-02-11 VITALS
DIASTOLIC BLOOD PRESSURE: 83 MMHG | HEART RATE: 77 BPM | OXYGEN SATURATION: 99 % | TEMPERATURE: 97.5 F | WEIGHT: 315 LBS | BODY MASS INDEX: 44.1 KG/M2 | RESPIRATION RATE: 20 BRPM | SYSTOLIC BLOOD PRESSURE: 139 MMHG | HEIGHT: 71 IN

## 2025-02-11 LAB
ANION GAP SERPL CALC-SCNC: 4 MMOL/L (ref 2–12)
BASOPHILS # BLD: 0.01 K/UL (ref 0–0.1)
BASOPHILS NFR BLD: 0.1 % (ref 0–1)
BUN SERPL-MCNC: 25 MG/DL (ref 6–20)
BUN/CREAT SERPL: 19 (ref 12–20)
CALCIUM SERPL-MCNC: 9.1 MG/DL (ref 8.5–10.1)
CHLORIDE SERPL-SCNC: 101 MMOL/L (ref 97–108)
CO2 SERPL-SCNC: 32 MMOL/L (ref 21–32)
CREAT SERPL-MCNC: 1.33 MG/DL (ref 0.7–1.3)
DIFFERENTIAL METHOD BLD: ABNORMAL
EOSINOPHIL # BLD: 0 K/UL (ref 0–0.4)
EOSINOPHIL NFR BLD: 0 % (ref 0–7)
ERYTHROCYTE [DISTWIDTH] IN BLOOD BY AUTOMATED COUNT: 15.2 % (ref 11.5–14.5)
GLUCOSE SERPL-MCNC: 130 MG/DL (ref 65–100)
HCT VFR BLD AUTO: 42.1 % (ref 36.6–50.3)
HGB BLD-MCNC: 13.6 G/DL (ref 12.1–17)
IMM GRANULOCYTES # BLD AUTO: 0.08 K/UL (ref 0–0.04)
IMM GRANULOCYTES NFR BLD AUTO: 0.6 % (ref 0–0.5)
LYMPHOCYTES # BLD: 1.46 K/UL (ref 0.8–3.5)
LYMPHOCYTES NFR BLD: 11 % (ref 12–49)
MCH RBC QN AUTO: 30.6 PG (ref 26–34)
MCHC RBC AUTO-ENTMCNC: 32.3 G/DL (ref 30–36.5)
MCV RBC AUTO: 94.8 FL (ref 80–99)
MONOCYTES # BLD: 1.05 K/UL (ref 0–1)
MONOCYTES NFR BLD: 7.9 % (ref 5–13)
NEUTS SEG # BLD: 10.62 K/UL (ref 1.8–8)
NEUTS SEG NFR BLD: 80.4 % (ref 32–75)
NRBC # BLD: 0 K/UL (ref 0–0.01)
NRBC BLD-RTO: 0 PER 100 WBC
PLATELET # BLD AUTO: 196 K/UL (ref 150–400)
PMV BLD AUTO: 11 FL (ref 8.9–12.9)
POTASSIUM SERPL-SCNC: 4 MMOL/L (ref 3.5–5.1)
RBC # BLD AUTO: 4.44 M/UL (ref 4.1–5.7)
SODIUM SERPL-SCNC: 137 MMOL/L (ref 136–145)
WBC # BLD AUTO: 13.2 K/UL (ref 4.1–11.1)

## 2025-02-11 PROCEDURE — 36415 COLL VENOUS BLD VENIPUNCTURE: CPT

## 2025-02-11 PROCEDURE — G0378 HOSPITAL OBSERVATION PER HR: HCPCS

## 2025-02-11 PROCEDURE — 6370000000 HC RX 637 (ALT 250 FOR IP): Performed by: SURGERY

## 2025-02-11 PROCEDURE — 2500000003 HC RX 250 WO HCPCS: Performed by: SURGERY

## 2025-02-11 PROCEDURE — 80048 BASIC METABOLIC PNL TOTAL CA: CPT

## 2025-02-11 PROCEDURE — 85025 COMPLETE CBC W/AUTO DIFF WBC: CPT

## 2025-02-11 RX ORDER — OXYCODONE HYDROCHLORIDE 5 MG/1
5 TABLET ORAL EVERY 6 HOURS PRN
Qty: 15 TABLET | Refills: 0 | Status: SHIPPED | OUTPATIENT
Start: 2025-02-11 | End: 2025-02-14

## 2025-02-11 RX ORDER — ONDANSETRON 4 MG/1
4 TABLET, ORALLY DISINTEGRATING ORAL 3 TIMES DAILY PRN
Qty: 10 TABLET | Refills: 0 | Status: SHIPPED | OUTPATIENT
Start: 2025-02-11

## 2025-02-11 RX ORDER — ACETAMINOPHEN 325 MG/1
650 TABLET ORAL EVERY 6 HOURS
Qty: 56 TABLET | Refills: 0 | Status: SHIPPED | OUTPATIENT
Start: 2025-02-11 | End: 2025-02-18

## 2025-02-11 RX ADMIN — SODIUM CHLORIDE, PRESERVATIVE FREE 10 ML: 5 INJECTION INTRAVENOUS at 08:20

## 2025-02-11 RX ADMIN — FINASTERIDE 5 MG: 5 TABLET, FILM COATED ORAL at 08:20

## 2025-02-11 RX ADMIN — ACETAMINOPHEN 650 MG: 325 TABLET ORAL at 05:53

## 2025-02-11 RX ADMIN — BUMETANIDE 2 MG: 1 TABLET ORAL at 08:20

## 2025-02-11 RX ADMIN — ATENOLOL 50 MG: 50 TABLET ORAL at 08:20

## 2025-02-11 RX ADMIN — VALSARTAN 320 MG: 160 TABLET ORAL at 08:21

## 2025-02-11 RX ADMIN — ALLOPURINOL 300 MG: 300 TABLET ORAL at 08:19

## 2025-02-11 NOTE — PROGRESS NOTES
End of Shift Note    Bedside shift change report given to marie (oncoming nurse) by Florentino Sloan RN (offgoing nurse).  Report included the following information SBAR, Kardex, Procedure Summary, Intake/Output, and MAR    Shift worked:  7a-7p     Shift summary and any significant changes:     VSS. No complaints of pain. Tylenol refused during shift. Incisions c/d/I. Tolerating diet while up to chair. uneventful     Concerns for physician to address:       Zone phone for oncoming shift:          Activity:     Number times ambulated in hallways past shift: 0  Number of times OOB to chair past shift: 1    Cardiac:   Cardiac Monitoring: No      Cardiac Rhythm: Sinus rhythm    Access:  Current line(s): PIV     Genitourinary:   Urinary Status: Voiding    Respiratory:   O2 Device: None (Room air)  Chronic home O2 use?: NO  Incentive spirometer at bedside: YES    GI:  Last BM (including prior to admit): 02/09/25  Current diet:  ADULT DIET; Regular; NPO while in bed. Can have diet if in a chair.  Passing flatus: NO    Pain Management:   Patient states pain is manageable on current regimen: N/A    Skin:  Venkatesh Scale Score: 20  Interventions:      Patient Safety:  Fall Risk:         Active Consults:   None    Length of Stay:  Expected LOS: 1  Actual LOS: 0    Florentino Sloan, RN

## 2025-02-11 NOTE — DISCHARGE INSTRUCTIONS
Dr Cash's Discharge Instructions after  Laparoscopy    Benjie DHILLON Day/ [id number] : 1955    Admitted 2/10/2025 Discharged: 2025     Take Home Medications       It is important that you take the medication exactly as they are prescribed.   Keep your medication in the bottles provided by the pharmacist and keep a list of the medication names, dosages, and times to be taken in your wallet.   Do not take other medications without consulting your doctor.       What to do at Home    Recommended diet: Your usual diet    Recommended activity: as tolerated, do not drive for 3-5 days while on pain medicine.     Follow-up with Dr. Cash in 2 weeks.  Call [phone] for an appointment.         Your Recovery    You are likely to have pain that comes and goes for the next few days. You may feel like you have the flu. You also may have a low fever and feel tired and nauseated. This is common. You should feel better after 1 to 2 weeks and will probably be back to normal in 2 to 4 weeks.    Your bowel movements may not be regular for several weeks.     This care sheet gives you a general idea about how long it will take for you to recover. But each person recovers at a different pace. Follow the steps below to get better as quickly as possible.    How can you care for yourself at home?    Activity  Rest when you feel tired. Getting enough sleep will help you recover. Sleep with your head raised by using three or four pillows. You can also try to sleep with your head up in a lounger chair. Do not sleep on your side or stomach.  Try to walk each day. Start by walking a little more than you did the day before. Bit by bit, increase the amount you walk. Walking boosts blood flow and helps prevent pneumonia and constipation.  Avoid strenuous activities, such as biking, jogging, weight lifting, or aerobic exercise, until your doctor says it is okay.  Avoid lifting anything that would make you strain. This may include heavy

## 2025-02-11 NOTE — PLAN OF CARE
Problem: Chronic Conditions and Co-morbidities  Goal: Patient's chronic conditions and co-morbidity symptoms are monitored and maintained or improved  2/11/2025 1149 by Florentino Sloan RN  Outcome: Progressing  2/11/2025 0130 by Jenny Drake RN  Outcome: Progressing  Flowsheets (Taken 2/10/2025 2035)  Care Plan - Patient's Chronic Conditions and Co-Morbidity Symptoms are Monitored and Maintained or Improved: Monitor and assess patient's chronic conditions and comorbid symptoms for stability, deterioration, or improvement     Problem: ABCDS Injury Assessment  Goal: Absence of physical injury  2/11/2025 1149 by Florentino Sloan RN  Outcome: Progressing  2/11/2025 0130 by Jenny Drake RN  Outcome: Progressing     Problem: Discharge Planning  Goal: Discharge to home or other facility with appropriate resources  2/11/2025 1149 by Florentino Sloan RN  Outcome: Progressing  2/11/2025 0130 by Jenny Drake RN  Outcome: Progressing  Flowsheets (Taken 2/10/2025 2035)  Discharge to home or other facility with appropriate resources:   Identify barriers to discharge with patient and caregiver   Arrange for needed discharge resources and transportation as appropriate   Identify discharge learning needs (meds, wound care, etc)     Problem: Safety - Adult  Goal: Free from fall injury  2/11/2025 1149 by Florentino Sloan RN  Outcome: Progressing  2/11/2025 0130 by Jenny Drake RN  Outcome: Progressing

## 2025-02-11 NOTE — PROGRESS NOTES
End of Shift Note    Bedside shift change report given to LEANNA Good (oncoming nurse) by Jenny Drake RN (offgoing nurse).  Report included the following information SBAR    Shift worked:  6596-1346     Shift summary and any significant changes:    Pt denies pain  Up to chair the the whole shift as he mentioned he is uncomfortable lying on the bed  Oxygen got better from 94 to 100 at 2/lpm  Voiding independently  No BM but passing gas  Uneventful shift     Concerns for physician to address:      Zone phone for oncoming shift:           Jenny Drake RN

## 2025-02-12 ENCOUNTER — TELEPHONE (OUTPATIENT)
Age: 70
End: 2025-02-12

## 2025-02-12 DIAGNOSIS — D3A.8 NEUROENDOCRINE TUMOR: Primary | ICD-10-CM

## 2025-02-12 NOTE — TELEPHONE ENCOUNTER
Doing okay.  Pain controlled with Tylenol  +flatus  Will add MiraLAX and possibly mag citrate to help with constipation    Reviewed his pathology:    metastatic well-differentiated neuroendocrine   tumor     We discussed possible primary sources.  No other masses identified along his GI tract.  Small bowel was inspected during the surgery without mass identified.    I suggested we get an Octreoscan to search for a primary.    Will also refer to Dr Esparza to discuss any benefit for treatment and to establish follow-up.    They expressed understanding of our discussion.    Will keep his postop appointment with me.

## 2025-02-13 DIAGNOSIS — D3A.8 NEUROENDOCRINE TUMOR: Primary | ICD-10-CM

## 2025-02-24 ENCOUNTER — OFFICE VISIT (OUTPATIENT)
Age: 70
End: 2025-02-24
Payer: MEDICARE

## 2025-02-24 VITALS
HEART RATE: 72 BPM | OXYGEN SATURATION: 92 % | HEIGHT: 71 IN | RESPIRATION RATE: 16 BRPM | TEMPERATURE: 97.6 F | WEIGHT: 315 LBS | BODY MASS INDEX: 44.1 KG/M2 | DIASTOLIC BLOOD PRESSURE: 68 MMHG | SYSTOLIC BLOOD PRESSURE: 151 MMHG

## 2025-02-24 DIAGNOSIS — D3A.8 NEUROENDOCRINE TUMOR (HCC): Primary | ICD-10-CM

## 2025-02-24 PROCEDURE — 99212 OFFICE O/P EST SF 10 MIN: CPT | Performed by: SURGERY

## 2025-02-24 NOTE — PROGRESS NOTES
Chief Complaint   Patient presents with    Post-Op Check     S/p Laparoscopic biopsy of retroperitoneal mass, robot-assisted on 2/10/25       Reviewed pathology with him over the phone.  Provided him a copy today.    Tolerating PO  BMs:  daily    Pain controlled without pain meds    We discussed possible primary sources. No other masses identified along his GI tract. Small bowel was inspected during the surgery without mass identified.     Physical Exam:   Abdominal exam: Soft, non-distended, non-tender.    Wound: clean, dry, no drainage    Doing well    Octreotide scan and oncology consult pending    Scheduled to see Dr Esparza 3/10        Continue unrestricted activity.  Follow-up: testing        I had an extensive and thorough discussion with Benjie Brown regarding current diagnosis and treatment recommendations. Total time spend with him was 10 minutes.  This included the following:  preparing to see the patient (reviewing prior records and tests),  performing a medically appropriate examination and/or evaluation,  counseling and educating the patient/family/caregiver,  documenting clinical information in the electronic or other health record,  independently interpreting results and communicating results to the patient/family/caregiver,  care coordination

## 2025-02-24 NOTE — PROGRESS NOTES
Identified pt with two pt identifiers (name and ). Reviewed chart in preparation for visit and have obtained necessary documentation.    Benjie Brown is a 69 y.o. male Post-Op Check (S/p Laparoscopic biopsy of retroperitoneal mass, robot-assisted on 2/10/25/)  .    Vitals:    25 1319 25 1333   BP: (!) 155/90 (!) 151/68   Site: Right Lower Arm Left Lower Arm   Position: Sitting Sitting   Pulse: 72    Resp: 16    Temp: 97.6 °F (36.4 °C)    TempSrc: Oral    SpO2: 92%    Weight: (!) 183 kg (403 lb 6.4 oz)    Height: 1.803 m (5' 11\")           1. Have you been to the ER, urgent care clinic since your last visit?  Hospitalized since your last visit?  no     2. Have you seen or consulted any other health care providers outside of the [facility] since your last visit?  Include any pap smears or colon screening.  No     Patient and provider made aware of elevated BP x2. Patient asymptomatic. Patient reminded to monitor BP, continue to take BP medications if prescribed, and follow up with PCP/Cardiologist.  Patient expressed understanding and agreement.

## 2025-02-25 ENCOUNTER — TELEPHONE (OUTPATIENT)
Age: 70
End: 2025-02-25

## 2025-02-25 NOTE — TELEPHONE ENCOUNTER
Informed patient  Dr. Cash aware of imaging not being done at any [facility]. Will wait and see what Isaias recommends. Express understanding.

## 2025-02-25 NOTE — TELEPHONE ENCOUNTER
Leona with scheduling called and wants to let MD know imaging that was ordered, esvin gibson does not do due to the injection      Please advise

## 2025-03-04 PROBLEM — N18.30 CHRONIC RENAL DISEASE, STAGE III (HCC): Status: RESOLVED | Noted: 2022-11-03 | Resolved: 2025-03-04

## 2025-03-04 PROBLEM — C7B.8 MESENTERY METASTASIS OF NEUROENDOCRINE TUMOR (HCC): Status: ACTIVE | Noted: 2025-03-04

## 2025-03-04 PROBLEM — C7A.8 MESENTERY METASTASIS OF NEUROENDOCRINE TUMOR (HCC): Status: ACTIVE | Noted: 2025-03-04

## 2025-03-04 NOTE — PROGRESS NOTES
Chief Complaint   Patient presents with    1 Month Follow-Up     HTN       SUBJECTIVE:    Benjie Brown is a 69 y.o. male who returns in follow-up for his hypertension, CKD and other medical problems.  On his last visit here his creatinine increased from 1.19-1.50 and thus I brought him back sooner for follow-up.  Incidentally septum to that he is not up with hospitalization and had surgery to have a mass removed which turned out to be a metastatic neuroendocrine tumor.  I will note in the hospital his creatinine at time of discharge was 1.33.  He does have an appointment to see Dr. Esparza regarding his neuroendocrine tumor.  He is also been evaluated from a urologic aspect by Dr. Herman and has been evaluated for renal aspect in the past by Dr. Mark.  He has had chronic constipation and has been evaluated and followed by Dr. Ojeda who is recommend he take Metamucil 3 times a day but he does not like the idea of taking the Metamucil 3 times a day.  He notes no chest pain, shortness of breath, palpitations, PND, orthopnea or other cardiac or respiratory complaints.  He does not really note any swelling in his ankles and 7 good urine output with the diuretic.  He does note he still has to chronic constipation and has no other GI complaints.  He has no headaches, dizziness or neurologic complaints other than he occasionally notes some numbness in the back of his legs after he sits on the toilet for a long time due to his constipation which I suspect is related to nerve compression related to sitting on the toilet for such a long period of time.  He has no new arthritic complaints and no other complaints noted.      Current Outpatient Medications   Medication Sig Dispense Refill    acetaminophen (AMINOFEN) 325 MG tablet Take 2 tablets by mouth every 6 hours for 7 days 56 tablet 0    allopurinol (ZYLOPRIM) 300 MG tablet TAKE 1 TABLET BY MOUTH DAILY 90 tablet 1    rosuvastatin (CRESTOR) 10 MG tablet TAKE 1 TABLET

## 2025-03-05 ENCOUNTER — OFFICE VISIT (OUTPATIENT)
Facility: CLINIC | Age: 70
End: 2025-03-05

## 2025-03-05 VITALS
TEMPERATURE: 97.6 F | HEART RATE: 75 BPM | HEIGHT: 71 IN | BODY MASS INDEX: 44.1 KG/M2 | OXYGEN SATURATION: 97 % | WEIGHT: 315 LBS | RESPIRATION RATE: 17 BRPM | DIASTOLIC BLOOD PRESSURE: 80 MMHG | SYSTOLIC BLOOD PRESSURE: 130 MMHG

## 2025-03-05 DIAGNOSIS — I10 PRIMARY HYPERTENSION: Primary | ICD-10-CM

## 2025-03-05 DIAGNOSIS — N18.32 CHRONIC KIDNEY DISEASE, STAGE 3B (HCC): ICD-10-CM

## 2025-03-05 DIAGNOSIS — E11.9 DIABETES MELLITUS TYPE 2, DIET-CONTROLLED (HCC): ICD-10-CM

## 2025-03-05 DIAGNOSIS — C7B.8 MESENTERY METASTASIS OF NEUROENDOCRINE TUMOR (HCC): ICD-10-CM

## 2025-03-05 DIAGNOSIS — K59.09 CHRONIC CONSTIPATION: ICD-10-CM

## 2025-03-05 DIAGNOSIS — E66.01 MORBID OBESITY: ICD-10-CM

## 2025-03-05 DIAGNOSIS — C7A.8 MESENTERY METASTASIS OF NEUROENDOCRINE TUMOR (HCC): ICD-10-CM

## 2025-03-05 NOTE — PROGRESS NOTES
Benjie Brown is a 69 y.o. male     Chief Complaint   Patient presents with    1 Month Follow-Up     HTN       /80 (Site: Left Upper Arm, Position: Sitting, Cuff Size: Large Adult)   Pulse 75   Temp 97.6 °F (36.4 °C) (Oral)   Resp 17   Ht 1.803 m (5' 11\")   Wt (!) 185.4 kg (408 lb 12.8 oz)   SpO2 97%   BMI 57.02 kg/m²     Health Maintenance Due   Topic Date Due    Respiratory Syncytial Virus (RSV) Pregnant or age 60 yrs+ (1 - Risk 60-74 years 1-dose series) Never done    DTaP/Tdap/Td vaccine (1 - Tdap) 12/02/2014    Shingles vaccine (1 of 2) 01/26/2015    COVID-19 Vaccine (4 - 2024-25 season) 09/01/2024         \"Have you been to the ER, urgent care clinic since your last visit?  Hospitalized since your last visit?\"    NO    “Have you seen or consulted any other health care providers outside of [facility] since your last visit?”    NO

## 2025-03-06 LAB
ANION GAP SERPL CALC-SCNC: 7 MMOL/L (ref 2–12)
BASOPHILS # BLD: 0.04 K/UL (ref 0–0.1)
BASOPHILS NFR BLD: 0.5 % (ref 0–1)
BUN SERPL-MCNC: 27 MG/DL (ref 6–20)
BUN/CREAT SERPL: 19 (ref 12–20)
CALCIUM SERPL-MCNC: 9.4 MG/DL (ref 8.5–10.1)
CHLORIDE SERPL-SCNC: 102 MMOL/L (ref 97–108)
CO2 SERPL-SCNC: 32 MMOL/L (ref 21–32)
CREAT SERPL-MCNC: 1.45 MG/DL (ref 0.7–1.3)
DIFFERENTIAL METHOD BLD: ABNORMAL
EOSINOPHIL # BLD: 0.16 K/UL (ref 0–0.4)
EOSINOPHIL NFR BLD: 1.8 % (ref 0–7)
ERYTHROCYTE [DISTWIDTH] IN BLOOD BY AUTOMATED COUNT: 15.4 % (ref 11.5–14.5)
GLUCOSE SERPL-MCNC: 108 MG/DL (ref 65–100)
HCT VFR BLD AUTO: 44.2 % (ref 36.6–50.3)
HGB BLD-MCNC: 14 G/DL (ref 12.1–17)
IMM GRANULOCYTES # BLD AUTO: 0.04 K/UL (ref 0–0.04)
IMM GRANULOCYTES NFR BLD AUTO: 0.5 % (ref 0–0.5)
LYMPHOCYTES # BLD: 2.83 K/UL (ref 0.8–3.5)
LYMPHOCYTES NFR BLD: 31.9 % (ref 12–49)
MCH RBC QN AUTO: 30.7 PG (ref 26–34)
MCHC RBC AUTO-ENTMCNC: 31.7 G/DL (ref 30–36.5)
MCV RBC AUTO: 96.9 FL (ref 80–99)
MONOCYTES # BLD: 0.78 K/UL (ref 0–1)
MONOCYTES NFR BLD: 8.8 % (ref 5–13)
NEUTS SEG # BLD: 5.03 K/UL (ref 1.8–8)
NEUTS SEG NFR BLD: 56.5 % (ref 32–75)
NRBC # BLD: 0 K/UL (ref 0–0.01)
NRBC BLD-RTO: 0 PER 100 WBC
PLATELET # BLD AUTO: 207 K/UL (ref 150–400)
PMV BLD AUTO: 11.6 FL (ref 8.9–12.9)
POTASSIUM SERPL-SCNC: 3.7 MMOL/L (ref 3.5–5.1)
RBC # BLD AUTO: 4.56 M/UL (ref 4.1–5.7)
SODIUM SERPL-SCNC: 141 MMOL/L (ref 136–145)
WBC # BLD AUTO: 8.9 K/UL (ref 4.1–11.1)

## 2025-03-07 NOTE — PROGRESS NOTES
Benjie Brown is a 69 y.o. male here for new patient appt for neuroendocrine tumor  Referred by Dr Cash  2/10/25 biopsy  Pt here with wife, Gertrude.   Pt has been dealing with constipation. Takes a few medications that can cause it.   Has been over 5 years since he has seen Dr Ojeda.     . Have you been to the ER, urgent care clinic since your last visit?  Hospitalized since your last visit? New Pt    2. Have you seen or consulted any other health care providers outside of the [facility] since your last visit?  Include any pap smears or colon screening.  New Pt

## 2025-03-10 ENCOUNTER — OFFICE VISIT (OUTPATIENT)
Age: 70
End: 2025-03-10
Payer: MEDICARE

## 2025-03-10 ENCOUNTER — CLINICAL DOCUMENTATION (OUTPATIENT)
Age: 70
End: 2025-03-10

## 2025-03-10 ENCOUNTER — RESULTS FOLLOW-UP (OUTPATIENT)
Facility: CLINIC | Age: 70
End: 2025-03-10

## 2025-03-10 VITALS
BODY MASS INDEX: 44.1 KG/M2 | HEART RATE: 61 BPM | OXYGEN SATURATION: 96 % | TEMPERATURE: 98.2 F | WEIGHT: 315 LBS | HEIGHT: 71 IN | SYSTOLIC BLOOD PRESSURE: 120 MMHG | DIASTOLIC BLOOD PRESSURE: 67 MMHG

## 2025-03-10 DIAGNOSIS — C7A.8 NEUROENDOCRINE CANCER (HCC): Primary | ICD-10-CM

## 2025-03-10 PROCEDURE — 1126F AMNT PAIN NOTED NONE PRSNT: CPT | Performed by: INTERNAL MEDICINE

## 2025-03-10 PROCEDURE — 99205 OFFICE O/P NEW HI 60 MIN: CPT | Performed by: INTERNAL MEDICINE

## 2025-03-10 PROCEDURE — 3074F SYST BP LT 130 MM HG: CPT | Performed by: INTERNAL MEDICINE

## 2025-03-10 PROCEDURE — 1123F ACP DISCUSS/DSCN MKR DOCD: CPT | Performed by: INTERNAL MEDICINE

## 2025-03-10 PROCEDURE — G8428 CUR MEDS NOT DOCUMENT: HCPCS | Performed by: INTERNAL MEDICINE

## 2025-03-10 PROCEDURE — 3078F DIAST BP <80 MM HG: CPT | Performed by: INTERNAL MEDICINE

## 2025-03-10 PROCEDURE — G8417 CALC BMI ABV UP PARAM F/U: HCPCS | Performed by: INTERNAL MEDICINE

## 2025-03-10 PROCEDURE — 1036F TOBACCO NON-USER: CPT | Performed by: INTERNAL MEDICINE

## 2025-03-10 PROCEDURE — 3017F COLORECTAL CA SCREEN DOC REV: CPT | Performed by: INTERNAL MEDICINE

## 2025-03-10 NOTE — PROGRESS NOTES
[facility]   Psychosocial Assessment      Location: Medical Oncology at [facility]  Patient: Benjie Brown (1955)    Reason for Assessment: Initial Assessment, New Diagnosis    Advance Care Planning: ACP conversation deferred at this time    Sources of Information: Patient, Family, Staff, Medical Record    Mental Status: Alert, Oriented to Person, Oriented to Place, Oriented to Time, Oriented to Situation    Relationship Status:     Living Circumstances: Family/Significant Other in Household    Employment Status: Retired    Transportation Resources: Self, Family/Friends    Barriers to Learning: No Barriers Identified    Financial/Legal Concerns: Limited Income/Resources    Mandaen/Spiritual/Existential: Patient is involved in a spiritual, clifton, or Voodoo community    Support System: Fair Support: The patient has a moderate support network that provides occasional or inconsistent assistance, meeting some but not all of their emotional and practical needs.  Patient's Primary Support Person/Group: wife Chloe pt has 2 2 sons (lost 1 of twin son to COVID (38 yo), wife has 1 boy, step dtr from pt previous marriage    Pt History of Mental Health / Substance Use Disorders: No  Conversation deferred    Coping with Illness: Challenges Coping with Serious Illness, Situational Depression, Situational Anxiety           Concerns/Barriers to Care:     Narrative:   Met with patient and his wife Chloe,  to introduce social work navigator role and supports.    PT has twin sons, 1  of Covid at 38 yo and another son, a step dtr, and spouse has a son.  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  PT belongs to a [employer].  PT has a neuro endocrine tumor in his bladder and will have a PET scan to see if any other hot spots. Pt does not like scans.      Information/Education/Referrals Provided:        Plan:   Introduce self and role of the  in the [facility].  Informed

## 2025-03-10 NOTE — PROGRESS NOTES
[facility] [place]  [facility]  [address], [address], [address]  [place] [address]  [phone]       Oncology Consultation      Patient: Benjie Brown MRN: [id number]  SSN: [id number]    YOB: 1955  Age: 69 y.o.  Sex: male      Subjective:      Benjie Brown is a 69 y.o. male who I am seeing for a new diagnosis of well differentiated neuroendocrine carcinoma. He is being followed by Dr. Herman for a renal tumor and BPH. In the CT he was noted to have a mesenteric mass. A biopsy by Dr. Cash revealed well differentiated low grade neuroendocrine carcinoma. He denies excessive sweating, diarrhea, weight loss etc. He comes in to discuss the next steps.     Review of Systems:    Constitutional: negative  Eyes: negative  Ears, Nose, Mouth, Throat, and Face: negative  Respiratory: negative  Cardiovascular: negative  Gastrointestinal: negative  Genitourinary:negative  Integument/Breast: negative  Hematologic/Lymphatic: negative  Musculoskeletal:negative  Neurological: negative    Past Medical History:   Diagnosis Date    BPH (benign prostatic hyperplasia)     without outflow obstruction    CKD stage G3b/A1, GFR 30-44 and albumin creatinine ratio <30 mg/g (HCC)     Constipation     Environmental allergies     GERD (gastroesophageal reflux disease)     Gout 11/22/2017    Heart disease     Hepatic steatosis 11/22/2017    Comments: non-alcoholic    Hypercholesterolemia     Hypertension     Insomnia 11/22/2017    Iron deficiency 11/22/2017    Left chronic serous otitis media 11/22/2017    Mesenteric mass 01/2025    Nodular adrenal cortex     Obstructive sleep apnea     wears CPAP    Renal mass      Past Surgical History:   Procedure Laterality Date    COLONOSCOPY N/A 4/30/2021    COLONOSCOPY performed by Patrick Ojeda Jr., MD at [facility] ENDOSCOPY    COLORECTAL SCRN; HI RISK IND  4/30/2021         KNEE SURGERY Right     LAPAROSCOPY N/A

## 2025-03-10 NOTE — PROGRESS NOTES
NCCN Distress Thermometer    Medical Oncology at [facility]    Date Screening Completed: 3/10/25    Screening Declined:  [] Yes    Number that best describes how much distress you've experienced in the past week, including today?  0 [] - No distress 1 []      2 []      3 []      4 []       5 []       6 []      7 []      8 []      9 []       10 [] - Extreme distress    PROBLEM LIST  Have you had concerns about any of the items below in the past week, including today?      Physical Concerns Practical Concerns   [] Pain [] Taking care of myself    [x] Sleep [] Taking care of others    [x] Fatigue [] Safety   [] Tobacco use  [] Work   [] Substance use  [] School   [] Memory or concentration [] Housing/Utilities   [x] Sexual health [] Finances   [] Changes in eating  [] Insurance   [] Loss or change of physical abilities  [] Transportation    []    Emotional Concerns [] Having enough food   [x] Worry or anxiety [] Access to medicine   [] Sadness or depression [] Treatment decisions   [] Loss of interest or enjoyment     [] Grief or loss  Spiritual or Shinto Concerns   [] Fear [] Sense of meaning or purpose   [] Loneliness  [] Changes in clifton or beliefs   [] Anger [] Death, dying, or afterlife   [] Changes in appearance [] Conflict between beliefs and cancer treatments    [] Feelings of worthlessness or being a burden [] Relationship with the sacred    [] Ritual or dietary needs    Social Concerns     [] Relationship with spouse or partner     [] Relationship with children    [] Relationship with family members     [] Relationship with friends or coworkers     [] Communication with health care team     [] Ability to have children     [] Prejudice or discrimination        Other Concerns: no number selected

## 2025-03-31 ENCOUNTER — TELEPHONE (OUTPATIENT)
Age: 70
End: 2025-03-31

## 2025-03-31 DIAGNOSIS — C7A.8 NEUROENDOCRINE CANCER (HCC): Primary | ICD-10-CM

## 2025-03-31 NOTE — TELEPHONE ENCOUNTER
Pt came in to update the team on his dental surgery on 04/07/25. I've scanned paperwork to his chart from [facility]

## 2025-04-01 NOTE — TELEPHONE ENCOUNTER
Noted.    Upon chart review, pt needs a 24 hour urine 5 hiaa level and a serum chromagranin a level.    Orders entered at this time per VORB from  (covering for )    Called pt.  No answer.  Left VM with AI asking for a call back.

## 2025-04-02 ENCOUNTER — HOSPITAL ENCOUNTER (OUTPATIENT)
Facility: HOSPITAL | Age: 70
Discharge: HOME | End: 2025-04-05
Attending: INTERNAL MEDICINE
Payer: MEDICARE

## 2025-04-02 VITALS — WEIGHT: 315 LBS | BODY MASS INDEX: 56.63 KG/M2

## 2025-04-02 DIAGNOSIS — C7A.8 NEUROENDOCRINE CANCER (HCC): ICD-10-CM

## 2025-04-02 PROCEDURE — A9587 GALLIUM GA-68: HCPCS | Performed by: INTERNAL MEDICINE

## 2025-04-02 PROCEDURE — 3430000000 HC RX DIAGNOSTIC RADIOPHARMACEUTICAL: Performed by: INTERNAL MEDICINE

## 2025-04-02 PROCEDURE — 78815 PET IMAGE W/CT SKULL-THIGH: CPT

## 2025-04-02 RX ADMIN — 68GA-DOTATATE 5.8 MILLICURIE: KIT INTRAVENOUS at 09:05

## 2025-04-14 DIAGNOSIS — C7A.8 NEUROENDOCRINE CANCER (HCC): ICD-10-CM

## 2025-04-17 LAB — CGA SERPL-SCNC: 51.7 NG/ML (ref 0–101.8)

## 2025-04-22 LAB
5OH-INDOLEACETATE 24H UR-MCNC: 1.9 MG/L
5OH-INDOLEACETATE 24H UR-MRATE: 5.7 MG/24 HR (ref 0–14.9)
COLLECT DURATION TIME UR: 24 HR
SPECIMEN VOL ?TM UR: 3000 ML

## 2025-04-22 NOTE — PROGRESS NOTES
Benjie Brown is a 69 y.o. male here for one month follow up appt for neuroendocrine tumor  PET done 4/2/25  Has upcoming appts with Dr Herman  Medications reviewed.     1. Have you been to the ER, urgent care clinic since your last visit?  Hospitalized since your last visit? no    2. Have you seen or consulted any other health care providers outside of the [facility] since your last visit?  Include any pap smears or colon screening.  Dentist - root canal. Ophthalmologist - will be having cataract surgery

## 2025-04-23 ENCOUNTER — OFFICE VISIT (OUTPATIENT)
Age: 70
End: 2025-04-23
Payer: MEDICARE

## 2025-04-23 VITALS
BODY MASS INDEX: 44.1 KG/M2 | TEMPERATURE: 98.1 F | SYSTOLIC BLOOD PRESSURE: 119 MMHG | DIASTOLIC BLOOD PRESSURE: 74 MMHG | WEIGHT: 315 LBS | HEART RATE: 59 BPM | HEIGHT: 71 IN | OXYGEN SATURATION: 94 %

## 2025-04-23 DIAGNOSIS — C7A.8 NEUROENDOCRINE CANCER (HCC): Primary | ICD-10-CM

## 2025-04-23 PROCEDURE — 99215 OFFICE O/P EST HI 40 MIN: CPT | Performed by: INTERNAL MEDICINE

## 2025-04-23 PROCEDURE — 3078F DIAST BP <80 MM HG: CPT | Performed by: INTERNAL MEDICINE

## 2025-04-23 PROCEDURE — G8428 CUR MEDS NOT DOCUMENT: HCPCS | Performed by: INTERNAL MEDICINE

## 2025-04-23 PROCEDURE — G8417 CALC BMI ABV UP PARAM F/U: HCPCS | Performed by: INTERNAL MEDICINE

## 2025-04-23 PROCEDURE — 1126F AMNT PAIN NOTED NONE PRSNT: CPT | Performed by: INTERNAL MEDICINE

## 2025-04-23 PROCEDURE — 1036F TOBACCO NON-USER: CPT | Performed by: INTERNAL MEDICINE

## 2025-04-23 PROCEDURE — 1123F ACP DISCUSS/DSCN MKR DOCD: CPT | Performed by: INTERNAL MEDICINE

## 2025-04-23 PROCEDURE — 3017F COLORECTAL CA SCREEN DOC REV: CPT | Performed by: INTERNAL MEDICINE

## 2025-04-23 PROCEDURE — 3074F SYST BP LT 130 MM HG: CPT | Performed by: INTERNAL MEDICINE

## 2025-04-23 RX ORDER — LANREOTIDE ACETATE 120 MG/.5ML
120 INJECTION SUBCUTANEOUS ONCE
OUTPATIENT
Start: 2025-04-30 | End: 2025-04-30

## 2025-04-23 RX ORDER — TRIAMCINOLONE ACETONIDE 1 MG/G
1 CREAM TOPICAL
COMMUNITY
Start: 2025-04-09

## 2025-04-23 RX ORDER — ONDANSETRON 2 MG/ML
8 INJECTION INTRAMUSCULAR; INTRAVENOUS
OUTPATIENT
Start: 2025-04-30

## 2025-04-23 RX ORDER — DIPHENHYDRAMINE HYDROCHLORIDE 50 MG/ML
50 INJECTION, SOLUTION INTRAMUSCULAR; INTRAVENOUS
OUTPATIENT
Start: 2025-04-30

## 2025-04-23 RX ORDER — UREA 40 G/100G
LOTION TOPICAL
COMMUNITY
Start: 2025-04-09

## 2025-04-23 RX ORDER — ALBUTEROL SULFATE 90 UG/1
4 INHALANT RESPIRATORY (INHALATION) PRN
OUTPATIENT
Start: 2025-04-30

## 2025-04-23 RX ORDER — HYDROCORTISONE SODIUM SUCCINATE 100 MG/2ML
100 INJECTION INTRAMUSCULAR; INTRAVENOUS
OUTPATIENT
Start: 2025-04-30

## 2025-04-23 RX ORDER — FAMOTIDINE 10 MG/ML
20 INJECTION, SOLUTION INTRAVENOUS
OUTPATIENT
Start: 2025-04-30

## 2025-04-23 RX ORDER — ACETAMINOPHEN 325 MG/1
650 TABLET ORAL
OUTPATIENT
Start: 2025-04-30

## 2025-04-23 RX ORDER — EPINEPHRINE 1 MG/ML
0.3 INJECTION, SOLUTION, CONCENTRATE INTRAVENOUS PRN
OUTPATIENT
Start: 2025-04-30

## 2025-04-23 RX ORDER — SODIUM CHLORIDE 9 MG/ML
INJECTION, SOLUTION INTRAVENOUS CONTINUOUS
OUTPATIENT
Start: 2025-04-30

## 2025-04-23 NOTE — PROGRESS NOTES
[facility] [place]  at [facility]  [address], [address], [address]  [place] [address]  [phone]       Oncology Note      Patient: Benjie Brown MRN: [id number]  SSN: [id number]    YOB: 1955  Age: 69 y.o.  Sex: male      Subjective:      Benjie Brown is a 69 y.o. male who I am seeing for a new diagnosis of well differentiated neuroendocrine carcinoma. He is being followed by Dr. Herman for a renal tumor and BPH. In the CT he was noted to have a mesenteric mass. A biopsy by Dr. Cash revealed well differentiated low grade neuroendocrine carcinoma. He denies excessive sweating, diarrhea, weight loss etc. He had a DOTATATE PET. He comes in to discuss the next steps.       Review of Systems:    Constitutional: negative  Eyes: negative  Ears, Nose, Mouth, Throat, and Face: negative  Respiratory: negative  Cardiovascular: negative  Gastrointestinal: negative  Genitourinary:negative  Integument/Breast: negative  Hematologic/Lymphatic: negative  Musculoskeletal:negative  Neurological: negative    Past Medical History:   Diagnosis Date    BPH (benign prostatic hyperplasia)     without outflow obstruction    CKD stage G3b/A1, GFR 30-44 and albumin creatinine ratio <30 mg/g (HCC)     Constipation     Environmental allergies     GERD (gastroesophageal reflux disease)     Gout 11/22/2017    Heart disease     Hepatic steatosis 11/22/2017    Comments: non-alcoholic    Hypercholesterolemia     Hypertension     Insomnia 11/22/2017    Iron deficiency 11/22/2017    Left chronic serous otitis media 11/22/2017    Mesenteric mass 01/2025    Nodular adrenal cortex     Obstructive sleep apnea     wears CPAP    Renal mass      Past Surgical History:   Procedure Laterality Date    COLONOSCOPY N/A 4/30/2021    COLONOSCOPY performed by Patrick Ojeda Jr., MD at [facility] ENDOSCOPY    COLORECTAL SCRN; HI RISK IND  4/30/2021         KNEE SURGERY Right

## 2025-04-24 ENCOUNTER — TELEPHONE (OUTPATIENT)
Age: 70
End: 2025-04-24

## 2025-04-24 NOTE — TELEPHONE ENCOUNTER
----- Message -----  From: Amanda High [facility]  Sent: 4/23/2025   3:27 PM EDT  To: Sonia Pate  Subject: new start                                        Pls call to schedule      Patient is scheduled and aware.

## 2025-04-25 ENCOUNTER — TELEPHONE (OUTPATIENT)
Age: 70
End: 2025-04-25

## 2025-04-25 NOTE — TELEPHONE ENCOUNTER
Returned call to pt wife.  Pt has a colonoscopy on 5/1.  His first somatuline injection is 4/30 in [facility].  She and pt would like for the injection to be pushed back a week.    Please call and adjust all appointments.

## 2025-04-30 ENCOUNTER — ANESTHESIA EVENT (OUTPATIENT)
Facility: HOSPITAL | Age: 70
End: 2025-04-30
Payer: MEDICARE

## 2025-05-01 ENCOUNTER — TELEPHONE (OUTPATIENT)
Age: 70
End: 2025-05-01

## 2025-05-01 ENCOUNTER — ANESTHESIA (OUTPATIENT)
Facility: HOSPITAL | Age: 70
End: 2025-05-01
Payer: MEDICARE

## 2025-05-01 ENCOUNTER — HOSPITAL ENCOUNTER (OUTPATIENT)
Facility: HOSPITAL | Age: 70
Setting detail: OUTPATIENT SURGERY
Discharge: HOME | End: 2025-05-01
Attending: INTERNAL MEDICINE | Admitting: INTERNAL MEDICINE
Payer: MEDICARE

## 2025-05-01 VITALS
WEIGHT: 315 LBS | DIASTOLIC BLOOD PRESSURE: 62 MMHG | RESPIRATION RATE: 15 BRPM | BODY MASS INDEX: 44.1 KG/M2 | SYSTOLIC BLOOD PRESSURE: 127 MMHG | OXYGEN SATURATION: 94 % | HEIGHT: 71 IN | TEMPERATURE: 97.6 F | HEART RATE: 79 BPM

## 2025-05-01 PROCEDURE — 7100000010 HC PHASE II RECOVERY - FIRST 15 MIN: Performed by: INTERNAL MEDICINE

## 2025-05-01 PROCEDURE — 7100000011 HC PHASE II RECOVERY - ADDTL 15 MIN: Performed by: INTERNAL MEDICINE

## 2025-05-01 PROCEDURE — 2580000003 HC RX 258

## 2025-05-01 PROCEDURE — 3600007502: Performed by: INTERNAL MEDICINE

## 2025-05-01 PROCEDURE — 2580000003 HC RX 258: Performed by: INTERNAL MEDICINE

## 2025-05-01 PROCEDURE — 88341 IMHCHEM/IMCYTCHM EA ADD ANTB: CPT

## 2025-05-01 PROCEDURE — 2720000010 HC SURG SUPPLY STERILE: Performed by: INTERNAL MEDICINE

## 2025-05-01 PROCEDURE — 3600007512: Performed by: INTERNAL MEDICINE

## 2025-05-01 PROCEDURE — 88305 TISSUE EXAM BY PATHOLOGIST: CPT

## 2025-05-01 PROCEDURE — 6360000002 HC RX W HCPCS

## 2025-05-01 PROCEDURE — 3700000001 HC ADD 15 MINUTES (ANESTHESIA): Performed by: INTERNAL MEDICINE

## 2025-05-01 PROCEDURE — 6370000000 HC RX 637 (ALT 250 FOR IP)

## 2025-05-01 PROCEDURE — 2709999900 HC NON-CHARGEABLE SUPPLY: Performed by: INTERNAL MEDICINE

## 2025-05-01 PROCEDURE — 3700000000 HC ANESTHESIA ATTENDED CARE: Performed by: INTERNAL MEDICINE

## 2025-05-01 PROCEDURE — 88342 IMHCHEM/IMCYTCHM 1ST ANTB: CPT

## 2025-05-01 RX ORDER — GLYCOPYRROLATE 0.2 MG/ML
INJECTION INTRAMUSCULAR; INTRAVENOUS
Status: DISCONTINUED | OUTPATIENT
Start: 2025-05-01 | End: 2025-05-01 | Stop reason: SDUPTHER

## 2025-05-01 RX ORDER — SODIUM CHLORIDE 0.9 % (FLUSH) 0.9 %
5-40 SYRINGE (ML) INJECTION EVERY 12 HOURS SCHEDULED
Status: DISCONTINUED | OUTPATIENT
Start: 2025-05-01 | End: 2025-05-01 | Stop reason: HOSPADM

## 2025-05-01 RX ORDER — SODIUM CHLORIDE 9 MG/ML
INJECTION, SOLUTION INTRAVENOUS
Status: DISCONTINUED | OUTPATIENT
Start: 2025-05-01 | End: 2025-05-01 | Stop reason: SDUPTHER

## 2025-05-01 RX ORDER — LIDOCAINE HYDROCHLORIDE 20 MG/ML
INJECTION, SOLUTION EPIDURAL; INFILTRATION; INTRACAUDAL; PERINEURAL
Status: DISCONTINUED | OUTPATIENT
Start: 2025-05-01 | End: 2025-05-01 | Stop reason: SDUPTHER

## 2025-05-01 RX ORDER — PHENYLEPHRINE HCL IN 0.9% NACL 0.4MG/10ML
SYRINGE (ML) INTRAVENOUS
Status: DISCONTINUED | OUTPATIENT
Start: 2025-05-01 | End: 2025-05-01 | Stop reason: SDUPTHER

## 2025-05-01 RX ORDER — SODIUM CHLORIDE 9 MG/ML
INJECTION, SOLUTION INTRAVENOUS PRN
Status: DISCONTINUED | OUTPATIENT
Start: 2025-05-01 | End: 2025-05-01 | Stop reason: HOSPADM

## 2025-05-01 RX ORDER — SODIUM CHLORIDE 0.9 % (FLUSH) 0.9 %
5-40 SYRINGE (ML) INJECTION PRN
Status: DISCONTINUED | OUTPATIENT
Start: 2025-05-01 | End: 2025-05-01 | Stop reason: HOSPADM

## 2025-05-01 RX ADMIN — PROPOFOL 40 MG: 10 INJECTION, EMULSION INTRAVENOUS at 07:53

## 2025-05-01 RX ADMIN — Medication 80 MCG: at 07:52

## 2025-05-01 RX ADMIN — PROPOFOL 30 MG: 10 INJECTION, EMULSION INTRAVENOUS at 07:56

## 2025-05-01 RX ADMIN — GLYCOPYRROLATE 0.2 MG: 0.2 INJECTION, SOLUTION INTRAMUSCULAR; INTRAVENOUS at 07:34

## 2025-05-01 RX ADMIN — PROPOFOL 40 MG: 10 INJECTION, EMULSION INTRAVENOUS at 07:44

## 2025-05-01 RX ADMIN — PROPOFOL 30 MG: 10 INJECTION, EMULSION INTRAVENOUS at 07:36

## 2025-05-01 RX ADMIN — PROPOFOL 30 MG: 10 INJECTION, EMULSION INTRAVENOUS at 08:06

## 2025-05-01 RX ADMIN — PROPOFOL 30 MG: 10 INJECTION, EMULSION INTRAVENOUS at 07:59

## 2025-05-01 RX ADMIN — PROPOFOL 110 MG: 10 INJECTION, EMULSION INTRAVENOUS at 07:34

## 2025-05-01 RX ADMIN — PROPOFOL 30 MG: 10 INJECTION, EMULSION INTRAVENOUS at 08:02

## 2025-05-01 RX ADMIN — PROPOFOL 20 MG: 10 INJECTION, EMULSION INTRAVENOUS at 07:48

## 2025-05-01 RX ADMIN — Medication 40 MCG: at 07:59

## 2025-05-01 RX ADMIN — SODIUM CHLORIDE 100 ML/HR: 0.9 INJECTION, SOLUTION INTRAVENOUS at 07:20

## 2025-05-01 RX ADMIN — PROPOFOL 30 MG: 10 INJECTION, EMULSION INTRAVENOUS at 07:42

## 2025-05-01 RX ADMIN — LIDOCAINE HYDROCHLORIDE 100 MG: 20 INJECTION, SOLUTION EPIDURAL; INFILTRATION; INTRACAUDAL; PERINEURAL at 07:34

## 2025-05-01 RX ADMIN — PROPOFOL 40 MG: 10 INJECTION, EMULSION INTRAVENOUS at 08:14

## 2025-05-01 RX ADMIN — PROPOFOL 20 MG: 10 INJECTION, EMULSION INTRAVENOUS at 07:40

## 2025-05-01 RX ADMIN — PROPOFOL 40 MG: 10 INJECTION, EMULSION INTRAVENOUS at 07:47

## 2025-05-01 RX ADMIN — PROPOFOL 30 MG: 10 INJECTION, EMULSION INTRAVENOUS at 07:39

## 2025-05-01 RX ADMIN — BENZOCAINE 2 EACH: 200 SPRAY DENTAL; ORAL; PERIODONTAL at 07:33

## 2025-05-01 RX ADMIN — PROPOFOL 40 MG: 10 INJECTION, EMULSION INTRAVENOUS at 08:09

## 2025-05-01 RX ADMIN — PROPOFOL 40 MG: 10 INJECTION, EMULSION INTRAVENOUS at 07:50

## 2025-05-01 RX ADMIN — SODIUM CHLORIDE: 9 INJECTION, SOLUTION INTRAVENOUS at 07:27

## 2025-05-01 RX ADMIN — Medication 80 MCG: at 08:08

## 2025-05-01 ASSESSMENT — PAIN - FUNCTIONAL ASSESSMENT: PAIN_FUNCTIONAL_ASSESSMENT: 0-10

## 2025-05-01 ASSESSMENT — ENCOUNTER SYMPTOMS: SHORTNESS OF BREATH: 1

## 2025-05-01 NOTE — OP NOTE
[facility]                   Endoscopic Gastroduodenoscopy Procedure Note      5/1/2025  Benjie C Day  1955  [id number]    Procedure: Endoscopic Gastroduodenoscopy with polypectomy    Indication:  Neuroendocrine tumor of unknown primary      Pre-operative Diagnosis: see indication above    Post-operative Diagnosis: see findings below    : VERÓNICA Ojeda Jr. MD    Referring Provider:  Philipp Mullins MD      Anesthesia/Sedation:  MAC anesthesia Propofol    Airway assessment: No airway problems anticipated    Pre-Procedural Exam:      Airway: clear, no airway problems anticipated  Heart: RRR, without gallops or rubs  Lungs: clear bilaterally without wheezes, crackles, or rhonchi  Abdomen: soft, nontender, nondistended, bowel sounds present  Mental Status: awake, alert and oriented to person, place and time       Procedure Details     After infomed consent was obtained for the procedure, with all risks and benefits of procedure explained the patient was taken to the endoscopy suite and placed in the left lateral decubitus position.  Following sequential administration of sedation as per above, the endoscope was inserted into the mouth and advanced under direct vision to second portion of the duodenum.  A careful inspection was made as the gastroscope was withdrawn, including a retroflexed view of the proximal stomach; findings and interventions are described below.      Findings:   Esophagus:normal  Stomach: In the antrum there were four polyps ranging from 6 mm,  8 mm, 10 mm and 12 mm. The 8 mm polyp was in pyloric channel. All polyps removed and sent as one specimen.  Duodenum: normal    Therapies:  Snare polypectomy of 4 gastric polyps    Specimens:  gastric polyps           Complications:   None; patient tolerated the procedure well.    EBL:  None.            Impression:      Esophagus--normal  Four gastric polyps

## 2025-05-01 NOTE — ANESTHESIA PRE PROCEDURE
Social History     Tobacco Use   • Smoking status: Never     Passive exposure: Never   • Smokeless tobacco: Never   Substance Use Topics   • Alcohol use: Not Currently     Comment: social                                Counseling given: Not Answered      Vital Signs (Current): There were no vitals filed for this visit.                                           BP Readings from Last 3 Encounters:   04/23/25 119/74   03/10/25 120/67   03/05/25 130/80       NPO Status:                                                                                 BMI:   Wt Readings from Last 3 Encounters:   04/23/25 (!) 182.4 kg (402 lb 3.2 oz)   04/02/25 (!) 184.2 kg (406 lb)   03/10/25 (!) 184.4 kg (406 lb 9.6 oz)     There is no height or weight on file to calculate BMI.    CBC:   Lab Results   Component Value Date/Time    WBC 8.9 03/05/2025 02:32 PM    RBC 4.56 03/05/2025 02:32 PM    HGB 14.0 03/05/2025 02:32 PM    HCT 44.2 03/05/2025 02:32 PM    MCV 96.9 03/05/2025 02:32 PM    RDW 15.4 03/05/2025 02:32 PM     03/05/2025 02:32 PM       CMP:   Lab Results   Component Value Date/Time     03/05/2025 02:32 PM    K 3.7 03/05/2025 02:32 PM     03/05/2025 02:32 PM    CO2 32 03/05/2025 02:32 PM    BUN 27 03/05/2025 02:32 PM    CREATININE 1.45 03/05/2025 02:32 PM    GFRAA 54 05/03/2022 02:40 PM    AGRATIO 1.1 11/03/2022 02:59 PM    LABGLOM 52 03/05/2025 02:32 PM    LABGLOM >60 01/05/2024 02:31 PM    LABGLOM 40 11/03/2022 02:59 PM    GLUCOSE 108 03/05/2025 02:32 PM    CALCIUM 9.4 03/05/2025 02:32 PM    BILITOT 0.5 02/03/2025 10:26 AM    ALKPHOS 107 02/03/2025 10:26 AM    ALKPHOS 66 11/03/2022 02:59 PM    AST 20 02/03/2025 10:26 AM    ALT 33 02/03/2025 10:26 AM       POC Tests: No results for input(s): \"POCGLU\", \"POCNA\", \"POCK\", \"POCCL\", \"POCBUN\", \"POCHEMO\", \"POCHCT\" in the last 72 hours.    Coags: No results found for: \"PROTIME\", \"INR\", \"APTT\"    HCG (If Applicable): No results found for: \"PREGTESTUR\", \"PREGSERUM\",

## 2025-05-01 NOTE — ANESTHESIA POSTPROCEDURE EVALUATION
Department of Anesthesiology  Postprocedure Note    Patient: Benjie Brwon  MRN: [id number]  YOB: 1955  Date of evaluation: 5/1/2025    Procedure Summary       Date: 05/01/25 Room / Location: [facility] ENDO 04 / [facility] ENDOSCOPY    Anesthesia Start: 0727 Anesthesia Stop: 0819    Procedures:       ESOPHAGOGASTRODUODENOSCOPY (Upper GI Region)      COLONOSCOPY DIAGNOSTIC (Lower GI Region) Diagnosis:       Adenocarcinoma (HCC)      (Adenocarcinoma (HCC) [C80.1])    Surgeons: Patrick Ojeda Jr., MD Responsible Provider: Alex Calloway MD    Anesthesia Type: MAC ASA Status: 3            Anesthesia Type: MAC    Nanci Phase I: Nanci Score: 10    Nanci Phase II:      Anesthesia Post Evaluation    Patient location during evaluation: PACU  Patient participation: complete - patient participated  Level of consciousness: sleepy but conscious and responsive to verbal stimuli  Airway patency: patent  Nausea & Vomiting: no vomiting and no nausea  Cardiovascular status: blood pressure returned to baseline and hemodynamically stable  Respiratory status: acceptable  Hydration status: stable    No notable events documented.

## 2025-05-01 NOTE — PROGRESS NOTES
ARRIVAL INFORMATION:  Verified patient name and date of birth, scheduled procedure, and informed consent.     : Jose David (wife) contact number: [phone]  Physician and staff can share information with the .     Receive texts: yes    Belongings with patient include:  Clothing,Glasses    GI FOCUSED ASSESSMENT:  Neuro: Awake, alert, oriented x4  Respiratory: even and unlabored   GI: soft and non-distended  EKG Rhythm: normal sinus rhythm    Education:Reviewed general discharge instructions and  information.     The risks and benefits of the bite block have been explained to patient.  Patient verbalizes understanding.

## 2025-05-01 NOTE — PERIOP NOTE
Endoscopy Case End Note:    0816:  Procedure scope was pre-cleaned, per protocol, at bedside by JENNIFER Gomes.      0821:  Report received from anesthesia - TONY Navarro CRNA.  See anesthesia flowsheet for intra-procedure vital signs and events.    0825:  Glasses returned to patient.

## 2025-05-01 NOTE — TELEPHONE ENCOUNTER
Appts rescheduled. Spoke w/patient and made aware.    ===View-only below this line===  ----- Message -----  From: Chela Ibanez RN  Sent: 4/29/2025   9:08 AM EDT  To: Sonia Pate  Subject: FW: new start                                    Please push pt OPIC back.  He is getting colonoscopy this week so would be best to push him back.

## 2025-05-01 NOTE — OP NOTE
[facility]                  Colonoscopy Operative Report    5/1/2025      Benjie Brown  [id number]  1955    Procedure Type:   Colonoscopy with polypectomy (cold snare), polypectomy (snare cautery)     Indications:   Neuroendocrine tumor of unknown primary      Pre-operative Diagnosis: see indication above    Post-operative Diagnosis:  See findings below    :  ADRIANA Ojeda Jr, MD    Referring Provider: Philipp Mullins MD      Sedation:  MAC anesthesia Propofol    Pre-Procedural Exam:      Airway: clear,  No airway problems anticipated  Heart: RRR, without gallops or rubs  Lungs: clear bilaterally without wheezes, crackles, or rhonchi  Abdomen: soft, nontender, nondistended, bowel sounds present  Mental Status: awake, alert and oriented to person, place and time     Procedure Details:  After informed consent was obtained with all risks and benefits of procedure explained and preoperative exam completed, the patient was taken to the endoscopy suite and placed in the left lateral decubitus position.  Upon sequential sedation as per above, a digital rectal exam was performed .  The Olympus videocolonoscope  was inserted in the rectum and carefully advanced to the cecum, which was identified by the ileocecal valve and appendiceal orifice.  The cecum was identified by the ileocecal valve and appendiceal orifice.  The quality of preparation was adequate.  The colonoscope was slowly withdrawn with careful evaluation between folds. Retroflexion in the rectum was completed demonstrating internal hemorrhoids.     Findings:   Rectum: Grade 1 internal hemorrhoid(s);  Sigmoid: normal  Descending Colon: normal  Transverse Colon: Two 6-7 mm polyps, cold snared  Ascending Colon: At the hepatic flexure was a 10 mm polyp hot snared  Cecum: Two 8 to 10 mm polyps near IC valve--hot snared  Terminal Ileum: not intubated      Specimen Removed:   1. Cecal polyps  2 hepatic flexure polyp

## 2025-05-01 NOTE — H&P
VERÓNICA Ojeda MD  [facility]  [address], [address]  [place] [address] [phone]  [email or web address]      Gastroenterology Outpatient History and Physical    Patient: Benjie Brown    Physician: ADRIANA Ojeda MD    Vital Signs: Blood pressure (!) 153/65, pulse 72, temperature 98.1 °F (36.7 °C), temperature source Temporal, height 1.803 m (5' 11\"), weight (!) 181.4 kg (400 lb), SpO2 96%.    Allergies:   Allergies   Allergen Reactions    Ace Inhibitors Cough    Hydralazine Headaches       Chief Complaint: neuroendocrine tumor    History of Present Illness:     Benjie Brown is a 69 y.o. male with a new diagnosis of well differentiated neuroendocrine carcinoma. He is being followed by Dr. Herman for a renal tumor and BPH. In the CT he was noted to have a mesenteric mass. A biopsy by Dr. Cash revealed well differentiated low grade neuroendocrine carcinoma. He denies excessive sweating, diarrhea, weight loss etc. He had a DOTATATE PET.      4/2/25    PET/CT SCAN    FINDINGS:  HEAD/NECK: No apparent foci of abnormal hypermetabolism. Cerebral evaluation is  limited by normal intense activity.  CHEST: No foci of abnormal hypermetabolism.  ABDOMEN/PELVIS: Mesenteric mass lesion is hypermetabolic.  SKELETON: No foci of abnormal hypermetabolism in the axial and visualized  appendicular skeleton.     IMPRESSION:  Mesenteric mass lesion is hypermetabolic. Otherwise normal tracer  distribution.    History:  Past Medical History:   Diagnosis Date    BPH (benign prostatic hyperplasia)     without outflow obstruction    CKD stage G3b/A1, GFR 30-44 and albumin creatinine ratio <30 mg/g (HCC)     Constipation     Environmental allergies     GERD (gastroesophageal reflux disease)     Gout 11/22/2017    Heart disease     Hepatic steatosis 11/22/2017    Comments: non-alcoholic    Hypercholesterolemia     Hypertension     Insomnia 11/22/2017    Iron deficiency 11/22/2017  Onset    Diabetes Mother     Diabetes Father     Diabetes Other     Heart Disease Other     Hypertension Other       Patient Active Problem List   Diagnosis    Alcohol screening    Gout    Insomnia    Morbid obesity (HCC)    Peripheral vascular disease    Mixed hyperlipidemia    JONO (obstructive sleep apnea)    Diabetes mellitus type 2, diet-controlled (HCC)    Metabolic syndrome    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 Authorizing Provider   triamcinolone (KENALOG) 0.1 % cream 1 Application 4/9/25  Yes Jenn José MD   Urea 40 % LOTN 1 application as needed Externally Once a day for 30 days  for thickened skin on legs 4/9/25  Yes ProviderJenn MD   NONFORMULARY Amlodipine he thinks 50 mg   Yes ProviderJenn MD   allopurinol (ZYLOPRIM) 300 MG tablet TAKE 1 TABLET BY MOUTH DAILY 1/15/25  Yes Philipp Mullins MD   rosuvastatin (CRESTOR) 10 MG tablet TAKE 1 TABLET BY MOUTH DAILY 1/15/25  Yes Philipp Mullins MD   valsartan (DIOVAN) 320 MG tablet Take 1 tablet by mouth daily 11/6/24  Yes Philipp Mullins MD   atenolol (TENORMIN) 50 MG tablet TAKE 1 TABLET BY MOUTH DAILY 10/21/24  Yes Philipp Mullins MD   bumetanide (BUMEX) 2 MG tablet Take 1 tablet by mouth 2 times daily 10/4/24  Yes Philipp Mullins MD   ascorbic acid (VITAMIN C) 1000 MG tablet Take 1 tablet by mouth daily   Yes Automatic Reconciliation, Ar   aspirin 81 MG chewable tablet Take 1 tablet by mouth daily   Yes

## 2025-05-01 NOTE — DISCHARGE INSTRUCTIONS
VERÓNICA Ojeda MD  [facility]  [address] , [address]  [place], [place] [id number] [phone]  [email or web address]    Benjie C Day  [id number]  1955    EGD DISCHARGE INSTRUCTIONS    Discomfort:  Sore throat- throat lozenges or warm salt water gargle  redness at IV site- apply warm compress to area; if redness or soreness persist- contact your physician  Gaseous discomfort- walking, belching will help relieve any discomfort  You may not operate a vehicle for 12 hours  You may not engage in an occupation involving machinery or appliances for rest of today  You may not drink alcoholic beverages for at least 12 hours  Avoid making any critical decisions for at least 24 hour  DIET  You may have anything by mouth.  You may eat and drink immediately.  You may resume your regular diet - however -  remember your colon is empty and a heavy meal will produce gas.   Avoid these foods:  vegetables, fried / greasy foods, carbonated drinks      ACTIVITY  You may resume your normal daily activities   Spend the remainder of the day resting -  avoid any strenuous activity.    CALL M.D.  ANY SIGN OF   Increasing pain, nausea, vomiting  Abdominal distension (swelling)  New increased bleeding (oral or rectal)  Fever (chills)  Pain in chest area  Bloody discharge from nose or mouth  Shortness of breath    Follow-up Instructions:   Call Dr. ADRIANA Ojeda for any questions or problems.  Telephone # [phone]  Dr. Ojeda's office will notify you of the biopsy results available  Within 7 to 10 days. We will call you or send a letter   Continue same medications.    ENDOSCOPY FINDINGS:   Your endoscopy showed four polyps in the stomach which were removed.      DISCHARGE SUMMARY from Nurse    The following personal items collected during your admission are returned to you:   Dental Appliance:    Vision:    Hearing Aid:    Jewelry:    Clothing:    Other Valuables:    Valuables

## 2025-05-02 NOTE — PROGRESS NOTES
Chief Complaint   Patient presents with    Hypertension     3 month f/up    Diabetes    Hyperlipidemia    Cancer     Of abdomen per Pet Scan; April 2, 2025       SUBJECTIVE:    Benjie Brown is a 69 y.o. male who returns in follow-up for his medical problems include hypertension, diabetes, hyperlipidemia, neuroendocrine carcinoma, GERD, morbid obesity and other medical problems.  He is noting significant numbness in his legs where sometimes he will stand up and he has to stand still for his legs to start working him for weight and walk.  He notes no chest pain, shortness of breath or cardiac respiratory complaints.  There are no GI or  complaints.  There are no headaches, dizziness or neurologic complaints.  There are no arthritic complaints and no other complaints on complete review of systems.      Current Outpatient Medications   Medication Sig Dispense Refill    triamcinolone (KENALOG) 0.1 % cream 1 Application      Urea 40 % LOTN 1 application as needed Externally Once a day for 30 days  for thickened skin on legs      allopurinol (ZYLOPRIM) 300 MG tablet TAKE 1 TABLET BY MOUTH DAILY 90 tablet 1    valsartan (DIOVAN) 320 MG tablet Take 1 tablet by mouth daily 30 tablet PRN    atenolol (TENORMIN) 50 MG tablet TAKE 1 TABLET BY MOUTH DAILY 90 tablet 3    bumetanide (BUMEX) 2 MG tablet Take 1 tablet by mouth 2 times daily 180 tablet 3    ascorbic acid (VITAMIN C) 1000 MG tablet Take 1 tablet by mouth daily      aspirin 81 MG chewable tablet Take 1 tablet by mouth daily      acetaminophen (AMINOFEN) 325 MG tablet Take 2 tablets by mouth every 6 hours for 7 days 56 tablet 0     No current facility-administered medications for this visit.     Past Medical History:   Diagnosis Date    BPH (benign prostatic hyperplasia)     without outflow obstruction    CKD stage G3b/A1, GFR 30-44 and albumin creatinine ratio <30 mg/g (HCC)     Constipation     Environmental allergies     GERD (gastroesophageal reflux disease)

## 2025-05-05 ENCOUNTER — OFFICE VISIT (OUTPATIENT)
Facility: CLINIC | Age: 70
End: 2025-05-05
Payer: MEDICARE

## 2025-05-05 VITALS
TEMPERATURE: 98 F | HEART RATE: 66 BPM | HEIGHT: 71 IN | OXYGEN SATURATION: 93 % | DIASTOLIC BLOOD PRESSURE: 82 MMHG | SYSTOLIC BLOOD PRESSURE: 132 MMHG | WEIGHT: 315 LBS | BODY MASS INDEX: 44.1 KG/M2

## 2025-05-05 DIAGNOSIS — J30.9 ALLERGIC RHINITIS, UNSPECIFIED SEASONALITY, UNSPECIFIED TRIGGER: ICD-10-CM

## 2025-05-05 DIAGNOSIS — E11.9 DIABETES MELLITUS TYPE 2, DIET-CONTROLLED (HCC): ICD-10-CM

## 2025-05-05 DIAGNOSIS — E78.2 MIXED HYPERLIPIDEMIA: ICD-10-CM

## 2025-05-05 DIAGNOSIS — I10 PRIMARY HYPERTENSION: Primary | ICD-10-CM

## 2025-05-05 DIAGNOSIS — I73.9 PERIPHERAL VASCULAR DISEASE: ICD-10-CM

## 2025-05-05 DIAGNOSIS — E88.89 STEATOSIS (HCC): ICD-10-CM

## 2025-05-05 DIAGNOSIS — N18.32 CHRONIC KIDNEY DISEASE, STAGE 3B (HCC): ICD-10-CM

## 2025-05-05 DIAGNOSIS — K21.9 GASTROESOPHAGEAL REFLUX DISEASE WITHOUT ESOPHAGITIS: ICD-10-CM

## 2025-05-05 DIAGNOSIS — E66.01 MORBID OBESITY (HCC): ICD-10-CM

## 2025-05-05 DIAGNOSIS — G62.9 NEUROPATHY: ICD-10-CM

## 2025-05-05 LAB
ALBUMIN SERPL-MCNC: 3.6 G/DL (ref 3.5–5)
ALBUMIN/GLOB SERPL: 1 (ref 1.1–2.2)
ALP SERPL-CCNC: 116 U/L (ref 45–117)
ALT SERPL-CCNC: 38 U/L (ref 12–78)
ANION GAP SERPL CALC-SCNC: 4 MMOL/L (ref 2–12)
AST SERPL-CCNC: 26 U/L (ref 15–37)
BILIRUB SERPL-MCNC: 0.5 MG/DL (ref 0.2–1)
BUN SERPL-MCNC: 21 MG/DL (ref 6–20)
BUN/CREAT SERPL: 17 (ref 12–20)
CALCIUM SERPL-MCNC: 10 MG/DL (ref 8.5–10.1)
CHLORIDE SERPL-SCNC: 104 MMOL/L (ref 97–108)
CHOLEST SERPL-MCNC: 150 MG/DL
CK SERPL-CCNC: 183 U/L (ref 39–308)
CO2 SERPL-SCNC: 34 MMOL/L (ref 21–32)
CREAT SERPL-MCNC: 1.21 MG/DL (ref 0.7–1.3)
EST. AVERAGE GLUCOSE BLD GHB EST-MCNC: 120 MG/DL
GLOBULIN SER CALC-MCNC: 3.6 G/DL (ref 2–4)
GLUCOSE SERPL-MCNC: 100 MG/DL (ref 65–100)
HBA1C MFR BLD: 5.8 % (ref 4–5.6)
HDLC SERPL-MCNC: 45 MG/DL
HDLC SERPL: 3.3 (ref 0–5)
LDLC SERPL CALC-MCNC: 79.6 MG/DL (ref 0–100)
POTASSIUM SERPL-SCNC: 4.4 MMOL/L (ref 3.5–5.1)
PROT SERPL-MCNC: 7.2 G/DL (ref 6.4–8.2)
SODIUM SERPL-SCNC: 142 MMOL/L (ref 136–145)
TRIGL SERPL-MCNC: 127 MG/DL
VLDLC SERPL CALC-MCNC: 25.4 MG/DL

## 2025-05-05 PROCEDURE — 2022F DILAT RTA XM EVC RTNOPTHY: CPT | Performed by: INTERNAL MEDICINE

## 2025-05-05 PROCEDURE — 3075F SYST BP GE 130 - 139MM HG: CPT | Performed by: INTERNAL MEDICINE

## 2025-05-05 PROCEDURE — G8427 DOCREV CUR MEDS BY ELIG CLIN: HCPCS | Performed by: INTERNAL MEDICINE

## 2025-05-05 PROCEDURE — 3079F DIAST BP 80-89 MM HG: CPT | Performed by: INTERNAL MEDICINE

## 2025-05-05 PROCEDURE — 1160F RVW MEDS BY RX/DR IN RCRD: CPT | Performed by: INTERNAL MEDICINE

## 2025-05-05 PROCEDURE — 1036F TOBACCO NON-USER: CPT | Performed by: INTERNAL MEDICINE

## 2025-05-05 PROCEDURE — 3044F HG A1C LEVEL LT 7.0%: CPT | Performed by: INTERNAL MEDICINE

## 2025-05-05 PROCEDURE — 1126F AMNT PAIN NOTED NONE PRSNT: CPT | Performed by: INTERNAL MEDICINE

## 2025-05-05 PROCEDURE — 99214 OFFICE O/P EST MOD 30 MIN: CPT | Performed by: INTERNAL MEDICINE

## 2025-05-05 PROCEDURE — 3017F COLORECTAL CA SCREEN DOC REV: CPT | Performed by: INTERNAL MEDICINE

## 2025-05-05 PROCEDURE — 1123F ACP DISCUSS/DSCN MKR DOCD: CPT | Performed by: INTERNAL MEDICINE

## 2025-05-05 PROCEDURE — G8417 CALC BMI ABV UP PARAM F/U: HCPCS | Performed by: INTERNAL MEDICINE

## 2025-05-05 PROCEDURE — 1159F MED LIST DOCD IN RCRD: CPT | Performed by: INTERNAL MEDICINE

## 2025-05-05 ASSESSMENT — PATIENT HEALTH QUESTIONNAIRE - PHQ9
SUM OF ALL RESPONSES TO PHQ QUESTIONS 1-9: 0
2. FEELING DOWN, DEPRESSED OR HOPELESS: NOT AT ALL
1. LITTLE INTEREST OR PLEASURE IN DOING THINGS: NOT AT ALL

## 2025-05-05 NOTE — PROGRESS NOTES
Benjie Brown is a 69 y.o. male     Chief Complaint   Patient presents with    Hypertension     3 month f/up    Diabetes    Hyperlipidemia    Cancer     Of abdomen per Pet Scan; April 2, 2025       \"Have you been to the ER, urgent care clinic since your last visit?  Hospitalized since your last visit?\"    Saw GI, Oncologist, Urologist, Dermatologist and Dentist.    “Have you seen or consulted any other health care providers outside of [facility] since your last visit?”    NO

## 2025-05-07 ENCOUNTER — TELEPHONE (OUTPATIENT)
Age: 70
End: 2025-05-07

## 2025-05-07 NOTE — TELEPHONE ENCOUNTER
Returned call to pt wife.  HIPAA verified by two patient identifiers.   She is aware we see the results in the system.

## 2025-05-08 ENCOUNTER — OFFICE VISIT (OUTPATIENT)
Age: 70
End: 2025-05-08
Payer: MEDICARE

## 2025-05-08 ENCOUNTER — HOSPITAL ENCOUNTER (OUTPATIENT)
Facility: HOSPITAL | Age: 70
Setting detail: INFUSION SERIES
Discharge: HOME | End: 2025-05-08
Payer: MEDICARE

## 2025-05-08 VITALS
HEIGHT: 71 IN | BODY MASS INDEX: 44.1 KG/M2 | WEIGHT: 315 LBS | DIASTOLIC BLOOD PRESSURE: 81 MMHG | RESPIRATION RATE: 18 BRPM | SYSTOLIC BLOOD PRESSURE: 136 MMHG | TEMPERATURE: 98 F | HEART RATE: 79 BPM | OXYGEN SATURATION: 93 %

## 2025-05-08 VITALS
SYSTOLIC BLOOD PRESSURE: 110 MMHG | OXYGEN SATURATION: 95 % | TEMPERATURE: 98 F | HEIGHT: 71 IN | WEIGHT: 315 LBS | BODY MASS INDEX: 44.1 KG/M2 | HEART RATE: 78 BPM | DIASTOLIC BLOOD PRESSURE: 69 MMHG

## 2025-05-08 DIAGNOSIS — C7B.8 MESENTERY METASTASIS OF NEUROENDOCRINE TUMOR (HCC): Primary | ICD-10-CM

## 2025-05-08 DIAGNOSIS — C7A.8 MESENTERY METASTASIS OF NEUROENDOCRINE TUMOR (HCC): Primary | ICD-10-CM

## 2025-05-08 DIAGNOSIS — E66.01 MORBID OBESITY (HCC): ICD-10-CM

## 2025-05-08 DIAGNOSIS — Z79.899 HIGH RISK MEDICATION USE: ICD-10-CM

## 2025-05-08 PROCEDURE — 3078F DIAST BP <80 MM HG: CPT | Performed by: NURSE PRACTITIONER

## 2025-05-08 PROCEDURE — 3017F COLORECTAL CA SCREEN DOC REV: CPT | Performed by: NURSE PRACTITIONER

## 2025-05-08 PROCEDURE — 1123F ACP DISCUSS/DSCN MKR DOCD: CPT | Performed by: NURSE PRACTITIONER

## 2025-05-08 PROCEDURE — 6360000002 HC RX W HCPCS: Performed by: INTERNAL MEDICINE

## 2025-05-08 PROCEDURE — 96372 THER/PROPH/DIAG INJ SC/IM: CPT

## 2025-05-08 PROCEDURE — 1036F TOBACCO NON-USER: CPT | Performed by: NURSE PRACTITIONER

## 2025-05-08 PROCEDURE — 99214 OFFICE O/P EST MOD 30 MIN: CPT | Performed by: NURSE PRACTITIONER

## 2025-05-08 PROCEDURE — 1160F RVW MEDS BY RX/DR IN RCRD: CPT | Performed by: NURSE PRACTITIONER

## 2025-05-08 PROCEDURE — G8427 DOCREV CUR MEDS BY ELIG CLIN: HCPCS | Performed by: NURSE PRACTITIONER

## 2025-05-08 PROCEDURE — 3074F SYST BP LT 130 MM HG: CPT | Performed by: NURSE PRACTITIONER

## 2025-05-08 PROCEDURE — G8417 CALC BMI ABV UP PARAM F/U: HCPCS | Performed by: NURSE PRACTITIONER

## 2025-05-08 PROCEDURE — 1159F MED LIST DOCD IN RCRD: CPT | Performed by: NURSE PRACTITIONER

## 2025-05-08 PROCEDURE — 1125F AMNT PAIN NOTED PAIN PRSNT: CPT | Performed by: NURSE PRACTITIONER

## 2025-05-08 RX ORDER — ACETAMINOPHEN 325 MG/1
650 TABLET ORAL
OUTPATIENT
Start: 2025-06-05

## 2025-05-08 RX ORDER — EPINEPHRINE 1 MG/ML
0.3 INJECTION, SOLUTION INTRAMUSCULAR; SUBCUTANEOUS PRN
OUTPATIENT
Start: 2025-06-05

## 2025-05-08 RX ORDER — BISMUTH SUBSALICYLATE 262 MG/1
TABLET, CHEWABLE ORAL
COMMUNITY
Start: 2025-05-06

## 2025-05-08 RX ORDER — ONDANSETRON 2 MG/ML
8 INJECTION INTRAMUSCULAR; INTRAVENOUS
OUTPATIENT
Start: 2025-06-05

## 2025-05-08 RX ORDER — METRONIDAZOLE 250 MG/1
250 TABLET ORAL 4 TIMES DAILY
COMMUNITY
Start: 2025-05-06

## 2025-05-08 RX ORDER — SODIUM CHLORIDE 9 MG/ML
INJECTION, SOLUTION INTRAVENOUS CONTINUOUS
OUTPATIENT
Start: 2025-06-05

## 2025-05-08 RX ORDER — LANREOTIDE ACETATE 120 MG/.5ML
120 INJECTION SUBCUTANEOUS ONCE
Status: COMPLETED | OUTPATIENT
Start: 2025-05-08 | End: 2025-05-08

## 2025-05-08 RX ORDER — LANREOTIDE ACETATE 120 MG/.5ML
120 INJECTION SUBCUTANEOUS ONCE
OUTPATIENT
Start: 2025-06-05 | End: 2025-06-05

## 2025-05-08 RX ORDER — DIPHENHYDRAMINE HYDROCHLORIDE 50 MG/ML
50 INJECTION, SOLUTION INTRAMUSCULAR; INTRAVENOUS
OUTPATIENT
Start: 2025-06-05

## 2025-05-08 RX ORDER — HYDROCORTISONE SODIUM SUCCINATE 100 MG/2ML
100 INJECTION INTRAMUSCULAR; INTRAVENOUS
OUTPATIENT
Start: 2025-06-05

## 2025-05-08 RX ORDER — ALBUTEROL SULFATE 90 UG/1
4 INHALANT RESPIRATORY (INHALATION) PRN
OUTPATIENT
Start: 2025-06-05

## 2025-05-08 RX ADMIN — LANREOTIDE ACETATE 120 MG: 120 INJECTION SUBCUTANEOUS at 14:49

## 2025-05-08 NOTE — PROGRESS NOTES
Benjie Brown is a 69 y.o. male here for one month follow up appt for neuroendocrine carcinoma.  5/1/25 EGD/colonoscopy - Nine polyps removed  Appt with DR Herman on 5/12/25.  Medications reviewed.     1. Have you been to the ER, urgent care clinic since your last visit?  Hospitalized since your last visit? no    2. Have you seen or consulted any other health care providers outside of the [facility] since your last visit?  Include any pap smears or colon screening.  no  
visualized  appendicular skeleton.    Impression  Mesenteric mass lesion is hypermetabolic. Otherwise normal tracer  distribution.          Electronically signed by SEAMUS HARDY    I personally reviewed the images. Mesenteric mass which is hypermetabolic.       Assessment:     1. 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 Discuss this question with Dr. Blanton can this be resectable.     Start Somatuline.  I reviewed the benefit/risk, side effects and how to manage it. He agrees on receiving the medicine.       Plan:        > Somatuline start today. SE, risks and benefits reviewed. Diagnosis and treatment plan reviewed and patient verbalized understanding at the time of visit.   > Follow up in 4 weeks, labs per standing plan.     Patient discussed with and seen by Dr. Yao Esparza on day of visit.     The patient will continue to be seen long-term as part of ongoing care related to his serious or complex medical condition.      The patient is on a high risk medication for management of a diagnosis which poses a threat to life or bodily function.  He will be seen regularly with each cycle of therapy and labs will be monitored to assess for toxicity from therapy.       > 30 minutes spent reviewing chart, evaluating patient, and dictating notes     Signed By: EULALIO Cameron - CNP     May 8, 2025     Nurse Practitioner  Medical Oncology & Hematology

## 2025-05-08 NOTE — PROGRESS NOTES
Outpatient Infusion Center Progress Note    1315  Pt arrived in stable condition and in no distress for Lanreotide Injection.    Assessment completed.     No labs ordered.     1330 Patient proceeded to his appointment with Dr. Esparza.     Patient Vitals for the past 12 hrs:   Temp Pulse Resp BP SpO2   05/08/25 1315 98 °F (36.7 °C) 79 18 136/81 93 %      The following medications administered:  Medications Administered         lanreotide acetate (SOMATULINE) depot injection 120 mg Admin Date  05/08/2025 Action  Given Dose  120 mg Route  SubCUTAneous Documented By  Sukhwinder Phan, RN          Given deep SC in the left buttocks.  Education and handout provided to patient.     Pt tolerated treatment well. No s/s of adverse reaction noted.    Pt provided with education on possible side effects of medication along with discharge instructions. Pt verbalized understanding.     1457  Pt discharged in no acute distress. Next appointment:  Future Appointments   Date Time Provider Department Center   6/9/2025  3:15 PM [facility]   6/9/2025  3:45 PM Eusebia Gates APRN - CNP ONCMR [facility]   7/7/2025  3:30 PM [facility]

## 2025-05-09 ENCOUNTER — RESULTS FOLLOW-UP (OUTPATIENT)
Facility: CLINIC | Age: 70
End: 2025-05-09

## 2025-06-02 ENCOUNTER — APPOINTMENT (OUTPATIENT)
Facility: HOSPITAL | Age: 70
End: 2025-06-02
Payer: MEDICARE

## 2025-06-09 ENCOUNTER — HOSPITAL ENCOUNTER (OUTPATIENT)
Facility: HOSPITAL | Age: 70
Setting detail: INFUSION SERIES
Discharge: HOME | End: 2025-06-09
Payer: MEDICARE

## 2025-06-09 ENCOUNTER — OFFICE VISIT (OUTPATIENT)
Age: 70
End: 2025-06-09
Payer: MEDICARE

## 2025-06-09 VITALS
OXYGEN SATURATION: 91 % | SYSTOLIC BLOOD PRESSURE: 160 MMHG | RESPIRATION RATE: 18 BRPM | HEART RATE: 68 BPM | TEMPERATURE: 98.7 F | DIASTOLIC BLOOD PRESSURE: 83 MMHG

## 2025-06-09 VITALS
TEMPERATURE: 98.7 F | WEIGHT: 315 LBS | BODY MASS INDEX: 44.1 KG/M2 | HEART RATE: 60 BPM | DIASTOLIC BLOOD PRESSURE: 70 MMHG | OXYGEN SATURATION: 92 % | HEIGHT: 71 IN | SYSTOLIC BLOOD PRESSURE: 127 MMHG

## 2025-06-09 DIAGNOSIS — E66.01 MORBID OBESITY (HCC): ICD-10-CM

## 2025-06-09 DIAGNOSIS — C7A.8 MESENTERY METASTASIS OF NEUROENDOCRINE TUMOR (HCC): Primary | ICD-10-CM

## 2025-06-09 DIAGNOSIS — Z79.899 HIGH RISK MEDICATION USE: ICD-10-CM

## 2025-06-09 DIAGNOSIS — C7B.8 MESENTERY METASTASIS OF NEUROENDOCRINE TUMOR (HCC): Primary | ICD-10-CM

## 2025-06-09 DIAGNOSIS — C7A.8 NEUROENDOCRINE CANCER (HCC): ICD-10-CM

## 2025-06-09 DIAGNOSIS — K59.09 CHRONIC CONSTIPATION: ICD-10-CM

## 2025-06-09 LAB
ALBUMIN SERPL-MCNC: 3.5 G/DL (ref 3.5–5)
ALBUMIN/GLOB SERPL: 0.9 (ref 1.1–2.2)
ALP SERPL-CCNC: 100 U/L (ref 45–117)
ALT SERPL-CCNC: 38 U/L (ref 12–78)
ANION GAP SERPL CALC-SCNC: 6 MMOL/L (ref 2–12)
AST SERPL-CCNC: 28 U/L (ref 15–37)
BILIRUB SERPL-MCNC: 0.5 MG/DL (ref 0.2–1)
BUN SERPL-MCNC: 28 MG/DL (ref 6–20)
BUN/CREAT SERPL: 20 (ref 12–20)
CALCIUM SERPL-MCNC: 9 MG/DL (ref 8.5–10.1)
CHLORIDE SERPL-SCNC: 103 MMOL/L (ref 97–108)
CO2 SERPL-SCNC: 31 MMOL/L (ref 21–32)
CREAT SERPL-MCNC: 1.38 MG/DL (ref 0.7–1.3)
GLOBULIN SER CALC-MCNC: 4 G/DL (ref 2–4)
GLUCOSE SERPL-MCNC: 97 MG/DL (ref 65–100)
POTASSIUM SERPL-SCNC: 3.6 MMOL/L (ref 3.5–5.1)
PROT SERPL-MCNC: 7.5 G/DL (ref 6.4–8.2)
SODIUM SERPL-SCNC: 140 MMOL/L (ref 136–145)

## 2025-06-09 PROCEDURE — 36415 COLL VENOUS BLD VENIPUNCTURE: CPT

## 2025-06-09 PROCEDURE — 1036F TOBACCO NON-USER: CPT | Performed by: NURSE PRACTITIONER

## 2025-06-09 PROCEDURE — 1159F MED LIST DOCD IN RCRD: CPT | Performed by: NURSE PRACTITIONER

## 2025-06-09 PROCEDURE — 99214 OFFICE O/P EST MOD 30 MIN: CPT | Performed by: NURSE PRACTITIONER

## 2025-06-09 PROCEDURE — 1126F AMNT PAIN NOTED NONE PRSNT: CPT | Performed by: NURSE PRACTITIONER

## 2025-06-09 PROCEDURE — 1123F ACP DISCUSS/DSCN MKR DOCD: CPT | Performed by: NURSE PRACTITIONER

## 2025-06-09 PROCEDURE — 6360000002 HC RX W HCPCS: Performed by: INTERNAL MEDICINE

## 2025-06-09 PROCEDURE — 80053 COMPREHEN METABOLIC PANEL: CPT

## 2025-06-09 PROCEDURE — 1160F RVW MEDS BY RX/DR IN RCRD: CPT | Performed by: NURSE PRACTITIONER

## 2025-06-09 PROCEDURE — G8427 DOCREV CUR MEDS BY ELIG CLIN: HCPCS | Performed by: NURSE PRACTITIONER

## 2025-06-09 PROCEDURE — 3017F COLORECTAL CA SCREEN DOC REV: CPT | Performed by: NURSE PRACTITIONER

## 2025-06-09 PROCEDURE — 3078F DIAST BP <80 MM HG: CPT | Performed by: NURSE PRACTITIONER

## 2025-06-09 PROCEDURE — 3074F SYST BP LT 130 MM HG: CPT | Performed by: NURSE PRACTITIONER

## 2025-06-09 PROCEDURE — G8417 CALC BMI ABV UP PARAM F/U: HCPCS | Performed by: NURSE PRACTITIONER

## 2025-06-09 PROCEDURE — 96372 THER/PROPH/DIAG INJ SC/IM: CPT

## 2025-06-09 RX ORDER — HYDROCORTISONE SODIUM SUCCINATE 100 MG/2ML
100 INJECTION INTRAMUSCULAR; INTRAVENOUS
OUTPATIENT
Start: 2025-06-30

## 2025-06-09 RX ORDER — EPINEPHRINE 1 MG/ML
0.3 INJECTION, SOLUTION INTRAMUSCULAR; SUBCUTANEOUS PRN
OUTPATIENT
Start: 2025-06-30

## 2025-06-09 RX ORDER — DIPHENHYDRAMINE HYDROCHLORIDE 50 MG/ML
50 INJECTION, SOLUTION INTRAMUSCULAR; INTRAVENOUS
OUTPATIENT
Start: 2025-06-30

## 2025-06-09 RX ORDER — ONDANSETRON 2 MG/ML
8 INJECTION INTRAMUSCULAR; INTRAVENOUS
OUTPATIENT
Start: 2025-06-30

## 2025-06-09 RX ORDER — LANREOTIDE ACETATE 120 MG/.5ML
120 INJECTION SUBCUTANEOUS ONCE
Status: COMPLETED | OUTPATIENT
Start: 2025-06-09 | End: 2025-06-09

## 2025-06-09 RX ORDER — ACETAMINOPHEN 325 MG/1
650 TABLET ORAL
OUTPATIENT
Start: 2025-06-30

## 2025-06-09 RX ORDER — ALBUTEROL SULFATE 90 UG/1
4 INHALANT RESPIRATORY (INHALATION) PRN
OUTPATIENT
Start: 2025-06-30

## 2025-06-09 RX ORDER — LANREOTIDE ACETATE 120 MG/.5ML
120 INJECTION SUBCUTANEOUS ONCE
OUTPATIENT
Start: 2025-06-30 | End: 2025-06-30

## 2025-06-09 RX ORDER — SODIUM CHLORIDE 9 MG/ML
INJECTION, SOLUTION INTRAVENOUS CONTINUOUS
OUTPATIENT
Start: 2025-06-30

## 2025-06-09 RX ADMIN — LANREOTIDE ACETATE 120 MG: 120 INJECTION SUBCUTANEOUS at 15:33

## 2025-06-09 NOTE — PROGRESS NOTES
[facility] [facility] Visit Note    1520 Pt arrived to [facility] ambulatory and in no distress for Lanreotide.  Assessment unremarkable, no new complaints voiced. Labs obtained: CMP     BP (!) 160/83   Pulse 68   Temp 98.7 °F (37.1 °C)   Resp 18   SpO2 91%     Recent Results (from the past 12 hours)   Comprehensive Metabolic Panel    Collection Time: 06/09/25  3:30 PM   Result Value Ref Range    Sodium 140 136 - 145 mmol/L    Potassium 3.6 3.5 - 5.1 mmol/L    Chloride 103 97 - 108 mmol/L    CO2 31 21 - 32 mmol/L    Anion Gap 6 2 - 12 mmol/L    Glucose 97 65 - 100 mg/dL    BUN 28 (H) 6 - 20 MG/DL    Creatinine 1.38 (H) 0.70 - 1.30 MG/DL    BUN/Creatinine Ratio 20 12 - 20      Est, Glom Filt Rate 55 (L) >60 ml/min/1.73m2    Calcium 9.0 8.5 - 10.1 MG/DL    Total Bilirubin 0.5 0.2 - 1.0 MG/DL    ALT 38 12 - 78 U/L    AST 28 15 - 37 U/L    Alk Phosphatase 100 45 - 117 U/L    Total Protein 7.5 6.4 - 8.2 g/dL    Albumin 3.5 3.5 - 5.0 g/dL    Globulin 4.0 2.0 - 4.0 g/dL    Albumin/Globulin Ratio 0.9 (L) 1.1 - 2.2          Medications received:  Medications Administered         lanreotide acetate (SOMATULINE) depot injection 120 mg Admin Date  06/09/2025 Action  Given Dose  120 mg Route  SubCUTAneous Documented By  Jayne Bergman, RN          R hip    6147 Tolerated treatment well, no adverse reaction noted.  D/Cd from [facility] ambulatory and in no distress unaccompanied.  Next appt 7/7/25 at 1530

## 2025-06-09 NOTE — PROGRESS NOTES
Benjie Brown is a 69 y.o. male here for one month follow up appt for neuroendocrine carcinoma.  Getting Somatuline today.  Pt feeling well overall.   He is starting to feel constipated again. Was taking combination of Flagyl, Pepto, and another one and says it was flushing him out good.  Says he does have small bowel movements but feels like not all is coming out. Says he knows he should add stool softener.   Swelling in B/L lower legs.  Did not fill out symptom form today.    1. Have you been to the ER, urgent care clinic since your last visit?  Hospitalized since your last visit? no    2. Have you seen or consulted any other health care providers outside of the [facility] since your last visit?  Include any pap smears or colon screening.  DR Herman  
Miralax daily for constipation    Patient discussed with and seen by Dr. Yao Esparza on day of visit.     The patient will continue to be seen long-term as part of ongoing care related to his serious or complex medical condition.      The patient is on a high risk medication for management of a diagnosis which poses a threat to life or bodily function.  He will be seen regularly with each cycle of therapy and labs will be monitored to assess for toxicity from therapy.       > 30 minutes spent reviewing chart, evaluating patient, placing orders and dictating notes     Signed By: EULALIO Cameron - CNP     June 9, 2025     Nurse Practitioner  Medical Oncology & Hematology

## 2025-06-30 ENCOUNTER — HOSPITAL ENCOUNTER (OUTPATIENT)
Age: 70
Discharge: HOME | End: 2025-07-03
Payer: MEDICARE

## 2025-06-30 ENCOUNTER — APPOINTMENT (OUTPATIENT)
Facility: HOSPITAL | Age: 70
End: 2025-06-30
Payer: MEDICARE

## 2025-06-30 DIAGNOSIS — C7A.8 NEUROENDOCRINE CANCER (HCC): ICD-10-CM

## 2025-06-30 DIAGNOSIS — C7A.8 MESENTERY METASTASIS OF NEUROENDOCRINE TUMOR (HCC): ICD-10-CM

## 2025-06-30 DIAGNOSIS — C7B.8 MESENTERY METASTASIS OF NEUROENDOCRINE TUMOR (HCC): ICD-10-CM

## 2025-06-30 PROCEDURE — 74177 CT ABD & PELVIS W/CONTRAST: CPT

## 2025-06-30 PROCEDURE — 6360000004 HC RX CONTRAST MEDICATION: Performed by: NURSE PRACTITIONER

## 2025-06-30 RX ORDER — IOPAMIDOL 755 MG/ML
100 INJECTION, SOLUTION INTRAVASCULAR
Status: COMPLETED | OUTPATIENT
Start: 2025-06-30 | End: 2025-06-30

## 2025-06-30 RX ADMIN — IOPAMIDOL 100 ML: 755 INJECTION, SOLUTION INTRAVENOUS at 13:27

## 2025-07-07 ENCOUNTER — OFFICE VISIT (OUTPATIENT)
Age: 70
End: 2025-07-07
Payer: MEDICARE

## 2025-07-07 ENCOUNTER — HOSPITAL ENCOUNTER (OUTPATIENT)
Facility: HOSPITAL | Age: 70
Setting detail: INFUSION SERIES
Discharge: HOME | End: 2025-07-07
Payer: MEDICARE

## 2025-07-07 VITALS
SYSTOLIC BLOOD PRESSURE: 135 MMHG | HEIGHT: 71 IN | DIASTOLIC BLOOD PRESSURE: 75 MMHG | HEART RATE: 86 BPM | WEIGHT: 315 LBS | TEMPERATURE: 98.8 F | OXYGEN SATURATION: 95 % | BODY MASS INDEX: 44.1 KG/M2

## 2025-07-07 VITALS
SYSTOLIC BLOOD PRESSURE: 144 MMHG | RESPIRATION RATE: 20 BRPM | TEMPERATURE: 98.8 F | HEART RATE: 60 BPM | OXYGEN SATURATION: 92 % | DIASTOLIC BLOOD PRESSURE: 79 MMHG

## 2025-07-07 DIAGNOSIS — C7A.8 NEUROENDOCRINE CANCER (HCC): Primary | ICD-10-CM

## 2025-07-07 DIAGNOSIS — C7B.8 MESENTERY METASTASIS OF NEUROENDOCRINE TUMOR (HCC): Primary | ICD-10-CM

## 2025-07-07 DIAGNOSIS — C7A.8 MESENTERY METASTASIS OF NEUROENDOCRINE TUMOR (HCC): Primary | ICD-10-CM

## 2025-07-07 DIAGNOSIS — C7B.8 MESENTERY METASTASIS OF NEUROENDOCRINE TUMOR (HCC): ICD-10-CM

## 2025-07-07 DIAGNOSIS — C7A.8 MESENTERY METASTASIS OF NEUROENDOCRINE TUMOR (HCC): ICD-10-CM

## 2025-07-07 LAB
ALBUMIN SERPL-MCNC: 3.6 G/DL (ref 3.5–5)
ALBUMIN/GLOB SERPL: 0.9 (ref 1.1–2.2)
ALP SERPL-CCNC: 114 U/L (ref 45–117)
ALT SERPL-CCNC: 31 U/L (ref 12–78)
ANION GAP SERPL CALC-SCNC: 4 MMOL/L (ref 2–12)
AST SERPL-CCNC: 17 U/L (ref 15–37)
BILIRUB SERPL-MCNC: 0.7 MG/DL (ref 0.2–1)
BUN SERPL-MCNC: 26 MG/DL (ref 6–20)
BUN/CREAT SERPL: 22 (ref 12–20)
CALCIUM SERPL-MCNC: 9.3 MG/DL (ref 8.5–10.1)
CHLORIDE SERPL-SCNC: 102 MMOL/L (ref 97–108)
CO2 SERPL-SCNC: 34 MMOL/L (ref 21–32)
CREAT SERPL-MCNC: 1.2 MG/DL (ref 0.7–1.3)
GLOBULIN SER CALC-MCNC: 4 G/DL (ref 2–4)
GLUCOSE SERPL-MCNC: 96 MG/DL (ref 65–100)
POTASSIUM SERPL-SCNC: 3.8 MMOL/L (ref 3.5–5.1)
PROT SERPL-MCNC: 7.6 G/DL (ref 6.4–8.2)
SODIUM SERPL-SCNC: 140 MMOL/L (ref 136–145)

## 2025-07-07 PROCEDURE — G8428 CUR MEDS NOT DOCUMENT: HCPCS | Performed by: INTERNAL MEDICINE

## 2025-07-07 PROCEDURE — 99214 OFFICE O/P EST MOD 30 MIN: CPT | Performed by: INTERNAL MEDICINE

## 2025-07-07 PROCEDURE — 3017F COLORECTAL CA SCREEN DOC REV: CPT | Performed by: INTERNAL MEDICINE

## 2025-07-07 PROCEDURE — 36415 COLL VENOUS BLD VENIPUNCTURE: CPT

## 2025-07-07 PROCEDURE — 1123F ACP DISCUSS/DSCN MKR DOCD: CPT | Performed by: INTERNAL MEDICINE

## 2025-07-07 PROCEDURE — 3078F DIAST BP <80 MM HG: CPT | Performed by: INTERNAL MEDICINE

## 2025-07-07 PROCEDURE — 1126F AMNT PAIN NOTED NONE PRSNT: CPT | Performed by: INTERNAL MEDICINE

## 2025-07-07 PROCEDURE — 96372 THER/PROPH/DIAG INJ SC/IM: CPT

## 2025-07-07 PROCEDURE — G8417 CALC BMI ABV UP PARAM F/U: HCPCS | Performed by: INTERNAL MEDICINE

## 2025-07-07 PROCEDURE — 80053 COMPREHEN METABOLIC PANEL: CPT

## 2025-07-07 PROCEDURE — 3075F SYST BP GE 130 - 139MM HG: CPT | Performed by: INTERNAL MEDICINE

## 2025-07-07 PROCEDURE — 6360000002 HC RX W HCPCS: Performed by: INTERNAL MEDICINE

## 2025-07-07 PROCEDURE — 1036F TOBACCO NON-USER: CPT | Performed by: INTERNAL MEDICINE

## 2025-07-07 RX ORDER — DIPHENHYDRAMINE HYDROCHLORIDE 50 MG/ML
50 INJECTION, SOLUTION INTRAMUSCULAR; INTRAVENOUS
OUTPATIENT
Start: 2025-08-04

## 2025-07-07 RX ORDER — EPINEPHRINE 1 MG/ML
0.3 INJECTION, SOLUTION INTRAMUSCULAR; SUBCUTANEOUS PRN
OUTPATIENT
Start: 2025-08-04

## 2025-07-07 RX ORDER — SODIUM CHLORIDE 9 MG/ML
INJECTION, SOLUTION INTRAVENOUS CONTINUOUS
OUTPATIENT
Start: 2025-08-04

## 2025-07-07 RX ORDER — LANREOTIDE ACETATE 120 MG/.5ML
120 INJECTION SUBCUTANEOUS ONCE
OUTPATIENT
Start: 2025-08-04 | End: 2025-08-04

## 2025-07-07 RX ORDER — LANREOTIDE ACETATE 120 MG/.5ML
120 INJECTION SUBCUTANEOUS ONCE
Status: COMPLETED | OUTPATIENT
Start: 2025-07-07 | End: 2025-07-07

## 2025-07-07 RX ORDER — ONDANSETRON 2 MG/ML
8 INJECTION INTRAMUSCULAR; INTRAVENOUS
OUTPATIENT
Start: 2025-08-04

## 2025-07-07 RX ORDER — HYDROCORTISONE SODIUM SUCCINATE 100 MG/2ML
100 INJECTION INTRAMUSCULAR; INTRAVENOUS
OUTPATIENT
Start: 2025-08-04

## 2025-07-07 RX ORDER — ACETAMINOPHEN 325 MG/1
650 TABLET ORAL
OUTPATIENT
Start: 2025-08-04

## 2025-07-07 RX ORDER — ALBUTEROL SULFATE 90 UG/1
4 INHALANT RESPIRATORY (INHALATION) PRN
OUTPATIENT
Start: 2025-08-04

## 2025-07-07 RX ADMIN — LANREOTIDE ACETATE 120 MG: 120 INJECTION SUBCUTANEOUS at 16:22

## 2025-07-07 ASSESSMENT — PAIN DESCRIPTION - LOCATION: LOCATION: LEG

## 2025-07-07 ASSESSMENT — PAIN DESCRIPTION - ORIENTATION: ORIENTATION: RIGHT;LEFT

## 2025-07-07 ASSESSMENT — PAIN DESCRIPTION - DESCRIPTORS: DESCRIPTORS: OTHER (COMMENT)

## 2025-07-07 ASSESSMENT — PAIN SCALES - GENERAL: PAINLEVEL_OUTOF10: 5

## 2025-07-07 NOTE — PROGRESS NOTES
Benjie Brown is a 69 y.o. male here for one month follow up appt for neuroendocrine carcinoma.  Getting Somatuline today.  Medications reviewed by [facility].   CT done 6/30/25.   Still dealing with constipation. Trying to eat better with fruits.

## 2025-07-07 NOTE — PROGRESS NOTES
[place] [facility] Visit Note    1530  Pt arrived at [facility] ambulatory and in no distress for Lanreotide.  Assessment completed, no new complaints voiced.  Labs drawn peripherally as ordered.     Patient denied having any symptoms of COVID-19, i.e. SOB, coughing, fever, or generally not feeling well.      BP (!) 144/79   Pulse 60   Temp 98.8 °F (37.1 °C) (Temporal)   Resp 20   SpO2 92%     Recent Results (from the past 12 hours)   Comprehensive Metabolic Panel    Collection Time: 07/07/25  3:45 PM   Result Value Ref Range    Sodium 140 136 - 145 mmol/L    Potassium 3.8 3.5 - 5.1 mmol/L    Chloride 102 97 - 108 mmol/L    CO2 34 (H) 21 - 32 mmol/L    Anion Gap 4 2 - 12 mmol/L    Glucose 96 65 - 100 mg/dL    BUN 26 (H) 6 - 20 MG/DL    Creatinine 1.20 0.70 - 1.30 MG/DL    BUN/Creatinine Ratio 22 (H) 12 - 20      Est, Glom Filt Rate 65 >60 ml/min/1.73m2    Calcium 9.3 8.5 - 10.1 MG/DL    Total Bilirubin 0.7 0.2 - 1.0 MG/DL    ALT 31 12 - 78 U/L    AST 17 15 - 37 U/L    Alk Phosphatase 114 45 - 117 U/L    Total Protein 7.6 6.4 - 8.2 g/dL    Albumin 3.6 3.5 - 5.0 g/dL    Globulin 4.0 2.0 - 4.0 g/dL    Albumin/Globulin Ratio 0.9 (L) 1.1 - 2.2          Medications Administered         lanreotide acetate (SOMATULINE) depot injection 120 mg Admin Date  07/07/2025 Action  Given Dose  120 mg Route  SubCUTAneous Documented By  Susy Jeronimo, RN         Given to L hip/buttocks    Patient tolerated treatment well, no adverse reaction noted.  D/Cd from [facility] ambulatory and in no distress.  Next appt 8/4/2025

## 2025-07-07 NOTE — PROGRESS NOTES
[facility]  at [facility]  [address], [address], [address]  [place] [address]  [phone]       Oncology Note      Patient: Benjie Brown MRN: [id number]  SSN: [id number]    YOB: 1955  Age: 69 y.o.  Sex: male      Subjective:      Benjie Brown is a 69 y.o. male who I am seeing for a diagnosis of well differentiated neuroendocrine carcinoma. He is being followed by Dr. Heramn for a renal tumor and BPH. In the CT he was noted to have a mesenteric mass. A biopsy by Dr. Cash revealed well differentiated low grade neuroendocrine carcinoma. He denies excessive sweating, diarrhea, weight loss etc. He is receiving Somatuline and doing well.       Review of Systems:    Constitutional: negative  Eyes: negative  Ears, Nose, Mouth, Throat, and Face: negative  Respiratory: negative  Cardiovascular: negative  Gastrointestinal: negative  Genitourinary:negative  Integument/Breast: negative  Hematologic/Lymphatic: negative  Musculoskeletal:negative  Neurological: negative    Past Medical History:   Diagnosis Date    BPH (benign prostatic hyperplasia)     without outflow obstruction    CKD stage G3b/A1, GFR 30-44 and albumin creatinine ratio <30 mg/g (HCC)     Constipation     Environmental allergies     GERD (gastroesophageal reflux disease)     Gout 11/22/2017    Heart disease     Hepatic steatosis 11/22/2017    Comments: non-alcoholic    Hypercholesterolemia     Hypertension     Insomnia 11/22/2017    Iron deficiency 11/22/2017    Left chronic serous otitis media 11/22/2017    Mesenteric mass 01/2025    Nodular adrenal cortex     Obstructive sleep apnea     wears CPAP    Renal mass      Past Surgical History:   Procedure Laterality Date    COLONOSCOPY N/A 4/30/2021    COLONOSCOPY performed by Patrick Ojeda Jr., MD at [facility] ENDOSCOPY    COLONOSCOPY N/A 5/1/2025    COLONOSCOPY DIAGNOSTIC performed by Patrick Ojeda Jr., MD at

## 2025-07-08 RX ORDER — ROSUVASTATIN CALCIUM 10 MG/1
10 TABLET, COATED ORAL NIGHTLY
Qty: 90 TABLET | Refills: 1 | Status: SHIPPED | OUTPATIENT
Start: 2025-07-08

## 2025-07-08 NOTE — TELEPHONE ENCOUNTER
RX refill request from the patient/pharmacy. Patient last seen 05- with labs, and next appt. scheduled for 08-  Requested Prescriptions     Pending Prescriptions Disp Refills    rosuvastatin (CRESTOR) 10 MG tablet 90 tablet 1     Sig: Take 1 tablet by mouth nightly    .

## 2025-07-08 NOTE — TELEPHONE ENCOUNTER
Patient is requesting a refill to be sent to [facility] on [address]     rosuvastatin (CRESTOR) 10 MG tablet

## 2025-07-10 ENCOUNTER — TELEPHONE (OUTPATIENT)
Age: 70
End: 2025-07-10

## 2025-07-10 NOTE — TELEPHONE ENCOUNTER
Attempted to contact patient in regards to referral for Low grade GI NET (Possible resection)  left message for return call    If patient calls back, please schedule for 7/16 at 3 pm.     NP: Low grade GI NET (Possible resection), ref by dr. mark

## 2025-07-14 RX ORDER — ALLOPURINOL 300 MG/1
300 TABLET ORAL DAILY
Qty: 90 TABLET | Refills: 1 | Status: SHIPPED | OUTPATIENT
Start: 2025-07-14

## 2025-07-14 NOTE — TELEPHONE ENCOUNTER
PCP: Philipp Mullins MD    Last appt: 5/5/2025    Future Appointments   Date Time Provider Department Center   7/16/2025  3:00 PM Bryant Blanton MD [facility]   8/4/2025  3:30 PM [facility] CHAIR 14 [facility]   9/2/2025  3:45 PM [facility] CHEMO CHAIR 1 [facility]   9/29/2025  3:45 PM [facility] CHEMO CHAIR 16 [facility]       Requested Prescriptions     Pending Prescriptions Disp Refills    allopurinol (ZYLOPRIM) 300 MG tablet [Pharmacy Med Name: ALLOPURINOL 300MG TABLETS] 90 tablet 1     Sig: TAKE 1 TABLET BY MOUTH DAILY

## 2025-07-16 ENCOUNTER — OFFICE VISIT (OUTPATIENT)
Age: 70
End: 2025-07-16
Payer: MEDICARE

## 2025-07-16 VITALS
HEIGHT: 71 IN | WEIGHT: 315 LBS | OXYGEN SATURATION: 98 % | SYSTOLIC BLOOD PRESSURE: 138 MMHG | RESPIRATION RATE: 15 BRPM | TEMPERATURE: 97.7 F | DIASTOLIC BLOOD PRESSURE: 82 MMHG | HEART RATE: 69 BPM | BODY MASS INDEX: 44.1 KG/M2

## 2025-07-16 DIAGNOSIS — K63.89 MESENTERIC MASS: ICD-10-CM

## 2025-07-16 DIAGNOSIS — C7A.8: Primary | ICD-10-CM

## 2025-07-16 PROCEDURE — 1159F MED LIST DOCD IN RCRD: CPT | Performed by: SURGERY

## 2025-07-16 PROCEDURE — 1160F RVW MEDS BY RX/DR IN RCRD: CPT | Performed by: SURGERY

## 2025-07-16 PROCEDURE — 3017F COLORECTAL CA SCREEN DOC REV: CPT | Performed by: SURGERY

## 2025-07-16 PROCEDURE — 3075F SYST BP GE 130 - 139MM HG: CPT | Performed by: SURGERY

## 2025-07-16 PROCEDURE — 1123F ACP DISCUSS/DSCN MKR DOCD: CPT | Performed by: SURGERY

## 2025-07-16 PROCEDURE — 99215 OFFICE O/P EST HI 40 MIN: CPT | Performed by: SURGERY

## 2025-07-16 PROCEDURE — 3078F DIAST BP <80 MM HG: CPT | Performed by: SURGERY

## 2025-07-16 PROCEDURE — 1036F TOBACCO NON-USER: CPT | Performed by: SURGERY

## 2025-07-16 PROCEDURE — 1126F AMNT PAIN NOTED NONE PRSNT: CPT | Performed by: SURGERY

## 2025-07-16 PROCEDURE — G8427 DOCREV CUR MEDS BY ELIG CLIN: HCPCS | Performed by: SURGERY

## 2025-07-16 PROCEDURE — G8417 CALC BMI ABV UP PARAM F/U: HCPCS | Performed by: SURGERY

## 2025-07-16 ASSESSMENT — PATIENT HEALTH QUESTIONNAIRE - PHQ9
SUM OF ALL RESPONSES TO PHQ QUESTIONS 1-9: 0
1. LITTLE INTEREST OR PLEASURE IN DOING THINGS: NOT AT ALL
2. FEELING DOWN, DEPRESSED OR HOPELESS: NOT AT ALL
SUM OF ALL RESPONSES TO PHQ QUESTIONS 1-9: 0

## 2025-07-16 NOTE — PROGRESS NOTES
[facility] History and Physical    Chief Complaint: Well-differentiated neuroendocrine tumor of small intestine with mesenteric mass.    History of Present Illness:      Benjie Brown is a 69 y.o. male who was kindly referred by Dr. Yao Esparza for evaluation of a mesenteric mass that was biopsied and consistent with well-differentiated neuroendocrine tumor.  The patient states the mass was identified incidentally on imaging performed by urology for a renal mass and BPH.  He was referred to Dr. Cash who performed a robotic biopsy of this mass as it was not felt to be resectable.  No primary tumor was noted at that time.  The biopsy of the mesenteric mass was consistent with metastatic grade 1 well-differentiated neuroendocrine tumor, Ki-67 was not reported.  The patient tolerated this well without any complaints.  He subsequently had an endoscopy and colonoscopy and numerous adenomatous polyps were removed.  The patient does have a history of chronic constipation.  He is currently on lanreotide injections monthly for the neuroendocrine tumor.  He had a dotatate/PET CT in April as well as an abdomen/pelvis CT in June that showed no additional foci aside from the mesenteric mass.  He is subsequently referred for consideration of surgical resection.  The patient is morbidly obese.  He is accompanied by his wife and daughter.  Chromogranin A is not elevated at 51.7.    Past Medical History:   Diagnosis Date    BPH (benign prostatic hyperplasia)     without outflow obstruction    CKD stage G3b/A1, GFR 30-44 and albumin creatinine ratio <30 mg/g (HCC)     Constipation     Environmental allergies     GERD (gastroesophageal reflux disease)     Gout 11/22/2017    Heart disease     Hepatic steatosis 11/22/2017    Comments: non-alcoholic    Hypercholesterolemia     Hypertension     Insomnia 11/22/2017    Iron deficiency 11/22/2017    Left chronic serous otitis media 11/22/2017    Mesenteric mass

## 2025-07-16 NOTE — PROGRESS NOTES
Identified patient with two patient identifiers (name and ). Reviewed chart in preparation for visit and have obtained necessary documentation.    Benjie Brown is a 69 y.o. male  Chief Complaint   Patient presents with    New Patient     Low Grade GI NET (Possible Resection)     /82 (BP Site: Left Upper Arm, Patient Position: Sitting, BP Cuff Size: Large Adult)   Pulse 69   Temp 97.7 °F (36.5 °C) (Oral)   Resp 15   Ht 1.803 m (5' 11\")   Wt (!) 167.6 kg (369 lb 6.4 oz)   SpO2 98%   BMI 51.52 kg/m²     1. Have you been to the ER, urgent care clinic since your last visit?  Hospitalized since your last visit?no    2. Have you seen or consulted any other health care providers outside of the [facility] since your last visit?  Include any pap smears or colon screening. no

## 2025-07-18 ENCOUNTER — TELEPHONE (OUTPATIENT)
Age: 70
End: 2025-07-18

## 2025-07-18 PROBLEM — C7B.8 NEUROENDOCRINE CARCINOMA METASTATIC TO SMALL INTESTINE (HCC): Status: ACTIVE | Noted: 2025-07-18

## 2025-07-18 PROBLEM — C7A.8 NEUROENDOCRINE CARCINOMA METASTATIC TO SMALL INTESTINE (HCC): Status: ACTIVE | Noted: 2025-07-18

## 2025-07-18 PROBLEM — K63.89 MASS OF SMALL INTESTINE: Status: ACTIVE | Noted: 2025-07-18

## 2025-07-18 NOTE — TELEPHONE ENCOUNTER
Contacted patient regarding scheduling surgery with Dr. Blanton. Patient wanted something after the first week of August. Agreed to the date of 08/22. Informed patient that PAT will be reaching out as well as a surgical letter will be sent out, patient understood.

## 2025-07-24 ENCOUNTER — TELEPHONE (OUTPATIENT)
Age: 70
End: 2025-07-24

## 2025-07-24 NOTE — TELEPHONE ENCOUNTER
Contacted patient to let him know that his surgery will need to be moved to 08/20. Patient understood.

## 2025-08-04 ENCOUNTER — TELEPHONE (OUTPATIENT)
Age: 70
End: 2025-08-04

## 2025-08-04 ENCOUNTER — HOSPITAL ENCOUNTER (OUTPATIENT)
Facility: HOSPITAL | Age: 70
Setting detail: INFUSION SERIES
Discharge: HOME | End: 2025-08-04
Payer: MEDICARE

## 2025-08-04 VITALS
SYSTOLIC BLOOD PRESSURE: 130 MMHG | HEIGHT: 71 IN | HEART RATE: 65 BPM | TEMPERATURE: 98.9 F | RESPIRATION RATE: 15 BRPM | BODY MASS INDEX: 44.1 KG/M2 | WEIGHT: 315 LBS | DIASTOLIC BLOOD PRESSURE: 76 MMHG | OXYGEN SATURATION: 94 %

## 2025-08-04 DIAGNOSIS — C7B.8 MESENTERY METASTASIS OF NEUROENDOCRINE TUMOR (HCC): Primary | ICD-10-CM

## 2025-08-04 DIAGNOSIS — C7A.8 MESENTERY METASTASIS OF NEUROENDOCRINE TUMOR (HCC): Primary | ICD-10-CM

## 2025-08-04 LAB
ALBUMIN SERPL-MCNC: 3.7 G/DL (ref 3.5–5.2)
ALBUMIN/GLOB SERPL: 1 (ref 1.1–2.2)
ALP SERPL-CCNC: 107 U/L (ref 40–129)
ALT SERPL-CCNC: 25 U/L (ref 10–50)
ANION GAP SERPL CALC-SCNC: 13 MMOL/L (ref 2–14)
AST SERPL-CCNC: 23 U/L (ref 10–50)
BILIRUB SERPL-MCNC: 0.6 MG/DL (ref 0–1.2)
BUN SERPL-MCNC: 27 MG/DL (ref 8–23)
BUN/CREAT SERPL: 23 (ref 12–20)
CALCIUM SERPL-MCNC: 9.5 MG/DL (ref 8.8–10.2)
CHLORIDE SERPL-SCNC: 101 MMOL/L (ref 98–107)
CO2 SERPL-SCNC: 28 MMOL/L (ref 20–29)
CREAT SERPL-MCNC: 1.14 MG/DL (ref 0.7–1.2)
GLOBULIN SER CALC-MCNC: 3.7 G/DL (ref 2–4)
GLUCOSE SERPL-MCNC: 103 MG/DL (ref 65–100)
POTASSIUM SERPL-SCNC: 3.5 MMOL/L (ref 3.5–5.1)
PROT SERPL-MCNC: 7.3 G/DL (ref 6.4–8.3)
SODIUM SERPL-SCNC: 142 MMOL/L (ref 136–145)

## 2025-08-04 PROCEDURE — 36415 COLL VENOUS BLD VENIPUNCTURE: CPT

## 2025-08-04 PROCEDURE — 96372 THER/PROPH/DIAG INJ SC/IM: CPT

## 2025-08-04 PROCEDURE — 80053 COMPREHEN METABOLIC PANEL: CPT

## 2025-08-04 PROCEDURE — 6360000002 HC RX W HCPCS: Performed by: INTERNAL MEDICINE

## 2025-08-04 RX ORDER — LANREOTIDE ACETATE 120 MG/.5ML
120 INJECTION SUBCUTANEOUS ONCE
OUTPATIENT
Start: 2025-09-01 | End: 2025-09-01

## 2025-08-04 RX ORDER — HYDROCORTISONE SODIUM SUCCINATE 100 MG/2ML
100 INJECTION INTRAMUSCULAR; INTRAVENOUS
OUTPATIENT
Start: 2025-09-01

## 2025-08-04 RX ORDER — DIPHENHYDRAMINE HYDROCHLORIDE 50 MG/ML
50 INJECTION, SOLUTION INTRAMUSCULAR; INTRAVENOUS
OUTPATIENT
Start: 2025-09-01

## 2025-08-04 RX ORDER — ALBUTEROL SULFATE 90 UG/1
4 INHALANT RESPIRATORY (INHALATION) PRN
OUTPATIENT
Start: 2025-09-01

## 2025-08-04 RX ORDER — LANREOTIDE ACETATE 120 MG/.5ML
120 INJECTION SUBCUTANEOUS ONCE
Status: COMPLETED | OUTPATIENT
Start: 2025-08-04 | End: 2025-08-04

## 2025-08-04 RX ORDER — ACETAMINOPHEN 325 MG/1
650 TABLET ORAL
OUTPATIENT
Start: 2025-09-01

## 2025-08-04 RX ORDER — ONDANSETRON 2 MG/ML
8 INJECTION INTRAMUSCULAR; INTRAVENOUS
OUTPATIENT
Start: 2025-09-01

## 2025-08-04 RX ORDER — SODIUM CHLORIDE 9 MG/ML
INJECTION, SOLUTION INTRAVENOUS CONTINUOUS
OUTPATIENT
Start: 2025-09-01

## 2025-08-04 RX ORDER — EPINEPHRINE 1 MG/ML
0.3 INJECTION, SOLUTION INTRAMUSCULAR; SUBCUTANEOUS PRN
OUTPATIENT
Start: 2025-09-01

## 2025-08-04 RX ADMIN — LANREOTIDE ACETATE 120 MG: 120 INJECTION SUBCUTANEOUS at 15:47

## 2025-08-04 ASSESSMENT — PAIN DESCRIPTION - LOCATION: LOCATION: BACK

## 2025-08-04 ASSESSMENT — PAIN SCALES - GENERAL: PAINLEVEL_OUTOF10: 5

## 2025-08-04 ASSESSMENT — PAIN DESCRIPTION - ORIENTATION: ORIENTATION: LOWER

## 2025-08-05 ENCOUNTER — APPOINTMENT (OUTPATIENT)
Facility: HOSPITAL | Age: 70
End: 2025-08-05
Payer: MEDICARE

## 2025-08-08 ENCOUNTER — HOSPITAL ENCOUNTER (OUTPATIENT)
Age: 70
Discharge: HOME | End: 2025-08-11
Payer: MEDICARE

## 2025-08-08 ENCOUNTER — HOSPITAL ENCOUNTER (OUTPATIENT)
Facility: HOSPITAL | Age: 70
Discharge: HOME | End: 2025-08-11
Payer: MEDICARE

## 2025-08-08 VITALS
SYSTOLIC BLOOD PRESSURE: 151 MMHG | DIASTOLIC BLOOD PRESSURE: 80 MMHG | HEART RATE: 70 BPM | HEIGHT: 71 IN | OXYGEN SATURATION: 95 % | TEMPERATURE: 98.1 F | WEIGHT: 315 LBS | BODY MASS INDEX: 44.1 KG/M2

## 2025-08-08 DIAGNOSIS — C7A.8: ICD-10-CM

## 2025-08-08 LAB
ABO + RH BLD: NORMAL
ALBUMIN SERPL-MCNC: 3.6 G/DL (ref 3.5–5.2)
ALBUMIN/GLOB SERPL: 1.2 (ref 1.1–2.2)
ALP SERPL-CCNC: 132 U/L (ref 40–129)
ALT SERPL-CCNC: 38 U/L (ref 10–50)
ANION GAP SERPL CALC-SCNC: 11 MMOL/L (ref 2–14)
AST SERPL-CCNC: 27 U/L (ref 10–50)
BILIRUB SERPL-MCNC: 0.4 MG/DL (ref 0–1.2)
BLOOD GROUP ANTIBODIES SERPL: NORMAL
BUN SERPL-MCNC: 23 MG/DL (ref 8–23)
BUN/CREAT SERPL: 21 (ref 12–20)
CALCIUM SERPL-MCNC: 9.1 MG/DL (ref 8.8–10.2)
CHLORIDE SERPL-SCNC: 100 MMOL/L (ref 98–107)
CO2 SERPL-SCNC: 31 MMOL/L (ref 20–29)
CREAT SERPL-MCNC: 1.06 MG/DL (ref 0.7–1.2)
EKG ATRIAL RATE: 63 BPM
EKG DIAGNOSIS: NORMAL
EKG P AXIS: 51 DEGREES
EKG P-R INTERVAL: 240 MS
EKG Q-T INTERVAL: 402 MS
EKG QRS DURATION: 128 MS
EKG QTC CALCULATION (BAZETT): 411 MS
EKG R AXIS: -55 DEGREES
EKG T AXIS: 32 DEGREES
EKG VENTRICULAR RATE: 63 BPM
ERYTHROCYTE [DISTWIDTH] IN BLOOD BY AUTOMATED COUNT: 16.2 % (ref 11.5–14.5)
EST. AVERAGE GLUCOSE BLD GHB EST-MCNC: 132 MG/DL
GLOBULIN SER CALC-MCNC: 2.9 G/DL (ref 2–4)
GLUCOSE SERPL-MCNC: 120 MG/DL (ref 65–100)
HBA1C MFR BLD: 6.2 % (ref 4–5.6)
HCT VFR BLD AUTO: 38.8 % (ref 36.6–50.3)
HGB BLD-MCNC: 12.7 G/DL (ref 12.1–17)
INR PPP: 1.1 (ref 0.9–1.1)
MCH RBC QN AUTO: 30.8 PG (ref 26–34)
MCHC RBC AUTO-ENTMCNC: 32.7 G/DL (ref 30–36.5)
MCV RBC AUTO: 94.2 FL (ref 80–99)
NRBC # BLD: 0 K/UL (ref 0–0.01)
NRBC BLD-RTO: 0 PER 100 WBC
PLATELET # BLD AUTO: 168 K/UL (ref 150–400)
PMV BLD AUTO: 11.3 FL (ref 8.9–12.9)
POTASSIUM SERPL-SCNC: 3.6 MMOL/L (ref 3.5–5.1)
PROT SERPL-MCNC: 6.5 G/DL (ref 6.4–8.3)
PROTHROMBIN TIME: 11.4 SEC (ref 9.2–11.2)
RBC # BLD AUTO: 4.12 M/UL (ref 4.1–5.7)
SODIUM SERPL-SCNC: 141 MMOL/L (ref 136–145)
SPECIMEN EXP DATE BLD: NORMAL
WBC # BLD AUTO: 8.8 K/UL (ref 4.1–11.1)

## 2025-08-08 PROCEDURE — 83036 HEMOGLOBIN GLYCOSYLATED A1C: CPT

## 2025-08-08 PROCEDURE — 85027 COMPLETE CBC AUTOMATED: CPT

## 2025-08-08 PROCEDURE — 93005 ELECTROCARDIOGRAM TRACING: CPT

## 2025-08-08 PROCEDURE — 86900 BLOOD TYPING SEROLOGIC ABO: CPT

## 2025-08-08 PROCEDURE — 86901 BLOOD TYPING SEROLOGIC RH(D): CPT

## 2025-08-08 PROCEDURE — 86850 RBC ANTIBODY SCREEN: CPT

## 2025-08-08 PROCEDURE — 85610 PROTHROMBIN TIME: CPT

## 2025-08-08 PROCEDURE — 80053 COMPREHEN METABOLIC PANEL: CPT

## 2025-08-08 PROCEDURE — 71046 X-RAY EXAM CHEST 2 VIEWS: CPT

## 2025-08-08 PROCEDURE — 93010 ELECTROCARDIOGRAM REPORT: CPT | Performed by: SPECIALIST

## 2025-08-08 RX ORDER — POLYETHYLENE GLYCOL 3350 17 G/17G
17 POWDER, FOR SOLUTION ORAL DAILY PRN
COMMUNITY

## 2025-08-08 RX ORDER — FINASTERIDE 5 MG/1
5 TABLET, FILM COATED ORAL DAILY
COMMUNITY

## 2025-08-11 LAB
ABO + RH BLD: NORMAL
BLOOD GROUP ANTIBODIES SERPL: NORMAL
SPECIMEN EXP DATE BLD: NORMAL

## 2025-08-19 ENCOUNTER — ANESTHESIA EVENT (OUTPATIENT)
Facility: HOSPITAL | Age: 70
End: 2025-08-19
Payer: MEDICARE

## 2025-08-20 ENCOUNTER — HOSPITAL ENCOUNTER (INPATIENT)
Facility: HOSPITAL | Age: 70
LOS: 2 days | Discharge: HOME | DRG: 828 | End: 2025-08-22
Attending: SURGERY | Admitting: SURGERY
Payer: MEDICARE

## 2025-08-20 ENCOUNTER — ANESTHESIA (OUTPATIENT)
Facility: HOSPITAL | Age: 70
End: 2025-08-20
Payer: MEDICARE

## 2025-08-20 DIAGNOSIS — K63.89 MASS OF SMALL INTESTINE: Primary | ICD-10-CM

## 2025-08-20 PROBLEM — C7A.8: Status: ACTIVE | Noted: 2025-08-20

## 2025-08-20 LAB
GLUCOSE BLD STRIP.AUTO-MCNC: 93 MG/DL (ref 65–117)
SERVICE CMNT-IMP: NORMAL

## 2025-08-20 PROCEDURE — 6360000002 HC RX W HCPCS: Performed by: SURGERY

## 2025-08-20 PROCEDURE — 2720000010 HC SURG SUPPLY STERILE: Performed by: SURGERY

## 2025-08-20 PROCEDURE — 2500000003 HC RX 250 WO HCPCS: Performed by: SURGERY

## 2025-08-20 PROCEDURE — 0DBV0ZZ EXCISION OF MESENTERY, OPEN APPROACH: ICD-10-PCS | Performed by: SURGERY

## 2025-08-20 PROCEDURE — 88309 TISSUE EXAM BY PATHOLOGIST: CPT

## 2025-08-20 PROCEDURE — 3700000001 HC ADD 15 MINUTES (ANESTHESIA): Performed by: SURGERY

## 2025-08-20 PROCEDURE — 6360000002 HC RX W HCPCS: Performed by: NURSE ANESTHETIST, CERTIFIED REGISTERED

## 2025-08-20 PROCEDURE — 3700000000 HC ANESTHESIA ATTENDED CARE: Performed by: SURGERY

## 2025-08-20 PROCEDURE — 7100000000 HC PACU RECOVERY - FIRST 15 MIN: Performed by: SURGERY

## 2025-08-20 PROCEDURE — 0DJV4ZZ INSPECTION OF MESENTERY, PERCUTANEOUS ENDOSCOPIC APPROACH: ICD-10-PCS | Performed by: SURGERY

## 2025-08-20 PROCEDURE — 2580000003 HC RX 258: Performed by: NURSE ANESTHETIST, CERTIFIED REGISTERED

## 2025-08-20 PROCEDURE — 2580000003 HC RX 258: Performed by: SURGERY

## 2025-08-20 PROCEDURE — 6370000000 HC RX 637 (ALT 250 FOR IP): Performed by: SURGERY

## 2025-08-20 PROCEDURE — 2500000003 HC RX 250 WO HCPCS: Performed by: NURSE ANESTHETIST, CERTIFIED REGISTERED

## 2025-08-20 PROCEDURE — 6360000002 HC RX W HCPCS: Performed by: ANESTHESIOLOGY

## 2025-08-20 PROCEDURE — 88311 DECALCIFY TISSUE: CPT

## 2025-08-20 PROCEDURE — 3600000004 HC SURGERY LEVEL 4 BASE: Performed by: SURGERY

## 2025-08-20 PROCEDURE — 6360000002 HC RX W HCPCS

## 2025-08-20 PROCEDURE — 2500000003 HC RX 250 WO HCPCS

## 2025-08-20 PROCEDURE — 82962 GLUCOSE BLOOD TEST: CPT

## 2025-08-20 PROCEDURE — P9045 ALBUMIN (HUMAN), 5%, 250 ML: HCPCS | Performed by: NURSE ANESTHETIST, CERTIFIED REGISTERED

## 2025-08-20 PROCEDURE — 2580000003 HC RX 258: Performed by: ANESTHESIOLOGY

## 2025-08-20 PROCEDURE — 7100000001 HC PACU RECOVERY - ADDTL 15 MIN: Performed by: SURGERY

## 2025-08-20 PROCEDURE — 1100000000 HC RM PRIVATE

## 2025-08-20 PROCEDURE — 2709999900 HC NON-CHARGEABLE SUPPLY: Performed by: SURGERY

## 2025-08-20 PROCEDURE — 3600000014 HC SURGERY LEVEL 4 ADDTL 15MIN: Performed by: SURGERY

## 2025-08-20 RX ORDER — SODIUM CHLORIDE 9 MG/ML
INJECTION, SOLUTION INTRAVENOUS PRN
Status: DISCONTINUED | OUTPATIENT
Start: 2025-08-20 | End: 2025-08-20 | Stop reason: HOSPADM

## 2025-08-20 RX ORDER — LIDOCAINE HYDROCHLORIDE ANHYDROUS AND DEXTROSE MONOHYDRATE 5; 400 G/100ML; MG/100ML
INJECTION, SOLUTION INTRAVENOUS
Status: DISCONTINUED | OUTPATIENT
Start: 2025-08-20 | End: 2025-08-20 | Stop reason: SDUPTHER

## 2025-08-20 RX ORDER — VALSARTAN 160 MG/1
320 TABLET ORAL DAILY
Status: DISCONTINUED | OUTPATIENT
Start: 2025-08-20 | End: 2025-08-22 | Stop reason: HOSPADM

## 2025-08-20 RX ORDER — ENOXAPARIN SODIUM 100 MG/ML
60 INJECTION SUBCUTANEOUS 2 TIMES DAILY
Status: DISCONTINUED | OUTPATIENT
Start: 2025-08-20 | End: 2025-08-22 | Stop reason: HOSPADM

## 2025-08-20 RX ORDER — OXYCODONE HYDROCHLORIDE 5 MG/1
5 TABLET ORAL EVERY 4 HOURS PRN
Status: DISCONTINUED | OUTPATIENT
Start: 2025-08-20 | End: 2025-08-22 | Stop reason: HOSPADM

## 2025-08-20 RX ORDER — METRONIDAZOLE 500 MG/100ML
500 INJECTION, SOLUTION INTRAVENOUS
Status: COMPLETED | OUTPATIENT
Start: 2025-08-20 | End: 2025-08-20

## 2025-08-20 RX ORDER — ONDANSETRON 4 MG/1
4 TABLET, ORALLY DISINTEGRATING ORAL EVERY 8 HOURS PRN
Status: DISCONTINUED | OUTPATIENT
Start: 2025-08-20 | End: 2025-08-22 | Stop reason: HOSPADM

## 2025-08-20 RX ORDER — ALVIMOPAN 12 MG/1
12 CAPSULE ORAL 2 TIMES DAILY
Status: DISCONTINUED | OUTPATIENT
Start: 2025-08-20 | End: 2025-08-22 | Stop reason: HOSPADM

## 2025-08-20 RX ORDER — ALBUMIN HUMAN 50 G/1000ML
SOLUTION INTRAVENOUS
Status: DISCONTINUED | OUTPATIENT
Start: 2025-08-20 | End: 2025-08-20 | Stop reason: SDUPTHER

## 2025-08-20 RX ORDER — FENTANYL CITRATE 50 UG/ML
INJECTION, SOLUTION INTRAMUSCULAR; INTRAVENOUS
Status: DISCONTINUED | OUTPATIENT
Start: 2025-08-20 | End: 2025-08-20 | Stop reason: SDUPTHER

## 2025-08-20 RX ORDER — SODIUM CHLORIDE 9 MG/ML
INJECTION, SOLUTION INTRAVENOUS PRN
Status: DISCONTINUED | OUTPATIENT
Start: 2025-08-20 | End: 2025-08-22 | Stop reason: HOSPADM

## 2025-08-20 RX ORDER — SODIUM CHLORIDE 9 MG/ML
INJECTION, SOLUTION INTRAVENOUS CONTINUOUS
Status: DISCONTINUED | OUTPATIENT
Start: 2025-08-20 | End: 2025-08-20 | Stop reason: HOSPADM

## 2025-08-20 RX ORDER — ASPIRIN 81 MG/1
81 TABLET, CHEWABLE ORAL DAILY
Status: DISCONTINUED | OUTPATIENT
Start: 2025-08-21 | End: 2025-08-22 | Stop reason: HOSPADM

## 2025-08-20 RX ORDER — ALLOPURINOL 100 MG/1
300 TABLET ORAL DAILY
Status: DISCONTINUED | OUTPATIENT
Start: 2025-08-21 | End: 2025-08-22 | Stop reason: HOSPADM

## 2025-08-20 RX ORDER — MIDAZOLAM HYDROCHLORIDE 1 MG/ML
INJECTION, SOLUTION INTRAMUSCULAR; INTRAVENOUS
Status: DISCONTINUED | OUTPATIENT
Start: 2025-08-20 | End: 2025-08-20 | Stop reason: SDUPTHER

## 2025-08-20 RX ORDER — ONDANSETRON 2 MG/ML
4 INJECTION INTRAMUSCULAR; INTRAVENOUS
Status: DISCONTINUED | OUTPATIENT
Start: 2025-08-20 | End: 2025-08-20 | Stop reason: HOSPADM

## 2025-08-20 RX ORDER — HYDROMORPHONE HYDROCHLORIDE 1 MG/ML
0.5 INJECTION, SOLUTION INTRAMUSCULAR; INTRAVENOUS; SUBCUTANEOUS EVERY 5 MIN PRN
Status: COMPLETED | OUTPATIENT
Start: 2025-08-20 | End: 2025-08-20

## 2025-08-20 RX ORDER — FINASTERIDE 5 MG/1
5 TABLET, FILM COATED ORAL DAILY
Status: DISCONTINUED | OUTPATIENT
Start: 2025-08-21 | End: 2025-08-22 | Stop reason: HOSPADM

## 2025-08-20 RX ORDER — ONDANSETRON 2 MG/ML
4 INJECTION INTRAMUSCULAR; INTRAVENOUS EVERY 6 HOURS PRN
Status: DISCONTINUED | OUTPATIENT
Start: 2025-08-20 | End: 2025-08-22 | Stop reason: HOSPADM

## 2025-08-20 RX ORDER — PROCHLORPERAZINE EDISYLATE 5 MG/ML
5 INJECTION INTRAMUSCULAR; INTRAVENOUS
Status: DISCONTINUED | OUTPATIENT
Start: 2025-08-20 | End: 2025-08-20 | Stop reason: HOSPADM

## 2025-08-20 RX ORDER — SUCCINYLCHOLINE/SOD CL,ISO/PF 200MG/10ML
SYRINGE (ML) INTRAVENOUS
Status: DISCONTINUED | OUTPATIENT
Start: 2025-08-20 | End: 2025-08-20 | Stop reason: SDUPTHER

## 2025-08-20 RX ORDER — SODIUM CHLORIDE 0.9 % (FLUSH) 0.9 %
5-40 SYRINGE (ML) INJECTION EVERY 12 HOURS SCHEDULED
Status: DISCONTINUED | OUTPATIENT
Start: 2025-08-20 | End: 2025-08-20 | Stop reason: HOSPADM

## 2025-08-20 RX ORDER — LABETALOL HYDROCHLORIDE 5 MG/ML
10 INJECTION, SOLUTION INTRAVENOUS
Status: DISCONTINUED | OUTPATIENT
Start: 2025-08-20 | End: 2025-08-20 | Stop reason: HOSPADM

## 2025-08-20 RX ORDER — LIDOCAINE HYDROCHLORIDE 20 MG/ML
INJECTION, SOLUTION EPIDURAL; INFILTRATION; INTRACAUDAL; PERINEURAL
Status: DISCONTINUED | OUTPATIENT
Start: 2025-08-20 | End: 2025-08-20 | Stop reason: SDUPTHER

## 2025-08-20 RX ORDER — DEXMEDETOMIDINE HYDROCHLORIDE 100 UG/ML
INJECTION, SOLUTION INTRAVENOUS
Status: DISCONTINUED | OUTPATIENT
Start: 2025-08-20 | End: 2025-08-20 | Stop reason: SDUPTHER

## 2025-08-20 RX ORDER — ACETAMINOPHEN 500 MG
1000 TABLET ORAL EVERY 6 HOURS
Status: DISCONTINUED | OUTPATIENT
Start: 2025-08-20 | End: 2025-08-22 | Stop reason: HOSPADM

## 2025-08-20 RX ORDER — BUMETANIDE 1 MG/1
2 TABLET ORAL 2 TIMES DAILY
Status: DISCONTINUED | OUTPATIENT
Start: 2025-08-20 | End: 2025-08-22 | Stop reason: HOSPADM

## 2025-08-20 RX ORDER — SODIUM CHLORIDE, SODIUM LACTATE, POTASSIUM CHLORIDE, CALCIUM CHLORIDE 600; 310; 30; 20 MG/100ML; MG/100ML; MG/100ML; MG/100ML
INJECTION, SOLUTION INTRAVENOUS CONTINUOUS
Status: DISCONTINUED | OUTPATIENT
Start: 2025-08-20 | End: 2025-08-20 | Stop reason: HOSPADM

## 2025-08-20 RX ORDER — ROSUVASTATIN CALCIUM 10 MG/1
10 TABLET, COATED ORAL NIGHTLY
Status: DISCONTINUED | OUTPATIENT
Start: 2025-08-21 | End: 2025-08-21

## 2025-08-20 RX ORDER — BUPIVACAINE HYDROCHLORIDE AND EPINEPHRINE 2.5; 5 MG/ML; UG/ML
INJECTION, SOLUTION EPIDURAL; INFILTRATION; INTRACAUDAL; PERINEURAL PRN
Status: DISCONTINUED | OUTPATIENT
Start: 2025-08-20 | End: 2025-08-20 | Stop reason: HOSPADM

## 2025-08-20 RX ORDER — SODIUM CHLORIDE 0.9 % (FLUSH) 0.9 %
5-40 SYRINGE (ML) INJECTION PRN
Status: DISCONTINUED | OUTPATIENT
Start: 2025-08-20 | End: 2025-08-20 | Stop reason: HOSPADM

## 2025-08-20 RX ORDER — MAGNESIUM SULFATE HEPTAHYDRATE 40 MG/ML
INJECTION, SOLUTION INTRAVENOUS
Status: DISCONTINUED | OUTPATIENT
Start: 2025-08-20 | End: 2025-08-20 | Stop reason: SDUPTHER

## 2025-08-20 RX ORDER — ATENOLOL 25 MG/1
50 TABLET ORAL DAILY
Status: DISCONTINUED | OUTPATIENT
Start: 2025-08-21 | End: 2025-08-22 | Stop reason: HOSPADM

## 2025-08-20 RX ORDER — SODIUM CHLORIDE 0.9 % (FLUSH) 0.9 %
5-40 SYRINGE (ML) INJECTION EVERY 12 HOURS SCHEDULED
Status: DISCONTINUED | OUTPATIENT
Start: 2025-08-20 | End: 2025-08-22 | Stop reason: HOSPADM

## 2025-08-20 RX ORDER — ACETAMINOPHEN 500 MG
1000 TABLET ORAL ONCE
Status: COMPLETED | OUTPATIENT
Start: 2025-08-20 | End: 2025-08-20

## 2025-08-20 RX ORDER — DEXAMETHASONE SODIUM PHOSPHATE 4 MG/ML
INJECTION, SOLUTION INTRA-ARTICULAR; INTRALESIONAL; INTRAMUSCULAR; INTRAVENOUS; SOFT TISSUE
Status: DISCONTINUED | OUTPATIENT
Start: 2025-08-20 | End: 2025-08-20 | Stop reason: SDUPTHER

## 2025-08-20 RX ORDER — CELECOXIB 100 MG/1
100 CAPSULE ORAL ONCE
Status: COMPLETED | OUTPATIENT
Start: 2025-08-20 | End: 2025-08-20

## 2025-08-20 RX ORDER — FENTANYL CITRATE 50 UG/ML
25 INJECTION, SOLUTION INTRAMUSCULAR; INTRAVENOUS EVERY 5 MIN PRN
Status: DISCONTINUED | OUTPATIENT
Start: 2025-08-20 | End: 2025-08-20 | Stop reason: HOSPADM

## 2025-08-20 RX ORDER — KETOROLAC TROMETHAMINE 30 MG/ML
15 INJECTION, SOLUTION INTRAMUSCULAR; INTRAVENOUS EVERY 6 HOURS
Status: DISCONTINUED | OUTPATIENT
Start: 2025-08-20 | End: 2025-08-22 | Stop reason: HOSPADM

## 2025-08-20 RX ORDER — SODIUM CHLORIDE 0.9 % (FLUSH) 0.9 %
5-40 SYRINGE (ML) INJECTION PRN
Status: DISCONTINUED | OUTPATIENT
Start: 2025-08-20 | End: 2025-08-22 | Stop reason: HOSPADM

## 2025-08-20 RX ORDER — ROCURONIUM BROMIDE 10 MG/ML
INJECTION, SOLUTION INTRAVENOUS
Status: DISCONTINUED | OUTPATIENT
Start: 2025-08-20 | End: 2025-08-20 | Stop reason: SDUPTHER

## 2025-08-20 RX ORDER — SODIUM CHLORIDE, SODIUM LACTATE, POTASSIUM CHLORIDE, CALCIUM CHLORIDE 600; 310; 30; 20 MG/100ML; MG/100ML; MG/100ML; MG/100ML
INJECTION, SOLUTION INTRAVENOUS CONTINUOUS
Status: DISPENSED | OUTPATIENT
Start: 2025-08-20 | End: 2025-08-21

## 2025-08-20 RX ORDER — ONDANSETRON 2 MG/ML
INJECTION INTRAMUSCULAR; INTRAVENOUS
Status: DISCONTINUED | OUTPATIENT
Start: 2025-08-20 | End: 2025-08-20 | Stop reason: SDUPTHER

## 2025-08-20 RX ADMIN — LIDOCAINE HYDROCHLORIDE 1 MG/KG/HR: 4 INJECTION, SOLUTION INTRAVENOUS at 13:10

## 2025-08-20 RX ADMIN — FENTANYL CITRATE 50 MCG: 50 INJECTION INTRAMUSCULAR; INTRAVENOUS at 16:15

## 2025-08-20 RX ADMIN — CELECOXIB 100 MG: 100 CAPSULE ORAL at 10:45

## 2025-08-20 RX ADMIN — PROPOFOL 200 MG: 10 INJECTION, EMULSION INTRAVENOUS at 12:52

## 2025-08-20 RX ADMIN — ROCURONIUM BROMIDE 10 MG: 10 INJECTION, SOLUTION INTRAVENOUS at 12:52

## 2025-08-20 RX ADMIN — Medication 180 MG: at 12:52

## 2025-08-20 RX ADMIN — SUGAMMADEX 200 MG: 100 INJECTION, SOLUTION INTRAVENOUS at 15:29

## 2025-08-20 RX ADMIN — SODIUM CHLORIDE, POTASSIUM CHLORIDE, SODIUM LACTATE AND CALCIUM CHLORIDE: 600; 310; 30; 20 INJECTION, SOLUTION INTRAVENOUS at 12:30

## 2025-08-20 RX ADMIN — SODIUM CHLORIDE, SODIUM LACTATE, POTASSIUM CHLORIDE, AND CALCIUM CHLORIDE: .6; .31; .03; .02 INJECTION, SOLUTION INTRAVENOUS at 16:19

## 2025-08-20 RX ADMIN — ALBUMIN (HUMAN) 12.5 G: 12.5 INJECTION, SOLUTION INTRAVENOUS at 13:19

## 2025-08-20 RX ADMIN — ENOXAPARIN SODIUM 60 MG: 100 INJECTION SUBCUTANEOUS at 21:10

## 2025-08-20 RX ADMIN — ALVIMOPAN 12 MG: 12 CAPSULE ORAL at 21:10

## 2025-08-20 RX ADMIN — Medication 25 MG: at 13:11

## 2025-08-20 RX ADMIN — BUMETANIDE 2 MG: 1 TABLET ORAL at 21:10

## 2025-08-20 RX ADMIN — MAGNESIUM SULFATE HEPTAHYDRATE 2000 MG: 40 INJECTION, SOLUTION INTRAVENOUS at 13:16

## 2025-08-20 RX ADMIN — ROCURONIUM BROMIDE 20 MG: 10 INJECTION, SOLUTION INTRAVENOUS at 14:25

## 2025-08-20 RX ADMIN — DEXAMETHASONE SODIUM PHOSPHATE 4 MG: 4 INJECTION, SOLUTION INTRAMUSCULAR; INTRAVENOUS at 13:06

## 2025-08-20 RX ADMIN — FENTANYL CITRATE 100 MCG: 50 INJECTION INTRAMUSCULAR; INTRAVENOUS at 12:52

## 2025-08-20 RX ADMIN — Medication 25 MG: at 12:52

## 2025-08-20 RX ADMIN — ROCURONIUM BROMIDE 40 MG: 10 INJECTION, SOLUTION INTRAVENOUS at 13:05

## 2025-08-20 RX ADMIN — DEXMEDETOMIDINE 10 MCG: 100 INJECTION, SOLUTION, CONCENTRATE INTRAVENOUS at 14:05

## 2025-08-20 RX ADMIN — SODIUM CHLORIDE: 9 INJECTION, SOLUTION INTRAVENOUS at 13:00

## 2025-08-20 RX ADMIN — PHENYLEPHRINE HYDROCHLORIDE 15 MCG/MIN: 10 INJECTION INTRAVENOUS at 13:35

## 2025-08-20 RX ADMIN — HYDROMORPHONE HYDROCHLORIDE 0.5 MG: 1 INJECTION, SOLUTION INTRAMUSCULAR; INTRAVENOUS; SUBCUTANEOUS at 16:22

## 2025-08-20 RX ADMIN — MIDAZOLAM 2 MG: 1 INJECTION INTRAMUSCULAR; INTRAVENOUS at 12:38

## 2025-08-20 RX ADMIN — HYDROMORPHONE HYDROCHLORIDE 0.5 MG: 1 INJECTION, SOLUTION INTRAMUSCULAR; INTRAVENOUS; SUBCUTANEOUS at 16:54

## 2025-08-20 RX ADMIN — LIDOCAINE HYDROCHLORIDE 100 MG: 20 INJECTION, SOLUTION EPIDURAL; INFILTRATION; INTRACAUDAL; PERINEURAL at 12:52

## 2025-08-20 RX ADMIN — KETOROLAC TROMETHAMINE 15 MG: 30 INJECTION, SOLUTION INTRAMUSCULAR; INTRAVENOUS at 19:46

## 2025-08-20 RX ADMIN — ACETAMINOPHEN 1000 MG: 500 TABLET ORAL at 19:47

## 2025-08-20 RX ADMIN — METRONIDAZOLE 500 MG: 5 INJECTION, SOLUTION INTRAVENOUS at 13:15

## 2025-08-20 RX ADMIN — ACETAMINOPHEN 1000 MG: 500 TABLET ORAL at 10:45

## 2025-08-20 RX ADMIN — ONDANSETRON 4 MG: 2 INJECTION, SOLUTION INTRAMUSCULAR; INTRAVENOUS at 15:29

## 2025-08-20 RX ADMIN — SODIUM CHLORIDE 3000 MG: 9 INJECTION, SOLUTION INTRAVENOUS at 13:00

## 2025-08-20 RX ADMIN — ALBUMIN (HUMAN) 12.5 G: 12.5 INJECTION, SOLUTION INTRAVENOUS at 14:05

## 2025-08-20 RX ADMIN — ROCURONIUM BROMIDE 20 MG: 10 INJECTION, SOLUTION INTRAVENOUS at 13:45

## 2025-08-20 ASSESSMENT — PAIN DESCRIPTION - LOCATION
LOCATION: ABDOMEN

## 2025-08-20 ASSESSMENT — PAIN DESCRIPTION - ORIENTATION
ORIENTATION: ANTERIOR

## 2025-08-20 ASSESSMENT — PAIN DESCRIPTION - FREQUENCY: FREQUENCY: INTERMITTENT

## 2025-08-20 ASSESSMENT — PAIN - FUNCTIONAL ASSESSMENT
PAIN_FUNCTIONAL_ASSESSMENT: 0-10
PAIN_FUNCTIONAL_ASSESSMENT: 0-10
PAIN_FUNCTIONAL_ASSESSMENT: PREVENTS OR INTERFERES SOME ACTIVE ACTIVITIES AND ADLS
PAIN_FUNCTIONAL_ASSESSMENT: 0-10

## 2025-08-20 ASSESSMENT — PAIN SCALES - GENERAL
PAINLEVEL_OUTOF10: 5
PAINLEVEL_OUTOF10: 4
PAINLEVEL_OUTOF10: 6
PAINLEVEL_OUTOF10: 3

## 2025-08-20 ASSESSMENT — PAIN DESCRIPTION - PAIN TYPE: TYPE: SURGICAL PAIN

## 2025-08-20 ASSESSMENT — PAIN DESCRIPTION - DESCRIPTORS
DESCRIPTORS: ACHING;CRAMPING;DISCOMFORT
DESCRIPTORS: ACHING;DISCOMFORT;CRAMPING
DESCRIPTORS: ACHING;DISCOMFORT;CRAMPING
DESCRIPTORS: ACHING

## 2025-08-20 ASSESSMENT — PAIN DESCRIPTION - ONSET: ONSET: ON-GOING

## 2025-08-21 LAB
ANION GAP SERPL CALC-SCNC: 14 MMOL/L (ref 2–14)
BASOPHILS # BLD: 0.01 K/UL (ref 0–0.1)
BASOPHILS NFR BLD: 0.1 % (ref 0–1)
BUN SERPL-MCNC: 26 MG/DL (ref 8–23)
BUN/CREAT SERPL: 24 (ref 12–20)
CALCIUM SERPL-MCNC: 8.9 MG/DL (ref 8.8–10.2)
CHLORIDE SERPL-SCNC: 102 MMOL/L (ref 98–107)
CO2 SERPL-SCNC: 24 MMOL/L (ref 20–29)
CREAT SERPL-MCNC: 1.08 MG/DL (ref 0.7–1.2)
DIFFERENTIAL METHOD BLD: ABNORMAL
EOSINOPHIL # BLD: 0 K/UL (ref 0–0.4)
EOSINOPHIL NFR BLD: 0 % (ref 0–7)
ERYTHROCYTE [DISTWIDTH] IN BLOOD BY AUTOMATED COUNT: 16.1 % (ref 11.5–14.5)
GLUCOSE SERPL-MCNC: 147 MG/DL (ref 65–100)
HCT VFR BLD AUTO: 37.6 % (ref 36.6–50.3)
HGB BLD-MCNC: 12.1 G/DL (ref 12.1–17)
IMM GRANULOCYTES # BLD AUTO: 0.06 K/UL (ref 0–0.04)
IMM GRANULOCYTES NFR BLD AUTO: 0.4 % (ref 0–0.5)
LYMPHOCYTES # BLD: 1.22 K/UL (ref 0.8–3.5)
LYMPHOCYTES NFR BLD: 9 % (ref 12–49)
MAGNESIUM SERPL-MCNC: 2.6 MG/DL (ref 1.6–2.4)
MCH RBC QN AUTO: 30.6 PG (ref 26–34)
MCHC RBC AUTO-ENTMCNC: 32.2 G/DL (ref 30–36.5)
MCV RBC AUTO: 95.2 FL (ref 80–99)
MONOCYTES # BLD: 0.57 K/UL (ref 0–1)
MONOCYTES NFR BLD: 4.2 % (ref 5–13)
NEUTS SEG # BLD: 11.71 K/UL (ref 1.8–8)
NEUTS SEG NFR BLD: 86.3 % (ref 32–75)
NRBC # BLD: 0 K/UL (ref 0–0.01)
NRBC BLD-RTO: 0 PER 100 WBC
PHOSPHATE SERPL-MCNC: 4.4 MG/DL (ref 2.5–4.5)
PLATELET # BLD AUTO: 178 K/UL (ref 150–400)
PMV BLD AUTO: 10.9 FL (ref 8.9–12.9)
POTASSIUM SERPL-SCNC: 4.9 MMOL/L (ref 3.5–5.1)
RBC # BLD AUTO: 3.95 M/UL (ref 4.1–5.7)
SODIUM SERPL-SCNC: 140 MMOL/L (ref 136–145)
WBC # BLD AUTO: 13.6 K/UL (ref 4.1–11.1)

## 2025-08-21 PROCEDURE — 84100 ASSAY OF PHOSPHORUS: CPT

## 2025-08-21 PROCEDURE — 80048 BASIC METABOLIC PNL TOTAL CA: CPT

## 2025-08-21 PROCEDURE — 6360000002 HC RX W HCPCS: Performed by: SURGERY

## 2025-08-21 PROCEDURE — 6370000000 HC RX 637 (ALT 250 FOR IP): Performed by: SURGERY

## 2025-08-21 PROCEDURE — 85025 COMPLETE CBC W/AUTO DIFF WBC: CPT

## 2025-08-21 PROCEDURE — 99024 POSTOP FOLLOW-UP VISIT: CPT | Performed by: PHYSICIAN ASSISTANT

## 2025-08-21 PROCEDURE — 94760 N-INVAS EAR/PLS OXIMETRY 1: CPT

## 2025-08-21 PROCEDURE — 1100000000 HC RM PRIVATE

## 2025-08-21 PROCEDURE — 83735 ASSAY OF MAGNESIUM: CPT

## 2025-08-21 PROCEDURE — 2700000000 HC OXYGEN THERAPY PER DAY

## 2025-08-21 RX ORDER — ROSUVASTATIN CALCIUM 10 MG/1
10 TABLET, COATED ORAL NIGHTLY
Status: DISCONTINUED | OUTPATIENT
Start: 2025-08-22 | End: 2025-08-22 | Stop reason: HOSPADM

## 2025-08-21 RX ADMIN — ALVIMOPAN 12 MG: 12 CAPSULE ORAL at 20:36

## 2025-08-21 RX ADMIN — KETOROLAC TROMETHAMINE 15 MG: 30 INJECTION, SOLUTION INTRAMUSCULAR; INTRAVENOUS at 18:57

## 2025-08-21 RX ADMIN — ATENOLOL 50 MG: 25 TABLET ORAL at 08:50

## 2025-08-21 RX ADMIN — ACETAMINOPHEN 1000 MG: 500 TABLET ORAL at 07:17

## 2025-08-21 RX ADMIN — ASPIRIN 81 MG: 81 TABLET, CHEWABLE ORAL at 08:51

## 2025-08-21 RX ADMIN — ALLOPURINOL 300 MG: 100 TABLET ORAL at 08:50

## 2025-08-21 RX ADMIN — ALVIMOPAN 12 MG: 12 CAPSULE ORAL at 08:50

## 2025-08-21 RX ADMIN — VALSARTAN 320 MG: 160 TABLET, FILM COATED ORAL at 08:50

## 2025-08-21 RX ADMIN — BUMETANIDE 2 MG: 1 TABLET ORAL at 08:50

## 2025-08-21 RX ADMIN — ACETAMINOPHEN 1000 MG: 500 TABLET ORAL at 01:32

## 2025-08-21 RX ADMIN — ENOXAPARIN SODIUM 60 MG: 100 INJECTION SUBCUTANEOUS at 08:51

## 2025-08-21 RX ADMIN — FINASTERIDE 5 MG: 5 TABLET, FILM COATED ORAL at 08:50

## 2025-08-21 RX ADMIN — KETOROLAC TROMETHAMINE 15 MG: 30 INJECTION, SOLUTION INTRAMUSCULAR; INTRAVENOUS at 01:31

## 2025-08-21 RX ADMIN — KETOROLAC TROMETHAMINE 15 MG: 30 INJECTION, SOLUTION INTRAMUSCULAR; INTRAVENOUS at 07:17

## 2025-08-21 RX ADMIN — ACETAMINOPHEN 1000 MG: 500 TABLET ORAL at 12:36

## 2025-08-21 RX ADMIN — KETOROLAC TROMETHAMINE 15 MG: 30 INJECTION, SOLUTION INTRAMUSCULAR; INTRAVENOUS at 12:37

## 2025-08-21 RX ADMIN — BUMETANIDE 2 MG: 1 TABLET ORAL at 20:36

## 2025-08-21 RX ADMIN — ENOXAPARIN SODIUM 60 MG: 100 INJECTION SUBCUTANEOUS at 20:35

## 2025-08-21 ASSESSMENT — PAIN - FUNCTIONAL ASSESSMENT
PAIN_FUNCTIONAL_ASSESSMENT: 0-10
PAIN_FUNCTIONAL_ASSESSMENT: 0-10

## 2025-08-21 ASSESSMENT — PAIN DESCRIPTION - LOCATION
LOCATION: ABDOMEN
LOCATION: ABDOMEN

## 2025-08-21 ASSESSMENT — PAIN SCALES - GENERAL
PAINLEVEL_OUTOF10: 5
PAINLEVEL_OUTOF10: 5

## 2025-08-21 ASSESSMENT — PAIN DESCRIPTION - ORIENTATION
ORIENTATION: ANTERIOR
ORIENTATION: ANTERIOR

## 2025-08-21 ASSESSMENT — PAIN DESCRIPTION - DESCRIPTORS
DESCRIPTORS: ACHING
DESCRIPTORS: ACHING;SORE

## 2025-08-22 VITALS
BODY MASS INDEX: 44.1 KG/M2 | WEIGHT: 315 LBS | TEMPERATURE: 97.7 F | RESPIRATION RATE: 18 BRPM | SYSTOLIC BLOOD PRESSURE: 133 MMHG | DIASTOLIC BLOOD PRESSURE: 77 MMHG | HEART RATE: 64 BPM | HEIGHT: 71 IN | OXYGEN SATURATION: 93 %

## 2025-08-22 PROCEDURE — 2500000003 HC RX 250 WO HCPCS: Performed by: SURGERY

## 2025-08-22 PROCEDURE — 99024 POSTOP FOLLOW-UP VISIT: CPT | Performed by: PHYSICIAN ASSISTANT

## 2025-08-22 PROCEDURE — 6360000002 HC RX W HCPCS: Performed by: SURGERY

## 2025-08-22 PROCEDURE — 6370000000 HC RX 637 (ALT 250 FOR IP): Performed by: SURGERY

## 2025-08-22 PROCEDURE — 2700000000 HC OXYGEN THERAPY PER DAY

## 2025-08-22 RX ORDER — OXYCODONE HYDROCHLORIDE 5 MG/1
5 TABLET ORAL EVERY 6 HOURS PRN
Qty: 25 TABLET | Refills: 0 | Status: SHIPPED | OUTPATIENT
Start: 2025-08-22 | End: 2025-09-01

## 2025-08-22 RX ADMIN — KETOROLAC TROMETHAMINE 15 MG: 30 INJECTION, SOLUTION INTRAMUSCULAR; INTRAVENOUS at 00:46

## 2025-08-22 RX ADMIN — ACETAMINOPHEN 1000 MG: 500 TABLET ORAL at 00:46

## 2025-08-22 RX ADMIN — ATENOLOL 50 MG: 25 TABLET ORAL at 08:28

## 2025-08-22 RX ADMIN — ACETAMINOPHEN 1000 MG: 500 TABLET ORAL at 06:46

## 2025-08-22 RX ADMIN — ACETAMINOPHEN 1000 MG: 500 TABLET ORAL at 13:18

## 2025-08-22 RX ADMIN — FINASTERIDE 5 MG: 5 TABLET, FILM COATED ORAL at 08:27

## 2025-08-22 RX ADMIN — KETOROLAC TROMETHAMINE 15 MG: 30 INJECTION, SOLUTION INTRAMUSCULAR; INTRAVENOUS at 13:18

## 2025-08-22 RX ADMIN — KETOROLAC TROMETHAMINE 15 MG: 30 INJECTION, SOLUTION INTRAMUSCULAR; INTRAVENOUS at 06:46

## 2025-08-22 RX ADMIN — ALVIMOPAN 12 MG: 12 CAPSULE ORAL at 08:27

## 2025-08-22 RX ADMIN — ROSUVASTATIN 10 MG: 10 TABLET, FILM COATED ORAL at 06:46

## 2025-08-22 RX ADMIN — BUMETANIDE 2 MG: 1 TABLET ORAL at 08:27

## 2025-08-22 RX ADMIN — ENOXAPARIN SODIUM 60 MG: 100 INJECTION SUBCUTANEOUS at 08:29

## 2025-08-22 RX ADMIN — ASPIRIN 81 MG: 81 TABLET, CHEWABLE ORAL at 08:28

## 2025-08-22 RX ADMIN — ALLOPURINOL 300 MG: 100 TABLET ORAL at 08:28

## 2025-08-22 RX ADMIN — SODIUM CHLORIDE, PRESERVATIVE FREE 10 ML: 5 INJECTION INTRAVENOUS at 08:29

## 2025-08-22 RX ADMIN — VALSARTAN 320 MG: 160 TABLET, FILM COATED ORAL at 08:26

## 2025-08-22 ASSESSMENT — PAIN SCALES - GENERAL
PAINLEVEL_OUTOF10: 0
PAINLEVEL_OUTOF10: 3

## 2025-09-02 ENCOUNTER — TELEPHONE (OUTPATIENT)
Age: 70
End: 2025-09-02

## 2025-09-02 DIAGNOSIS — C7A.8 NEUROENDOCRINE CANCER (HCC): Primary | ICD-10-CM

## 2025-09-04 ENCOUNTER — OFFICE VISIT (OUTPATIENT)
Age: 70
End: 2025-09-04

## 2025-09-04 VITALS
SYSTOLIC BLOOD PRESSURE: 142 MMHG | DIASTOLIC BLOOD PRESSURE: 80 MMHG | TEMPERATURE: 98 F | HEART RATE: 79 BPM | RESPIRATION RATE: 17 BRPM | BODY MASS INDEX: 44.1 KG/M2 | WEIGHT: 315 LBS | OXYGEN SATURATION: 93 % | HEIGHT: 71 IN

## 2025-09-04 DIAGNOSIS — C7B.8 NEUROENDOCRINE CARCINOMA METASTATIC TO SMALL INTESTINE (HCC): Primary | ICD-10-CM

## 2025-09-04 DIAGNOSIS — C7A.8 NEUROENDOCRINE CARCINOMA METASTATIC TO SMALL INTESTINE (HCC): Primary | ICD-10-CM

## 2025-09-04 PROCEDURE — 99024 POSTOP FOLLOW-UP VISIT: CPT | Performed by: PHYSICIAN ASSISTANT

## (undated) DEVICE — CONTAINER SPEC 20 ML LID NEUT BUFF FORMALIN 10 % POLYPR STS

## (undated) DEVICE — SUTURE VICRYL + SZ 3-0 L27IN ABSRB UD L26MM SH 1/2 CIR VCP416H

## (undated) DEVICE — Device

## (undated) DEVICE — ADHESIVE SKIN CLOSURE TOP 0.8 CC PREM PUR LIQUIBAND RAPID LF

## (undated) DEVICE — Z DISCONTINUED PER MEDLINE LINE GAS SAMPLING O2/CO2 LNG AD 13 FT NSL W/ TBNG FILTERLINE

## (undated) DEVICE — RELOAD STPL L60MM H1-2.6MM MESENTERY THN TISS WHT 6 ROW

## (undated) DEVICE — GLOVE ORANGE PI 7 1/2   MSG9075

## (undated) DEVICE — SET GRAV CK VLV NEEDLESS ST 3 GANGED 4WAY STPCOCK HI FLO 10

## (undated) DEVICE — TIP SUCT TRNSPAR RIB SURF STD BLB RIG NVENT W/ 5IN1 CONN DYND50138] MEDLINE INDUSTRIES INC]

## (undated) DEVICE — SOLIDIFIER FLD 2OZ 1500CC N DISINF IN BTL DISP SAFESORB

## (undated) DEVICE — AGENT HEMSTAT 3GM OXIDIZED REGENERATED CELOS ABSRB FOR CONT (ORDER MULTIPLES OF 5EA)

## (undated) DEVICE — SUTURE PERMAHAND SZ 3-0 L18IN NONABSORBABLE BLK L26MM SH C013D

## (undated) DEVICE — CATHETER IV 14GA L1.25IN TEF STR HUB INTROCAN SFTY

## (undated) DEVICE — SOLUTION IV 500 ML 0.9 NACL INJ EXCEL CONTAINER USP LF

## (undated) DEVICE — HYPODERMIC SAFETY NEEDLE: Brand: MAGELLAN

## (undated) DEVICE — TROCAR LAP L100MM DIA5MM BLDELSS W/ STBL SL ENDOPATH XCEL

## (undated) DEVICE — GLOVE SURG SZ 8 L12IN FNGR THK79MIL GRN LTX FREE

## (undated) DEVICE — SOLUTION IV 1000 ML 0.9 NACL INJ USP EXCEL PLAS CONTAINER

## (undated) DEVICE — GLOVE BIOGEL PI ULTRA TOUCH M SZ 7.5

## (undated) DEVICE — GARMENT COMPR M FOR 12-18IN CALF INTMIT SGL BLDR HEMO-FORCE

## (undated) DEVICE — SET ADMIN 16ML TBNG L100IN 2 Y INJ SITE IV PIGGY BK DISP

## (undated) DEVICE — CATHETER IV 20GA L1.16IN OD1.0414-1.1176MM ID0.762-0.8382MM

## (undated) DEVICE — TRAP ENDOSCP POLYP 2 CHMBR DRAWER TYP

## (undated) DEVICE — INTENDED FOR TISSUE SEPARATION, AND OTHER PROCEDURES THAT REQUIRE A SHARP SURGICAL BLADE TO PUNCTURE OR CUT.: Brand: BARD-PARKER ® CARBON RIB-BACK BLADES

## (undated) DEVICE — NEEDLE INSUF L150MM OD13GA DISP VERES

## (undated) DEVICE — ELECTRODE,RADIOTRANSLUCENT,FOAM,5PK: Brand: MEDLINE

## (undated) DEVICE — SNARE ENDOSCP POLYP MED 2.4 MM 240 CM 27 MM 2.8 MM SHT SENS

## (undated) DEVICE — ACUSNARE POLYPECTOMY SNARE: Brand: ACUSNARE

## (undated) DEVICE — TISSEEL VALPK W/EP 10ML KIT 6P

## (undated) DEVICE — SUTURE PDS II SZ 4-0 L27IN ABSRB VLT L17MM RB-1 1/2 CIR Z304H

## (undated) DEVICE — BITEBLOCK 54FR W/ DENT RIM BLOX

## (undated) DEVICE — BLADELESS OBTURATOR: Brand: WECK VISTA

## (undated) DEVICE — FIAPC® PROBE W/ FILTER 2200 C OD 2.3MM/6.9FR; L 2.2M/7.2FT: Brand: ERBE

## (undated) DEVICE — FORCEPS BX L240CM JAW DIA2.4MM ORNG L CAP W/ NDL DISP RAD

## (undated) DEVICE — TOWEL 4 PLY TISS 19X30 SUE WHT

## (undated) DEVICE — STAPLER ECHELON 3000 60MM COMPACT

## (undated) DEVICE — SYSTEM EVAC SMOKE LAPARSCOPIC

## (undated) DEVICE — TROCAR LAP L100MM DIA5MM Z THRD OPT ACCS SYS KII

## (undated) DEVICE — SUTURE MONOCRYL SZ 4-0 L27IN ABSRB UD L19MM PS-2 1/2 CIR PRIM Y426H

## (undated) DEVICE — SNARE ENDOSCP POLYP MED STD AD 2.4X27X240 CM 2.8 MM OVL SENS

## (undated) DEVICE — CATH IV AUTOGRD BC PNK 20GA 25 -- INSYTE

## (undated) DEVICE — MANIFOLD SUCT 4 PRT 2 CANSTR FLTR DISP NEPTUNE 2

## (undated) DEVICE — ARM DRAPE

## (undated) DEVICE — COVER,TABLE,HEAVY DUTY,77"X90",STRL: Brand: MEDLINE

## (undated) DEVICE — SYR 3ML LL TIP 1/10ML GRAD --

## (undated) DEVICE — APPLICATOR MEDICATED 26 CC SOLUTION HI LT ORNG CHLORAPREP

## (undated) DEVICE — COVIDIEN KENDALL DL DISPOSABLE 3 LEAD SY: Brand: MEDLINE RENEWAL

## (undated) DEVICE — TUBING INSUF FLTR STD FLO DEHP FREE

## (undated) DEVICE — TIP COVER ACCESSORY

## (undated) DEVICE — TIP SUCT TAPR BULBOUS NO VENT YANK

## (undated) DEVICE — SYR 10ML LUER LOK 1/5ML GRAD --

## (undated) DEVICE — 1200 GUARD II KIT W/5MM TUBE W/O VAC TUBE: Brand: GUARDIAN

## (undated) DEVICE — TOTAL TRAY, 16FR 10ML SIL FOLEY, URN: Brand: MEDLINE

## (undated) DEVICE — IV START KIT: Brand: MEDLINE

## (undated) DEVICE — SLEEVE LAP L100MM DIA5MM Z THRD KII

## (undated) DEVICE — SPONGE LAP W18XL18IN WHT COT 4 PLY FLD STRUNG RADPQ DISP ST 2 PER PACK

## (undated) DEVICE — SEAL

## (undated) DEVICE — SUTURE VICRYL + SZ 0 L27IN ABSRB VLT L26MM UR-6 5/8 CIR VCP603H

## (undated) DEVICE — GARMENT COMPR L FOR 18-22IN CALF INTMIT THER HEM FORC II

## (undated) DEVICE — FIAPC® PROBE W/ FILTER 2200 A OD 2.3MM/6.9FR; L 2.2M/7.2FT: Brand: ERBE

## (undated) DEVICE — DRAPE,ROBOTICS,STERILE: Brand: MEDLINE

## (undated) DEVICE — SUTURE PROL 2-0 L48IN NONABSORBABLE BLU SH L26MM 1/2 CIR 8533H

## (undated) DEVICE — DECANTER BAG 9": Brand: MEDLINE INDUSTRIES, INC.

## (undated) DEVICE — SUTURE MONOCRYL + SZ 4-0 L27IN ABSRB UD L19MM PS-2 3/8 CIR MCP426H

## (undated) DEVICE — SOLUTION ANTIFOG VIS SYS CLEARIFY LAPSCP

## (undated) DEVICE — SENSOR PLSE OXMTR AD CBL L3FT ADH TRANSMISSIVE

## (undated) DEVICE — KIT,1200CC CANISTER,3/16"X6' TUBING: Brand: MEDLINE INDUSTRIES, INC.

## (undated) DEVICE — SEALER ONE-STEP 37CM LIGASURE MARYLAND XP

## (undated) DEVICE — ELECTRODE PT RET AD L9FT HI MOIST COND ADH HYDRGEL CORDED

## (undated) DEVICE — INJECTION THERAPY NEEDLE CATHETER: Brand: INTERJECT

## (undated) DEVICE — CUFF BLD PRSS AD CLTH SGL TB W/ BAYNT CONN ROUNDED CORNER

## (undated) DEVICE — GOWN,SIRUS,NONRNF,SETINSLV,XL,20/CS: Brand: MEDLINE

## (undated) DEVICE — RETRACTOR LAP L SHTH 25CM INCIS RANG 9-14CM WND PROTCT FLX

## (undated) DEVICE — BASIN EMSIS 16OZ GRAPHITE PLAS KID SHP MOLD GRAD FOR ORAL

## (undated) DEVICE — NEONATAL-ADULT SPO2 SENSOR: Brand: NELLCOR

## (undated) DEVICE — FOLEY TRAY 1 LAYR 16 FR 10 CC URIMTR SNAPSECURE URO175816S

## (undated) DEVICE — ENDOSCOPIC KIT COMPLIANCE ENDOKIT

## (undated) DEVICE — BLADE CLIPPER GEN PURP NS

## (undated) DEVICE — SET SPR SNAP LOK 40CM 360DEG ENDOSCP APPL FLX W DUPLOSPR

## (undated) DEVICE — NEEDLE HYPO 18GA L1.5IN PNK S STL HUB POLYPR SHLD REG BVL

## (undated) DEVICE — SUTURE STRATAFIX SYMMETRIC SZ 1 L18IN ABSRB VLT CT1 L36CM SXPP1A404

## (undated) DEVICE — SYRINGE IRRIG 60ML SFT PLIABLE BLB EZ TO GRP 1 HND USE W/

## (undated) DEVICE — LEGGINGS: Brand: CONVERTORS

## (undated) DEVICE — RETRIEVAL DEVICE: Brand: RESCUENET™

## (undated) DEVICE — SYRINGE MED 10ML LUERLOCK TIP W/O SFTY DISP

## (undated) DEVICE — GENERAL LAPAROSCOPY-MRMC: Brand: MEDLINE INDUSTRIES, INC.

## (undated) DEVICE — SHEET,DRAPE,UNDERBUTTOCK,GRAD POUCH,PORT: Brand: MEDLINE